# Patient Record
Sex: MALE | Race: WHITE | Employment: OTHER | URBAN - METROPOLITAN AREA
[De-identification: names, ages, dates, MRNs, and addresses within clinical notes are randomized per-mention and may not be internally consistent; named-entity substitution may affect disease eponyms.]

---

## 2015-05-12 LAB — HEMOCCULT STL QL: NEGATIVE

## 2017-08-11 ENCOUNTER — IP HISTORICAL/CONVERTED ENCOUNTER (OUTPATIENT)
Dept: OTHER | Age: 52
End: 2017-08-11

## 2019-09-25 LAB — CREATININE, EXTERNAL: 1.6

## 2019-10-07 LAB — HBA1C MFR BLD HPLC: 9.1 %

## 2019-10-08 LAB — PSA, EXTERNAL: 0.8

## 2020-10-06 RX ORDER — CETIRIZINE HCL 10 MG
TABLET ORAL
Qty: 90 TAB | Refills: 0 | Status: SHIPPED | OUTPATIENT
Start: 2020-10-06 | End: 2021-01-11

## 2020-10-21 VITALS
DIASTOLIC BLOOD PRESSURE: 67 MMHG | WEIGHT: 121 LBS | BODY MASS INDEX: 17.32 KG/M2 | SYSTOLIC BLOOD PRESSURE: 110 MMHG | TEMPERATURE: 97.9 F | HEART RATE: 71 BPM | RESPIRATION RATE: 16 BRPM | OXYGEN SATURATION: 97 % | HEIGHT: 70 IN

## 2020-10-21 PROBLEM — F41.9 CHRONIC ANXIETY: Status: ACTIVE | Noted: 2020-10-21

## 2020-10-21 PROBLEM — E11.42 DIABETIC PERIPHERAL NEUROPATHY (HCC): Status: ACTIVE | Noted: 2020-10-21

## 2020-10-21 PROBLEM — D63.8 ANEMIA OF CHRONIC DISEASE: Status: ACTIVE | Noted: 2020-10-21

## 2020-10-21 PROBLEM — G47.00 INSOMNIA: Status: ACTIVE | Noted: 2020-10-21

## 2020-10-21 PROBLEM — E10.9 TYPE 1 DIABETES MELLITUS (HCC): Status: ACTIVE | Noted: 2020-10-21

## 2020-10-21 PROBLEM — K31.84 GASTROPARESIS: Status: ACTIVE | Noted: 2020-10-21

## 2020-10-21 PROBLEM — I10 BENIGN HYPERTENSION: Status: ACTIVE | Noted: 2020-10-21

## 2020-10-21 RX ORDER — DOCUSATE SODIUM 100 MG/1
100 CAPSULE, LIQUID FILLED ORAL 2 TIMES DAILY
COMMUNITY
End: 2020-12-15

## 2020-10-21 RX ORDER — BLOOD SUGAR DIAGNOSTIC
STRIP MISCELLANEOUS SEE ADMIN INSTRUCTIONS
Status: ON HOLD | COMMUNITY
End: 2021-12-29

## 2020-10-21 RX ORDER — PANCRELIPASE 36000; 180000; 114000 [USP'U]/1; [USP'U]/1; [USP'U]/1
CAPSULE, DELAYED RELEASE PELLETS ORAL
COMMUNITY
End: 2020-12-15

## 2020-10-21 RX ORDER — INSULIN ADMIN. SUPPLIES
INSULIN PEN (EA) SUBCUTANEOUS
COMMUNITY
End: 2020-10-22

## 2020-10-21 RX ORDER — INSULIN DETEMIR 100 [IU]/ML
INJECTION, SOLUTION SUBCUTANEOUS
COMMUNITY
End: 2020-12-15

## 2020-10-21 RX ORDER — INSULIN ASPART 100 [IU]/ML
INJECTION, SOLUTION INTRAVENOUS; SUBCUTANEOUS
COMMUNITY
End: 2021-01-26

## 2020-10-22 RX ORDER — INSULIN ADMIN. SUPPLIES
INSULIN PEN (EA) SUBCUTANEOUS
Qty: 100 CARTRIDGE | Refills: 4 | Status: SHIPPED | OUTPATIENT
Start: 2020-10-22 | End: 2020-12-15

## 2020-11-12 LAB — HBA1C MFR BLD HPLC: 8.2 %

## 2020-12-14 ENCOUNTER — TELEPHONE (OUTPATIENT)
Dept: PHARMACY | Age: 55
End: 2020-12-14

## 2020-12-14 NOTE — TELEPHONE ENCOUNTER
Aníbal Dangelo MD, from below, would patient benefit from lipid panel (and LFTs) and then assessment for statin therapy?  - No noted lipid panel in this EMR or available CareEverywhere labwork    Appears new patient appt with you 12/15/20 - if time permits? Please let me know if I can further assist, or if you would like me to try to reach patient prior to (or after) appt to discuss any further. Thank you,  Patricia Brothers, PharmD, [facility]  Direct: [phone]  Department, toll free: [phone], option 7      ==============================================================  CLINICAL PHARMACY: STATIN REVIEW    SUBJECTIVE:   Identified as DM care gap for [employer]: statin therapy. OBJECTIVE:  Allergies no known allergies    No results found for: CHOL, CHOLPOCT, CHOLX, CHLST, CHOLV, HDL, HDLPOC, HDLP, LDL, LDLCPOC, LDLC, DLDLP, VLDLC, VLDL, TGLX, TRIGL, TRIGP, TGLPOCT, CHHD, CHHDX    Per CareEverywhere, 11/27/19: AST/ALT 16/10    WHITE OR     BP Readings from Last 1 Encounters:   07/13/20 110/67       Social History     Tobacco Use    Smoking status: Never Smoker    Smokeless tobacco: Current User    Tobacco comment: 5+/day   Substance Use Topics    Alcohol use: Not Currently       ASSESSMENT:  Hyperlipidemia Goal: no noted/available lipid panel. May likely benefit from statin therapy, given age and PMH including DM, HTN. · Appears PMH also includes alcohol-induced chronic pancreatitis - available LFTs from >1 year ago appears wnl and 11/23/20 \"Abstract\" noted alcohol use \"Not Currently\".     PLAN:  - Consider lipid panel and LFTs (if not available from another source); then assess for statin therapy

## 2020-12-15 ENCOUNTER — VIRTUAL VISIT (OUTPATIENT)
Dept: INTERNAL MEDICINE CLINIC | Age: 55
End: 2020-12-15
Payer: MEDICARE

## 2020-12-15 DIAGNOSIS — E10.65 HYPERGLYCEMIA DUE TO TYPE 1 DIABETES MELLITUS (HCC): Primary | ICD-10-CM

## 2020-12-15 PROCEDURE — 99213 OFFICE O/P EST LOW 20 MIN: CPT | Performed by: INTERNAL MEDICINE

## 2020-12-15 NOTE — PROGRESS NOTES
Patient states he needs to have a follow up. States he lost his liscence due to a diabetic episode on 10/2020 where he blacked out. States he is back on his insulin pump now and doing good. Has to see doctor to get paperwork completed to get his liscence back. Goes to [place] on Friday. Checks BS 6 times a day. On Friday he goes to get sensor put in his arm for his pump with Marce Brady at Endocrinology. He is having neurologist, endocrinologist and family doctor do paperwork. Lincoln Raymond presents today for   Chief Complaint   Patient presents with   [address]     was a patient of ANTHONY Francis and Dr Thomas Player       Is someone accompanying this pt? No  Is the patient using any DME equipment during OV? No    Depression Screening:  3 most recent PHQ Screens 12/15/2020   Little interest or pleasure in doing things Not at all   Feeling down, depressed, irritable, or hopeless Not at all   Total Score PHQ 2 0       Learning Assessment:  No flowsheet data found. Health Maintenance reviewed and discussed and ordered per Provider. Health Maintenance Due   Topic Date Due    Pneumococcal 0-64 years (1 of 1 - PPSV23) 10/12/1973    Foot Exam Q1  10/12/1977    MICROALBUMIN Q1  10/12/1977    Eye Exam Retinal or Dilated  10/12/1977    Lipid Screen  10/12/1977    DTaP/Tdap/Td series (1 - Tdap) 10/12/1988    Shingrix Vaccine Age 50> (1 of 2) 10/12/2017    Medicare Yearly Exam  08/11/2020    Flu Vaccine (1) 09/01/2020   . Coordination of Care:  1. Have you been to the ER, urgent care clinic since your last visit? Hospitalized since your last visit? no    2. Have you seen or consulted any other health care providers outside of the [address] [place] [address] since your last visit? Include any pap smears or colon screening.  no

## 2020-12-15 NOTE — PROGRESS NOTES
I was at my office in [place], [place] while conducting this encounter. The patient is at home. Consent:  Patient and/or HIS/HER healthcare decision maker is aware that this patient-initiated Telehealth encounter is a billable service, with coverage as determined by patient's insurance carrier. Patient is aware that He/She may receive a bill and has provided verbal consent to proceed: Consent has been obtained within past 12 months of the date of this encounter. This virtual visit was conducted via Telephone    1. Hyperglycemia due to type 1 diabetes mellitus ([facility] [place] [address]  The patient reports that while his blood sugars have been running high overall they are doing okay. His last hemoglobin A1c in November was 8.3. He is being followed by an endocrinologist who is managing all of his diabetic care. He also reports he just had labs drawn including a lipid profile. He is going to bring those to my office. Have also explained to him the that he needs to make an appointment for his annual wellness exam so that we can get all of his screening labs done       Chief Complaint   Patient presents with   [facility]     was a patient of ANTHONY Haider and Dr Jerman Darby        HPI   This is a pleasant 70-year-old gentleman with a history of type 1 diabetes who presents to [facility]. He has seen a couple different providers in our locations and has decided to request me to be his primary care doctor. He reports he sees endocrinology for his type 1 diabetes and although his sugars have been running a little bit high he has been working on it. He recently was implanted with an insulin pump as well. He reports he is overall doing well he has no chest pain palpitation shortness of breath or cough really needs help in getting his license back. He lost it due to hypoglycemic episode.   He states he will bring this by my office for me to look at  Current Outpatient Medications on File Prior to Visit   Medication Sig Dispense Refill    glucose blood VI test strips (Contour Next Test Strips) strip by Does Not Apply route See Admin Instructions.  insulin aspart U-100 (NovoLOG Flexpen U-100 Insulin) 100 unit/mL (3 mL) inpn by SubCUTAneous route. Uses Novolog in insulin pump      cetirizine (ZYRTEC) 10 mg tablet TAKE 1 TABLET EVERY DAY BY ORAL ROUTE. 90 Tab 0    gabapentin (NEURONTIN) 800 mg tablet Take  by mouth three (3) times daily. No current facility-administered medications on file prior to visit.          Patient Active Problem List   Diagnosis Code    Anemia of chronic disease D63.8    Benign hypertension I10    Chronic anxiety F41.9    Type 1 diabetes mellitus ([address] E10.9    Diabetic peripheral neuropathy ([address] E11.42    Gastroparesis K31.84    Insomnia G47.00             Total Time Spent on this Encounter: 15 minutes spent

## 2020-12-16 NOTE — TELEPHONE ENCOUNTER
Zayda Salinas MD  to Me      12/14/20 9:19 AM  I've never seen this patient but will be seeing him. I'll make the assessment when I see him for the first time. Thanks       And per 12/16/20 VV note: \"reports he just had labs drawn including a lipid profile. He is going to bring those to my office. \"      Thank you for review; will close encounter at this time.    =========================================================   For Pharmacy Admin Tracking Only    PHSO: PHSO Patient?: Yes  Total # of Interventions Recommended: Count: 1  - Maintenance Safety Lab Monitoring #: 1  Recommended intervention potential cost savings: 0  Total Interventions Accepted: 0  Time Spent (min): 15

## 2021-01-11 RX ORDER — CETIRIZINE HCL 10 MG
TABLET ORAL
Qty: 90 TAB | Refills: 0 | Status: SHIPPED | OUTPATIENT
Start: 2021-01-11 | End: 2021-04-22

## 2021-01-26 ENCOUNTER — OFFICE VISIT (OUTPATIENT)
Dept: INTERNAL MEDICINE CLINIC | Age: 56
End: 2021-01-26
Payer: MEDICARE

## 2021-01-26 VITALS
HEART RATE: 97 BPM | RESPIRATION RATE: 20 BRPM | TEMPERATURE: 98.8 F | BODY MASS INDEX: 19.33 KG/M2 | WEIGHT: 135 LBS | SYSTOLIC BLOOD PRESSURE: 156 MMHG | HEIGHT: 70 IN | OXYGEN SATURATION: 97 % | DIASTOLIC BLOOD PRESSURE: 80 MMHG

## 2021-01-26 DIAGNOSIS — R05.9 COUGH: ICD-10-CM

## 2021-01-26 DIAGNOSIS — F51.01 PRIMARY INSOMNIA: ICD-10-CM

## 2021-01-26 DIAGNOSIS — I10 HYPERTENSION, UNSPECIFIED TYPE: ICD-10-CM

## 2021-01-26 DIAGNOSIS — E10.65 HYPERGLYCEMIA DUE TO TYPE 1 DIABETES MELLITUS (HCC): ICD-10-CM

## 2021-01-26 DIAGNOSIS — Z00.00 ANNUAL PHYSICAL EXAM: Primary | ICD-10-CM

## 2021-01-26 PROCEDURE — 3017F COLORECTAL CA SCREEN DOC REV: CPT | Performed by: INTERNAL MEDICINE

## 2021-01-26 PROCEDURE — G8753 SYS BP > OR = 140: HCPCS | Performed by: INTERNAL MEDICINE

## 2021-01-26 PROCEDURE — G8427 DOCREV CUR MEDS BY ELIG CLIN: HCPCS | Performed by: INTERNAL MEDICINE

## 2021-01-26 PROCEDURE — 3046F HEMOGLOBIN A1C LEVEL >9.0%: CPT | Performed by: INTERNAL MEDICINE

## 2021-01-26 PROCEDURE — G8510 SCR DEP NEG, NO PLAN REQD: HCPCS | Performed by: INTERNAL MEDICINE

## 2021-01-26 PROCEDURE — G8754 DIAS BP LESS 90: HCPCS | Performed by: INTERNAL MEDICINE

## 2021-01-26 PROCEDURE — 99396 PREV VISIT EST AGE 40-64: CPT | Performed by: INTERNAL MEDICINE

## 2021-01-26 PROCEDURE — 99214 OFFICE O/P EST MOD 30 MIN: CPT | Performed by: INTERNAL MEDICINE

## 2021-01-26 PROCEDURE — G8420 CALC BMI NORM PARAMETERS: HCPCS | Performed by: INTERNAL MEDICINE

## 2021-01-26 PROCEDURE — 2022F DILAT RTA XM EVC RTNOPTHY: CPT | Performed by: INTERNAL MEDICINE

## 2021-01-26 RX ORDER — LANCETS
EACH MISCELLANEOUS
Status: ON HOLD | COMMUNITY
Start: 2020-11-09 | End: 2021-12-29

## 2021-01-26 RX ORDER — LOSARTAN POTASSIUM 50 MG/1
50 TABLET ORAL DAILY
Qty: 30 TAB | Refills: 5 | Status: SHIPPED | OUTPATIENT
Start: 2021-01-26 | End: 2022-01-03

## 2021-01-26 RX ORDER — TRAZODONE HYDROCHLORIDE 50 MG/1
50 TABLET ORAL
Qty: 45 TAB | Refills: 0 | Status: SHIPPED | OUTPATIENT
Start: 2021-01-26 | End: 2022-01-03

## 2021-01-26 RX ORDER — CETIRIZINE HCL 10 MG
10 TABLET ORAL DAILY
COMMUNITY
End: 2021-01-26 | Stop reason: SDUPTHER

## 2021-01-26 NOTE — PROGRESS NOTES
1. Annual physical exam  Lipid profile hemoglobin A1c PSA CMP and a microalbumin. With the exception of his chronic neuropathy and weakness in his left leg as well as his very mild strabismus and chronically slurred speech this is a normal exam except noted below  - LIPID PANEL  - HEMOGLOBIN A1C WITH EAG  - MICROALBUMIN, UR, RAND W/ MICROALB/CREAT RATIO  - PSA SCREENING (SCREENING)  - METABOLIC PANEL, COMPREHENSIVE    2. Hypertension, unspecified type  Uncontrolled. We will start Cozaar 50 mg daily. This will provide renal protection as well in this type I diabetic  - losartan (COZAAR) 50 mg tablet; Take 1 Tab by mouth daily. Dispense: 30 Tab; Refill: 5    3. Hyperglycemia due to type 1 diabetes mellitus [facility])  The patient sees endocrinology but his blood sugars been a little bit elevated. He is due for hemoglobin A1c and a urine microalbumin which I have ordered    4. Primary insomnia  Start trazodone 50 mg at night. He may increase to 100 mg nightly  - traZODone (DESYREL) 50 mg tablet; Take 1 Tab by mouth nightly. If no effect with one tab, may take two tabs 30 mins before going to bed. Dispense: 45 Tab; Refill: 0    5. Cough  -He to monitor this. This could be related to his postnasal drip. Should he have any chest pain fevers or chills he will call me back    Chief Complaint   Patient presents with    Physical        HPI   This is a very pleasant 60-year-old gentleman who presents for his annual physical exam.  He reports he is overall been doing well. He notes his blood sugars have been a little bit elevated in the 1 4150 range but otherwise feels good. He denies any dysuria or changes in urinary frequency. He has no headache new vision changes difficulty swallowing chest pain palpitations. He has had a chronic cough for the past week. It is nonproductive. He has no body aches fevers or chills. He has no loss of smell or taste.   He does report he has chronic weakness in his left leg due to his profound neuropathy. He is up-to-date and had a podiatry exam within the past 6 months and follows with Dr. Jyoti Christianson for his vision. I do not have access to any of his other labs that have been done outside of the facility but he reports he has had some done in the past 7 months. He otherwise reports he is doing well and states that he is up-to-date on his colonoscopy which she received just recently. A polyp was found and he knows he is due in 5 years. He also notes he is able to get up and walk around without difficulty although he does use a cane. His weight has been stable and he has no other complaints  Patient Active Problem List   Diagnosis Code    Anemia of chronic disease D63.8    Benign hypertension I10    Chronic anxiety F41.9    Type 1 diabetes mellitus ([facility] [address] E10.9    Diabetic peripheral neuropathy ([facility] [address] E11.42    Gastroparesis K31.84    Insomnia G47.00        Current Outpatient Medications on File Prior to Visit   Medication Sig Dispense Refill    Microlet Lancet misc       insulin pump (PATIENT SUPPLIED) misc by SubCUTAneous route continuous.  cetirizine (ZYRTEC) 10 mg tablet TAKE 1 TABLET EVERY DAY BY ORAL ROUTE.  90 Tab 0    glucose blood VI test strips (Contour Next Test Strips) strip by Does Not Apply route See Admin Instructions.  gabapentin (NEURONTIN) 800 mg tablet Take  by mouth three (3) times daily. No current facility-administered medications on file prior to visit. ROS  - GEN: no weight gain/loss, no fevers or chills + insomnia  - HEENT: no vision changes, no tinnitus, no sore throat  - CV: no cp, palpitations or edema  - RESP: no sob, + cough  - ABD: no n/v/d, no blood in stool  - : no dysuria or changes in freq.   - SKIN: no rashes, ulcers on arms or legs  - Neuro: no resting tremors, + parasthesia in extremities, no headaches  - MS: + weakness in left lower extremity, + gait abnormalities  - Psych: negative for depression or anxiety      Visit Vitals  BP (!) 156/80   Pulse 97   Temp 98.8 °F (37.1 °C)   Resp 20   Ht 5' 10\" (1.778 m)   Wt 135 lb (61.2 kg)   SpO2 97%   BMI 19.37 kg/m²           Physical Exam  Constitutional:       Appearance: Normal appearance. Normal weight. NAD and pleasant  HENT:      Head: Normocephalic. Nose: Nose normal.      Mouth/Throat: pnd noted     Mouth: Mucous membranes are moist. Throat not inflammed poor dentition  Eyes:      Extraocular Movements: slight strabismus is noted. perrl. Conjunctiva/sclera: Conjunctivae normal. Sclera anicteric     Pupils: Pupils are equal, round, and reactive to light. Cardiovascular:      Rate and Rhythm: slightly tachy 2/6 sm lsb      Pulses: Normal pulses. Pulmonary:      Effort: No respiratory distress. Breath sounds: CTAB and No stridor. No rhonchi. Abdominal:      General: There is no distension. NT, ND  Neurological:      Mental Status: patient is alert and oriented times 3.  No resting tremor, abnormal gait favors right side     Cranial Nerves: cranial nerves grossly intact  Muskuloskeletal     Full ROM in extremities     +2 pt reflexes bilaterally  Skin     Dry without lesions on examined areas including chest and arms, warm to the touch       Deferred  Psychiatry     Calm, normal affect, interacting normally

## 2021-01-26 NOTE — PROGRESS NOTES
Ekta Bhatt presents today for   Chief Complaint   Patient presents with    Insomnia     unable to sleep       Is someone accompanying this pt? no  Is the patient using any DME equipment during OV? no    Depression Screening:  3 most recent PHQ Screens 1/26/2021   Little interest or pleasure in doing things Not at all   Feeling down, depressed, irritable, or hopeless Not at all   Total Score PHQ 2 0       Learning Assessment:  Learning Assessment 12/15/2020   PRIMARY LEARNER Patient   BARRIERS PRIMARY LEARNER NONE   PRIMARY LANGUAGE ENGLISH   LEARNER PREFERENCE PRIMARY READING   ANSWERED BY patient   RELATIONSHIP SELF         Health Maintenance reviewed and discussed and ordered per Provider. Health Maintenance Due   Topic Date Due    Pneumococcal 0-64 years (1 of 1 - PPSV23) 10/12/1973    Foot Exam Q1  10/12/1977    MICROALBUMIN Q1  10/12/1977    Eye Exam Retinal or Dilated  10/12/1977    Lipid Screen  10/12/1977    COVID-19 Vaccine (1 of 2) 10/12/1983    DTaP/Tdap/Td series (1 - Tdap) 10/12/1988    Shingrix Vaccine Age 50> (1 of 2) 10/12/2017    Flu Vaccine (1) 09/01/2020    Medicare Yearly Exam  12/21/2020   . Coordination of Care:  1. Have you been to the ER, urgent care clinic since your last visit? Hospitalized since your last visit? no    2. Have you seen or consulted any other health care providers outside of the [address] [place] [address] since your last visit? Include any pap smears or colon screening.  no

## 2021-01-28 ENCOUNTER — APPOINTMENT (OUTPATIENT)
Dept: GENERAL RADIOLOGY | Age: 56
End: 2021-01-28
Attending: EMERGENCY MEDICINE
Payer: MEDICARE

## 2021-01-28 ENCOUNTER — HOSPITAL ENCOUNTER (EMERGENCY)
Age: 56
Discharge: HOME | End: 2021-01-28
Attending: EMERGENCY MEDICINE
Payer: MEDICARE

## 2021-01-28 VITALS
SYSTOLIC BLOOD PRESSURE: 90 MMHG | RESPIRATION RATE: 16 BRPM | BODY MASS INDEX: 18.9 KG/M2 | WEIGHT: 135 LBS | DIASTOLIC BLOOD PRESSURE: 33 MMHG | HEIGHT: 71 IN | TEMPERATURE: 98.6 F | OXYGEN SATURATION: 98 % | HEART RATE: 64 BPM

## 2021-01-28 DIAGNOSIS — M24.411 RECURRENT SHOULDER DISLOCATION, RIGHT: Primary | ICD-10-CM

## 2021-01-28 PROCEDURE — 75810000303 HC CLSD TRMT  FRACTURE/DISLOCATION W/  ANES

## 2021-01-28 PROCEDURE — 99285 EMERGENCY DEPT VISIT HI MDM: CPT

## 2021-01-28 PROCEDURE — 73030 X-RAY EXAM OF SHOULDER: CPT

## 2021-01-28 RX ORDER — PROPOFOL 10 MG/ML
70 INJECTION, EMULSION INTRAVENOUS
Status: DISCONTINUED | OUTPATIENT
Start: 2021-01-28 | End: 2021-01-28 | Stop reason: HOSPADM

## 2021-01-28 NOTE — ED NOTES
Pt ready for discharge, pt is alert and talking, denies any pain. Pt's nurse Kristy Randle called to come pick pt up. Sling applied to right arm post reduction. VSS. MD aware of blood pressure.   Pt states baseline BP is low, denies any s/s r/t low BP at this time

## 2021-01-28 NOTE — ED TRIAGE NOTES
Pt states he fell last night on his right shoulder, states he felt like he broke his arm at that time, but he went to bed.   Pt woke up with pain and deformity to right shoulder, possible dislocation

## 2021-01-28 NOTE — ED NOTES
70mg Propofol admimistered by anesthesia. Med brought over from OR by anesthesia. Anesthesia consent as well as procedural consent given verbally by pt witnessed by this nurse as well as Asher Wilson RN.   Pt unable to sign r/t injury

## 2021-01-28 NOTE — ED PROVIDER NOTES
EMERGENCY DEPARTMENT HISTORY AND PHYSICAL EXAM      Date: 1/28/2021  Patient Name: Jaswinder Carver    History of Presenting Illness     Chief Complaint   Patient presents with    Shoulder Injury       History Provided By: Patient and EMS    HPI: Jaswinder Carver, 50 y.o. male presents to the ED with complaints of a possible dislocated shoulder. Patient states that he got up early in the morning to get a Coke as he claims his blood sugar was low. In the process of retrieving his drink, the patient states that he tripped and fell, landing on his right shoulder. He states that he was able to sleep on the shoulder, but was still in pain when he woke back up, so he decided to call EMS to bring him to the ED. Patient does have a history of dislocating the same shoulder in the past. Denies any headache. There are no other complaints, changes, or physical findings at this time. PCP: Shay Pollack MD    Current Outpatient Medications   Medication Sig Dispense Refill    insulin pump (PATIENT SUPPLIED) misc by SubCUTAneous route continuous.  losartan (COZAAR) 50 mg tablet Take 1 Tab by mouth daily. 30 Tab 5    traZODone (DESYREL) 50 mg tablet Take 1 Tab by mouth nightly. If no effect with one tab, may take two tabs 30 mins before going to bed. 45 Tab 0    cetirizine (ZYRTEC) 10 mg tablet TAKE 1 TABLET EVERY DAY BY ORAL ROUTE.  90 Tab 0    gabapentin (NEURONTIN) 800 mg tablet Take  by mouth three (3) times daily.  Microlet Lancet misc       glucose blood VI test strips (Contour Next Test Strips) strip by Does Not Apply route See Admin Instructions.          Past History     Past Medical History:  Past Medical History:   Diagnosis Date    Diabetes ([address]     Hypercholesterolemia     Hypertension        Past Surgical History:  Past Surgical History:   Procedure Laterality Date    HX ORTHOPAEDIC Left 08/30/2018    hip joint    HX ORTHOPAEDIC Right     broken knee       Family History:  Family History Problem Relation Age of Onset    Cancer Father         prostate    Cancer Maternal Uncle         prostate    Cancer Paternal Uncle         prostate       Social History:  Social History     Tobacco Use    Smoking status: Never Smoker    Smokeless tobacco: Current User    Tobacco comment: 5+/day   Substance Use Topics    Alcohol use: Not Currently    Drug use: Never       Allergies:  No Known Allergies      Review of Systems   Review of Systems   Constitutional: Negative for chills, diaphoresis and fever. HENT: Negative for congestion, ear pain, rhinorrhea, sore throat and trouble swallowing. Eyes: Negative for photophobia, pain, redness and visual disturbance. Respiratory: Negative for cough, chest tightness, shortness of breath and wheezing. Cardiovascular: Negative for chest pain, palpitations and leg swelling. Gastrointestinal: Negative for abdominal pain, blood in stool, diarrhea, nausea and vomiting. Endocrine: Negative for polydipsia, polyphagia and polyuria. Genitourinary: Negative for dysuria, frequency and urgency. Musculoskeletal: Negative for back pain, joint swelling and neck pain. Right shoulder pain    Skin: Negative for color change, pallor, rash and wound. Allergic/Immunologic: Negative for environmental allergies, food allergies and immunocompromised state. Neurological: Negative for seizures, syncope and headaches. Hematological: Negative for adenopathy. Does not bruise/bleed easily. Psychiatric/Behavioral: Negative for behavioral problems and confusion. The patient is not nervous/anxious. Physical Exam   Physical Exam  Vitals signs and nursing note reviewed. Constitutional:       General: He is not in acute distress. Appearance: Normal appearance. He is normal weight. He is not diaphoretic. HENT:      Head: Normocephalic.       Comments: Small abrasion on the right temple     Right Ear: Tympanic membrane, ear canal and external ear normal. Nose: Nose normal.      Mouth/Throat:      Mouth: Mucous membranes are moist.      Pharynx: Oropharynx is clear. Eyes:      Extraocular Movements: Extraocular movements intact. Conjunctiva/sclera: Conjunctivae normal.      Pupils: Pupils are equal, round, and reactive to light. Neck:      Musculoskeletal: Normal range of motion and neck supple. No neck rigidity or muscular tenderness. Cardiovascular:      Rate and Rhythm: Normal rate and regular rhythm. Pulses: Normal pulses. Heart sounds: Normal heart sounds. Pulmonary:      Effort: Pulmonary effort is normal. No respiratory distress. Breath sounds: Normal breath sounds. Chest:      Chest wall: No tenderness. Abdominal:      General: Bowel sounds are normal.      Palpations: Abdomen is soft. There is no mass. Tenderness: There is no abdominal tenderness. Musculoskeletal:         General: No swelling. Right shoulder: He exhibits decreased range of motion, tenderness, deformity and pain. He exhibits no swelling, no spasm and normal pulse. Arms:    Skin:     General: Skin is warm. Coloration: Skin is not pale. Findings: No erythema. Neurological:      Mental Status: He is alert and oriented to person, place, and time. Sensory: No sensory deficit. Motor: No weakness. Psychiatric:         Mood and Affect: Mood normal.         Behavior: Behavior normal.         Thought Content: Thought content normal.         Judgment: Judgment normal.         Diagnostic Study Results     Labs -   No results found for this or any previous visit (from the past 12 hour(s)). Radiologic Studies -   XR SHOULDER RT AP/LAT MIN 2 V   Final Result         1. No evidence of acute fracture or malalignment involving the right shoulder.    2. Chronic appearing midshaft right clavicular deformity      XR SHOULDER RT AP/LAT MIN 2 V    (Results Pending)     CT Results  (Last 48 hours)    None        CXR Results  (Last 48 hours)    None          Medical Decision Making and ED Course   I am the first provider for this patient. I reviewed the vital signs, available nursing notes, past medical history, past surgical history, family history and social history. Vital Signs-Reviewed the patient's vital signs. Patient Vitals for the past 12 hrs:   Temp Pulse Resp BP SpO2   01/28/21 1208  68 14 100/75 100 %   01/28/21 1203  74 14 113/65 100 %   01/28/21 1112 98.6 °F (37 °C) 75 18 90/61 98 %       EKG interpretation: (Preliminary): Performed at ; Read at      Records Reviewed: Nursing Notes    The patient presents with     ED Course:   Initial assessment performed. The patients presenting problems have been discussed, and they are in agreement with the care plan formulated and outlined with them. I have encouraged them to ask questions as they arise throughout their visit. Provider Notes (Medical Decision Making):     History and physical consistent with an isolated dislocation without other trauma. Will check x-ray, and reduce under moderate sedation. Review of radiology read of initial x-ray reveals shoulder misalignment due to chronic clavicle fracture. However, patient reports that the pain was excruciating prior to procedure and is now completely resolved. X-ray does reveal some better alignment and I suspect some level of dislocation despite radiology finding. Okay for discharge  Consultations:       Consultations:     Anesthesia: CRNA, provided sedation. Procedures     1200 pm: Right shoulder reduction; fixed due to counter rotation. Mild sedation was given via Anesthesia. Patient tolerated well. Disposition     Disposition:         DISCHARGE PLAN:  1. Current Discharge Medication List      CONTINUE these medications which have NOT CHANGED    Details   insulin pump (PATIENT SUPPLIED) misc by SubCUTAneous route continuous. losartan (COZAAR) 50 mg tablet Take 1 Tab by mouth daily.   Qty: 30 Tab, Refills: 5    Associated Diagnoses: Hypertension, unspecified type      traZODone (DESYREL) 50 mg tablet Take 1 Tab by mouth nightly. If no effect with one tab, may take two tabs 30 mins before going to bed. Qty: 45 Tab, Refills: 0    Associated Diagnoses: Primary insomnia      cetirizine (ZYRTEC) 10 mg tablet TAKE 1 TABLET EVERY DAY BY ORAL ROUTE. Qty: 90 Tab, Refills: 0      gabapentin (NEURONTIN) 800 mg tablet Take  by mouth three (3) times daily. Microlet Lancet misc       glucose blood VI test strips (Contour Next Test Strips) strip by Does Not Apply route See Admin Instructions. 2.   Follow-up Information    None       3. Return to ED if worse     Diagnosis     Clinical Impression: No diagnosis found. Attestations:    By signing my name below, I, Kiki Ardon, attest that this documentation has been prepared under the direction and in presence of Dr. Roosevelt Gonzalez on 01/28/21. Electronically signed: Kiki Ardon, 01/28/21, 11:22 AM      Please note that this dictation was completed with [employer], the [employer] voice recognition software. Quite often unanticipated grammatical, syntax, homophones, and other interpretive errors are inadvertently transcribed by the computer software. Please disregard these errors. Please excuse any errors that have escaped final proofreading. Thank you.

## 2021-02-01 ENCOUNTER — APPOINTMENT (OUTPATIENT)
Dept: GENERAL RADIOLOGY | Age: 56
End: 2021-02-01
Attending: EMERGENCY MEDICINE
Payer: MEDICARE

## 2021-02-01 ENCOUNTER — HOSPITAL ENCOUNTER (EMERGENCY)
Age: 56
Discharge: HOME | End: 2021-02-01
Attending: EMERGENCY MEDICINE
Payer: MEDICARE

## 2021-02-01 VITALS
WEIGHT: 135 LBS | DIASTOLIC BLOOD PRESSURE: 55 MMHG | SYSTOLIC BLOOD PRESSURE: 118 MMHG | TEMPERATURE: 99.1 F | BODY MASS INDEX: 18.9 KG/M2 | RESPIRATION RATE: 18 BRPM | HEART RATE: 78 BPM | OXYGEN SATURATION: 100 % | HEIGHT: 71 IN

## 2021-02-01 DIAGNOSIS — R53.1 WEAKNESS: ICD-10-CM

## 2021-02-01 DIAGNOSIS — B34.9 VIRAL ILLNESS: ICD-10-CM

## 2021-02-01 DIAGNOSIS — R53.83 FATIGUE, UNSPECIFIED TYPE: Primary | ICD-10-CM

## 2021-02-01 DIAGNOSIS — E86.0 DEHYDRATION: ICD-10-CM

## 2021-02-01 DIAGNOSIS — E13.9 OTHER SPECIFIED DIABETES MELLITUS WITHOUT COMPLICATION, WITH LONG-TERM CURRENT USE OF INSULIN (HCC): ICD-10-CM

## 2021-02-01 DIAGNOSIS — Z79.4 OTHER SPECIFIED DIABETES MELLITUS WITHOUT COMPLICATION, WITH LONG-TERM CURRENT USE OF INSULIN (HCC): ICD-10-CM

## 2021-02-01 LAB
ALBUMIN SERPL-MCNC: 3.7 G/DL (ref 3.5–4.7)
ALBUMIN/GLOB SERPL: 1.1 {RATIO}
ALP SERPL-CCNC: 88 U/L (ref 38–126)
ALT SERPL-CCNC: 16 U/L (ref 3–72)
ANION GAP SERPL CALC-SCNC: 13 MMOL/L
AST SERPL W P-5'-P-CCNC: 21 U/L (ref 17–74)
BASOPHILS # BLD: 0 K/UL (ref 0–0.1)
BASOPHILS NFR BLD: 0 % (ref 0–2)
BILIRUB DIRECT SERPL-MCNC: <0.1 MG/DL (ref 0–0.3)
BILIRUB SERPL-MCNC: 0.2 MG/DL (ref 0.2–1)
BUN SERPL-MCNC: 63 MG/DL (ref 9–21)
BUN/CREAT SERPL: 29
CA-I BLD-MCNC: 7.8 MG/DL (ref 8.5–10.5)
CHLORIDE SERPL-SCNC: 96 MMOL/L (ref 94–111)
CO2 SERPL-SCNC: 19 MMOL/L (ref 21–33)
CREAT SERPL-MCNC: 2.2 MG/DL (ref 0.8–1.5)
EOSINOPHIL # BLD: 0 K/UL (ref 0–0.4)
EOSINOPHIL NFR BLD: 0 % (ref 0–5)
ERYTHROCYTE [DISTWIDTH] IN BLOOD BY AUTOMATED COUNT: 13.3 % (ref 11.6–14.5)
FLUAV AG NPH QL IA: NEGATIVE
FLUBV AG NOSE QL IA: NEGATIVE
GLOBULIN SER CALC-MCNC: 3.4 G/DL
GLUCOSE SERPL-MCNC: 134 MG/DL (ref 70–110)
HCT VFR BLD AUTO: 37.8 % (ref 36–48)
HGB BLD-MCNC: 12.5 G/DL (ref 13–16)
IMM GRANULOCYTES # BLD AUTO: 0 K/UL
IMM GRANULOCYTES NFR BLD AUTO: 0 %
LYMPHOCYTES # BLD: 0.8 K/UL (ref 0.9–3.6)
LYMPHOCYTES NFR BLD: 11 % (ref 21–52)
MCH RBC QN AUTO: 28.7 PG (ref 24–34)
MCHC RBC AUTO-ENTMCNC: 33.1 G/DL (ref 31–37)
MCV RBC AUTO: 86.9 FL (ref 74–97)
MONOCYTES # BLD: 0.8 K/UL (ref 0.05–1.2)
MONOCYTES NFR BLD: 11 % (ref 3–10)
NEUTS SEG # BLD: 5.8 K/UL (ref 1.8–8)
NEUTS SEG NFR BLD: 78 % (ref 40–73)
PLATELET # BLD AUTO: 236 K/UL (ref 135–420)
PMV BLD AUTO: 11.8 FL (ref 9.2–11.8)
POTASSIUM SERPL-SCNC: 3.3 MMOL/L (ref 3.2–5.1)
PROT SERPL-MCNC: 7.1 G/DL (ref 6.1–8.4)
RBC # BLD AUTO: 4.35 M/UL (ref 4.7–5.5)
SARS-COV-2, COV2: NORMAL
SODIUM SERPL-SCNC: 128 MMOL/L (ref 135–145)
WBC # BLD AUTO: 7.4 K/UL (ref 4.6–13.2)

## 2021-02-01 PROCEDURE — 96360 HYDRATION IV INFUSION INIT: CPT

## 2021-02-01 PROCEDURE — 96361 HYDRATE IV INFUSION ADD-ON: CPT

## 2021-02-01 PROCEDURE — U0003 INFECTIOUS AGENT DETECTION BY NUCLEIC ACID (DNA OR RNA); SEVERE ACUTE RESPIRATORY SYNDROME CORONAVIRUS 2 (SARS-COV-2) (CORONAVIRUS DISEASE [COVID-19]), AMPLIFIED PROBE TECHNIQUE, MAKING USE OF HIGH THROUGHPUT TECHNOLOGIES AS DESCRIBED BY CMS-2020-01-R: HCPCS

## 2021-02-01 PROCEDURE — 74011250636 HC RX REV CODE- 250/636: Performed by: EMERGENCY MEDICINE

## 2021-02-01 PROCEDURE — 87804 INFLUENZA ASSAY W/OPTIC: CPT

## 2021-02-01 PROCEDURE — 71045 X-RAY EXAM CHEST 1 VIEW: CPT

## 2021-02-01 PROCEDURE — 36415 COLL VENOUS BLD VENIPUNCTURE: CPT

## 2021-02-01 PROCEDURE — 93005 ELECTROCARDIOGRAM TRACING: CPT

## 2021-02-01 PROCEDURE — 80048 BASIC METABOLIC PNL TOTAL CA: CPT

## 2021-02-01 PROCEDURE — 74011250637 HC RX REV CODE- 250/637: Performed by: EMERGENCY MEDICINE

## 2021-02-01 PROCEDURE — 99284 EMERGENCY DEPT VISIT MOD MDM: CPT

## 2021-02-01 PROCEDURE — 80076 HEPATIC FUNCTION PANEL: CPT

## 2021-02-01 PROCEDURE — 85025 COMPLETE CBC W/AUTO DIFF WBC: CPT

## 2021-02-01 RX ORDER — ACETAMINOPHEN 500 MG
1000 TABLET ORAL
Status: COMPLETED | OUTPATIENT
Start: 2021-02-01 | End: 2021-02-01

## 2021-02-01 RX ADMIN — SODIUM CHLORIDE 1000 ML: 9 INJECTION, SOLUTION INTRAVENOUS at 19:30

## 2021-02-01 RX ADMIN — SODIUM CHLORIDE 1000 ML: 9 INJECTION, SOLUTION INTRAVENOUS at 18:02

## 2021-02-01 RX ADMIN — ACETAMINOPHEN 1000 MG: 500 TABLET ORAL at 19:20

## 2021-02-01 NOTE — DISCHARGE INSTRUCTIONS
Please see your doctor or clinic in 24 to 48 hours. Please return for increasing pain, weakness, fevers, chills or any other concerning symptoms.   Please talk to your doctor about further testing and work-up for your ongoing fatigue

## 2021-02-01 NOTE — ED PROVIDER NOTES
Patient states he just feels fatigue, he has not eaten much because his appetite is not been good over the last 2 to 3 days. Feels like he could use a bag or 2 of fluids. States this is happened to him in the past.  He does not exactly know why. Says he has been following his blood sugars at home and they have been good, he also has been using his insulin pump. Denies chest pain or shortness of breath. States he does not have any fevers or chills, he tends to always be a little cold, this is not unusual for him and it is what he is feeling currently. Denies any recent infection    No change in bowel or bladder control    The history is provided by the patient. Lethargy  This is a new problem. Pertinent negatives include no chest pain, no abdominal pain, no headaches and no shortness of breath. Nothing aggravates the symptoms. He has tried nothing for the symptoms.         Past Medical History:   Diagnosis Date    Diabetes ([address]     Hypercholesterolemia     Hypertension        Past Surgical History:   Procedure Laterality Date    HX ORTHOPAEDIC Left 08/30/2018    hip joint    HX ORTHOPAEDIC Right     broken knee         Family History:   Problem Relation Age of Onset    Cancer Father         prostate    Cancer Maternal Uncle         prostate    Cancer Paternal Uncle         prostate       Social History     Socioeconomic History    Marital status:      Spouse name: Not on file    Number of children: Not on file    Years of education: Not on file    Highest education level: Not on file   Occupational History    Not on file   Social Needs    Financial resource strain: Not on file    Food insecurity     Worry: Not on file     Inability: Not on file    Transportation needs     Medical: Not on file     Non-medical: Not on file   Tobacco Use    Smoking status: Never Smoker    Smokeless tobacco: Current User    Tobacco comment: 5+/day   Substance and Sexual Activity    Alcohol use: Not Currently    Drug use: Never    Sexual activity: Not Currently     Partners: Female   Lifestyle    Physical activity     Days per week: Not on file     Minutes per session: Not on file    Stress: Not on file   Relationships    Social connections     Talks on phone: Not on file     Gets together: Not on file     Attends Sikhism service: Not on file     Active member of club or organization: Not on file     Attends meetings of clubs or organizations: Not on file     Relationship status: Not on file    Intimate partner violence     Fear of current or ex partner: Not on file     Emotionally abused: Not on file     Physically abused: Not on file     Forced sexual activity: Not on file   Other Topics Concern    Not on file   Social History Narrative    Not on file         ALLERGIES: Patient has no known allergies. Review of Systems   Constitutional: Positive for fatigue. Negative for diaphoresis and fever. HENT: Negative for congestion. Eyes: Negative for visual disturbance. Respiratory: Negative for shortness of breath. Cardiovascular: Negative for chest pain and leg swelling. Gastrointestinal: Negative for abdominal pain. Endocrine: Negative for polyuria. Genitourinary: Negative for dysuria. Skin: Negative for rash. Neurological: Negative for weakness and headaches. Psychiatric/Behavioral: Negative for behavioral problems. Vitals:    02/01/21 1740 02/01/21 1904 02/01/21 2017   BP: 130/78 (!) 153/75 (!) 118/55   Pulse: 88 87 78   Resp: 18 18 18   Temp: 98.9 °F (37.2 °C) (!) 101.2 °F (38.4 °C) (!) 101.1 °F (38.4 °C)   SpO2: 100% 100% 100%   Weight: 61.2 kg (135 lb)     Height: 5' 11\" (1.803 m)              Physical Exam  Vitals signs reviewed. Constitutional:       Appearance: He is well-developed. He is ill-appearing. Comments: Patient chronically ill in appearance, thin and cachectic, no acute distress   HENT:      Head: Normocephalic and atraumatic.       Nose: Nose normal.   Eyes:      Conjunctiva/sclera: Conjunctivae normal.   Neck:      Musculoskeletal: Neck supple. Trachea: No tracheal deviation. Cardiovascular:      Rate and Rhythm: Normal rate and regular rhythm. Heart sounds: Normal heart sounds. Pulmonary:      Effort: Pulmonary effort is normal. No respiratory distress. Breath sounds: Normal breath sounds. Abdominal:      General: There is no distension. Palpations: Abdomen is soft. Tenderness: There is no abdominal tenderness. Musculoskeletal: Normal range of motion. Comments: Right arm in sling   Lymphadenopathy:      Cervical: No cervical adenopathy. Skin:     General: Skin is warm and dry. Capillary Refill: Capillary refill takes less than 2 seconds. Findings: No erythema. Neurological:      Mental Status: He is alert and oriented to person, place, and time. Cranial Nerves: No cranial nerve deficit. Comments: Grossly intact   Psychiatric:         Mood and Affect: Mood normal.          MDM  Number of Diagnoses or Management Options  Diagnosis management comments: Patient without specific complaints, well-known to staff they state this is his baseline. Comfortable. He has checked his blood sugar on arrival here, reports it to be in the 150s. Will provide gentle hydration, screening labs. ED Course as of Feb 01 2047   Mon Feb 01, 2021   [id number] Case signed out to oncoming physician pending labs, anticipate discharge to home if no anion gap or significant laboratory abnormalities    [BT]      ED Course User Index  [BT] Joey Souza MD     Assumed care of patient from [place] [id number], patient awaiting labs and hydration. Ordered exam of patient, he does not appear toxic, he tells me he feels fine and wants to go home. However nurse instructs me temperature of 101.2. Patient is not tachycardic. Added chest x-ray, liver function tests, urine analysis and culture.   CBC is already back and patient has no leukocytosis. On review of chest x-ray, no acute findings. Because of the fever, flu and Covid tests were ordered. Flu is negative. Patient's chemistry shows some hyponatremia and also bicarb of 19 and a creatinine of 2.2. On review of old records last creatinine about 4 years ago was 1.6. Suspect patient's hyponatremia and elevated creatinine most likely due to dehydration. Patient given 2 L of normal saline in the ED he states he feels much better and wants to go home. He refused to give urine and refused catheterization. He is aware of risk of missing UTI. I do however suspect patient's symptoms are related to viral illness. Will put him on Covid precautions anterior results are known. 8:39 PM  I have spent 45 minutes of critical care time involved in lab review, consultations with specialist, family decision-making, and documentation. During this entire length of time I was immediately available to the patient. Critical Care: The reason for providing this level of medical care for this critically ill patient was due a critical illness that impaired one or more vital organ systems such that there was a high probability of imminent or life threatening deterioration in the patients condition. This care involved high complexity decision making to assess, manipulate, and support vital system functions, to treat this degreee vital organ system failure and to prevent further life threatening deterioration of the patients condition. Procedures        Diagnosis:   1. Fatigue, unspecified type    2. Weakness    3. Other specified diabetes mellitus without complication, with long-term current use of insulin ([facility] [address]    4. Viral illness    5.  Dehydration          Disposition:     Follow-up Information     Follow up With Specialties Details Why Liana Zhang., MD Internal Medicine Schedule an appointment as soon as possible for a visit in 2 days  [address] [place] [address] [place] [address]  [phone]            Patient's Medications   Start Taking    No medications on file   Continue Taking    CETIRIZINE (ZYRTEC) 10 MG TABLET    TAKE 1 TABLET EVERY DAY BY ORAL ROUTE. GABAPENTIN (NEURONTIN) 800 MG TABLET    Take  by mouth three (3) times daily. GLUCOSE BLOOD VI TEST STRIPS (CONTOUR NEXT TEST STRIPS) STRIP    by Does Not Apply route See Admin Instructions. INSULIN PUMP (PATIENT SUPPLIED) MISC    by SubCUTAneous route continuous. LOSARTAN (COZAAR) 50 MG TABLET    Take 1 Tab by mouth daily. MICROLET LANCET MISC        TRAZODONE (DESYREL) 50 MG TABLET    Take 1 Tab by mouth nightly. If no effect with one tab, may take two tabs 30 mins before going to bed.    These Medications have changed    No medications on file   Stop Taking    No medications on file

## 2021-02-01 NOTE — ED TRIAGE NOTES
Patient recently in the ER for dislocated shoulder. Discharged home and has been weak. States had not been eating well and is on a continuous insulin pump. Blood glucose has been WNL. POC glucose by . Patient denies fever, shortness of breath, nausea, or sweating.

## 2021-02-02 LAB
ATRIAL RATE: 85 BPM
CALCULATED P AXIS, ECG09: 88 DEGREES
CALCULATED R AXIS, ECG10: 78 DEGREES
CALCULATED T AXIS, ECG11: 85 DEGREES
DIAGNOSIS, 93000: NORMAL
P-R INTERVAL, ECG05: 156 MS
Q-T INTERVAL, ECG07: 367 MS
QRS DURATION, ECG06: 82 MS
QTC CALCULATION (BEZET), ECG08: 437 MS
VENTRICULAR RATE, ECG03: 85 BPM

## 2021-02-03 LAB — SARS-COV-2, COV2NT: DETECTED

## 2021-02-06 RX ORDER — AZITHROMYCIN 250 MG/1
TABLET, FILM COATED ORAL
Qty: 6 TAB | Refills: 0 | Status: SHIPPED | OUTPATIENT
Start: 2021-02-06 | End: 2021-02-11

## 2021-02-11 ENCOUNTER — VIRTUAL VISIT (OUTPATIENT)
Dept: INTERNAL MEDICINE CLINIC | Age: 56
End: 2021-02-11
Payer: MEDICARE

## 2021-02-11 DIAGNOSIS — E87.1 HYPONATREMIA: ICD-10-CM

## 2021-02-11 DIAGNOSIS — U07.1 COVID-19: Primary | ICD-10-CM

## 2021-02-11 DIAGNOSIS — R79.89 ELEVATED SERUM CREATININE: ICD-10-CM

## 2021-02-11 DIAGNOSIS — Z09 HOSPITAL DISCHARGE FOLLOW-UP: ICD-10-CM

## 2021-02-11 PROCEDURE — 99443 PR PHYS/QHP TELEPHONE EVALUATION 21-30 MIN: CPT | Performed by: INTERNAL MEDICINE

## 2021-02-11 PROCEDURE — 1111F DSCHRG MED/CURRENT MED MERGE: CPT | Performed by: INTERNAL MEDICINE

## 2021-02-11 NOTE — PROGRESS NOTES
I was at my office in [place], [place] while conducting this encounter. The patient is at home. Consent:  Patient and/or HIS/HER healthcare decision maker is aware that this patient-initiated Telehealth encounter is a billable service, with coverage as determined by patient's insurance carrier. Patient is aware that He/She may receive a bill and has provided verbal consent to proceed: Consent has been obtained within past 12 months of the date of this encounter. This virtual visit was conducted via Telephone    1. COVID-19  He appears to be recovering. I have reviewed the ER note and all of the labs associated with that visit to the emergency room on February 1. He is not short of breath he still has a dry cough but he has no further fever chills or body aches. His blood sugars have been in the low 100s as well which is better than I would expect in a patient with Covid. I told him to report to the ED should he develop any chest pain or worsening shortness of breath    2. Elevated serum creatinine  Creatinine was greater than 2 at his visit on February 1. A little bit concerned about this we will recheck a BMP just to make sure he did not sustain any further insult to his renal function. Also need to consider further work-up as of his renal insufficiency. In further review of his labs I do see an elevated creatinine of 1.6 at 1 point.  - METABOLIC PANEL, BASIC    3. Hospital discharge follow-up  I have reconciled the patient's discharge meds  - [facility] MED LIST       Chief Complaint   Patient presents with   [facility] Follow Up     ER    Results for Luis Saravia (MRN [id number]) as of 2/11/2021 15:22   Ref.  Range 2/1/2021 18:25   Sodium Latest Ref Range: 135 - 145 mmol/L 128 (L)   Potassium Latest Ref Range: 3.2 - 5.1 mmol/L 3.3   Chloride Latest Ref Range: 94 - 111 mmol/L 96   CO2 Latest Ref Range: 21 - 33 mmol/L 19 (L)   Anion gap Latest Units: mmol/L 13 Glucose Latest Ref Range: 70 - 110 mg/dL 134 (H)   BUN Latest Ref Range: 9 - 21 mg/dL 63 (H)   Creatinine Latest Ref Range: 0.8 - 1.50 mg/dL 2.20 (H)   BUN/Creatinine ratio Latest Units:   29   Calcium Latest Ref Range: 8.5 - 10.5 mg/dL 7.8 (L)   GFR est non-AA Latest Units: ml/min/1.73m2 31   GFR est AA Latest Units: ml/min/1.73m2 38   Bilirubin, total Latest Ref Range: 0.2 - 1.0 mg/dL 0.2   Bilirubin, direct Latest Ref Range: 0.0 - 0.3 mg/dL <0.1   Protein, total Latest Ref Range: 6.1 - 8.4 g/dL 7.1   Albumin Latest Ref Range: 3.5 - 4.7 g/dL 3.7   Globulin Latest Units: g/dL 3.4   A-G Ratio Latest Units:   1.1   ALT Latest Ref Range: 3 - 72 U/L 16   AST Latest Ref Range: 17 - 74 U/L 21   Alk. phosphatase Latest Ref Range: 38 - 126 U/L 88       HPI   This is a very pleasant 44-year-old gentleman with a history of insulin-dependent type 1 diabetes hypertension who presented to the emergency room on February 1 because of cough fever and shortness of breath. He was diagnosed with Covid and also found to have acute renal failure and hyponatremia. He was given IV fluids and subsequently discharged. Of note he was also given antibiotics which she just completed today. He reports he is doing a little bit better. He has no new shortness of breath he has no new headache he still some mild body aches along with a dry cough. He denies any chest pain or new back pain. His blood sugars have been running in the low 100s. He has no nausea vomiting or diarrhea and otherwise he feels okay. He has an appointment with his endocrinologist next week. I asked him about this because he noted some of his blood sugars have been in the low 200s which he reports is good for him. I am a little bit concerned about his blood glucose control. When I talked to him about his kidney function he noted that no one had ever told him he had any kidney issues.   Of note given that this patient is relatively new to me I was unaware of any renal problems until I looked further back and saw some elevated creatinine readings. The patient reports he is urinating without difficulty and otherwise feels well  Current Outpatient Medications on File Prior to Visit   Medication Sig Dispense Refill    Microlet Lancet misc       insulin pump (PATIENT SUPPLIED) misc by SubCUTAneous route continuous.  losartan (COZAAR) 50 mg tablet Take 1 Tab by mouth daily. 30 Tab 5    traZODone (DESYREL) 50 mg tablet Take 1 Tab by mouth nightly. If no effect with one tab, may take two tabs 30 mins before going to bed. 45 Tab 0    cetirizine (ZYRTEC) 10 mg tablet TAKE 1 TABLET EVERY DAY BY ORAL ROUTE.  90 Tab 0    glucose blood VI test strips (Contour Next Test Strips) strip by Does Not Apply route See Admin Instructions.  gabapentin (NEURONTIN) 800 mg tablet Take  by mouth three (3) times daily. No current facility-administered medications on file prior to visit.          Patient Active Problem List   Diagnosis Code    Anemia of chronic disease D63.8    Benign hypertension I10    Chronic anxiety F41.9    Type 1 diabetes mellitus ([address] E10.9    Diabetic peripheral neuropathy ([address] E11.42    Gastroparesis K31.84    Insomnia G47.00             Total Time Spent on this Encounter: greater than 21 minutes spent

## 2021-02-11 NOTE — PROGRESS NOTES
Patient went to ER not feeling well, very fatigued. Tested positive for Covid. Roxann Hillman presents today for   Chief Complaint   Patient presents with   [facility] Follow Up     ER       Depression Screening:  3 most recent PHQ Screens 2/11/2021   Little interest or pleasure in doing things Not at all   Feeling down, depressed, irritable, or hopeless Not at all   Total Score PHQ 2 0       Learning Assessment:  Learning Assessment 12/15/2020   PRIMARY LEARNER Patient   BARRIERS PRIMARY LEARNER NONE   PRIMARY LANGUAGE ENGLISH   LEARNER PREFERENCE PRIMARY READING   ANSWERED BY patient   RELATIONSHIP SELF       Health Maintenance reviewed and discussed and ordered per Provider. Health Maintenance Due   Topic Date Due    Pneumococcal 0-64 years (1 of 1 - PPSV23) 10/12/1973    COVID-19 Vaccine (1 of 2) 10/12/1983    DTaP/Tdap/Td series (1 - Tdap) 10/12/1988    Shingrix Vaccine Age 50> (1 of 2) 10/12/2017    Flu Vaccine (1) 09/01/2020    Medicare Yearly Exam  12/21/2020   . Coordination of Care:  1. Have you been to the ER, urgent care clinic since your last visit? Hospitalized since your last visit? no    2. Have you seen or consulted any other health care providers outside of the [address] [place] [address] since your last visit? Include any pap smears or colon screening.  no

## 2021-02-12 NOTE — ED NOTES
2018 2/11/2021   Patient called back  to inform him that he was Covid positive. He states that he also received another call a few days ago from the hospital informing him of the same. He is doing okay no new or worsening symptoms he has been advised to maintain quarantine as per CDC guidelines for the next 4 days complete his quarantine. He was advised to return to emergency room for any new or worsening symptoms.   Juan Antonio Stringer MD

## 2021-02-25 ENCOUNTER — HOSPITAL ENCOUNTER (OUTPATIENT)
Dept: LAB | Age: 56
Discharge: HOME | End: 2021-02-25

## 2021-02-26 LAB
HBA1C MFR BLD HPLC: 8.3 %
LDL-C, EXTERNAL: 74
PSA, EXTERNAL: 1.6

## 2021-03-16 ENCOUNTER — OFFICE VISIT (OUTPATIENT)
Dept: INTERNAL MEDICINE CLINIC | Age: 56
End: 2021-03-16
Payer: MEDICARE

## 2021-03-16 VITALS
OXYGEN SATURATION: 99 % | TEMPERATURE: 97.3 F | HEART RATE: 95 BPM | BODY MASS INDEX: 18.9 KG/M2 | WEIGHT: 135 LBS | HEIGHT: 71 IN | SYSTOLIC BLOOD PRESSURE: 141 MMHG | DIASTOLIC BLOOD PRESSURE: 82 MMHG | RESPIRATION RATE: 18 BRPM

## 2021-03-16 DIAGNOSIS — I10 BENIGN ESSENTIAL HTN: ICD-10-CM

## 2021-03-16 DIAGNOSIS — E87.6 HYPOKALEMIA: ICD-10-CM

## 2021-03-16 DIAGNOSIS — E10.65 TYPE 1 DIABETES MELLITUS WITH HYPERGLYCEMIA (HCC): ICD-10-CM

## 2021-03-16 DIAGNOSIS — R79.89 ELEVATED SERUM CREATININE: Primary | ICD-10-CM

## 2021-03-16 PROCEDURE — G8420 CALC BMI NORM PARAMETERS: HCPCS | Performed by: INTERNAL MEDICINE

## 2021-03-16 PROCEDURE — 3046F HEMOGLOBIN A1C LEVEL >9.0%: CPT | Performed by: INTERNAL MEDICINE

## 2021-03-16 PROCEDURE — 2022F DILAT RTA XM EVC RTNOPTHY: CPT | Performed by: INTERNAL MEDICINE

## 2021-03-16 PROCEDURE — G8754 DIAS BP LESS 90: HCPCS | Performed by: INTERNAL MEDICINE

## 2021-03-16 PROCEDURE — G8510 SCR DEP NEG, NO PLAN REQD: HCPCS | Performed by: INTERNAL MEDICINE

## 2021-03-16 PROCEDURE — G8427 DOCREV CUR MEDS BY ELIG CLIN: HCPCS | Performed by: INTERNAL MEDICINE

## 2021-03-16 PROCEDURE — 3017F COLORECTAL CA SCREEN DOC REV: CPT | Performed by: INTERNAL MEDICINE

## 2021-03-16 PROCEDURE — 99214 OFFICE O/P EST MOD 30 MIN: CPT | Performed by: INTERNAL MEDICINE

## 2021-03-16 PROCEDURE — G8753 SYS BP > OR = 140: HCPCS | Performed by: INTERNAL MEDICINE

## 2021-03-16 NOTE — PROGRESS NOTES
1. Elevated serum creatinine  His creatinine was quite elevated just a month ago. I am a little bit concerned that he is got rather significant chronic kidney disease due to uncontrolled diabetes. We will recheck a CMP  - METABOLIC PANEL, COMPREHENSIVE    2. Benign essential HTN  His initial blood pressure reading was incredibly high although in reviewing his blood pressure readings previously they were all low. Manual was 141/82. We will continue to monitor    3. Type 1 diabetes mellitus with hyperglycemia (HCC)  He is being followed by endocrine    4. Hypokalemia  Recheck a CMP to see where his potassium is    Total time spent with this encounter was greater than 43 minutes more than 50% spent in coordination of care and counseling including 15 minutes filling out paperwork  Results for Inocencia Pineda (MRN [id number]) as of 3/16/2021 12:41   Ref. Range 2/1/2021 18:25 2/1/2021 19:15   WBC Latest Ref Range: 4.6 - 13.2 K/uL 7.4    RBC Latest Ref Range: 4.70 - 5.50 M/uL 4.35 (L)    HGB Latest Ref Range: 13.0 - 16.0 g/dL 12.5 (L)    HCT Latest Ref Range: 36.0 - 48.0 % 37.8    MCV Latest Ref Range: 74.0 - 97.0 FL 86.9    MCH Latest Ref Range: 24.0 - 34.0 PG 28.7    MCHC Latest Ref Range: 31.0 - 37.0 g/dL 33.1    RDW Latest Ref Range: 11.6 - 14.5 % 13.3    PLATELET Latest Ref Range: 135 - 420 K/uL 236    MPV Latest Ref Range: 9.2 - 11.8 FL 11.8    NEUTROPHILS Latest Ref Range: 40 - 73 % 78 (H)    LYMPHOCYTES Latest Ref Range: 21 - 52 % 11 (L)    MONOCYTES Latest Ref Range: 3 - 10 % 11 (H)    EOSINOPHILS Latest Ref Range: 0 - 5 % 0    BASOPHILS Latest Ref Range: 0 - 2 % 0    IMMATURE GRANULOCYTES Latest Units: % 0    ABS. NEUTROPHILS Latest Ref Range: 1.8 - 8.0 K/UL 5.8    ABS. IMM. GRANS. Latest Units: K/UL 0.0    ABS. LYMPHOCYTES Latest Ref Range: 0.9 - 3.6 K/UL 0.8 (L)    ABS. MONOCYTES Latest Ref Range: 0.05 - 1.2 K/UL 0.8    ABS. EOSINOPHILS Latest Ref Range: 0.0 - 0.4 K/UL 0.0    ABS.  BASOPHILS Latest Ref Range: 0.0 - 0.1 K/UL 0.0    Sodium Latest Ref Range: 135 - 145 mmol/L 128 (L)    Potassium Latest Ref Range: 3.2 - 5.1 mmol/L 3.3    Chloride Latest Ref Range: 94 - 111 mmol/L 96    CO2 Latest Ref Range: 21 - 33 mmol/L 19 (L)    Anion gap Latest Units: mmol/L 13    Glucose Latest Ref Range: 70 - 110 mg/dL 134 (H)    BUN Latest Ref Range: 9 - 21 mg/dL 63 (H)    Creatinine Latest Ref Range: 0.8 - 1.50 mg/dL 2.20 (H)    BUN/Creatinine ratio Latest Units:   29    Calcium Latest Ref Range: 8.5 - 10.5 mg/dL 7.8 (L)    GFR est non-AA Latest Units: ml/min/1.73m2 31    GFR est AA Latest Units: ml/min/1.73m2 38    Bilirubin, total Latest Ref Range: 0.2 - 1.0 mg/dL 0.2    Bilirubin, direct Latest Ref Range: 0.0 - 0.3 mg/dL <0.1    Protein, total Latest Ref Range: 6.1 - 8.4 g/dL 7.1    Albumin Latest Ref Range: 3.5 - 4.7 g/dL 3.7    Globulin Latest Units: g/dL 3.4    A-G Ratio Latest Units:   1.1    ALT Latest Ref Range: 3 - 72 U/L 16    AST Latest Ref Range: 17 - 74 U/L 21    Alk. phosphatase Latest Ref Range: 38 - 126 U/L 88    Influenza A Antigen Latest Ref Range: Negative    Negative   Influenza B Antigen Latest Ref Range: Negative    Negative   INFLUENZA A & B AG (RAPID TEST) Unknown  Rpt   NOVEL CORONAVIRUS (COVID-19) Unknown  Rpt (A)   SARS-COV-2 Unknown  Rpt     Chief Complaint   Patient presents with    Form Completion    followup cr    HPI   This is a pleasant 61-year-old gentleman with a history of generalized debility type 1 diabetes and a stroke in the past.  He originally presented to have us fill out paperwork because his 's license was taken for him because he was in a diabetic coma in a coma a couple months ago. He reports he has been doing well and he has been watching his sugars while he has been on his pump. He sees an endocrinologist who is managing this. He does not recall his most recent hemoglobin A1c.   His last labs noted that he was either in acute renal failure or he has significant chronic kidney disease stage IV. He reports he has been doing okay he has not had any headache vision changes chest pain palpitations or shortness of breath. He takes 800 mg of gabapentin 3 times a day for his chronic neuropathy. He reports that he has been driving a car for years after everything that happened to him and no one took his license away and is very upset that they took his license away because he had a diabetic coma. He reports that he has no issues walking and that he can drive completely normally. Patient Active Problem List   Diagnosis Code    Anemia of chronic disease D63.8    Benign hypertension I10    Chronic anxiety F41.9    Type 1 diabetes mellitus ([facility] [address] E10.9    Diabetic peripheral neuropathy ([facility] [address] E11.42    Gastroparesis K31.84    Insomnia G47.00        Current Outpatient Medications on File Prior to Visit   Medication Sig Dispense Refill    Microlet Lancet misc       insulin pump (PATIENT SUPPLIED) misc by SubCUTAneous route continuous.  losartan (COZAAR) 50 mg tablet Take 1 Tab by mouth daily. 30 Tab 5    cetirizine (ZYRTEC) 10 mg tablet TAKE 1 TABLET EVERY DAY BY ORAL ROUTE.  90 Tab 0    glucose blood VI test strips (Contour Next Test Strips) strip by Does Not Apply route See Admin Instructions.  gabapentin (NEURONTIN) 800 mg tablet Take  by mouth three (3) times daily.  traZODone (DESYREL) 50 mg tablet Take 1 Tab by mouth nightly. If no effect with one tab, may take two tabs 30 mins before going to bed. 45 Tab 0     No current facility-administered medications on file prior to visit. ROS  - GEN: no weight gain/loss, no fevers or chills  - HEENT: no vision changes, no tinnitus, no sore throat  - CV: no cp, palpitations or edema  - RESP: no sob, cough  - ABD: no n/v/d, no blood in stool  - : no dysuria or changes in freq.   - SKIN: no rashes, ulcers  - Neuro: no resting tremors, parasthesia in extremities, no headaches  - MS: + weakness in legs and trunk, + gait abnormalities  - Psych: negative for depression or anxiety      Visit Vitals  BP (!) 141/82   Pulse 95   Temp 97.3 °F (36.3 °C)   Resp 18   Ht 5' 11\" (1.803 m)   Wt 135 lb (61.2 kg)   SpO2 99%   BMI 18.83 kg/m²           Physical Exam  Constitutional:       Appearance: slightly dishevelled appearance. under weight. NAD and pleasant  HENT:      Head: temporal wasting noted. Nose: Nose normal.      Mouth/Throat:      Mouth: Mucous membranes are moist. Throat not inflammed  Eyes:      Extraocular Movements: Extraocular movements intact. Conjunctiva/sclera: Conjunctivae normal. Sclera anicteric     Pupils: Pupils are equal, round, and reactive to light. Cardiovascular:      Rate and Rhythm: Normal rate and regular rhythm. Pulses: Normal pulses. Pulmonary:      Effort: No respiratory distress. Breath sounds: CTAB and No stridor. No rhonchi. Abdominal:      General: There is no distension. NT, ND  Neurological:      Mental Status: patient is alert and oriented times 3.  No resting tremor, abnml gait unable to track fingertips with 100% accuracy     Cranial Nerves: cranial nerves grossly intact

## 2021-03-16 NOTE — PROGRESS NOTES
Mark Srinivasan presents today for   Chief Complaint   Patient presents with    Form Completion       Is someone accompanying this pt? no  Is the patient using any DME equipment during OV? no    Depression Screening:  3 most recent PHQ Screens 3/16/2021   Little interest or pleasure in doing things Not at all   Feeling down, depressed, irritable, or hopeless Not at all   Total Score PHQ 2 0       Learning Assessment:  Learning Assessment 12/15/2020   PRIMARY LEARNER Patient   BARRIERS PRIMARY LEARNER NONE   PRIMARY LANGUAGE ENGLISH   LEARNER PREFERENCE PRIMARY READING   ANSWERED BY patient   RELATIONSHIP SELF         Health Maintenance reviewed and discussed and ordered per Provider. Health Maintenance Due   Topic Date Due    Hepatitis C Screening  Never done    Pneumococcal 0-64 years (1 of 1 - PPSV23) 10/12/1971    DTaP/Tdap/Td series (1 - Tdap) Never done    Shingrix Vaccine Age 50> (1 of 2) Never done    Flu Vaccine (1) 09/01/2020    Medicare Yearly Exam  Never done   . Coordination of Care:  1. Have you been to the ER, urgent care clinic since your last visit? Hospitalized since your last visit? no    2. Have you seen or consulted any other health care providers outside of the [address] [place] [address] since your last visit? Include any pap smears or colon screening.  no

## 2021-03-29 ENCOUNTER — HOSPITAL ENCOUNTER (OUTPATIENT)
Dept: LAB | Age: 56
End: 2021-03-29

## 2021-03-29 ENCOUNTER — HOSPITAL ENCOUNTER (OUTPATIENT)
Dept: LAB | Age: 56
Discharge: HOME | End: 2021-03-29

## 2021-03-29 PROCEDURE — 99001 SPECIMEN HANDLING PT-LAB: CPT

## 2021-03-29 PROCEDURE — 36415 COLL VENOUS BLD VENIPUNCTURE: CPT

## 2021-03-30 LAB — CREATININE, EXTERNAL: 1.54

## 2021-04-22 RX ORDER — CETIRIZINE HCL 10 MG
TABLET ORAL
Qty: 90 TAB | Refills: 0 | Status: SHIPPED | OUTPATIENT
Start: 2021-04-22

## 2021-12-24 ENCOUNTER — APPOINTMENT (OUTPATIENT)
Dept: GENERAL RADIOLOGY | Age: 56
DRG: 637 | End: 2021-12-24
Attending: FAMILY MEDICINE
Payer: MEDICARE

## 2021-12-24 ENCOUNTER — HOSPITAL ENCOUNTER (INPATIENT)
Age: 56
LOS: 10 days | Discharge: NURSING, REHAB OR LONG-TERM CARE | DRG: 637 | End: 2022-01-03
Attending: FAMILY MEDICINE | Admitting: INTERNAL MEDICINE
Payer: MEDICARE

## 2021-12-24 DIAGNOSIS — R77.8 ELEVATED TROPONIN: ICD-10-CM

## 2021-12-24 DIAGNOSIS — E11.42 DIABETIC PERIPHERAL NEUROPATHY (HCC): ICD-10-CM

## 2021-12-24 DIAGNOSIS — E10.10 DIABETIC KETOACIDOSIS WITHOUT COMA ASSOCIATED WITH TYPE 1 DIABETES MELLITUS (HCC): Primary | ICD-10-CM

## 2021-12-24 DIAGNOSIS — J18.9 COMMUNITY ACQUIRED PNEUMONIA, UNSPECIFIED LATERALITY: ICD-10-CM

## 2021-12-24 PROBLEM — Z86.39 HISTORY OF DIABETES MELLITUS: Status: ACTIVE | Noted: 2021-12-24

## 2021-12-24 PROBLEM — E86.0 DEHYDRATION: Status: ACTIVE | Noted: 2021-12-24

## 2021-12-24 PROBLEM — E11.10 DKA (DIABETIC KETOACIDOSIS) (HCC): Status: ACTIVE | Noted: 2021-12-24

## 2021-12-24 PROBLEM — N17.9 AKI (ACUTE KIDNEY INJURY) (HCC): Status: ACTIVE | Noted: 2021-12-24

## 2021-12-24 LAB
ALBUMIN SERPL-MCNC: 3.6 G/DL (ref 3.5–4.7)
ALBUMIN/GLOB SERPL: 1.4 {RATIO}
ALP SERPL-CCNC: 111 U/L (ref 38–126)
ALT SERPL-CCNC: 70 U/L (ref 3–72)
ANION GAP SERPL CALC-SCNC: 31 MMOL/L
ANION GAP SERPL CALC-SCNC: 33 MMOL/L
ARTERIAL PATENCY WRIST A: ABNORMAL
AST SERPL W P-5'-P-CCNC: 76 U/L (ref 17–74)
BASE DEFICIT BLDA-SCNC: 21.3 MMOL/L (ref 0–2.5)
BASOPHILS # BLD: 0 K/UL (ref 0–0.1)
BASOPHILS NFR BLD: 0 % (ref 0–2)
BDY SITE: ABNORMAL
BILIRUB SERPL-MCNC: 1.5 MG/DL (ref 0.2–1)
BNP SERPL-MCNC: 455 PG/ML (ref 0–100)
BUN SERPL-MCNC: 76 MG/DL (ref 9–21)
BUN SERPL-MCNC: 76 MG/DL (ref 9–21)
BUN/CREAT SERPL: 18
BUN/CREAT SERPL: 19
CA-I BLD-MCNC: 7.7 MG/DL (ref 8.5–10.5)
CA-I BLD-MCNC: 7.9 MG/DL (ref 8.5–10.5)
CHLORIDE SERPL-SCNC: 90 MMOL/L (ref 94–111)
CHLORIDE SERPL-SCNC: 92 MMOL/L (ref 94–111)
CO2 SERPL-SCNC: 7 MMOL/L (ref 21–33)
CO2 SERPL-SCNC: 8 MMOL/L (ref 21–33)
COHGB MFR BLD: 0.3 % (ref 0–3)
CREAT SERPL-MCNC: 4 MG/DL (ref 0.8–1.5)
CREAT SERPL-MCNC: 4.2 MG/DL (ref 0.8–1.5)
DIFFERENTIAL METHOD BLD: ABNORMAL
EOSINOPHIL # BLD: 0 K/UL (ref 0–0.4)
EOSINOPHIL NFR BLD: 0 % (ref 0–5)
ERYTHROCYTE [DISTWIDTH] IN BLOOD BY AUTOMATED COUNT: 14.5 % (ref 11.6–14.5)
ETHANOL PERCENT, ALCP: <0.004 %
ETHANOL SERPL-MCNC: <4 MG/DL
FIO2 ON VENT: 21 %
GLOBULIN SER CALC-MCNC: 2.6 G/DL
GLUCOSE BLD STRIP.AUTO-MCNC: >600 MG/DL (ref 70–110)
GLUCOSE SERPL-MCNC: 1040 MG/DL (ref 70–110)
GLUCOSE SERPL-MCNC: 1043 MG/DL (ref 70–110)
HCO3 BLDA-SCNC: 6 MMOL/L (ref 23–27)
HCT VFR BLD AUTO: 38.5 % (ref 36–48)
HGB BLD OXIMETRY-MCNC: 11.6 G/DL (ref 12–16)
HGB BLD-MCNC: 10.6 G/DL (ref 13–16)
IMM GRANULOCYTES # BLD AUTO: 0 K/UL
IMM GRANULOCYTES NFR BLD AUTO: 0 %
INR PPP: 1.2 (ref 0.8–1.2)
LACTATE SERPL-SCNC: 4.6 MMOL/L (ref 0.5–2)
LIPASE SERPL-CCNC: 41 U/L (ref 10–57)
LYMPHOCYTES # BLD: 3.2 K/UL (ref 0.9–3.6)
LYMPHOCYTES NFR BLD: 12 % (ref 21–52)
MAGNESIUM SERPL-MCNC: 2.2 MG/DL (ref 1.7–2.8)
MCH RBC QN AUTO: 28.9 PG (ref 24–34)
MCHC RBC AUTO-ENTMCNC: 27.5 G/DL (ref 31–37)
MCV RBC AUTO: 104.9 FL (ref 78–100)
METHGB MFR BLD: 0.4 % (ref 0–3)
MONOCYTES # BLD: 1.1 K/UL (ref 0.05–1.2)
MONOCYTES NFR BLD: 4 % (ref 3–10)
NEUTS BAND NFR BLD MANUAL: 1 % (ref 0–5)
NEUTS SEG # BLD: 22.2 K/UL (ref 1.8–8)
NEUTS SEG NFR BLD: 83 % (ref 40–73)
NRBC # BLD: 0 K/UL (ref 0–0.01)
NRBC BLD-RTO: 0 PER 100 WBC
OXYHGB MFR BLD: 96.5 % (ref 90–100)
PCO2 BLDA: 21 MMHG (ref 35–45)
PERFORMED BY, TECHID: ABNORMAL
PH BLDA: 7.11 [PH] (ref 7.37–7.43)
PLATELET # BLD AUTO: 227 K/UL (ref 135–420)
PLATELET COMMENTS,PCOM: ABNORMAL
PMV BLD AUTO: 12.8 FL (ref 9.2–11.8)
PO2 BLDA: 113 MMHG (ref 84–98)
POTASSIUM SERPL-SCNC: 5.2 MMOL/L (ref 3.2–5.1)
POTASSIUM SERPL-SCNC: 5.8 MMOL/L (ref 3.2–5.1)
PROT SERPL-MCNC: 6.2 G/DL (ref 6.1–8.4)
PROTHROMBIN TIME: 14.8 SEC (ref 11.5–15.2)
RBC # BLD AUTO: 3.67 M/UL (ref 4.35–5.65)
RBC MORPH BLD: ABNORMAL
SAO2 % BLD: 97 % (ref 94–100)
SAO2% DEVICE SAO2% SENSOR NAME: ABNORMAL
SERVICE CMNT-IMP: ABNORMAL
SODIUM SERPL-SCNC: 130 MMOL/L (ref 135–145)
SODIUM SERPL-SCNC: 131 MMOL/L (ref 135–145)
SPECIMEN SITE: ABNORMAL
TROPONIN I SERPL-MCNC: 0.15 NG/ML (ref 0.02–0.05)
TROPONIN I SERPL-MCNC: 0.19 NG/ML (ref 0.02–0.05)
TSH SERPL DL<=0.05 MIU/L-ACNC: 0.4 UIU/ML (ref 0.35–6.2)
WBC # BLD AUTO: 26.5 K/UL (ref 4.6–13.2)

## 2021-12-24 PROCEDURE — 74011000258 HC RX REV CODE- 258: Performed by: NURSE PRACTITIONER

## 2021-12-24 PROCEDURE — 87040 BLOOD CULTURE FOR BACTERIA: CPT

## 2021-12-24 PROCEDURE — 96361 HYDRATE IV INFUSION ADD-ON: CPT

## 2021-12-24 PROCEDURE — 74011636637 HC RX REV CODE- 636/637: Performed by: FAMILY MEDICINE

## 2021-12-24 PROCEDURE — 83690 ASSAY OF LIPASE: CPT

## 2021-12-24 PROCEDURE — 96365 THER/PROPH/DIAG IV INF INIT: CPT

## 2021-12-24 PROCEDURE — 82803 BLOOD GASES ANY COMBINATION: CPT

## 2021-12-24 PROCEDURE — 80053 COMPREHEN METABOLIC PANEL: CPT

## 2021-12-24 PROCEDURE — 83605 ASSAY OF LACTIC ACID: CPT

## 2021-12-24 PROCEDURE — 36415 COLL VENOUS BLD VENIPUNCTURE: CPT

## 2021-12-24 PROCEDURE — 84443 ASSAY THYROID STIM HORMONE: CPT

## 2021-12-24 PROCEDURE — 96375 TX/PRO/DX INJ NEW DRUG ADDON: CPT

## 2021-12-24 PROCEDURE — 84484 ASSAY OF TROPONIN QUANT: CPT

## 2021-12-24 PROCEDURE — 93005 ELECTROCARDIOGRAM TRACING: CPT

## 2021-12-24 PROCEDURE — 85025 COMPLETE CBC W/AUTO DIFF WBC: CPT

## 2021-12-24 PROCEDURE — 74011250636 HC RX REV CODE- 250/636: Performed by: FAMILY MEDICINE

## 2021-12-24 PROCEDURE — 74011250636 HC RX REV CODE- 250/636: Performed by: NURSE PRACTITIONER

## 2021-12-24 PROCEDURE — 85610 PROTHROMBIN TIME: CPT

## 2021-12-24 PROCEDURE — 83880 ASSAY OF NATRIURETIC PEPTIDE: CPT

## 2021-12-24 PROCEDURE — 74011000258 HC RX REV CODE- 258: Performed by: FAMILY MEDICINE

## 2021-12-24 PROCEDURE — 82962 GLUCOSE BLOOD TEST: CPT

## 2021-12-24 PROCEDURE — 99285 EMERGENCY DEPT VISIT HI MDM: CPT

## 2021-12-24 PROCEDURE — 65610000006 HC RM INTENSIVE CARE

## 2021-12-24 PROCEDURE — 80048 BASIC METABOLIC PNL TOTAL CA: CPT

## 2021-12-24 PROCEDURE — 36600 WITHDRAWAL OF ARTERIAL BLOOD: CPT

## 2021-12-24 PROCEDURE — 74011636637 HC RX REV CODE- 636/637: Performed by: NURSE PRACTITIONER

## 2021-12-24 PROCEDURE — 71045 X-RAY EXAM CHEST 1 VIEW: CPT

## 2021-12-24 PROCEDURE — 82077 ASSAY SPEC XCP UR&BREATH IA: CPT

## 2021-12-24 PROCEDURE — 83735 ASSAY OF MAGNESIUM: CPT

## 2021-12-24 RX ORDER — HEPARIN SODIUM 5000 [USP'U]/ML
5000 INJECTION, SOLUTION INTRAVENOUS; SUBCUTANEOUS EVERY 8 HOURS
Status: DISCONTINUED | OUTPATIENT
Start: 2021-12-24 | End: 2021-12-25

## 2021-12-24 RX ORDER — SODIUM CHLORIDE 9 MG/ML
200 INJECTION, SOLUTION INTRAVENOUS CONTINUOUS
Status: DISCONTINUED | OUTPATIENT
Start: 2021-12-24 | End: 2021-12-25

## 2021-12-24 RX ORDER — LORAZEPAM 2 MG/ML
1 INJECTION INTRAMUSCULAR
Status: DISCONTINUED | OUTPATIENT
Start: 2021-12-24 | End: 2021-12-29

## 2021-12-24 RX ORDER — SODIUM CHLORIDE 0.9 % (FLUSH) 0.9 %
5-40 SYRINGE (ML) INJECTION EVERY 8 HOURS
Status: DISCONTINUED | OUTPATIENT
Start: 2021-12-24 | End: 2022-01-03 | Stop reason: HOSPADM

## 2021-12-24 RX ORDER — SODIUM CHLORIDE 0.9 % (FLUSH) 0.9 %
5-10 SYRINGE (ML) INJECTION AS NEEDED
Status: DISCONTINUED | OUTPATIENT
Start: 2021-12-24 | End: 2022-01-03 | Stop reason: HOSPADM

## 2021-12-24 RX ORDER — DEXTROSE 50 % IN WATER (D50W) INTRAVENOUS SYRINGE
25 AS NEEDED
Status: DISCONTINUED | OUTPATIENT
Start: 2021-12-24 | End: 2022-01-03 | Stop reason: HOSPADM

## 2021-12-24 RX ORDER — SODIUM CHLORIDE 0.9 % (FLUSH) 0.9 %
5-40 SYRINGE (ML) INJECTION AS NEEDED
Status: DISCONTINUED | OUTPATIENT
Start: 2021-12-24 | End: 2021-12-24

## 2021-12-24 RX ADMIN — SODIUM CHLORIDE 150 ML/HR: 9 INJECTION, SOLUTION INTRAVENOUS at 23:42

## 2021-12-24 RX ADMIN — INSULIN HUMAN 10 UNITS: 100 INJECTION, SOLUTION PARENTERAL at 23:41

## 2021-12-24 RX ADMIN — SODIUM CHLORIDE 1000 ML: 9 INJECTION, SOLUTION INTRAVENOUS at 19:26

## 2021-12-24 RX ADMIN — SODIUM CHLORIDE 10 UNITS/HR: 9 INJECTION, SOLUTION INTRAVENOUS at 23:43

## 2021-12-24 RX ADMIN — SODIUM CHLORIDE 959 ML: 9 INJECTION, SOLUTION INTRAVENOUS at 21:15

## 2021-12-24 RX ADMIN — SODIUM CHLORIDE 1000 ML: 9 INJECTION, SOLUTION INTRAVENOUS at 20:45

## 2021-12-24 RX ADMIN — INSULIN HUMAN 10 UNITS: 100 INJECTION, SOLUTION PARENTERAL at 22:29

## 2021-12-24 RX ADMIN — PIPERACILLIN AND TAZOBACTAM 3.38 G: 3; .375 INJECTION, POWDER, LYOPHILIZED, FOR SOLUTION INTRAVENOUS at 22:34

## 2021-12-24 NOTE — Clinical Note
Status[de-identified] INPATIENT [101]   Type of Bed: Intensive Care [6]   Inpatient Hospitalization Certified Necessary for the Following Reasons: 3. Patient receiving treatment that can only be provided in an inpatient setting (further clarification in H&P documentation)   Admitting Diagnosis: DKA, type 1 ([facility] [3052628]   Admitting Diagnosis: PNA (pneumonia) [1585800]   Admitting Diagnosis: ROOSEVELT (acute kidney injury) ([facility] [2140851]   Admitting Diagnosis: Dehydration [276.51. ICD-9-CM]   Admitting Diagnosis: History of diabetes mellitus [2488800]   Admitting Physician: Belgica Khan [[id number]]   Attending Physician: Belgica Khan [[id number]]   Estimated Length of Stay: 2 Midnights   Discharge Plan[de-identified] Home with Office Follow-up

## 2021-12-25 ENCOUNTER — APPOINTMENT (OUTPATIENT)
Dept: CT IMAGING | Age: 56
DRG: 637 | End: 2021-12-25
Attending: INTERNAL MEDICINE
Payer: MEDICARE

## 2021-12-25 PROBLEM — R77.8 ELEVATED TROPONIN: Status: ACTIVE | Noted: 2021-12-25

## 2021-12-25 LAB
ALBUMIN SERPL-MCNC: 3.3 G/DL (ref 3.5–4.7)
ALBUMIN/GLOB SERPL: 1.4 {RATIO}
ALP SERPL-CCNC: 92 U/L (ref 38–126)
ALT SERPL-CCNC: 56 U/L (ref 3–72)
ANION GAP SERPL CALC-SCNC: 15 MMOL/L
ANION GAP SERPL CALC-SCNC: 16 MMOL/L
ANION GAP SERPL CALC-SCNC: 16 MMOL/L
ANION GAP SERPL CALC-SCNC: 18 MMOL/L
ANION GAP SERPL CALC-SCNC: 29 MMOL/L
APTT PPP: 105.7 SEC (ref 23–36.4)
APTT PPP: >180 SEC (ref 23–36.4)
ARTERIAL PATENCY WRIST A: ABNORMAL
AST SERPL W P-5'-P-CCNC: 52 U/L (ref 17–74)
BASE DEFICIT BLDA-SCNC: 10.9 MMOL/L (ref 0–2.5)
BASOPHILS # BLD: 0 K/UL (ref 0–0.1)
BASOPHILS # BLD: 0 K/UL (ref 0–0.1)
BASOPHILS NFR BLD: 0 % (ref 0–2)
BASOPHILS NFR BLD: 0 % (ref 0–2)
BDY SITE: ABNORMAL
BILIRUB SERPL-MCNC: 1 MG/DL (ref 0.2–1)
BUN SERPL-MCNC: 77 MG/DL (ref 9–21)
BUN SERPL-MCNC: 78 MG/DL (ref 9–21)
BUN SERPL-MCNC: 81 MG/DL (ref 9–21)
BUN SERPL-MCNC: 82 MG/DL (ref 9–21)
BUN SERPL-MCNC: 84 MG/DL (ref 9–21)
BUN/CREAT SERPL: 17
BUN/CREAT SERPL: 18
CA-I BLD-MCNC: 7.1 MG/DL (ref 8.5–10.5)
CA-I BLD-MCNC: 7.2 MG/DL (ref 8.5–10.5)
CA-I BLD-MCNC: 7.2 MG/DL (ref 8.5–10.5)
CHLORIDE SERPL-SCNC: 103 MMOL/L (ref 94–111)
CHLORIDE SERPL-SCNC: 106 MMOL/L (ref 94–111)
CHLORIDE SERPL-SCNC: 98 MMOL/L (ref 94–111)
CO2 SERPL-SCNC: 13 MMOL/L (ref 21–33)
CO2 SERPL-SCNC: 15 MMOL/L (ref 21–33)
CO2 SERPL-SCNC: 16 MMOL/L (ref 21–33)
CO2 SERPL-SCNC: 18 MMOL/L (ref 21–33)
CO2 SERPL-SCNC: 7 MMOL/L (ref 21–33)
COHGB MFR BLD: 0.3 % (ref 0–3)
CREAT SERPL-MCNC: 4.3 MG/DL (ref 0.8–1.5)
CREAT SERPL-MCNC: 4.4 MG/DL (ref 0.8–1.5)
CREAT SERPL-MCNC: 4.6 MG/DL (ref 0.8–1.5)
CREAT SERPL-MCNC: 4.8 MG/DL (ref 0.8–1.5)
CREAT SERPL-MCNC: 4.8 MG/DL (ref 0.8–1.5)
DIFFERENTIAL METHOD BLD: ABNORMAL
DIFFERENTIAL METHOD BLD: ABNORMAL
EOSINOPHIL # BLD: 0 K/UL (ref 0–0.4)
EOSINOPHIL # BLD: 0 K/UL (ref 0–0.4)
EOSINOPHIL NFR BLD: 0 % (ref 0–5)
EOSINOPHIL NFR BLD: 0 % (ref 0–5)
ERYTHROCYTE [DISTWIDTH] IN BLOOD BY AUTOMATED COUNT: 14.1 % (ref 11.6–14.5)
ERYTHROCYTE [DISTWIDTH] IN BLOOD BY AUTOMATED COUNT: 14.2 % (ref 11.6–14.5)
FIO2 ON VENT: 21 %
GLOBULIN SER CALC-MCNC: 2.3 G/DL
GLUCOSE BLD STRIP.AUTO-MCNC: 125 MG/DL (ref 70–110)
GLUCOSE BLD STRIP.AUTO-MCNC: 133 MG/DL (ref 70–110)
GLUCOSE BLD STRIP.AUTO-MCNC: 262 MG/DL (ref 70–110)
GLUCOSE BLD STRIP.AUTO-MCNC: 283 MG/DL (ref 70–110)
GLUCOSE BLD STRIP.AUTO-MCNC: 288 MG/DL (ref 70–110)
GLUCOSE BLD STRIP.AUTO-MCNC: 300 MG/DL (ref 70–110)
GLUCOSE BLD STRIP.AUTO-MCNC: 359 MG/DL (ref 70–110)
GLUCOSE BLD STRIP.AUTO-MCNC: 400 MG/DL (ref 70–110)
GLUCOSE BLD STRIP.AUTO-MCNC: 453 MG/DL (ref 70–110)
GLUCOSE BLD STRIP.AUTO-MCNC: 496 MG/DL (ref 70–110)
GLUCOSE BLD STRIP.AUTO-MCNC: 510 MG/DL (ref 70–110)
GLUCOSE BLD STRIP.AUTO-MCNC: 557 MG/DL (ref 70–110)
GLUCOSE BLD STRIP.AUTO-MCNC: 91 MG/DL (ref 70–110)
GLUCOSE BLD STRIP.AUTO-MCNC: 98 MG/DL (ref 70–110)
GLUCOSE BLD STRIP.AUTO-MCNC: >600 MG/DL (ref 70–110)
GLUCOSE SERPL-MCNC: 173 MG/DL (ref 70–110)
GLUCOSE SERPL-MCNC: 247 MG/DL (ref 70–110)
GLUCOSE SERPL-MCNC: 474 MG/DL (ref 70–110)
GLUCOSE SERPL-MCNC: 585 MG/DL (ref 70–110)
GLUCOSE SERPL-MCNC: 686 MG/DL (ref 70–110)
HCO3 BLDA-SCNC: 15 MMOL/L (ref 23–27)
HCT VFR BLD AUTO: 31.4 % (ref 36–48)
HCT VFR BLD AUTO: 32.3 % (ref 36–48)
HGB BLD OXIMETRY-MCNC: 10.7 G/DL (ref 12–16)
HGB BLD-MCNC: 9.4 G/DL (ref 13–16)
HGB BLD-MCNC: 9.8 G/DL (ref 13–16)
IMM GRANULOCYTES # BLD AUTO: 0 K/UL
IMM GRANULOCYTES # BLD AUTO: 0 K/UL
IMM GRANULOCYTES NFR BLD AUTO: 0 %
IMM GRANULOCYTES NFR BLD AUTO: 0 %
LACTATE SERPL-SCNC: 1.7 MMOL/L (ref 0.5–2)
LACTATE SERPL-SCNC: 3.1 MMOL/L (ref 0.5–2)
LYMPHOCYTES # BLD: 0.6 K/UL (ref 0.9–3.6)
LYMPHOCYTES # BLD: 1.5 K/UL (ref 0.9–3.6)
LYMPHOCYTES NFR BLD: 2 % (ref 21–52)
LYMPHOCYTES NFR BLD: 6 % (ref 21–52)
MAGNESIUM SERPL-MCNC: 1.9 MG/DL (ref 1.7–2.8)
MCH RBC QN AUTO: 29 PG (ref 24–34)
MCH RBC QN AUTO: 29.8 PG (ref 24–34)
MCHC RBC AUTO-ENTMCNC: 29.9 G/DL (ref 31–37)
MCHC RBC AUTO-ENTMCNC: 30.3 G/DL (ref 31–37)
MCV RBC AUTO: 96.9 FL (ref 78–100)
MCV RBC AUTO: 98.2 FL (ref 78–100)
METAMYELOCYTES NFR BLD MANUAL: 1 %
METHGB MFR BLD: 0 % (ref 0–3)
MONOCYTES # BLD: 1.8 K/UL (ref 0.05–1.2)
MONOCYTES # BLD: 1.9 K/UL (ref 0.05–1.2)
MONOCYTES NFR BLD: 6 % (ref 3–10)
MONOCYTES NFR BLD: 7 % (ref 3–10)
NEUTS BAND NFR BLD MANUAL: 1 % (ref 0–5)
NEUTS BAND NFR BLD MANUAL: 4 % (ref 0–5)
NEUTS SEG # BLD: 22 K/UL (ref 1.8–8)
NEUTS SEG # BLD: 29.4 K/UL (ref 1.8–8)
NEUTS SEG NFR BLD: 85 % (ref 40–73)
NEUTS SEG NFR BLD: 88 % (ref 40–73)
NRBC # BLD: 0 K/UL (ref 0–0.01)
NRBC # BLD: 0 K/UL (ref 0–0.01)
NRBC BLD-RTO: 0 PER 100 WBC
NRBC BLD-RTO: 0 PER 100 WBC
OXYHGB MFR BLD: 91.3 % (ref 90–100)
PCO2 BLDA: 35 MMHG (ref 35–45)
PERFORMED BY, TECHID: ABNORMAL
PERFORMED BY, TECHID: NORMAL
PERFORMED BY, TECHID: NORMAL
PH BLDA: 7.26 [PH] (ref 7.37–7.43)
PLATELET # BLD AUTO: 211 K/UL (ref 135–420)
PLATELET # BLD AUTO: 424 K/UL (ref 135–420)
PMV BLD AUTO: 12.2 FL (ref 9.2–11.8)
PMV BLD AUTO: 12.5 FL (ref 9.2–11.8)
PO2 BLDA: 63 MMHG (ref 84–98)
POTASSIUM SERPL-SCNC: 3.1 MMOL/L (ref 3.2–5.1)
POTASSIUM SERPL-SCNC: 3.3 MMOL/L (ref 3.2–5.1)
POTASSIUM SERPL-SCNC: 3.9 MMOL/L (ref 3.2–5.1)
PROT SERPL-MCNC: 5.6 G/DL (ref 6.1–8.4)
RBC # BLD AUTO: 3.24 M/UL (ref 4.35–5.65)
RBC # BLD AUTO: 3.29 M/UL (ref 4.35–5.65)
RBC MORPH BLD: ABNORMAL
RBC MORPH BLD: ABNORMAL
SAO2 % BLD: 92 % (ref 94–100)
SAO2% DEVICE SAO2% SENSOR NAME: ABNORMAL
SERVICE CMNT-IMP: ABNORMAL
SODIUM SERPL-SCNC: 134 MMOL/L (ref 135–145)
SODIUM SERPL-SCNC: 134 MMOL/L (ref 135–145)
SODIUM SERPL-SCNC: 136 MMOL/L (ref 135–145)
SODIUM SERPL-SCNC: 138 MMOL/L (ref 135–145)
SODIUM SERPL-SCNC: 140 MMOL/L (ref 135–145)
SPECIMEN SITE: ABNORMAL
THERAPEUTIC RANGE,PTTT: ABNORMAL SEC (ref 82–109)
THERAPEUTIC RANGE,PTTT: ABNORMAL SEC (ref 82–109)
TROPONIN I SERPL-MCNC: 0.2 NG/ML (ref 0.02–0.05)
TROPONIN I SERPL-MCNC: 0.38 NG/ML (ref 0.02–0.05)
TROPONIN I SERPL-MCNC: 0.81 NG/ML (ref 0.02–0.05)
WBC # BLD AUTO: 25.6 K/UL (ref 4.6–13.2)
WBC # BLD AUTO: 31.9 K/UL (ref 4.6–13.2)

## 2021-12-25 PROCEDURE — 70450 CT HEAD/BRAIN W/O DYE: CPT

## 2021-12-25 PROCEDURE — 74011250636 HC RX REV CODE- 250/636: Performed by: INTERNAL MEDICINE

## 2021-12-25 PROCEDURE — 85730 THROMBOPLASTIN TIME PARTIAL: CPT

## 2021-12-25 PROCEDURE — 83605 ASSAY OF LACTIC ACID: CPT

## 2021-12-25 PROCEDURE — 82962 GLUCOSE BLOOD TEST: CPT

## 2021-12-25 PROCEDURE — 80048 BASIC METABOLIC PNL TOTAL CA: CPT

## 2021-12-25 PROCEDURE — 80053 COMPREHEN METABOLIC PANEL: CPT

## 2021-12-25 PROCEDURE — 74011250636 HC RX REV CODE- 250/636: Performed by: NURSE PRACTITIONER

## 2021-12-25 PROCEDURE — 74011000258 HC RX REV CODE- 258: Performed by: NURSE PRACTITIONER

## 2021-12-25 PROCEDURE — 36600 WITHDRAWAL OF ARTERIAL BLOOD: CPT

## 2021-12-25 PROCEDURE — 74011000250 HC RX REV CODE- 250: Performed by: INTERNAL MEDICINE

## 2021-12-25 PROCEDURE — 83735 ASSAY OF MAGNESIUM: CPT

## 2021-12-25 PROCEDURE — 65610000006 HC RM INTENSIVE CARE

## 2021-12-25 PROCEDURE — 74011250636 HC RX REV CODE- 250/636: Performed by: FAMILY MEDICINE

## 2021-12-25 PROCEDURE — 85025 COMPLETE CBC W/AUTO DIFF WBC: CPT

## 2021-12-25 PROCEDURE — 74011636637 HC RX REV CODE- 636/637: Performed by: NURSE PRACTITIONER

## 2021-12-25 PROCEDURE — 82803 BLOOD GASES ANY COMBINATION: CPT

## 2021-12-25 PROCEDURE — 83036 HEMOGLOBIN GLYCOSYLATED A1C: CPT

## 2021-12-25 RX ORDER — TRAZODONE HYDROCHLORIDE 50 MG/1
50 TABLET ORAL
Status: DISCONTINUED | OUTPATIENT
Start: 2021-12-25 | End: 2021-12-29

## 2021-12-25 RX ORDER — CETIRIZINE HCL 10 MG
10 TABLET ORAL DAILY
Status: DISCONTINUED | OUTPATIENT
Start: 2021-12-25 | End: 2022-01-03 | Stop reason: HOSPADM

## 2021-12-25 RX ORDER — POTASSIUM CHLORIDE 750 MG/1
40 TABLET, EXTENDED RELEASE ORAL
Status: DISCONTINUED | OUTPATIENT
Start: 2021-12-25 | End: 2021-12-25

## 2021-12-25 RX ORDER — GABAPENTIN 300 MG/1
300 CAPSULE ORAL DAILY
Status: DISCONTINUED | OUTPATIENT
Start: 2021-12-25 | End: 2022-01-03 | Stop reason: HOSPADM

## 2021-12-25 RX ORDER — HEPARIN SODIUM 10000 [USP'U]/100ML
12-17 INJECTION, SOLUTION INTRAVENOUS
Status: DISCONTINUED | OUTPATIENT
Start: 2021-12-25 | End: 2021-12-27

## 2021-12-25 RX ORDER — LOSARTAN POTASSIUM 25 MG/1
50 TABLET ORAL DAILY
Status: DISCONTINUED | OUTPATIENT
Start: 2021-12-25 | End: 2021-12-27

## 2021-12-25 RX ORDER — DEXTROSE, SODIUM CHLORIDE, AND POTASSIUM CHLORIDE 5; .45; .15 G/100ML; G/100ML; G/100ML
125 INJECTION INTRAVENOUS CONTINUOUS
Status: DISCONTINUED | OUTPATIENT
Start: 2021-12-25 | End: 2021-12-26

## 2021-12-25 RX ORDER — POTASSIUM CHLORIDE 750 MG/1
40 TABLET, EXTENDED RELEASE ORAL 2 TIMES DAILY
Status: ACTIVE | OUTPATIENT
Start: 2021-12-25 | End: 2021-12-26

## 2021-12-25 RX ADMIN — SODIUM BICARBONATE: 84 INJECTION, SOLUTION INTRAVENOUS at 10:29

## 2021-12-25 RX ADMIN — SODIUM CHLORIDE, PRESERVATIVE FREE 10 ML: 5 INJECTION INTRAVENOUS at 22:08

## 2021-12-25 RX ADMIN — HEPARIN SODIUM 12 UNITS/KG/HR: 10000 INJECTION, SOLUTION INTRAVENOUS at 10:42

## 2021-12-25 RX ADMIN — SODIUM CHLORIDE, PRESERVATIVE FREE 10 ML: 5 INJECTION INTRAVENOUS at 05:47

## 2021-12-25 RX ADMIN — POTASSIUM CHLORIDE, DEXTROSE MONOHYDRATE AND SODIUM CHLORIDE 125 ML/HR: 150; 5; 450 INJECTION, SOLUTION INTRAVENOUS at 16:42

## 2021-12-25 RX ADMIN — HEPARIN SODIUM 5000 UNITS: 5000 INJECTION INTRAVENOUS; SUBCUTANEOUS at 06:06

## 2021-12-25 RX ADMIN — SODIUM CHLORIDE 16 UNITS/HR: 9 INJECTION, SOLUTION INTRAVENOUS at 08:16

## 2021-12-25 RX ADMIN — SODIUM CHLORIDE 150 ML/HR: 9 INJECTION, SOLUTION INTRAVENOUS at 05:31

## 2021-12-25 RX ADMIN — HEPARIN SODIUM 5000 UNITS: 5000 INJECTION INTRAVENOUS; SUBCUTANEOUS at 00:22

## 2021-12-25 RX ADMIN — LORAZEPAM 1 MG: 2 INJECTION INTRAMUSCULAR; INTRAVENOUS at 02:45

## 2021-12-25 RX ADMIN — SODIUM CHLORIDE, PRESERVATIVE FREE 10 ML: 5 INJECTION INTRAVENOUS at 01:02

## 2021-12-25 NOTE — ED NOTES
TRANSFER - OUT REPORT:    Verbal report given to Kofi Tomlinson on [facility]  being transferred to ICU(unit) for routine progression of care       Report consisted of patients Situation, Background, Assessment and   Recommendations(SBAR). Information from the following report(s) SBAR was reviewed with the receiving nurse. Lines:   Peripheral IV 12/24/21 Posterior;Right Hand (Active)        Opportunity for questions and clarification was provided.       Patient transported with:   Monitor

## 2021-12-25 NOTE — ASSESSMENT & PLAN NOTE
No chest pain is reported  Troponin may be elevated secondary to DKA  I will cycle troponin every 6x3  Cardiology consult  Initial EKG and ED shows tachycardia no other acute findings

## 2021-12-25 NOTE — ROUTINE PROCESS
TRANSFER - IN REPORT:    Verbal report received from ANTHONY Girard RN(name) on [facility]ater  being received from TIFFANY Valerio RN(unit) for routine progression of care      Report consisted of patients Situation, Background, Assessment and   Recommendations(SBAR). Information from the following report(s) SBAR, Kardex, ED Summary, Intake/Output, Recent Results, Med Rec Status and Quality Measures was reviewed with the receiving nurse. Opportunity for questions and clarification was provided. Assessment completed upon patients arrival to unit and care assumed. 2345  Insulin gtt started at 10 units/hr per provider. 0006  FSBG reads high. 0200  FSBG continues to read \"HI\". Insulin gtt increased to 11units/hr. 0045  Lab in to draw blood for scheduled labs. Pt was encouraged to urinate for ordered labs. Pt states he can't at this time. 80  Pt OOB and saying he wanted to Ant Snyppitnifin and he wanted his coffee cup with apple juice in it\". Pt re-oriented and assisted back in to bed. Brief placed back on hips. 1 mg IVP Ativan given for agitation. 0315  Since pt's FSBG has been \"high\" the past two checks, Insulin gtt turned up to 12 units/hr. 0430  Lab in to draw blood for AM labs. FSBG still reads \"Hi\". Insulin gtt increased to 13 units/hr. 0530  FSBG 557. Gtt increased to 14 units/hr. 0630  FSBG  510. Gtt increased to 15 units/hr. 0645   Bedside shift change report given to H. Malachi Kocher, LPN (oncoming nurse) by TIFFANY Valerio RN (offgoing nurse). Report included the following information SBAR, Kardex, ED Summary, Intake/Output, Recent Results, Med Rec Status and Quality Measures. 100 Dylan Galvan provider and advised of pt's 0.38 Troponin level. No new orders.

## 2021-12-25 NOTE — ED PROVIDER NOTES
EMERGENCY DEPARTMENT HISTORY AND PHYSICAL EXAM      Date: 12/24/2021  Patient Name: Jake Quevedo    History of Presenting Illness     Chief Complaint   Patient presents with    High Blood Sugar       History Provided By: Patient and EMS    HPI: Jake Quevedo, 49 y.o. male with a past medical history significant diabetes, hypertension and hyperlipidemia presents to the ED via EMS with cc of high blood sugar. EMS brings patient to ER after law enforcement was called to his home for a wellness check and then on person called EMS due to patient being apparently altered. EMS did a point-of-care glucose and it read high. Upon arriving to the ER, patient is alert to person but not place, time, or situation. ER Accu-Chek reads high. Patient has an insulin pump which she has struggled with on and off. Apparently his wife usually helps him with it but his wife has had to move in with her mother recently due to her mother having some health issues. EMS states that when they arrived to patient's home, [place] and that it was 36. Currently in the ER, patient states that it is 1996 and that his blood sugar has been 1700. He does deny any chest pain or shortness of breath. He denies drinking alcohol. He otherwise is a poor historian and is difficult to understand because his speech is unintelligible. There are no other complaints, changes, or physical findings at this time. PCP: Claudine Rausch MD    No current facility-administered medications on file prior to encounter. Current Outpatient Medications on File Prior to Encounter   Medication Sig Dispense Refill    cetirizine (ZYRTEC) 10 mg tablet TAKE 1 TABLET EVERY DAY BY ORAL ROUTE.  90 Tab 0    Microlet Lancet misc       insulin pump (PATIENT SUPPLIED) misc by SubCUTAneous route continuous.  losartan (COZAAR) 50 mg tablet Take 1 Tab by mouth daily.  30 Tab 5    traZODone (DESYREL) 50 mg tablet Take 1 Tab by mouth nightly. If no effect with one tab, may take two tabs 30 mins before going to bed. 45 Tab 0    glucose blood VI test strips (Contour Next Test Strips) strip by Does Not Apply route See Admin Instructions.  gabapentin (NEURONTIN) 800 mg tablet Take  by mouth three (3) times daily. Past History     Past Medical History:  Past Medical History:   Diagnosis Date    Diabetes ([address]     Hypercholesterolemia     Hypertension        Past Surgical History:  Past Surgical History:   Procedure Laterality Date    HX ORTHOPAEDIC Left 08/30/2018    hip joint    HX ORTHOPAEDIC Right     broken knee       Family History:  Family History   Problem Relation Age of Onset    Cancer Father         prostate    Cancer Maternal Uncle         prostate    Cancer Paternal Uncle         prostate       Social History:  Social History     Tobacco Use    Smoking status: Never Smoker    Smokeless tobacco: Current User    Tobacco comment: 5+/day   Vaping Use    Vaping Use: Never used   Substance Use Topics    Alcohol use: Not Currently    Drug use: Never       Allergies:  No Known Allergies      Review of Systems     Review of Systems   Unable to perform ROS: Acuity of condition       Physical Exam     Physical Exam  Constitutional:       Appearance: He is ill-appearing. Comments: Thin, poor historian, afebrile, alert to person but not place time or situation. HENT:      Head: Normocephalic and atraumatic. Right Ear: Tympanic membrane normal.      Left Ear: Tympanic membrane normal.      Nose: Nose normal.      Mouth/Throat:      Mouth: Mucous membranes are dry. Pharynx: No oropharyngeal exudate. Eyes:      Extraocular Movements: Extraocular movements intact. Conjunctiva/sclera: Conjunctivae normal.      Pupils: Pupils are equal, round, and reactive to light. Cardiovascular:      Rate and Rhythm: Regular rhythm. Tachycardia present. Pulses: Normal pulses.       Heart sounds: Normal heart sounds. Pulmonary:      Effort: Pulmonary effort is normal. No respiratory distress. Breath sounds: No rales. Chest:      Chest wall: No tenderness. Abdominal:      General: Abdomen is flat. Bowel sounds are normal.      Palpations: Abdomen is soft. Tenderness: There is no abdominal tenderness. There is no guarding. Musculoskeletal:         General: No tenderness, deformity or signs of injury. Normal range of motion. Cervical back: Normal range of motion and neck supple. No rigidity or tenderness. Skin:     General: Skin is warm and dry. Capillary Refill: Capillary refill takes less than 2 seconds. Coloration: Skin is pale. Neurological:      Mental Status: He is alert. He is disoriented. Cranial Nerves: No cranial nerve deficit. Motor: No weakness. Psychiatric:         Attention and Perception: He does not perceive auditory or visual hallucinations. Cognition and Memory: Cognition is impaired. Memory is impaired. Comments: Alert to person but not place, time or situation. Thinks that the year is 56. States [place] the [place].          Lab and Diagnostic Study Results     Labs -     Recent Results (from the past 12 hour(s))   GLUCOSE, POC    Collection Time: 12/24/21  7:07 PM   Result Value Ref Range    Glucose (POC) >600 (HH) 70 - 110 mg/dL    Performed by [address] [place] [address], POC    Collection Time: 12/24/21  7:08 PM   Result Value Ref Range    Glucose (POC) >600 (HH) 70 - 110 mg/dL    Performed by [facility]    EKG, 12 LEAD, INITIAL    Collection Time: 12/24/21  7:11 PM   Result Value Ref Range    Ventricular Rate 109 BPM    Atrial Rate 110 BPM    P-R Interval 170 ms    QRS Duration 122 ms    Q-T Interval 364 ms    QTC Calculation (Bezet) 491 ms    Calculated P Axis 82 degrees    Calculated R Axis 87 degrees    Calculated T Axis 68 degrees    Diagnosis       Sinus tachycardia  NELSON, consider biatrial enlargement  Nonspecific intraventricular conduction delay  Anteroseptal infarct, old  Borderline ST depression, anterolateral leads  Baseline wander in lead(s) I,III,aVL,aVF     LACTIC ACID    Collection Time: 12/24/21  7:24 PM   Result Value Ref Range    Lactic acid 4.6 (HH) 0.5 - 2.0 mmol/L   CBC WITH AUTOMATED DIFF    Collection Time: 12/24/21  7:24 PM   Result Value Ref Range    WBC 26.5 (H) 4.6 - 13.2 K/uL    RBC 3.67 (L) 4.35 - 5.65 M/uL    HGB 10.6 (L) 13.0 - 16.0 g/dL    HCT 38.5 36.0 - 48.0 %    .9 (H) 78.0 - 100.0 FL    MCH 28.9 24.0 - 34.0 PG    MCHC 27.5 (L) 31.0 - 37.0 g/dL    RDW 14.5 11.6 - 14.5 %    PLATELET 378 848 - 837 K/uL    MPV 12.8 (H) 9.2 - 11.8 FL    NRBC 0.0 0.0  WBC    ABSOLUTE NRBC 0.00 0.00 - 0.01 K/uL    NEUTROPHILS 83 (H) 40 - 73 %    BAND NEUTROPHILS 1 0 - 5 %    LYMPHOCYTES 12 (L) 21 - 52 %    MONOCYTES 4 3 - 10 %    EOSINOPHILS 0 0 - 5 %    BASOPHILS 0 0 - 2 %    IMMATURE GRANULOCYTES 0 %    ABS. NEUTROPHILS 22.2 (H) 1.8 - 8.0 K/UL    ABS. LYMPHOCYTES 3.2 0.9 - 3.6 K/UL    ABS. MONOCYTES 1.1 0.05 - 1.2 K/UL    ABS. EOSINOPHILS 0.0 0.0 - 0.4 K/UL    ABS. BASOPHILS 0.0 0.0 - 0.1 K/UL    ABS. IMM.  GRANS. 0.0 K/UL    DF Manual      PLATELET COMMENTS VARIATION IN SIZE     RBC COMMENTS Anisocytosis  1+       PROTHROMBIN TIME + INR    Collection Time: 12/24/21  7:24 PM   Result Value Ref Range    Prothrombin time 14.8 11.5 - 15.2 sec    INR 1.2 0.8 - 1.2     METABOLIC PANEL, COMPREHENSIVE    Collection Time: 12/24/21  7:24 PM   Result Value Ref Range    Sodium 130 (L) 135 - 145 mmol/L    Potassium 5.2 (H) 3.2 - 5.1 mmol/L    Chloride 90 (L) 94 - 111 mmol/L    CO2 7 (LL) 21 - 33 mmol/L    Anion gap 33 mmol/L    Glucose 1,040 (HH) 70 - 110 mg/dL    BUN 76 (H) 9 - 21 mg/dL    Creatinine 4.00 (H) 0.8 - 1.50 mg/dL    BUN/Creatinine ratio 19      GFR est AA 19 ml/min/1.73m2    GFR est non-AA 16 ml/min/1.73m2    Calcium 7.9 (L) 8.5 - 10.5 mg/dL    Bilirubin, total 1.5 (H) 0.2 - 1.0 mg/dL    AST (SGOT) 76 (H) 17 - 74 U/L    ALT (SGPT) 70 3 - 72 U/L    Alk.  phosphatase 111 38 - 126 U/L    Protein, total 6.2 6.1 - 8.4 g/dL    Albumin 3.6 3.5 - 4.7 g/dL    Globulin 2.6 g/dL    A-G Ratio 1.4     TROPONIN I    Collection Time: 12/24/21  7:24 PM   Result Value Ref Range    Troponin-I, Qt. 0.15 (H) 0.02 - 0.05 ng/mL   ETHYL ALCOHOL    Collection Time: 12/24/21  7:24 PM   Result Value Ref Range    ALCOHOL(ETHYL),SERUM <4 <4 mg/dL    Ethanol, percent <0.004 <0.004 %   LIPASE    Collection Time: 12/24/21  7:24 PM   Result Value Ref Range    Lipase 41 10 - 57 U/L   MAGNESIUM    Collection Time: 12/24/21  7:24 PM   Result Value Ref Range    Magnesium 2.2 1.7 - 2.8 mg/dL   BNP    Collection Time: 12/24/21  7:24 PM   Result Value Ref Range     (H) 0 - 100 pg/mL   TSH 3RD GENERATION    Collection Time: 12/24/21  7:24 PM   Result Value Ref Range    TSH 0.40 0.35 - 6.20 uIU/mL   BLOOD GAS, ARTERIAL    Collection Time: 12/24/21  7:35 PM   Result Value Ref Range    pH 7.11 (LL) 7.37 - 7.43      PCO2 21 (L) 35.0 - 45.0 mmHg    PO2 113 (H) 84 - 98 mmHg    O2 SAT 97 94 - 100 %    BICARBONATE 6 (L) 23.0 - 27.0 mmol/L    BASE DEFICIT 21.3 (H) 0.0 - 2.5 mmol/L    O2 METHOD Room air      FIO2 21.0 %    Sample source Arterial      SITE Right Brachial      NAVEED'S TEST Not Applicable      Critical value read back RESULTS CALLED TO WILLIAM Carty8 RBV HV     Carboxy-Hgb 0.3 0 - 3 %    Methemoglobin 0.4 0 - 3 %    tHb 11.6 (L) 12.0 - 16.0 g/dL    Oxyhemoglobin 96.5 90 - 100 %   CULTURE, BLOOD    Collection Time: 12/24/21  8:15 PM    Specimen: Blood   Result Value Ref Range    Special Requests: No Special Requests      Culture result: PENDING    TROPONIN I    Collection Time: 12/24/21 10:15 PM   Result Value Ref Range    Troponin-I, Qt. 0.19 (H) 0.02 - 1.28 ng/mL   METABOLIC PANEL, BASIC    Collection Time: 12/24/21 10:15 PM   Result Value Ref Range    Sodium 131 (L) 135 - 145 mmol/L    Potassium 5.8 (H) 3.2 - 5.1 mmol/L    Chloride 92 (L) 94 - 111 mmol/L    CO2 8 (LL) 21 - 33 mmol/L    Anion gap 31 mmol/L    Glucose 1,043 (HH) 70 - 110 mg/dL    BUN 76 (H) 9 - 21 mg/dL    Creatinine 4.20 (H) 0.8 - 1.50 mg/dL    BUN/Creatinine ratio 18      GFR est AA 18 ml/min/1.73m2    GFR est non-AA 15 ml/min/1.73m2    Calcium 7.7 (L) 8.5 - 10.5 mg/dL   GLUCOSE, POC    Collection Time: 12/24/21 10:28 PM   Result Value Ref Range    Glucose (POC) >600 (HH) 70 - 110 mg/dL    Performed by Monique Garcia    GLUCOSE, POC    Collection Time: 12/25/21 12:06 AM   Result Value Ref Range    Glucose (POC) >600 (HH) 70 - 110 mg/dL    Performed by Naomi Fitzpatrick    METABOLIC PANEL, BASIC    Collection Time: 12/25/21 12:40 AM   Result Value Ref Range    Sodium 134 (L) 135 - 145 mmol/L    Potassium 3.9 3.2 - 5.1 mmol/L    Chloride 98 94 - 111 mmol/L    CO2 7 (LL) 21 - 33 mmol/L    Anion gap 29 mmol/L    Glucose 585 (HH) 70 - 110 mg/dL    BUN 77 (H) 9 - 21 mg/dL    Creatinine 4.40 (H) 0.8 - 1.50 mg/dL    BUN/Creatinine ratio 18      GFR est AA 17 ml/min/1.73m2    GFR est non-AA 14 ml/min/1.73m2    Calcium 7.1 (L) 8.5 - 10.5 mg/dL   LACTIC ACID    Collection Time: 12/25/21 12:40 AM   Result Value Ref Range    Lactic acid 3.1 (HH) 0.5 - 2.0 mmol/L   GLUCOSE, POC    Collection Time: 12/25/21  2:04 AM   Result Value Ref Range    Glucose (POC) >600 (HH) 70 - 110 mg/dL    Performed by Naomi Fitzpatrick    CBC WITH AUTOMATED DIFF    Collection Time: 12/25/21  4:15 AM   Result Value Ref Range    WBC 25.6 (H) 4.6 - 13.2 K/uL    RBC 3.24 (L) 4.35 - 5.65 M/uL    HGB 9.4 (L) 13.0 - 16.0 g/dL    HCT 31.4 (L) 36.0 - 48.0 %    MCV 96.9 78.0 - 100.0 FL    MCH 29.0 24.0 - 34.0 PG    MCHC 29.9 (L) 31.0 - 37.0 g/dL    RDW 14.1 11.6 - 14.5 %    PLATELET 247 493 - 598 K/uL    MPV 12.5 (H) 9.2 - 11.8 FL    NRBC 0.0 0.0  WBC    ABSOLUTE NRBC 0.00 0.00 - 0.01 K/uL    NEUTROPHILS PENDING %    LYMPHOCYTES PENDING %    MONOCYTES PENDING %    EOSINOPHILS PENDING %    BASOPHILS PENDING %    IMMATURE GRANULOCYTES PENDING %    ABS. NEUTROPHILS PENDING K/UL    ABS. LYMPHOCYTES PENDING K/UL    ABS. MONOCYTES PENDING K/UL    ABS. EOSINOPHILS PENDING K/UL    ABS. BASOPHILS PENDING K/UL    ABS. IMM. GRANS. PENDING K/UL    DF PENDING    METABOLIC PANEL, COMPREHENSIVE    Collection Time: 12/25/21  4:15 AM   Result Value Ref Range    Sodium 134 (L) 135 - 145 mmol/L    Potassium 3.3 3.2 - 5.1 mmol/L    Chloride 103 94 - 111 mmol/L    CO2 13 (L) 21 - 33 mmol/L    Anion gap 18 mmol/L    Glucose 686 (HH) 70 - 110 mg/dL    BUN 78 (H) 9 - 21 mg/dL    Creatinine 4.30 (H) 0.8 - 1.50 mg/dL    BUN/Creatinine ratio 18      GFR est AA 18 ml/min/1.73m2    GFR est non-AA 14 ml/min/1.73m2    Calcium 7.1 (L) 8.5 - 10.5 mg/dL    Bilirubin, total 1.0 0.2 - 1.0 mg/dL    AST (SGOT) 52 17 - 74 U/L    ALT (SGPT) 56 3 - 72 U/L    Alk.  phosphatase 92 38 - 126 U/L    Protein, total 5.6 (L) 6.1 - 8.4 g/dL    Albumin 3.3 (L) 3.5 - 4.7 g/dL    Globulin 2.3 g/dL    A-G Ratio 1.4     MAGNESIUM    Collection Time: 12/25/21  4:15 AM   Result Value Ref Range    Magnesium 1.9 1.7 - 2.8 mg/dL   TROPONIN I    Collection Time: 12/25/21  4:15 AM   Result Value Ref Range    Troponin-I, Qt. 0.38 (H) 0.02 - 0.05 ng/mL   LACTIC ACID    Collection Time: 12/25/21  4:15 AM   Result Value Ref Range    Lactic acid 1.7 0.5 - 2.0 mmol/L   GLUCOSE, POC    Collection Time: 12/25/21  4:26 AM   Result Value Ref Range    Glucose (POC) >600 (HH) 70 - 110 mg/dL    Performed by Ashlyn Lincoln, POC    Collection Time: 12/25/21  5:34 AM   Result Value Ref Range    Glucose (POC) 557 (HH) 70 - 110 mg/dL    Performed by Ashlyn Lincoln, POC    Collection Time: 12/25/21  6:32 AM   Result Value Ref Range    Glucose (POC) 510 (HH) 70 - 110 mg/dL    Performed by Jony Fossa        Radiologic Studies -   @[email or web address]@  CT Results  (Last 48 hours)    None        CXR Results  (Last 48 hours)               12/24/21 2114  XR CHEST PORT Final result    Impression:  Moderate emphysema with suggestion of mild pulmonary edema. No focal   airspace disease or effusion. Narrative:  EXAM:  AP Portable Chest X-ray 1 view        INDICATION: Altered mental status       COMPARISON: February 1, 2021       _______________       FINDINGS:  Heart and mediastinal contours are within normal limits for portable   radiograph. There is moderate emphysema. Increased reticular interstitial lung   markings are present. There is mild prominence of the central pulmonary   vasculature which may represent mild pulmonary edema. No focal airspace disease. There are no pleural effusions. No acute osseous findings. ________________                       Medical Decision Making   - I am the first provider for this patient. - I reviewed the vital signs, available nursing notes, past medical history, past surgical history, family history and social history. - Initial assessment performed. The patients presenting problems have been discussed, and they are in agreement with the care plan formulated and outlined with them. I have encouraged them to ask questions as they arise throughout their visit. Vital Signs-Reviewed the patient's vital signs.   Patient Vitals for the past 12 hrs:   Temp Pulse Resp BP SpO2   12/25/21 0400  90      12/25/21 0349 97.6 °F (36.4 °C) 91 18 (!) 110/51    12/25/21 0013 97.7 °F (36.5 °C) (!) 108 16 (!) 136/52 97 %   12/25/21 0000  (!) 108      12/24/21 2328  (!) 103 20 (!) 104/54 95 %   12/24/21 2202  (!) 107 20 (!) 135/54 97 %   12/24/21 1906 97.6 °F (36.4 °C) (!) 115 20 (!) 100/46 99 %       Records Reviewed: Nursing Notes, Old Medical Records, Previous electrocardiograms, Ambulance Run Sheet, Previous Radiology Studies and Previous Laboratory Studies    The patient presents with altered mental status with a differential diagnosis of  ETOH intoxication, encephalopathy, hypernatremia, hyponatremia, hypoxia, insulin reaction, ICB, UTI, volume depletion and DKA, substance ingestion      ED Course:     ED Course as of 12/25/21 0650   Fri Dec 24, 2021   234 63-year-old male with a longstanding history of type 1 diabetes and episodic DKA presents the ER via EMS after law enforcement was called to his home for a wellness check and then on person called EMS due to patient being apparently altered. EMS did a point-of-care glucose and it read high. Upon arriving to the ER, patient is alert to person but not place, time, or situation. ER Accu-Chek reads high. [OM]   1915 Patient is afebrile, tachycardic, with oxygen saturation 99% on room air. [OM]   2019 GLUCOSE,FAST - POC(!!): >600 [OM]   2020 pH(!!): 7.11 [OM]   2020 PCO2(!): 21 [OM]   2020 PO2(!): 113 [OM]   2020 BICARBONATE(!): 6 [OM]   2040 TSH: 0.40 [OM]   2040 Troponin-I, Qt.(!): 0.15 [OM]   2040 WBC(!): 26.5 [OM]   2040 RBC(!): 3.67 [OM]   2040 HGB(!): 10.6  Sepsis protocol initiated as patient has tachycardia, AMS, leukocytosis, hyperglycemia. Fluid running. [OM]   2041 Troponin elevated. Will trend. Suspect due to dehydration, ketosis, tachycardia. [OM]   2042 TODAY I, Mal Blocker, D.O., PERSONALLY VISUALIZED THE PATIENT'S EKG TRACING. I AM THE PRIMARY . Rate 109, , , QTc 491. Baseline wander throughout, with interference. Sinus tachycardia with nonspecific intraventricular conduction delay. No acute ST elevation to suggest STEMI, possible old anterior septal infarct. [OM]   2140 Lactic acid(!!): 4.6 [OM]   2140 BNP(!): 455 [OM]   2140 Glucose(!!): 1,040 [OM]   2158 Creatinine(!): 4.00 [OM]   2158 BUN(!): 76 [OM]   2203 TODAY I Mal Blocker, D.O., PERSONALLY VISUALIZED THE PATIENT'S DIAGNOSTIC IMAGING STUDIES. DEFER TO THE RADIOLOGIST'S REPORT FOR THE FINAL DEFINITIVE RADIOLOGY READING. MY INITIAL INDEPENDENT INTERPRETATION IS AS FOLLOWS, BUT NOT LIMITED TO: Preliminary review prior chest x-ray shows pulmonary vascular markings, possible early infiltrates cannot rule out pneumonia.   Left shoulder hardware present. [OM]   2249 Discussed ICU admission with Tl Johnson NP, Hospitalist. [OM]      ED Course User Index  [OM] Sterling Kohler DO       Provider Notes (Medical Decision Making):   7:20 PM  I have spent 55 minutes of critical care time involved in lab review, consultations with specialist, family decision-making, and documentation. During this entire length of time I was immediately available to the patient. Critical Care: The reason for providing this level of medical care for this critically ill patient was due a critical illness that impaired one or more vital organ systems such that there was a high probability of imminent or life threatening deterioration in the patients condition. This care involved high complexity decision making to assess, manipulate, and support vital system functions, to treat this degreee vital organ system failure and to prevent further life threatening deterioration of the patients condition. Arterial Blood Gas (ABG) Analyzer from [email or web address]  on 12/24/2021  ** All calculations should be rechecked by clinician prior to use **    RESULT SUMMARY:       This is an Anion Gap Metabolic Acidosis. Primary Metabolic Acidosis, with: Appropriately Compensated by Respiratory Alkalosis and Additional Metabolic Alkalosis. INPUTS:  pH > 7.11   P?CO? > 21 mm Hg  HCO?- > 6 mEq/L  Sodium > 130 mEq/L  Chloride > 90 mEq/L  Albumin > 3.6 g/dL    MDM       Procedures   Medical Decision Makingedical Decision Making  Performed by: Zeina Stone DO  PROCEDURES:  Procedures       Disposition   Disposition: Admitted to ICU Medical ICU the case was discussed with the admitting hospitalist Tl Johnson NP. Admitted    DISCHARGE PLAN:  1. Current Discharge Medication List      CONTINUE these medications which have NOT CHANGED    Details   cetirizine (ZYRTEC) 10 mg tablet TAKE 1 TABLET EVERY DAY BY ORAL ROUTE.    Qty: 90 Tab, Refills: 0      Microlet Lancet misc       insulin pump (PATIENT SUPPLIED) misc by SubCUTAneous route continuous. losartan (COZAAR) 50 mg tablet Take 1 Tab by mouth daily. Qty: 30 Tab, Refills: 5    Associated Diagnoses: Hypertension, unspecified type      traZODone (DESYREL) 50 mg tablet Take 1 Tab by mouth nightly. If no effect with one tab, may take two tabs 30 mins before going to bed. Qty: 45 Tab, Refills: 0    Associated Diagnoses: Primary insomnia      glucose blood VI test strips (Contour Next Test Strips) strip by Does Not Apply route See Admin Instructions. gabapentin (NEURONTIN) 800 mg tablet Take  by mouth three (3) times daily. 2.   Follow-up Information    None       3. Return to ED if worse   4. Current Discharge Medication List            Diagnosis     Clinical Impression:   1. Diabetic ketoacidosis without coma associated with type 1 diabetes mellitus (HCC)    2. Elevated troponin    3. Community acquired pneumonia, unspecified laterality        Attestations:    Natalya Das, DO    Please note that this dictation was completed with [employer], the computer voice recognition software. Quite often unanticipated grammatical, syntax, homophones, and other interpretive errors are inadvertently transcribed by the computer software. Please disregard these errors. Please excuse any errors that have escaped final proofreading. Thank you.

## 2021-12-25 NOTE — ED NOTES
Speech remains slurred, fluids administered per MAR. Pt unable to provide urine specimen, pt verbally uncooperative on being disturbed for procedure but does not physically resist blood draw or replacing of monitor leads.

## 2021-12-25 NOTE — ED TRIAGE NOTES
Pt arrived via ems for AMS, blood sugar high on meter on scene. On arrival pt alert, oriented x 2, cooperative.

## 2021-12-25 NOTE — PROGRESS NOTES
Reason for Admission: Chart reviewed and noted patient presented with elevated blood sugar. Serum blood test showed over thousand and patient had an increased anion gap and was in DKA. An insulin drip and IVF's was ordered in the ER. Patient was placed in the ICU on an insulin drip and BNP will be ordered every 4 hours. IVF's ordered for patient because he is dehydrated. Patient has not been managing his insulin pump well as his wife is out of town. His wife normally helps him with his insulin pump. Dx: DKA, Dehydration, [place]    PMH: DM, Hypercholesterolemia, HTN                       RUR Score: 15%                 PCP: First and Last name:   Jose Jones MD     Name of Practice:    Are you a current patient: Yes/No:    Approximate date of last visit:    Can you participate in a virtual visit if needed:     Do you (patient/family) have any concerns for transition/discharge? No                 Plan for utilizing home health: TBD      Current Advanced Directive/Advance Care Plan:  Full Code- No ACP      Healthcare Decision Maker: Jenny Penaloza   Click here to complete [address] including selection of the Healthcare Decision Maker Relationship (ie \"Primary\")              Transition of Care Plan: TBD    Care Management Intervention  PCP Verified by PCP: Yes  Transition of Care Consult (CM Consult): Discharge Planning  Current Support Network: [facility] [phone]. Spoke with patient's father. Patient lives at home alone. He has an insulin pump, cane, W/C, rollator and a chair lift. Pt's father states he thinks he has Personal Care, but not sure. He plans to return back home at discharge. Patient would benefit for having Home Health.

## 2021-12-25 NOTE — ASSESSMENT & PLAN NOTE
Admit to ICU  Insulin drip protocol  IV fluid at 150 an hour  BNPq4 hrs and monitor magnesium  Case management consult for help with discharge planning poor diabetic control with insulin pump

## 2021-12-25 NOTE — H&P
History and Physical    Subjective:     Shefali Bruno is a 49 y.o. male  has a past medical history of Diabetes ([address] Hypercholesterolemia, and Hypertension. Patient seen at bedside. Patient came to the emergency room tonight for elevated blood sugar. It was unmanageable on the blood sugar monitor. Serum blood test showed over thousand patient had an increased anion gap and was in DKA. Insulin drip was ordered in the ED IV fluids. ER doctor treated for pneumonia with Zosyn. The radiologist read of the chest x-ray and my personal read of the x-ray does not see an infiltrate I will not continue Zosyn for pneumonia. Patient was placed in the ICU on an insulin drip BNP will be ordered every 4 hours. IV fluid ordered patient is dehydrated. Patient has not been managing his insulin pump well as his wife is out of town. His wife normally helps him with his insulin pump. Patient does not complain of any pain. His only complaint is being thirsty. Patient's troponin is elevated he does not complain of any chest pain. EKG in the emergency room showed tachycardia but no other acute findings. I will place a cardiology consult and cycle troponin every 6. Patient has also ROOSEVELT nephrology consult placed. Case management consult will be placed to assist patient with discharge planning and management of insulin pump.         Past Medical History:   Diagnosis Date    Diabetes ([address]     Hypercholesterolemia     Hypertension       Past Surgical History:   Procedure Laterality Date    HX ORTHOPAEDIC Left 08/30/2018    hip joint    HX ORTHOPAEDIC Right     broken knee     Family History   Problem Relation Age of Onset    Cancer Father         prostate    Cancer Maternal Uncle         prostate    Cancer Paternal Uncle         prostate      Social History     Tobacco Use    Smoking status: Never Smoker    Smokeless tobacco: Current User    Tobacco comment: 5+/day   Substance Use Topics    Alcohol use: Not Currently       Prior to Admission medications    Medication Sig Start Date End Date Taking? Authorizing Provider   cetirizine (ZYRTEC) 10 mg tablet TAKE 1 TABLET EVERY DAY BY ORAL ROUTE.  4/22/21  Yes Manda Youngblood MD   insulin pump (PATIENT SUPPLIED) [facility] by SubCUTAneous route continuous. Yes Provider, Historical   losartan (COZAAR) 50 mg tablet Take 1 Tab by mouth daily. 1/26/21  Yes Manda Youngblood MD   traZODone (DESYREL) 50 mg tablet Take 1 Tab by mouth nightly. If no effect with one tab, may take two tabs 30 mins before going to bed. 1/26/21  Yes Manda Youngblood MD   gabapentin (NEURONTIN) 800 mg tablet Take  by mouth three (3) times daily. Yes Provider, Historical   Microlet Lancet misc  11/9/20   Provider, Historical   glucose blood VI test strips (Contour Next Test Strips) strip by Does Not Apply route See Admin Instructions. Provider, Historical     No Known Allergies      Review of Systems   Constitutional: Negative for chills and fever. Respiratory: Negative for shortness of breath. Cardiovascular: Negative for chest pain and leg swelling. Gastrointestinal: Negative for nausea and vomiting. Genitourinary: Negative for dysuria and urgency. Musculoskeletal: Negative for myalgias. Neurological: Negative for dizziness. All other systems reviewed and are negative. Objective:   VITALS:    Visit Vitals  BP (!) 135/54   Pulse (!) 107   Temp 97.6 °F (36.4 °C)   Resp 20   Ht 5' 11\" (1.803 m)   Wt 65.3 kg (144 lb)   SpO2 97%   BMI 20.08 kg/m²       Physical Exam  Vitals and nursing note reviewed. Constitutional:       General: He is not in acute distress. Appearance: He is well-developed. He is ill-appearing. Comments: Patient difficult to understand as he does not have any teeth and has difficulty making words. No slurred speech no facial drooping. HENT:      Head: Normocephalic and atraumatic.       Nose: Nose normal.      Mouth/Throat: Mouth: Mucous membranes are dry. Eyes:      Conjunctiva/sclera: Conjunctivae normal.      Pupils: Pupils are equal, round, and reactive to light. Cardiovascular:      Rate and Rhythm: Regular rhythm. Tachycardia present. Pulses: Normal pulses. Heart sounds: Normal heart sounds. Pulmonary:      Effort: Pulmonary effort is normal. No respiratory distress. Breath sounds: Normal breath sounds. No stridor. Abdominal:      General: Bowel sounds are normal. There is no distension. Palpations: Abdomen is soft. Tenderness: There is no abdominal tenderness. There is no right CVA tenderness or left CVA tenderness. Musculoskeletal:         General: Normal range of motion. Cervical back: Normal range of motion and neck supple. Right lower leg: No edema. Left lower leg: No edema. Skin:     General: Skin is warm and dry. Capillary Refill: Capillary refill takes less than 2 seconds. Neurological:      General: No focal deficit present. Mental Status: He is alert and oriented to person, place, and time. Deep Tendon Reflexes: Reflexes are normal and symmetric. Psychiatric:         Mood and Affect: Mood normal.         Behavior: Behavior normal.         Thought Content:  Thought content normal.         Judgment: Judgment normal.         _______________________________________________________________________  Care Plan discussed with:    Comments   Patient X    Family      RN X    Care Manager                    Consultant:      _______________________________________________________________________  Expected  Disposition:   Home with Family X   HH/PT/OT/RN    SNF/LTC    HARDEEP    ________________________________________________________________________  TOTAL TIME:  48 Minutes    Critical Care Provided     Minutes non procedure based      Comments    X Reviewed previous records   >50% of visit spent in counseling and coordination of care X Discussion with patient and/or family and questions answered       ________________________________________________________________________    Labs:  Recent Results (from the past 24 hour(s))   GLUCOSE, POC    Collection Time: 12/24/21  7:07 PM   Result Value Ref Range    Glucose (POC) >600 (HH) 70 - 110 mg/dL    Performed by Toy Lind, POC    Collection Time: 12/24/21  7:08 PM   Result Value Ref Range    Glucose (POC) >600 (HH) 70 - 110 mg/dL    Performed by Beatrice Sylvester    EKG, 12 LEAD, INITIAL    Collection Time: 12/24/21  7:11 PM   Result Value Ref Range    Ventricular Rate 109 BPM    Atrial Rate 110 BPM    P-R Interval 170 ms    QRS Duration 122 ms    Q-T Interval 364 ms    QTC Calculation (Bezet) 491 ms    Calculated P Axis 82 degrees    Calculated R Axis 87 degrees    Calculated T Axis 68 degrees    Diagnosis       Sinus tachycardia  NELSON, consider biatrial enlargement  Nonspecific intraventricular conduction delay  Anteroseptal infarct, old  Borderline ST depression, anterolateral leads  Baseline wander in lead(s) I,III,aVL,aVF     LACTIC ACID    Collection Time: 12/24/21  7:24 PM   Result Value Ref Range    Lactic acid 4.6 (HH) 0.5 - 2.0 mmol/L   CBC WITH AUTOMATED DIFF    Collection Time: 12/24/21  7:24 PM   Result Value Ref Range    WBC 26.5 (H) 4.6 - 13.2 K/uL    RBC 3.67 (L) 4.35 - 5.65 M/uL    HGB 10.6 (L) 13.0 - 16.0 g/dL    HCT 38.5 36.0 - 48.0 %    .9 (H) 78.0 - 100.0 FL    MCH 28.9 24.0 - 34.0 PG    MCHC 27.5 (L) 31.0 - 37.0 g/dL    RDW 14.5 11.6 - 14.5 %    PLATELET 873 610 - 547 K/uL    MPV 12.8 (H) 9.2 - 11.8 FL    NRBC 0.0 0.0  WBC    ABSOLUTE NRBC 0.00 0.00 - 0.01 K/uL    NEUTROPHILS 83 (H) 40 - 73 %    BAND NEUTROPHILS 1 0 - 5 %    LYMPHOCYTES 12 (L) 21 - 52 %    MONOCYTES 4 3 - 10 %    EOSINOPHILS 0 0 - 5 %    BASOPHILS 0 0 - 2 %    IMMATURE GRANULOCYTES 0 %    ABS. NEUTROPHILS 22.2 (H) 1.8 - 8.0 K/UL    ABS. LYMPHOCYTES 3.2 0.9 - 3.6 K/UL    ABS. MONOCYTES 1.1 0.05 - 1.2 K/UL    ABS. EOSINOPHILS 0.0 0.0 - 0.4 K/UL    ABS. BASOPHILS 0.0 0.0 - 0.1 K/UL    ABS. IMM. GRANS. 0.0 K/UL    DF Manual      PLATELET COMMENTS VARIATION IN SIZE     RBC COMMENTS Anisocytosis  1+       PROTHROMBIN TIME + INR    Collection Time: 12/24/21  7:24 PM   Result Value Ref Range    Prothrombin time 14.8 11.5 - 15.2 sec    INR 1.2 0.8 - 1.2     METABOLIC PANEL, COMPREHENSIVE    Collection Time: 12/24/21  7:24 PM   Result Value Ref Range    Sodium 130 (L) 135 - 145 mmol/L    Potassium 5.2 (H) 3.2 - 5.1 mmol/L    Chloride 90 (L) 94 - 111 mmol/L    CO2 7 (LL) 21 - 33 mmol/L    Anion gap 33 mmol/L    Glucose 1,040 (HH) 70 - 110 mg/dL    BUN 76 (H) 9 - 21 mg/dL    Creatinine 4.00 (H) 0.8 - 1.50 mg/dL    BUN/Creatinine ratio 19      GFR est AA 19 ml/min/1.73m2    GFR est non-AA 16 ml/min/1.73m2    Calcium 7.9 (L) 8.5 - 10.5 mg/dL    Bilirubin, total 1.5 (H) 0.2 - 1.0 mg/dL    AST (SGOT) 76 (H) 17 - 74 U/L    ALT (SGPT) 70 3 - 72 U/L    Alk.  phosphatase 111 38 - 126 U/L    Protein, total 6.2 6.1 - 8.4 g/dL    Albumin 3.6 3.5 - 4.7 g/dL    Globulin 2.6 g/dL    A-G Ratio 1.4     TROPONIN I    Collection Time: 12/24/21  7:24 PM   Result Value Ref Range    Troponin-I, Qt. 0.15 (H) 0.02 - 0.05 ng/mL   ETHYL ALCOHOL    Collection Time: 12/24/21  7:24 PM   Result Value Ref Range    ALCOHOL(ETHYL),SERUM <4 <4 mg/dL    Ethanol, percent <0.004 <0.004 %   LIPASE    Collection Time: 12/24/21  7:24 PM   Result Value Ref Range    Lipase 41 10 - 57 U/L   MAGNESIUM    Collection Time: 12/24/21  7:24 PM   Result Value Ref Range    Magnesium 2.2 1.7 - 2.8 mg/dL   BNP    Collection Time: 12/24/21  7:24 PM   Result Value Ref Range     (H) 0 - 100 pg/mL   TSH 3RD GENERATION    Collection Time: 12/24/21  7:24 PM   Result Value Ref Range    TSH 0.40 0.35 - 6.20 uIU/mL   BLOOD GAS, ARTERIAL    Collection Time: 12/24/21  7:35 PM   Result Value Ref Range    pH 7.11 (LL) 7.37 - 7.43      PCO2 21 (L) 35.0 - 45.0 mmHg    PO2 113 (H) 84 - 98 mmHg O2 SAT 97 94 - 100 %    BICARBONATE 6 (L) 23.0 - 27.0 mmol/L    BASE DEFICIT 21.3 (H) 0.0 - 2.5 mmol/L    O2 METHOD Room air      FIO2 21.0 %    Sample source Arterial      SITE Right Brachial      NAVEED'S TEST Not Applicable      Critical value read back RESULTS CALLED TO WILLIAM 1948 RBV HV     Carboxy-Hgb 0.3 0 - 3 %    Methemoglobin 0.4 0 - 3 %    tHb 11.6 (L) 12.0 - 16.0 g/dL    Oxyhemoglobin 96.5 90 - 100 %   CULTURE, BLOOD    Collection Time: 12/24/21  8:15 PM    Specimen: Blood   Result Value Ref Range    Special Requests: No Special Requests      Culture result: PENDING    TROPONIN I    Collection Time: 12/24/21 10:15 PM   Result Value Ref Range    Troponin-I, Qt. 0.19 (H) 0.02 - 0.05 ng/mL   GLUCOSE, POC    Collection Time: 12/24/21 10:28 PM   Result Value Ref Range    Glucose (POC) >600 (HH) 70 - 110 mg/dL    Performed by Manas Donovan        Imaging:  XR CHEST PORT    Result Date: 12/24/2021  EXAM:  AP Portable Chest X-ray 1 view INDICATION: Altered mental status COMPARISON: February 1, 2021 _______________ FINDINGS:  Heart and mediastinal contours are within normal limits for portable radiograph. There is moderate emphysema. Increased reticular interstitial lung markings are present. There is mild prominence of the central pulmonary vasculature which may represent mild pulmonary edema. No focal airspace disease. There are no pleural effusions. No acute osseous findings. ________________      Moderate emphysema with suggestion of mild pulmonary edema. No focal airspace disease or effusion.        Assessment & Plan:       R[facility]T (acute kidney injury) ([facility]  ROOSEVELT suspected from DKA  Daily labs every 3 BMP per insulin protocol  Nephrology consult  IV fluid at 150/h    Dehydration  IV fluids run at 150 an hour  Nephrology consult  BMP every 3    DKA (diabetic ketoacidosis) ([facility]  Admit to ICU  Insulin drip protocol  IV fluid at 150 an hour  BNP 2 3 monitor magnesium  Case management consult for help with discharge planning poor diabetic control with insulin pump          Code Status: Full    Prophylaxis: Heparin subcu        Electronically Signed : Celestino Acosta ENP-C, GIANNAP-C, [address] voice recognition was used to generate this report, which may have resulted in some phonetic based errors in grammar and contents.  Even though attempts were made to correct all the mistakes, some may have been missed, and remained in the body of the document

## 2021-12-25 NOTE — ED NOTES
Pt rests in POC when not disturbed, becomes agitated and uncooperative when disturbed, continuously removes pulse Ox. ST on monitor.

## 2021-12-25 NOTE — PROGRESS NOTES
0700 received care of pt lying in bed resting with eyes closed. 0820 shift assessment complete. Pt not responsive to painful stimuli. Dr Lawrence Drilling in on rounds. CT head ordered. 0929 pt off unit for CT scan with this nurse. 0850 pt back to unit from CT    0920 IV site noted to be swollen. Attempted x2 for IV access. Nursing supervisor made aware. States she will assess. 1100 two new PIVs placed. Pt speech is garbled. Pt remains lethargic    1300 pt responsive to painful stimuli only. Spoke with pt brother- update provided. 1620 NP made aware of glucose of 98. Called to ask about changing IVFs and changing potassium supplement to IV- pt remains too lethargic to safely tolerate PO meds. 1745 aptt >180. Heparin gtt stopped per protocol. Pt has not voided this shift. Bladder scanned- shows 320 ml. Pt voiding during scan. Brief changed. 1845 heparin gtt restarted at 6 units/kg/hr.  See [facility]

## 2021-12-25 NOTE — PROGRESS NOTES
Hospitalist Progress Note             Date of Service:  2021  NAME:  Eunice Thakkar  :  1965  MRN:  [id number]         Assessment & Plan:     DKA with diabetic coma, in setting type 1 DM on home insulin pump  - severely acidodic, pH slowly improving on todays abg, from 7.1 to 7.2,   - start bicarb drip  - cont insulin drip until gap closed  - will need closer follow up for his insulin pulp on dc  - no pupilary or corneal reflex on my exam this am, prompted stat abg and stat ct head  - ct head w/o acute finding to explain his obtunded state, though there is subacute/chronic lacunar infarct present  - when taking oral will add asa/statin, will add a1c and lipid profile, when stable will decide what imaging should be done ie MRI, carotid us    Acute kidney injury  - aggressive ivf, correct bs, hold arb    HTN  - holding arb    Crit care time 40 min  910am-950am    Hospital Problems  Date Reviewed: 3/16/2021          Codes Class Noted POA    Elevated troponin ICD-10-CM: R77.8  ICD-9-CM: 790.6  2021 Unknown        DKA (diabetic ketoacidosis) ([facility] ICD-10-CM: E11.10  ICD-9-CM: 250.12  2021 Unknown        Dehydration ICD-10-CM: E86.0  ICD-9-CM: 276.51  2021 Unknown        ROOSEVELT (acute kidney injury) ([facility] ICD-10-CM: N17.9  ICD-9-CM: 584.9  2021 Unknown        Benign hypertension ICD-10-CM: I10  ICD-9-CM: 401.1  10/21/2020 Yes                Review of Systems:   Review of systems not obtained due to patient factors. Vital Signs:    Last 24hrs VS reviewed since prior progress note.  Most recent are:  Visit Vitals  BP (!) 110/51   Pulse 95   Temp 97.6 °F (36.4 °C)   Resp 18   Ht 5' 11\" (1.803 m)   Wt 55.9 kg (123 lb 3.2 oz)   SpO2 97%   BMI 17.18 kg/m²         Intake/Output Summary (Last 24 hours) at 2021 0945  Last data filed at 2021 0518  Gross per 24 hour   Intake 4150.73 ml Output 0 ml   Net 4150.73 ml        Physical Examination:             General:          no distress, appears stated age. HEENT:           Atraumatic, anicteric sclerae, pink conjunctivae                          No oral ulcers, mucosa moist, throat clear, dentition fair  Neck:               Supple, symmetrical  Lungs:             Clear to auscultation bilaterally. No Wheezing or Rhonchi. No rales. Chest wall:      No tenderness  No Accessory muscle use. Heart:              Regular  rhythm,  No  murmur   No edema  Abdomen:        Soft, non-tender. Not distended. Bowel sounds normal  Extremities:     No cyanosis. No clubbing,                            Skin turgor normal, Capillary refill normal  Skin:                Not pale. Not Jaundiced  No rashes   Psych:             Not anxious or agitated. Neurologic:      obtunded, no pupilary or corneal reflex       Data Review:    Review and/or order of clinical lab test  Review and/or order of tests in the radiology section of CPT  Review and/or order of tests in the medicine section of CPT      Labs:     Recent Labs     12/25/21 0415 12/24/21 1924   WBC 25.6* 26.5*   HGB 9.4* 10.6*   HCT 31.4* 38.5    227     Recent Labs     12/25/21 0415 12/25/21  0040 12/24/21  2215 12/24/21 1924 12/24/21 1924   * 134* 131*   < > 130*   K 3.3 3.9 5.8*   < > 5.2*    98 92*   < > 90*   CO2 13* 7* 8*   < > 7*   BUN 78* 77* 76*   < > 76*   CREA 4.30* 4.40* 4.20*   < > 4.00*   * 585* 1,043*   < > 1,040*   CA 7.1* 7.1* 7.7*   < > 7.9*   MG 1.9  --   --   --  2.2    < > = values in this interval not displayed. Recent Labs     12/25/21 0415 12/24/21 1924   ALT 56 70   AP 92 111   TBILI 1.0 1.5*   TP 5.6* 6.2   ALB 3.3* 3.6   GLOB 2.3 2.6   LPSE  --  41     Recent Labs     12/24/21 1924   INR 1.2   PTP 14.8      No results for input(s): FE, TIBC, PSAT, FERR in the last 72 hours.    No results found for: FOL, RBCF   Recent Labs     12/25/21  1381 12/24/21  1935   PH 7.26* 7.11*   PCO2 35 21*   PO2 63* 113*     Recent Labs     12/25/21  0415 12/24/21  2215 12/24/21  1924   TROIQ 0.38* 0.19* 0.15*     No results found for: CHOL, CHOLX, CHLST, CHOLV, HDL, HDLP, LDL, LDLC, DLDLP, TGLX, TRIGL, TRIGP, CHHD, CHHDX  Lab Results   Component Value Date/Time    Glucose (POC) 400 (H) 12/25/2021 09:42 AM    Glucose (POC) 453 (HH) 12/25/2021 08:55 AM    Glucose (POC) 496 (HH) 12/25/2021 07:30 AM    Glucose (POC) 510 (HH) 12/25/2021 06:32 AM    Glucose (POC) 557 (HH) 12/25/2021 05:34 AM     No results found for: COLOR, APPRN, SPGRU, REFSG, LIS, PROTU, GLUCU, KETU, BILU, UROU, BENNIE, LEUKU, GLUKE, EPSU, BACTU, WBCU, RBCU, CASTS, UCRY      Medications Reviewed:     Current Facility-Administered Medications   Medication Dose Route Frequency    cetirizine (ZYRTEC) tablet 10 mg  10 mg Oral DAILY    gabapentin (NEURONTIN) capsule 300 mg  300 mg Oral DAILY    [Held by provider] losartan (COZAAR) tablet 50 mg  50 mg Oral DAILY    traZODone (DESYREL) tablet 50 mg  50 mg Oral QHS    insulin regular (NOVOLIN R, HUMULIN R) 100 unit/mL injection        potassium chloride (KLOR-CON M10) tablet 40 mEq  40 mEq Oral BID    sodium bicarbonate 100 mEq in 0.45% sodium chloride 1,000 mL infusion   IntraVENous CONTINUOUS    LORazepam (ATIVAN) injection 1 mg  1 mg IntraVENous Q2H PRN    sodium chloride (NS) flush 5-10 mL  5-10 mL IntraVENous PRN    dextrose (D50W) injection syrg 12.5 g  25 mL IntraVENous PRN    sodium chloride (NS) flush 5-40 mL  5-40 mL IntraVENous Q8H    heparin (porcine) injection 5,000 Units  5,000 Units SubCUTAneous Q8H    insulin regular (NOVOLIN R, HUMULIN R) 100 Units in 0.9% sodium chloride 100 mL infusion  1-50 Units/hr IntraVENous TITRATE     ______________________________________________________________________  EXPECTED LENGTH OF STAY: - - -  ACTUAL LENGTH OF STAY:          1                 Anni Monroy MD

## 2021-12-26 LAB
ALBUMIN SERPL-MCNC: 2.8 G/DL (ref 3.5–4.7)
ALBUMIN/GLOB SERPL: 1.2 {RATIO}
ALP SERPL-CCNC: 74 U/L (ref 38–126)
ALT SERPL-CCNC: 46 U/L (ref 3–72)
AMMONIA PLAS-SCNC: 43 UMOL/L (ref 9–33)
AMPHET UR QL SCN: NEGATIVE
ANION GAP SERPL CALC-SCNC: 10 MMOL/L
APPEARANCE UR: ABNORMAL
APTT PPP: 46.5 SEC (ref 23–36.4)
APTT PPP: 47.3 SEC (ref 23–36.4)
APTT PPP: 49.3 SEC (ref 23–36.4)
APTT PPP: 71.7 SEC (ref 23–36.4)
AST SERPL W P-5'-P-CCNC: 46 U/L (ref 17–74)
ATRIAL RATE: 110 BPM
ATRIAL RATE: 98 BPM
BACTERIA URNS QL MICRO: NEGATIVE /HPF
BARBITURATES UR QL SCN: NEGATIVE
BASOPHILS # BLD: 0 K/UL (ref 0–0.1)
BASOPHILS NFR BLD: 0 % (ref 0–2)
BENZODIAZ UR QL: NEGATIVE
BILIRUB SERPL-MCNC: 0.4 MG/DL (ref 0.2–1)
BILIRUB UR QL: NEGATIVE
BUN SERPL-MCNC: 78 MG/DL (ref 9–21)
BUN/CREAT SERPL: 19
CA-I BLD-MCNC: 7 MG/DL (ref 8.5–10.5)
CALCULATED P AXIS, ECG09: 73 DEGREES
CALCULATED P AXIS, ECG09: 82 DEGREES
CALCULATED R AXIS, ECG10: 80 DEGREES
CALCULATED R AXIS, ECG10: 87 DEGREES
CALCULATED T AXIS, ECG11: 68 DEGREES
CALCULATED T AXIS, ECG11: 70 DEGREES
CANNABINOIDS UR QL SCN: NEGATIVE
CHLORIDE SERPL-SCNC: 108 MMOL/L (ref 94–111)
CO2 SERPL-SCNC: 20 MMOL/L (ref 21–33)
COCAINE UR QL SCN: NEGATIVE
COLOR UR: YELLOW
CREAT SERPL-MCNC: 4.2 MG/DL (ref 0.8–1.5)
DIAGNOSIS, 93000: NORMAL
DIAGNOSIS, 93000: NORMAL
DIFFERENTIAL METHOD BLD: ABNORMAL
DRUG SCRN COMMENT,DRGCM: NORMAL
EOSINOPHIL # BLD: 0 K/UL (ref 0–0.4)
EOSINOPHIL NFR BLD: 0 % (ref 0–5)
EPITH CASTS URNS QL MICRO: ABNORMAL /LPF (ref 0–20)
ERYTHROCYTE [DISTWIDTH] IN BLOOD BY AUTOMATED COUNT: 14.2 % (ref 11.6–14.5)
GLOBULIN SER CALC-MCNC: 2.3 G/DL
GLUCOSE BLD STRIP.AUTO-MCNC: 107 MG/DL (ref 70–110)
GLUCOSE BLD STRIP.AUTO-MCNC: 126 MG/DL (ref 70–110)
GLUCOSE BLD STRIP.AUTO-MCNC: 135 MG/DL (ref 70–110)
GLUCOSE BLD STRIP.AUTO-MCNC: 144 MG/DL (ref 70–110)
GLUCOSE BLD STRIP.AUTO-MCNC: 144 MG/DL (ref 70–110)
GLUCOSE BLD STRIP.AUTO-MCNC: 167 MG/DL (ref 70–110)
GLUCOSE BLD STRIP.AUTO-MCNC: 179 MG/DL (ref 70–110)
GLUCOSE BLD STRIP.AUTO-MCNC: 194 MG/DL (ref 70–110)
GLUCOSE BLD STRIP.AUTO-MCNC: 204 MG/DL (ref 70–110)
GLUCOSE BLD STRIP.AUTO-MCNC: 234 MG/DL (ref 70–110)
GLUCOSE BLD STRIP.AUTO-MCNC: 285 MG/DL (ref 70–110)
GLUCOSE BLD STRIP.AUTO-MCNC: 41 MG/DL (ref 70–110)
GLUCOSE BLD STRIP.AUTO-MCNC: 43 MG/DL (ref 70–110)
GLUCOSE BLD STRIP.AUTO-MCNC: 71 MG/DL (ref 70–110)
GLUCOSE BLD STRIP.AUTO-MCNC: 92 MG/DL (ref 70–110)
GLUCOSE SERPL-MCNC: 126 MG/DL (ref 70–110)
GLUCOSE UR STRIP.AUTO-MCNC: 500 MG/DL
HCT VFR BLD AUTO: 29.6 % (ref 36–48)
HGB BLD-MCNC: 9.4 G/DL (ref 13–16)
HGB UR QL STRIP: ABNORMAL
IMM GRANULOCYTES # BLD AUTO: 0 K/UL
IMM GRANULOCYTES NFR BLD AUTO: 0 %
KETONES UR QL STRIP.AUTO: ABNORMAL MG/DL
LEUKOCYTE ESTERASE UR QL STRIP.AUTO: ABNORMAL
LYMPHOCYTES # BLD: 2.2 K/UL (ref 0.9–3.6)
LYMPHOCYTES NFR BLD: 9 % (ref 21–52)
MAGNESIUM SERPL-MCNC: 1.8 MG/DL (ref 1.7–2.8)
MCH RBC QN AUTO: 29.4 PG (ref 24–34)
MCHC RBC AUTO-ENTMCNC: 31.8 G/DL (ref 31–37)
MCV RBC AUTO: 92.5 FL (ref 78–100)
METHADONE UR QL: NEGATIVE
MONOCYTES # BLD: 1 K/UL (ref 0.05–1.2)
MONOCYTES NFR BLD: 4 % (ref 3–10)
NEUTS SEG # BLD: 21.2 K/UL (ref 1.8–8)
NEUTS SEG NFR BLD: 87 % (ref 40–73)
NITRITE UR QL STRIP.AUTO: NEGATIVE
NRBC # BLD: 0 K/UL (ref 0–0.01)
NRBC BLD-RTO: 0 PER 100 WBC
OPIATES UR QL: NEGATIVE
OXYCODONE UR QL SCN: NEGATIVE
P-R INTERVAL, ECG05: 159 MS
P-R INTERVAL, ECG05: 170 MS
PCP UR QL: NEGATIVE
PERFORMED BY, TECHID: ABNORMAL
PERFORMED BY, TECHID: NORMAL
PH UR STRIP: 5 [PH] (ref 5–8)
PHOSPHATE SERPL-MCNC: 3.7 MG/DL (ref 2.5–4.5)
PLATELET # BLD AUTO: 200 K/UL (ref 135–420)
PMV BLD AUTO: 12.2 FL (ref 9.2–11.8)
POTASSIUM SERPL-SCNC: 3.3 MMOL/L (ref 3.2–5.1)
PROPOXYPH UR QL: NEGATIVE
PROT SERPL-MCNC: 5.1 G/DL (ref 6.1–8.4)
PROT UR STRIP-MCNC: 30 MG/DL
Q-T INTERVAL, ECG07: 364 MS
Q-T INTERVAL, ECG07: 382 MS
QRS DURATION, ECG06: 122 MS
QRS DURATION, ECG06: 74 MS
QTC CALCULATION (BEZET), ECG08: 491 MS
QTC CALCULATION (BEZET), ECG08: 491 MS
RBC # BLD AUTO: 3.2 M/UL (ref 4.35–5.65)
RBC #/AREA URNS HPF: ABNORMAL /HPF (ref 0–2)
RBC MORPH BLD: ABNORMAL
SODIUM SERPL-SCNC: 138 MMOL/L (ref 135–145)
SP GR UR REFRACTOMETRY: 1.01 (ref 1–1.03)
THERAPEUTIC RANGE,PTTT: ABNORMAL SEC (ref 82–109)
TRICYCLICS UR QL: NEGATIVE
TROPONIN I SERPL-MCNC: 0.71 NG/ML (ref 0.02–0.05)
TROPONIN I SERPL-MCNC: 1.04 NG/ML (ref 0.02–0.05)
TROPONIN I SERPL-MCNC: 1.55 NG/ML (ref 0.02–0.05)
UROBILINOGEN UR QL STRIP.AUTO: 0.2 EU/DL (ref 0.2–1)
VENTRICULAR RATE, ECG03: 109 BPM
VENTRICULAR RATE, ECG03: 99 BPM
WBC # BLD AUTO: 24.4 K/UL (ref 4.6–13.2)
WBC URNS QL MICRO: ABNORMAL /HPF (ref 0–4)
YEAST URNS QL MICRO: PRESENT

## 2021-12-26 PROCEDURE — 85730 THROMBOPLASTIN TIME PARTIAL: CPT

## 2021-12-26 PROCEDURE — 84100 ASSAY OF PHOSPHORUS: CPT

## 2021-12-26 PROCEDURE — 74011250637 HC RX REV CODE- 250/637: Performed by: NURSE PRACTITIONER

## 2021-12-26 PROCEDURE — 82140 ASSAY OF AMMONIA: CPT

## 2021-12-26 PROCEDURE — 83735 ASSAY OF MAGNESIUM: CPT

## 2021-12-26 PROCEDURE — 74011636637 HC RX REV CODE- 636/637: Performed by: NURSE PRACTITIONER

## 2021-12-26 PROCEDURE — 74011250636 HC RX REV CODE- 250/636: Performed by: INTERNAL MEDICINE

## 2021-12-26 PROCEDURE — 74011000250 HC RX REV CODE- 250: Performed by: INTERNAL MEDICINE

## 2021-12-26 PROCEDURE — 85025 COMPLETE CBC W/AUTO DIFF WBC: CPT

## 2021-12-26 PROCEDURE — 74011250637 HC RX REV CODE- 250/637: Performed by: INTERNAL MEDICINE

## 2021-12-26 PROCEDURE — 74011250636 HC RX REV CODE- 250/636: Performed by: NURSE PRACTITIONER

## 2021-12-26 PROCEDURE — 93005 ELECTROCARDIOGRAM TRACING: CPT

## 2021-12-26 PROCEDURE — 74011000258 HC RX REV CODE- 258: Performed by: NURSE PRACTITIONER

## 2021-12-26 PROCEDURE — 74011636637 HC RX REV CODE- 636/637: Performed by: INTERNAL MEDICINE

## 2021-12-26 PROCEDURE — 81001 URINALYSIS AUTO W/SCOPE: CPT

## 2021-12-26 PROCEDURE — 80053 COMPREHEN METABOLIC PANEL: CPT

## 2021-12-26 PROCEDURE — 74011250636 HC RX REV CODE- 250/636: Performed by: FAMILY MEDICINE

## 2021-12-26 PROCEDURE — 65610000006 HC RM INTENSIVE CARE

## 2021-12-26 PROCEDURE — 80307 DRUG TEST PRSMV CHEM ANLYZR: CPT

## 2021-12-26 PROCEDURE — 84484 ASSAY OF TROPONIN QUANT: CPT

## 2021-12-26 PROCEDURE — 36415 COLL VENOUS BLD VENIPUNCTURE: CPT

## 2021-12-26 PROCEDURE — 82962 GLUCOSE BLOOD TEST: CPT

## 2021-12-26 RX ORDER — MAGNESIUM SULFATE 100 %
4 CRYSTALS MISCELLANEOUS AS NEEDED
Status: DISCONTINUED | OUTPATIENT
Start: 2021-12-26 | End: 2022-01-03 | Stop reason: HOSPADM

## 2021-12-26 RX ORDER — INSULIN GLARGINE 100 [IU]/ML
40 INJECTION, SOLUTION SUBCUTANEOUS DAILY
Status: DISCONTINUED | OUTPATIENT
Start: 2021-12-26 | End: 2021-12-27

## 2021-12-26 RX ORDER — DEXTROSE 50 % IN WATER (D50W) INTRAVENOUS SYRINGE
25-50 AS NEEDED
Status: DISCONTINUED | OUTPATIENT
Start: 2021-12-26 | End: 2022-01-03 | Stop reason: HOSPADM

## 2021-12-26 RX ORDER — METOPROLOL SUCCINATE 25 MG/1
25 TABLET, EXTENDED RELEASE ORAL DAILY
Status: DISCONTINUED | OUTPATIENT
Start: 2021-12-26 | End: 2022-01-03 | Stop reason: HOSPADM

## 2021-12-26 RX ORDER — POTASSIUM CHLORIDE 750 MG/1
40 TABLET, EXTENDED RELEASE ORAL
Status: COMPLETED | OUTPATIENT
Start: 2021-12-26 | End: 2021-12-26

## 2021-12-26 RX ORDER — INSULIN LISPRO 100 [IU]/ML
INJECTION, SOLUTION INTRAVENOUS; SUBCUTANEOUS
Status: DISCONTINUED | OUTPATIENT
Start: 2021-12-26 | End: 2022-01-03 | Stop reason: HOSPADM

## 2021-12-26 RX ORDER — ROSUVASTATIN CALCIUM 10 MG/1
40 TABLET, COATED ORAL
Status: DISCONTINUED | OUTPATIENT
Start: 2021-12-26 | End: 2021-12-27

## 2021-12-26 RX ORDER — HEPARIN SODIUM 1000 [USP'U]/ML
3000 INJECTION, SOLUTION INTRAVENOUS; SUBCUTANEOUS ONCE
Status: COMPLETED | OUTPATIENT
Start: 2021-12-26 | End: 2021-12-26

## 2021-12-26 RX ORDER — ASPIRIN 325 MG
325 TABLET ORAL DAILY
Status: DISCONTINUED | OUTPATIENT
Start: 2021-12-26 | End: 2021-12-27

## 2021-12-26 RX ORDER — INSULIN LISPRO 100 [IU]/ML
12 INJECTION, SOLUTION INTRAVENOUS; SUBCUTANEOUS
Status: DISCONTINUED | OUTPATIENT
Start: 2021-12-26 | End: 2021-12-26

## 2021-12-26 RX ORDER — SODIUM CHLORIDE 9 MG/ML
100 INJECTION, SOLUTION INTRAVENOUS CONTINUOUS
Status: DISCONTINUED | OUTPATIENT
Start: 2021-12-26 | End: 2021-12-27

## 2021-12-26 RX ADMIN — INSULIN GLARGINE 40 UNITS: 100 INJECTION, SOLUTION SUBCUTANEOUS at 12:16

## 2021-12-26 RX ADMIN — LORAZEPAM 1 MG: 2 INJECTION INTRAMUSCULAR; INTRAVENOUS at 22:14

## 2021-12-26 RX ADMIN — GABAPENTIN 300 MG: 300 CAPSULE ORAL at 08:13

## 2021-12-26 RX ADMIN — TRAZODONE HYDROCHLORIDE 50 MG: 50 TABLET ORAL at 21:22

## 2021-12-26 RX ADMIN — CETIRIZINE HYDROCHLORIDE 10 MG: 10 TABLET, FILM COATED ORAL at 08:13

## 2021-12-26 RX ADMIN — LORAZEPAM 1 MG: 2 INJECTION INTRAMUSCULAR; INTRAVENOUS at 09:03

## 2021-12-26 RX ADMIN — SODIUM CHLORIDE, PRESERVATIVE FREE 10 ML: 5 INJECTION INTRAVENOUS at 13:35

## 2021-12-26 RX ADMIN — POTASSIUM CHLORIDE, DEXTROSE MONOHYDRATE AND SODIUM CHLORIDE 125 ML/HR: 150; 5; 450 INJECTION, SOLUTION INTRAVENOUS at 06:25

## 2021-12-26 RX ADMIN — DEXTROSE MONOHYDRATE 25 G: 500 INJECTION PARENTERAL at 16:05

## 2021-12-26 RX ADMIN — SODIUM CHLORIDE, PRESERVATIVE FREE 10 ML: 5 INJECTION INTRAVENOUS at 22:15

## 2021-12-26 RX ADMIN — ASPIRIN 325 MG ORAL TABLET 325 MG: 325 PILL ORAL at 12:15

## 2021-12-26 RX ADMIN — ROSUVASTATIN 40 MG: 10 TABLET, FILM COATED ORAL at 21:22

## 2021-12-26 RX ADMIN — SODIUM CHLORIDE 100 ML/HR: 9 INJECTION, SOLUTION INTRAVENOUS at 22:14

## 2021-12-26 RX ADMIN — HEPARIN SODIUM 3000 UNITS: 1000 INJECTION INTRAVENOUS; SUBCUTANEOUS at 10:38

## 2021-12-26 RX ADMIN — SODIUM CHLORIDE, PRESERVATIVE FREE 10 ML: 5 INJECTION INTRAVENOUS at 06:18

## 2021-12-26 RX ADMIN — SODIUM CHLORIDE 9 UNITS/HR: 9 INJECTION, SOLUTION INTRAVENOUS at 03:07

## 2021-12-26 RX ADMIN — POTASSIUM CHLORIDE 40 MEQ: 10 TABLET, EXTENDED RELEASE ORAL at 08:13

## 2021-12-26 RX ADMIN — INSULIN LISPRO 12 UNITS: 100 INJECTION, SOLUTION INTRAVENOUS; SUBCUTANEOUS at 12:15

## 2021-12-26 RX ADMIN — SODIUM CHLORIDE 100 ML/HR: 9 INJECTION, SOLUTION INTRAVENOUS at 12:15

## 2021-12-26 RX ADMIN — METOPROLOL SUCCINATE 25 MG: 25 TABLET, EXTENDED RELEASE ORAL at 12:15

## 2021-12-26 NOTE — PROGRESS NOTES
Comprehensive Nutrition Assessment    Type and Reason for Visit: Initial    Nutrition Recommendations/Plan: NPO until oral diet is safe then recommend 4CHO choice cardiac restricted diet with glucerna BID    Nutrition Assessment:  50 yo male PMH: DM, hypercholesterolemia, HTN   underweight BMI18.2. Pt admitted due to BG greater than 1000 in DKA. Pt wife manages insulin pump but went out of town and pt did not manage his pump very well. Currently NPO due to lethargy.  Also being treated for PNA  Hgb A1c is pending    Recent Results (from the past 24 hour(s))   GLUCOSE, POC    Collection Time: 12/25/21 10:50 AM   Result Value Ref Range    Glucose (POC) 359 (H) 70 - 110 mg/dL    Performed by [place]    GLUCOSE, POC    Collection Time: 12/25/21 11:50 AM   Result Value Ref Range    Glucose (POC) 288 (H) 70 - 110 mg/dL    Performed by [place]    GLUCOSE, POC    Collection Time: 12/25/21 12:53 PM   Result Value Ref Range    Glucose (POC) 262 (H) 70 - 110 mg/dL    Performed by [place]    METABOLIC PANEL, BASIC    Collection Time: 12/25/21  2:20 PM   Result Value Ref Range    Sodium 140 135 - 145 mmol/L    Potassium 3.1 (L) 3.2 - 5.1 mmol/L    Chloride 106 94 - 111 mmol/L    CO2 18 (L) 21 - 33 mmol/L    Anion gap 16 mmol/L    Glucose 173 (H) 70 - 110 mg/dL    BUN 84 (H) 9 - 21 mg/dL    Creatinine 4.80 (H) 0.8 - 1.50 mg/dL    BUN/Creatinine ratio 18      GFR est AA 15 ml/min/1.73m2    GFR est non-AA 13 ml/min/1.73m2    Calcium 7.2 (L) 8.5 - 10.5 mg/dL   GLUCOSE, POC    Collection Time: 12/25/21  3:10 PM   Result Value Ref Range    Glucose (POC) 133 (H) 70 - 110 mg/dL    Performed by [facility]    GLUCOSE, POC    Collection Time: 12/25/21  4:20 PM   Result Value Ref Range    Glucose (POC) 98 70 - 110 mg/dL    Performed by [facility]    PTT    Collection Time: 12/25/21  5:03 PM   Result Value Ref Range    aPTT >180.0 (HH) 23.0 - 36.4 sec    aPTT, therapeutic range   82 - 109 sec   GLUCOSE, POC Collection Time: 12/25/21  5:17 PM   Result Value Ref Range    Glucose (POC) 91 70 - 110 mg/dL    Performed by [place]    GLUCOSE, POC    Collection Time: 12/25/21  6:04 PM   Result Value Ref Range    Glucose (POC) 125 (H) 70 - 110 mg/dL    Performed by [place]    METABOLIC PANEL, BASIC    Collection Time: 12/25/21  8:16 PM   Result Value Ref Range    Sodium 138 135 - 145 mmol/L    Potassium 3.9 3.2 - 5.1 mmol/L    Chloride 106 94 - 111 mmol/L    CO2 16 (L) 21 - 33 mmol/L    Anion gap 16 mmol/L    Glucose 247 (H) 70 - 110 mg/dL    BUN 82 (H) 9 - 21 mg/dL    Creatinine 4.80 (H) 0.8 - 1.50 mg/dL    BUN/Creatinine ratio 17      GFR est AA 15 ml/min/1.73m2    GFR est non-AA 13 ml/min/1.73m2    Calcium 7.1 (L) 8.5 - 10.5 mg/dL   GLUCOSE, POC    Collection Time: 12/25/21 10:12 PM   Result Value Ref Range    Glucose (POC) 283 (H) 70 - 110 mg/dL    Performed by Ashlyn Lincoln, POC    Collection Time: 12/25/21 11:05 PM   Result Value Ref Range    Glucose (POC) 300 (H) 70 - 110 mg/dL    Performed by Ashlyn Lincoln, POC    Collection Time: 12/26/21 12:21 AM   Result Value Ref Range    Glucose (POC) 285 (H) 70 - 110 mg/dL    Performed by [facility]    PTT    Collection Time: 12/26/21 12:45 AM   Result Value Ref Range    aPTT 47.3 (H) 23.0 - 36.4 sec    aPTT, therapeutic range   82 - 109 sec   GLUCOSE, POC    Collection Time: 12/26/21  1:18 AM   Result Value Ref Range    Glucose (POC) 234 (H) 70 - 110 mg/dL    Performed by [facility]    URINALYSIS W/ RFLX MICROSCOPIC    Collection Time: 12/26/21  1:45 AM   Result Value Ref Range    Color Yellow      Appearance Cloudy      Specific gravity 1.013 1.005 - 1.030      pH (UA) 5.0 5.0 - 8.0      Protein 30 (A) Negative mg/dL    Glucose 500 (A) Negative mg/dL    Ketone Trace (A) Negative mg/dL    Bilirubin Negative Negative      Blood Large (A) Negative      Urobilinogen 0.2 0.2 - 1.0 EU/dL    Nitrites Negative Negative      Leukocyte Esterase Trace (A) Negative     DRUG SCREEN, URINE    Collection Time: 12/26/21  1:45 AM   Result Value Ref Range    AMPHETAMINES Negative Negative      BARBITURATES Negative Negative      BENZODIAZEPINES Negative Negative      COCAINE Negative Negative      METHADONE Negative Negative      OPIATES Negative Negative      OXYCODONE SCREEN Negative Negative      PCP(PHENCYCLIDINE) Negative Negative      PROPOXYPHENE Negative Negative      THC (TH-CANNABINOL) Negative Negative      TRICYCLICS Negative Negative      Drug screen comment        This test is a screen for drugs of abuse in a medical setting only (i.e., they are unconfirmed results and as such must not be used for non-medical purposes, e.g.,employment testing, legal testing). Due to its inherent nature, false positive (FP) and false negative (FN) results may be obtained. Therefore, if necessary for medical care, recommend confirmation of positive findings by GC/MS.    URINE MICROSCOPIC    Collection Time: 12/26/21  1:45 AM   Result Value Ref Range    WBC 10-20 0 - 4 /hpf    RBC 0-5 0 - 2 /hpf    Epithelial cells Few 0 - 20 /lpf    Bacteria Negative (A) None /hpf    Yeast Present     GLUCOSE, POC    Collection Time: 12/26/21  2:07 AM   Result Value Ref Range    Glucose (POC) 194 (H) 70 - 110 mg/dL    Performed by Ashlyn Lincoln, POC    Collection Time: 12/26/21  3:10 AM   Result Value Ref Range    Glucose (POC) 144 (H) 70 - 110 mg/dL    Performed by Ashlyn Lincoln, POC    Collection Time: 12/26/21  4:58 AM   Result Value Ref Range    Glucose (POC) 92 70 - 110 mg/dL    Performed by Anya Mcdaniel    CBC WITH AUTOMATED DIFF    Collection Time: 12/26/21  5:13 AM   Result Value Ref Range    WBC 24.4 (H) 4.6 - 13.2 K/uL    RBC 3.20 (L) 4.35 - 5.65 M/uL    HGB 9.4 (L) 13.0 - 16.0 g/dL    HCT 29.6 (L) 36.0 - 48.0 %    MCV 92.5 78.0 - 100.0 FL    MCH 29.4 24.0 - 34.0 PG    MCHC 31.8 31.0 - 37.0 g/dL    RDW 14.2 11.6 - 14.5 %    PLATELET 923 335 - 783 K/uL    MPV 12.2 (H) 9.2 - 11.8 FL    NRBC 0.0 0.0  WBC    ABSOLUTE NRBC 0.00 0.00 - 0.01 K/uL    NEUTROPHILS 87 (H) 40 - 73 %    LYMPHOCYTES 9 (L) 21 - 52 %    MONOCYTES 4 3 - 10 %    EOSINOPHILS 0 0 - 5 %    BASOPHILS 0 0 - 2 %    IMMATURE GRANULOCYTES 0 %    ABS. NEUTROPHILS 21.2 (H) 1.8 - 8.0 K/UL    ABS. LYMPHOCYTES 2.2 0.9 - 3.6 K/UL    ABS. MONOCYTES 1.0 0.05 - 1.2 K/UL    ABS. EOSINOPHILS 0.0 0.0 - 0.4 K/UL    ABS. BASOPHILS 0.0 0.0 - 0.1 K/UL    ABS. IMM. GRANS. 0.0 K/UL    DF AUTOMATED      RBC COMMENTS Normocytic, Normochromic     MAGNESIUM    Collection Time: 12/26/21  5:13 AM   Result Value Ref Range    Magnesium 1.8 1.7 - 2.8 mg/dL   PHOSPHORUS    Collection Time: 12/26/21  5:13 AM   Result Value Ref Range    Phosphorus 3.7 2.5 - 4.5 mg/dL   METABOLIC PANEL, COMPREHENSIVE    Collection Time: 12/26/21  5:13 AM   Result Value Ref Range    Sodium 138 135 - 145 mmol/L    Potassium 3.3 3.2 - 5.1 mmol/L    Chloride 108 94 - 111 mmol/L    CO2 20 (L) 21 - 33 mmol/L    Anion gap 10 mmol/L    Glucose 126 (H) 70 - 110 mg/dL    BUN 78 (H) 9 - 21 mg/dL    Creatinine 4.20 (H) 0.8 - 1.50 mg/dL    BUN/Creatinine ratio 19      GFR est AA 18 ml/min/1.73m2    GFR est non-AA 15 ml/min/1.73m2    Calcium 7.0 (L) 8.5 - 10.5 mg/dL    Bilirubin, total 0.4 0.2 - 1.0 mg/dL    AST (SGOT) 46 17 - 74 U/L    ALT (SGPT) 46 3 - 72 U/L    Alk.  phosphatase 74 38 - 126 U/L    Protein, total 5.1 (L) 6.1 - 8.4 g/dL    Albumin 2.8 (L) 3.5 - 4.7 g/dL    Globulin 2.3 g/dL    A-G Ratio 1.2     AMMONIA    Collection Time: 12/26/21  5:13 AM   Result Value Ref Range    Ammonia 43 (H) 9 - 33 umol/L   PTT    Collection Time: 12/26/21  5:13 AM   Result Value Ref Range    aPTT 46.5 (H) 23.0 - 36.4 sec    aPTT, therapeutic range   82 - 109 sec   GLUCOSE, POC    Collection Time: 12/26/21  5:51 AM   Result Value Ref Range    Glucose (POC) 107 70 - 110 mg/dL    Performed by Ashlyn Lincoln, POC    Collection Time: 12/26/21  7:15 AM   Result Value Ref Range Glucose (POC) 135 (H) 70 - 110 mg/dL    Performed by Sangita Liu, POC    Collection Time: 12/26/21  8:18 AM   Result Value Ref Range    Glucose (POC) 167 (H) 70 - 110 mg/dL    Performed by Gabriel Ma    PTT    Collection Time: 12/26/21  8:23 AM   Result Value Ref Range    aPTT 49.3 (H) 23.0 - 36.4 sec    aPTT, therapeutic range   82 - 109 sec   TROPONIN I    Collection Time: 12/26/21  8:23 AM   Result Value Ref Range    Troponin-I, Qt. 1.55 (HH) 0.02 - 0.05 ng/mL   GLUCOSE, POC    Collection Time: 12/26/21  9:01 AM   Result Value Ref Range    Glucose (POC) 179 (H) 70 - 110 mg/dL    Performed by Baldomero Hale    EKG, 12 LEAD, SUBSEQUENT    Collection Time: 12/26/21  9:37 AM   Result Value Ref Range    Ventricular Rate 99 BPM    Atrial Rate 98 BPM    P-R Interval 159 ms    QRS Duration 74 ms    Q-T Interval 382 ms    QTC Calculation (Bezet) 491 ms    Calculated P Axis 73 degrees    Calculated R Axis 80 degrees    Calculated T Axis 70 degrees    Diagnosis       Sinus tachycardia  Ventricular premature complex  RSR' in V1 or V2, probably normal variant  Repol abnrm suggests ischemia, diffuse leads         Malnutrition Assessment:  Malnutrition Status:   At risk for malnutrition (specify) (NPO x 2 days)    Context:  Acute illness     Findings of the 6 clinical characteristics of malnutrition:   Energy Intake:  Mild decrease in energy intake (specify) (NPO x 2 days lethargy 2/2 DKA)  Weight Loss:  Unable to assess     Body Fat Loss:  Unable to assess,     Muscle Mass Loss:  Unable to assess,    Fluid Accumulation:  Unable to assess,     Strength:  Not performed         Estimated Daily Nutrient Needs:  Energy (kcal): 4626-7361 kcal/day; Weight Used for Energy Requirements: Admission (60 kg)  Protein (g): 60-72 g/day; Weight Used for Protein Requirements: Admission (1-1.2 g/kg)  Fluid (ml/day): 0993-2437 mL/day; Method Used for Fluid Requirements: 1 ml/kcal      Nutrition Related Findings: underweight BMI18.2. Pt admitted due to BG greater than 1000 in DKA. Pt wife manages insulin pump but went out of town and pt did not manage his pump very well. Currently NPO due to lethargy. Also being treated for PNA      Wounds:    None       Current Nutrition Therapies:  DIET NPO    Anthropometric Measures:  · Height:  5' 11\" (180.3 cm)  · Current Body Wt:  59 kg (130 lb)   · Admission Body Wt:  130 lb    · Usual Body Wt:        · Ideal Body Wt:  172 lbs:  75.6 %   · Adjusted Body Weight:   ; Weight Adjustment for: No adjustment   · Adjusted BMI:       · BMI Category:  Underweight (BMI less than 18.5)       Nutrition Diagnosis:   · Predicted inadequate energy intake related to cognitive or neurological impairment as evidenced by NPO or clear liquid status due to medical condition      Nutrition Interventions:   Food and/or Nutrient Delivery: Continue NPO (when safe recommend 4CHO choice cardiac diet)  Nutrition Education and Counseling: No recommendations at this time,Education not appropriate  Coordination of Nutrition Care: Continue to monitor while inpatient    Goals:  BG , Hgb A1c < 7, Pt to start oral diet with in 3 days or TF will be considered, BM every 1-3 days       Nutrition Monitoring and Evaluation:   Behavioral-Environmental Outcomes:    Food/Nutrient Intake Outcomes: Food and nutrient intake,Diet advancement/tolerance  Physical Signs/Symptoms Outcomes: Biochemical data,Meal time behavior,Weight     F/u: 12/28/2021    Discharge Planning:     Too soon to determine     Electronically signed by Mark Pleitez on 12/26/2021 at 10:32 AM    Contact: ROSIE [phone]

## 2021-12-26 NOTE — ROUTINE PROCESS
Bedside shift change report given to TIFFANY Valerio RN (oncoming nurse) by Patricia Easton LPN (offgoing nurse). Report included the following information SBAR, Intake/Output, Recent Results, Med Rec Status and Quality Measures. 1945   Shift assessment completed. Pt is awake and talking. Speech is garbled but some words are comprehendible. Pt denies pain or needs. 1955  Lab in to draw blood for scheduled BMP. Pt tolerated well. 2015  Called provider to clarify Insulin gtt and Bicarb orders. BMP drawn at 5. New order to wait to see results of BMP before possibly re-starting Insulin gtt to further close the Anion gap. Hold placed on Bicarb gtt at this time. 2115  Spoke with provider and discussed pt's Chemistry results. New order to re-start Insulin gtt starting at 5 units/hr for a blood glucose of 247.     2215  FSBG 283. Insulin gtt increased to 6 units/hr. 2305  FSBG 300. Insulin gtt increased to 8 units/hr. 0020  FSBG 285. Insulin gtt increased to 9 units/hr. 0045  APTT drawn by . Pt tolerated well. 0120  FSBG 234. Gtt left at 9 units/hr. Pt awake and talking cohenently. Pt states he needs \"to get outta here\". 0207  FSBG 194. Gtt remains at 9 units/hr. Aptt   results :47.3. Dressing increased to 8 units/hr. 0311  FSBG 144. Insulin gtt decreased to 5 units/hr. 0455  FSBG 92. Insulin gtt delayed for one hour. Will review [facility].       0645  Bedside shift change report given AlmaMarquita Gonzalez (oncoming nurse) by TIFFANY Valerio RN (offgoing nurse). Report included the following information SBAR, Intake/Output, Recent Results, Med Rec Status and Quality Measures.

## 2021-12-26 NOTE — PROGRESS NOTES
Problem: Pressure Injury - Risk of  Goal: *Prevention of pressure injury  Description: Document Giuliano Scale and appropriate interventions in the flowsheet.   Outcome: Progressing Towards Goal  Note: Pressure Injury Interventions:  Sensory Interventions: Discuss PT/OT consult with provider    Moisture Interventions: Offer toileting Q_hr    Activity Interventions: PT/OT evaluation    Mobility Interventions: Assess need for specialty bed,PT/OT evaluation    Nutrition Interventions: Discuss nutritional consult with provider    Friction and Shear Interventions: Apply protective barrier, creams and emollients,Minimize layers,HOB 30 degrees or less                Problem: Patient Education: Go to Patient Education Activity  Goal: Patient/Family Education  Outcome: Progressing Towards Goal     Problem: Patient Education: Go to Patient Education Activity  Goal: Patient/Family Education  Outcome: Progressing Towards Goal     Problem: DKA: Day 1  Goal: Off Pathway (Use only if patient is Off Pathway)  Outcome: Progressing Towards Goal  Goal: Activity/Safety  Outcome: Progressing Towards Goal  Goal: Consults, if ordered  Outcome: Progressing Towards Goal  Goal: Diagnostic Tests/Procedures, if Ordered  Outcome: Progressing Towards Goal  Goal: Nutrition/Diet  Outcome: Progressing Towards Goal  Goal: Discharge Planning  Outcome: Progressing Towards Goal  Goal: Medications  Outcome: Progressing Towards Goal  Goal: Respiratory  Outcome: Progressing Towards Goal  Goal: Treatments/Interventions/Procedures  Outcome: Progressing Towards Goal  Goal: Psychosocial  Outcome: Progressing Towards Goal  Goal: *Hemodynamically stable  Outcome: Progressing Towards Goal  Goal: *Blood glucose falling 50 to 100 mg/dl/hr  Outcome: Progressing Towards Goal  Goal: *Potassium normalizing  Outcome: Progressing Towards Goal     Problem: DKA: Day 2  Goal: Off Pathway (Use only if patient is Off Pathway)  Outcome: Progressing Towards Goal  Goal: Activity/Safety  Outcome: Progressing Towards Goal  Goal: Consults, if ordered  Outcome: Progressing Towards Goal  Goal: Diagnostic Test/Procedures  Outcome: Progressing Towards Goal  Goal: Nutrition/Diet  Outcome: Progressing Towards Goal  Goal: Discharge Planning  Outcome: Progressing Towards Goal  Goal: Medications  Outcome: Progressing Towards Goal  Goal: Respiratory  Outcome: Progressing Towards Goal  Goal: Treatments/Interventions/Procedures  Outcome: Progressing Towards Goal  Goal: Psychosocial  Outcome: Progressing Towards Goal  Goal: *Acidosis resolved  Outcome: Progressing Towards Goal  Goal: *Tolerating diet  Outcome: Progressing Towards Goal  Goal: *Demonstrates progressive activity  Outcome: Progressing Towards Goal  Goal: *Blood glucose 80 to 180 mg/dl  Outcome: Progressing Towards Goal     Problem: DKA: Day 3  Goal: Off Pathway (Use only if patient is Off Pathway)  Outcome: Progressing Towards Goal  Goal: Activity/Safety  Outcome: Progressing Towards Goal  Goal: Diagnostic Test/Procedures  Outcome: Progressing Towards Goal  Goal: Nutrition/Diet  Outcome: Progressing Towards Goal  Goal: Discharge Planning  Outcome: Progressing Towards Goal  Goal: Medications  Outcome: Progressing Towards Goal  Goal: Treatments/Interventions/Procedures  Outcome: Progressing Towards Goal  Goal: Psychosocial  Outcome: Progressing Towards Goal     Problem: DKA: Discharge Outcomes  Goal: *Ambulates and performs ADL's  Outcome: Progressing Towards Goal  Goal: *Describes follow-up/return visits to physicians, diabetes treatment coordinator and other resources  Outcome: Progressing Towards Goal  Goal: *Blood glucose at patient's target range  Outcome: Progressing Towards Goal  Goal: *Acidosis resolved  Outcome: Progressing Towards Goal  Goal: *Tolerating diet  Outcome: Progressing Towards Goal  Goal: *Verbalizes understanding and describes prescribed diet  Outcome: Progressing Towards Goal  Goal: *Describes blood glucose goals, monitoring, sick day rules, hypo/hyperglycemia  Outcome: Progressing Towards Goal  Goal: *Describes available resources and support systems  Outcome: Progressing Towards Goal  Goal: *Verbalizes name, dosage, time, side effects, and number of days to continue medications  Outcome: Progressing Towards Goal  Goal: *Demonstrates ability to self-administer insulin  Outcome: Progressing Towards Goal     Problem: Falls - Risk of  Goal: *Absence of Falls  Description: Document Henriquez Blades Fall Risk and appropriate interventions in the flowsheet.   Outcome: Progressing Towards Goal     Problem: Patient Education: Go to Patient Education Activity  Goal: Patient/Family Education  Outcome: Progressing Towards Goal     Problem: Patient Education: Go to Patient Education Activity  Goal: Patient/Family Education  Outcome: Progressing Towards Goal     Problem: Pneumonia: Day 1  Goal: Off Pathway (Use only if patient is Off Pathway)  Outcome: Progressing Towards Goal  Goal: Activity/Safety  Outcome: Progressing Towards Goal  Goal: Consults, if ordered  Outcome: Progressing Towards Goal  Goal: Diagnostic Test/Procedures  Outcome: Progressing Towards Goal  Goal: Nutrition/Diet  Outcome: Progressing Towards Goal  Goal: Medications  Outcome: Progressing Towards Goal  Goal: Respiratory  Outcome: Progressing Towards Goal  Goal: Treatments/Interventions/Procedures  Outcome: Progressing Towards Goal  Goal: Psychosocial  Outcome: Progressing Towards Goal  Goal: *Oxygen saturation within defined limits  Outcome: Progressing Towards Goal  Goal: *Influenza vaccine administered (October-March)  Outcome: Progressing Towards Goal  Goal: *Pneumoccocal vaccine administered  Outcome: Progressing Towards Goal  Goal: *Hemodynamically stable  Outcome: Progressing Towards Goal  Goal: *Demonstrates progressive activity  Outcome: Progressing Towards Goal  Goal: *Tolerating diet  Outcome: Progressing Towards Goal     Problem: Pneumonia: Day 2  Goal: Off Pathway (Use only if patient is Off Pathway)  Outcome: Progressing Towards Goal  Goal: Activity/Safety  Outcome: Progressing Towards Goal  Goal: Consults, if ordered  Outcome: Progressing Towards Goal  Goal: Diagnostic Test/Procedures  Outcome: Progressing Towards Goal  Goal: Nutrition/Diet  Outcome: Progressing Towards Goal  Goal: Discharge Planning  Outcome: Progressing Towards Goal  Goal: Medications  Outcome: Progressing Towards Goal  Goal: Respiratory  Outcome: Progressing Towards Goal  Goal: Treatments/Interventions/Procedures  Outcome: Progressing Towards Goal  Goal: Psychosocial  Outcome: Progressing Towards Goal  Goal: *Oxygen saturation within defined limits  Outcome: Progressing Towards Goal  Goal: *Hemodynamically stable  Outcome: Progressing Towards Goal  Goal: *Demonstrates progressive activity  Outcome: Progressing Towards Goal  Goal: *Tolerating diet  Outcome: Progressing Towards Goal  Goal: *Optimal pain control at patient's stated goal  Outcome: Progressing Towards Goal     Problem: Pneumonia: Day 3  Goal: Off Pathway (Use only if patient is Off Pathway)  Outcome: Progressing Towards Goal  Goal: Activity/Safety  Outcome: Progressing Towards Goal  Goal: Consults, if ordered  Outcome: Progressing Towards Goal  Goal: Diagnostic Test/Procedures  Outcome: Progressing Towards Goal  Goal: Nutrition/Diet  Outcome: Progressing Towards Goal  Goal: Discharge Planning  Outcome: Progressing Towards Goal  Goal: Medications  Outcome: Progressing Towards Goal  Goal: Respiratory  Outcome: Progressing Towards Goal  Goal: Treatments/Interventions/Procedures  Outcome: Progressing Towards Goal  Goal: Psychosocial  Outcome: Progressing Towards Goal  Goal: *Oxygen saturation within defined limits  Outcome: Progressing Towards Goal  Goal: *Hemodynamically stable  Outcome: Progressing Towards Goal  Goal: *Demonstrates progressive activity  Outcome: Progressing Towards Goal  Goal: *Tolerating diet  Outcome: Progressing Towards Goal  Goal: *Describes available resources and support systems  Outcome: Progressing Towards Goal  Goal: *Optimal pain control at patient's stated goal  Outcome: Progressing Towards Goal     Problem: Pneumonia: Day 4  Goal: Off Pathway (Use only if patient is Off Pathway)  Outcome: Progressing Towards Goal  Goal: Activity/Safety  Outcome: Progressing Towards Goal  Goal: Nutrition/Diet  Outcome: Progressing Towards Goal  Goal: Discharge Planning  Outcome: Progressing Towards Goal  Goal: Medications  Outcome: Progressing Towards Goal  Goal: Respiratory  Outcome: Progressing Towards Goal  Goal: Treatments/Interventions/Procedures  Outcome: Progressing Towards Goal  Goal: Psychosocial  Outcome: Progressing Towards Goal     Problem: Pneumonia: Discharge Outcomes  Goal: *Demonstrates progressive activity  Outcome: Progressing Towards Goal  Goal: *Describes follow-up/return visits to physicians  Outcome: Progressing Towards Goal  Goal: *Tolerating diet  Outcome: Progressing Towards Goal  Goal: *Verbalizes name, dosage, time, side effects, and number of days to continue medications  Outcome: Progressing Towards Goal  Goal: *Influenza immunization  Outcome: Progressing Towards Goal  Goal: *Pneumococcal immunization  Outcome: Progressing Towards Goal  Goal: *Respiratory status at baseline  Outcome: Progressing Towards Goal  Goal: *Vital signs within defined limits  Outcome: Progressing Towards Goal  Goal: *Describes available resources and support systems  Outcome: Progressing Towards Goal  Goal: *Optimal pain control at patient's stated goal  Outcome: Progressing Towards Goal

## 2021-12-26 NOTE — PROGRESS NOTES
Hospitalist Progress Note             Date of Service:  2021  NAME:  Justin Barlow  :  1965  MRN:  [id number]    Assessment & Plan:      DKA with diabetic coma, in setting type 1 DM on home insulin pump  - severely acidodic poa, now improving  - dc bicarb drip  - convert to basal bolus, his pump at bedside is not functioning, will not be able to address here, he will need to be discharged on insulin, can follow with his endocrine after that  - will need closer follow up for his insulin pulp on dc  - mental status greatly improved, still either not fully back to his baseline, or just very poorly educated about his dm regiment  - ct head w/o acute finding to explain his obtunded state, though there is subacute/chronic lacunar infarct present- asa/statin  - add a1c and lipid profile,     nstemi  - cont hep drip, add bb, add asa  - cardiology consult, ? Need for cath? Watch renal function     Acute kidney injury  - aggressive ivf, correct bs, hold arb  - cont to trend, renal consult     HTN  - holding arb        Hospital Problems  Date Reviewed: 3/16/2021          Codes Class Noted POA    Elevated troponin ICD-10-CM: R77.8  ICD-9-CM: 790.6  2021 Unknown        DKA (diabetic ketoacidosis) ([facility] [address] ICD-10-CM: E11.10  ICD-9-CM: 250.12  2021 Unknown        Dehydration ICD-10-CM: E86.0  ICD-9-CM: 276.51  2021 Unknown        ROOSEVELT (acute kidney injury) ([facility] ICD-10-CM: N17.9  ICD-9-CM: 584.9  2021 Unknown        Benign hypertension ICD-10-CM: I10  ICD-9-CM: 401.1  10/21/2020 Yes                Review of Systems:   A comprehensive review of systems was negative except for that written in the HPI. Vital Signs:    Last 24hrs VS reviewed since prior progress note.  Most recent are:  Visit Vitals  BP (!) 105/56   Pulse 80   Temp 97.9 °F (36.6 °C)   Resp 16   Ht 5' 11\" (1.803 m)   Wt 59 kg (130 lb 1.6 oz)   SpO2 93%   BMI 18.15 kg/m²         Intake/Output Summary (Last 24 hours) at 12/26/2021 1134  Last data filed at 12/26/2021 3093  Gross per 24 hour   Intake 2881.1 ml   Output 775 ml   Net 2106.1 ml        Physical Examination:             General:          Alert, cooperative, no distress, appears stated age. HEENT:           Atraumatic, anicteric sclerae, pink conjunctivae                          No oral ulcers, mucosa moist, throat clear, dentition fair  Neck:               Supple, symmetrical  Lungs:             Clear to auscultation bilaterally. No Wheezing or Rhonchi. No rales. Chest wall:      No tenderness  No Accessory muscle use. Heart:              Regular  rhythm,  No  murmur   No edema  Abdomen:        Soft, non-tender. Not distended. Bowel sounds normal  Extremities:     No cyanosis. No clubbing,                            Skin turgor normal, Capillary refill normal  Skin:                Not pale. Not Jaundiced  No rashes   Psych:             Not anxious or agitated.   Neurologic:      Alert, moves all extremities, answers questions appropriately and responds to commands - slow mental processing       Data Review:    Review and/or order of clinical lab test  Review and/or order of tests in the radiology section of CPT  Review and/or order of tests in the medicine section of CPT      Labs:     Recent Labs     12/26/21  0513 12/25/21  0902   WBC 24.4* 31.9*   HGB 9.4* 9.8*   HCT 29.6* 32.3*    424*     Recent Labs     12/26/21  0513 12/25/21 2016 12/25/21  1420 12/25/21  0902 12/25/21  0415 12/24/21  2215 12/24/21  1924    138 140   < > 134*   < > 130*   K 3.3 3.9 3.1*   < > 3.3   < > 5.2*    106 106   < > 103   < > 90*   CO2 20* 16* 18*   < > 13*   < > 7*   BUN 78* 82* 84*   < > 78*   < > 76*   CREA 4.20* 4.80* 4.80*   < > 4.30*   < > 4.00*   * 247* 173*   < > 686*   < > 1,040*   CA 7.0* 7.1* 7.2*   < > 7.1*   < > 7.9*   MG 1.8  --   --   --  1.9  --  2.2   PHOS 3.7  --   --   --   --   --   --     < > = values in this interval not displayed. Recent Labs     12/26/21  0513 12/25/21 0415 12/24/21 1924   ALT 46 56 70   AP 74 92 111   TBILI 0.4 1.0 1.5*   TP 5.1* 5.6* 6.2   ALB 2.8* 3.3* 3.6   GLOB 2.3 2.3 2.6   LPSE  --   --  41     Recent Labs     12/26/21  0823 12/26/21  0513 12/26/21 0045 12/25/21  0902 12/24/21 1924   INR  --   --   --   --  1.2   PTP  --   --   --   --  14.8   APTT 49.3* 46.5* 47.3*   < >  --     < > = values in this interval not displayed. No results for input(s): FE, TIBC, PSAT, FERR in the last 72 hours.    No results found for: FOL, RBCF   Recent Labs     12/25/21 0830 12/24/21 1935   PH 7.26* 7.11*   PCO2 35 21*   PO2 63* 113*     Recent Labs     12/26/21 0823 12/25/21 0902 12/25/21 0415   TROIQ 1.55* 0.81* 0.38*     No results found for: CHOL, CHOLX, CHLST, CHOLV, HDL, HDLP, LDL, LDLC, DLDLP, TGLX, TRIGL, TRIGP, CHHD, CHHDX  Lab Results   Component Value Date/Time    Glucose (POC) 204 (H) 12/26/2021 10:47 AM    Glucose (POC) 179 (H) 12/26/2021 09:01 AM    Glucose (POC) 167 (H) 12/26/2021 08:18 AM    Glucose (POC) 135 (H) 12/26/2021 07:15 AM    Glucose (POC) 107 12/26/2021 05:51 AM     Lab Results   Component Value Date/Time    Color Yellow 12/26/2021 01:45 AM    Appearance Cloudy 12/26/2021 01:45 AM    Specific gravity 1.013 12/26/2021 01:45 AM    pH (UA) 5.0 12/26/2021 01:45 AM    Protein 30 (A) 12/26/2021 01:45 AM    Glucose 500 (A) 12/26/2021 01:45 AM    Ketone Trace (A) 12/26/2021 01:45 AM    Bilirubin Negative 12/26/2021 01:45 AM    Urobilinogen 0.2 12/26/2021 01:45 AM    Nitrites Negative 12/26/2021 01:45 AM    Leukocyte Esterase Trace (A) 12/26/2021 01:45 AM    Epithelial cells Few 12/26/2021 01:45 AM    Bacteria Negative (A) 12/26/2021 01:45 AM    WBC 10-20 12/26/2021 01:45 AM    RBC 0-5 12/26/2021 01:45 AM         Medications Reviewed:     Current Facility-Administered Medications   Medication Dose Route Frequency    insulin glargine (LANTUS) injection 40 Units  40 Units SubCUTAneous DAILY    insulin lispro (HUMALOG) injection 12 Units  12 Units SubCUTAneous TIDAC    insulin lispro (HUMALOG) injection   SubCUTAneous AC&HS    glucose chewable tablet 16 g  4 Tablet Oral PRN    glucagon (GLUCAGEN) injection 1 mg  1 mg IntraMUSCular PRN    dextrose (D50W) injection syrg 12.5-25 g  25-50 mL IntraVENous PRN    0.9% sodium chloride infusion  100 mL/hr IntraVENous CONTINUOUS    aspirin tablet 325 mg  325 mg Oral DAILY    metoprolol succinate (TOPROL-XL) XL tablet 25 mg  25 mg Oral DAILY    cetirizine (ZYRTEC) tablet 10 mg  10 mg Oral DAILY    gabapentin (NEURONTIN) capsule 300 mg  300 mg Oral DAILY    [Held by provider] losartan (COZAAR) tablet 50 mg  50 mg Oral DAILY    traZODone (DESYREL) tablet 50 mg  50 mg Oral QHS    heparin 25,000 units in D5W 250 ml infusion  12-17 Units/kg/hr IntraVENous TITRATE    LORazepam (ATIVAN) injection 1 mg  1 mg IntraVENous Q2H PRN    sodium chloride (NS) flush 5-10 mL  5-10 mL IntraVENous PRN    dextrose (D50W) injection syrg 12.5 g  25 mL IntraVENous PRN    sodium chloride (NS) flush 5-40 mL  5-40 mL IntraVENous Q8H     ______________________________________________________________________  EXPECTED LENGTH OF STAY: - - -  ACTUAL LENGTH OF STAY:          2                 Loretta Gallardo MD

## 2021-12-26 NOTE — PROGRESS NOTES
0700  Assumed care of pt at this time, resting in bed with eyes closed, VSS.  0745  In to assist pt to void in urinal, pt very confused, agitated, argumentative, and irritable. Assessment complete, VSS, call bell in reach. 1100  Resting in bed with no distress noted, VSS, call bell in reach. 1600  FSBG low at 43, recheck on opposite hand 41, d50 given per protocol, hospitalist made aware of hypoglycemia and new orders received. 1800  Resting in bed with no distress noted, no complaints, VSS, call bell in reach.

## 2021-12-27 ENCOUNTER — APPOINTMENT (OUTPATIENT)
Dept: NON INVASIVE DIAGNOSTICS | Age: 56
DRG: 637 | End: 2021-12-27
Attending: NURSE PRACTITIONER
Payer: MEDICARE

## 2021-12-27 LAB
ALBUMIN SERPL-MCNC: 3 G/DL (ref 3.5–4.7)
ANION GAP SERPL CALC-SCNC: 14 MMOL/L
APTT PPP: 160.4 SEC (ref 23–36.4)
APTT PPP: 30.1 SEC (ref 23–36.4)
APTT PPP: 34.6 SEC (ref 23–36.4)
APTT PPP: 52.7 SEC (ref 23–36.4)
ATRIAL RATE: 72 BPM
BASOPHILS # BLD: 0 K/UL (ref 0–0.1)
BASOPHILS NFR BLD: 0 % (ref 0–2)
BUN SERPL-MCNC: 57 MG/DL (ref 9–21)
BUN/CREAT SERPL: 19
CA-I BLD-MCNC: 7.9 MG/DL (ref 8.5–10.5)
CALCULATED P AXIS, ECG09: 38 DEGREES
CALCULATED R AXIS, ECG10: 74 DEGREES
CALCULATED T AXIS, ECG11: 79 DEGREES
CHLORIDE SERPL-SCNC: 113 MMOL/L (ref 94–111)
CHOLEST SERPL-MCNC: 95 MG/DL
CO2 SERPL-SCNC: 21 MMOL/L (ref 21–33)
CREAT SERPL-MCNC: 3 MG/DL (ref 0.8–1.5)
DIAGNOSIS, 93000: NORMAL
DIFFERENTIAL METHOD BLD: ABNORMAL
ECHO AO ROOT DIAM: 3 CM
ECHO AO ROOT INDEX: 1.73 CM/M2
ECHO AV AREA PEAK VELOCITY: 2.8 CM2
ECHO AV AREA VTI: 3.1 CM2
ECHO AV AREA/BSA PEAK VELOCITY: 1.6 CM2/M2
ECHO AV AREA/BSA VTI: 1.8 CM2/M2
ECHO AV MEAN GRADIENT: 2 MMHG
ECHO AV MEAN VELOCITY: 0.6 M/S
ECHO AV PEAK GRADIENT: 3 MMHG
ECHO AV PEAK VELOCITY: 0.9 M/S
ECHO AV VELOCITY RATIO: 0.78
ECHO AV VTI: 21 CM
ECHO LA DIAMETER INDEX: 1.5 CM/M2
ECHO LA DIAMETER: 2.6 CM
ECHO LA TO AORTIC ROOT RATIO: 0.87
ECHO LV E' LATERAL VELOCITY: 11 CM/S
ECHO LV E' SEPTAL VELOCITY: 10 CM/S
ECHO LV EDV A2C: 42 ML
ECHO LV EDV A4C: 50 ML
ECHO LV EDV BP: 47 ML (ref 67–155)
ECHO LV EDV INDEX A4C: 29 ML/M2
ECHO LV EDV INDEX BP: 27 ML/M2
ECHO LV EDV NDEX A2C: 24 ML/M2
ECHO LV EJECTION FRACTION A2C: 65 %
ECHO LV EJECTION FRACTION A4C: 49 %
ECHO LV EJECTION FRACTION BIPLANE: 58 % (ref 55–100)
ECHO LV ESV A2C: 15 ML
ECHO LV ESV A4C: 25 ML
ECHO LV ESV INDEX A2C: 9 ML/M2
ECHO LV ESV INDEX A4C: 14 ML/M2
ECHO LV FRACTIONAL SHORTENING: 36 % (ref 28–44)
ECHO LV INTERNAL DIMENSION DIASTOLE INDEX: 2.25 CM/M2
ECHO LV INTERNAL DIMENSION DIASTOLIC: 3.9 CM (ref 4.2–5.9)
ECHO LV INTERNAL DIMENSION SYSTOLIC INDEX: 1.45 CM/M2
ECHO LV INTERNAL DIMENSION SYSTOLIC: 2.5 CM
ECHO LV IVSD: 1 CM (ref 0.6–1)
ECHO LV MASS 2D: 122.1 G (ref 88–224)
ECHO LV MASS INDEX 2D: 70.6 G/M2 (ref 49–115)
ECHO LV POSTERIOR WALL DIASTOLIC: 1 CM (ref 0.6–1)
ECHO LV RELATIVE WALL THICKNESS RATIO: 0.51
ECHO LVOT AREA: 3.5 CM2
ECHO LVOT AV VTI INDEX: 0.9
ECHO LVOT DIAM: 2.1 CM
ECHO LVOT MEAN GRADIENT: 1 MMHG
ECHO LVOT PEAK GRADIENT: 2 MMHG
ECHO LVOT PEAK VELOCITY: 0.7 M/S
ECHO LVOT STROKE VOLUME INDEX: 37.8 ML/M2
ECHO LVOT SV: 65.4 ML
ECHO LVOT VTI: 18.9 CM
ECHO MV A VELOCITY: 0.49 M/S
ECHO MV AREA VTI: 3.2 CM2
ECHO MV E DECELERATION TIME (DT): 190 MS
ECHO MV E VELOCITY: 0.76 M/S
ECHO MV E/A RATIO: 1.55
ECHO MV E/E' LATERAL: 6.91
ECHO MV E/E' RATIO (AVERAGED): 7.25
ECHO MV E/E' SEPTAL: 7.6
ECHO MV LVOT VTI INDEX: 1.07
ECHO MV MAX VELOCITY: 0.8 M/S
ECHO MV MEAN GRADIENT: 1 MMHG
ECHO MV MEAN VELOCITY: 0.4 M/S
ECHO MV PEAK GRADIENT: 3 MMHG
ECHO MV VTI: 20.3 CM
ECHO PV MAX VELOCITY: 0.8 M/S
ECHO PV PEAK GRADIENT: 2 MMHG
ECHO RA AREA 4C: 10.6 CM2
ECHO RV TAPSE: 2 CM (ref 1.5–2)
EOSINOPHIL # BLD: 0 K/UL (ref 0–0.4)
EOSINOPHIL NFR BLD: 0 % (ref 0–5)
ERYTHROCYTE [DISTWIDTH] IN BLOOD BY AUTOMATED COUNT: 14.4 % (ref 11.6–14.5)
GLUCOSE BLD STRIP.AUTO-MCNC: 111 MG/DL (ref 70–110)
GLUCOSE BLD STRIP.AUTO-MCNC: 114 MG/DL (ref 70–110)
GLUCOSE BLD STRIP.AUTO-MCNC: 85 MG/DL (ref 70–110)
GLUCOSE BLD STRIP.AUTO-MCNC: 86 MG/DL (ref 70–110)
GLUCOSE BLD STRIP.AUTO-MCNC: 89 MG/DL (ref 70–110)
GLUCOSE SERPL-MCNC: 100 MG/DL (ref 70–110)
HCT VFR BLD AUTO: 32.9 % (ref 36–48)
HDLC SERPL-MCNC: 47 MG/DL (ref 40–60)
HDLC SERPL: 2 {RATIO} (ref 0–5)
HGB BLD-MCNC: 10.3 G/DL (ref 13–16)
IMM GRANULOCYTES # BLD AUTO: 0.1 K/UL (ref 0–0.04)
IMM GRANULOCYTES NFR BLD AUTO: 1 % (ref 0–0.5)
LDLC SERPL CALC-MCNC: 38.2 MG/DL (ref 0–100)
LIPID PROFILE,FLP: NORMAL
LYMPHOCYTES # BLD: 1.4 K/UL (ref 0.9–3.6)
LYMPHOCYTES NFR BLD: 10 % (ref 21–52)
MCH RBC QN AUTO: 29.3 PG (ref 24–34)
MCHC RBC AUTO-ENTMCNC: 31.3 G/DL (ref 31–37)
MCV RBC AUTO: 93.5 FL (ref 78–100)
MONOCYTES # BLD: 0.8 K/UL (ref 0.05–1.2)
MONOCYTES NFR BLD: 6 % (ref 3–10)
NEUTS SEG # BLD: 11.4 K/UL (ref 1.8–8)
NEUTS SEG NFR BLD: 83 % (ref 40–73)
NRBC # BLD: 0 K/UL (ref 0–0.01)
NRBC BLD-RTO: 0 PER 100 WBC
P-R INTERVAL, ECG05: 137 MS
PERFORMED BY, TECHID: ABNORMAL
PERFORMED BY, TECHID: ABNORMAL
PERFORMED BY, TECHID: NORMAL
PHOSPHATE SERPL-MCNC: 3.3 MG/DL (ref 2.5–4.5)
PLATELET # BLD AUTO: 177 K/UL (ref 135–420)
PMV BLD AUTO: 12.1 FL (ref 9.2–11.8)
POTASSIUM SERPL-SCNC: 2.9 MMOL/L (ref 3.2–5.1)
Q-T INTERVAL, ECG07: 426 MS
QRS DURATION, ECG06: 89 MS
QTC CALCULATION (BEZET), ECG08: 463 MS
RBC # BLD AUTO: 3.52 M/UL (ref 4.35–5.65)
SODIUM SERPL-SCNC: 148 MMOL/L (ref 135–145)
THERAPEUTIC RANGE,PTTT: ABNORMAL SEC (ref 82–109)
THERAPEUTIC RANGE,PTTT: ABNORMAL SEC (ref 82–109)
THERAPEUTIC RANGE,PTTT: NORMAL SEC (ref 82–109)
THERAPEUTIC RANGE,PTTT: NORMAL SEC (ref 82–109)
TRIGL SERPL-MCNC: 49 MG/DL (ref ?–150)
TROPONIN I SERPL-MCNC: 0.75 NG/ML (ref 0.02–0.05)
VENTRICULAR RATE, ECG03: 71 BPM
VLDLC SERPL CALC-MCNC: 9.8 MG/DL
WBC # BLD AUTO: 13.6 K/UL (ref 4.6–13.2)

## 2021-12-27 PROCEDURE — 93306 TTE W/DOPPLER COMPLETE: CPT

## 2021-12-27 PROCEDURE — 74011250637 HC RX REV CODE- 250/637: Performed by: NURSE PRACTITIONER

## 2021-12-27 PROCEDURE — 74011250637 HC RX REV CODE- 250/637: Performed by: INTERNAL MEDICINE

## 2021-12-27 PROCEDURE — 74011250636 HC RX REV CODE- 250/636: Performed by: INTERNAL MEDICINE

## 2021-12-27 PROCEDURE — 85025 COMPLETE CBC W/AUTO DIFF WBC: CPT

## 2021-12-27 PROCEDURE — 85730 THROMBOPLASTIN TIME PARTIAL: CPT

## 2021-12-27 PROCEDURE — 74011250636 HC RX REV CODE- 250/636: Performed by: NURSE PRACTITIONER

## 2021-12-27 PROCEDURE — 93005 ELECTROCARDIOGRAM TRACING: CPT

## 2021-12-27 PROCEDURE — 84484 ASSAY OF TROPONIN QUANT: CPT

## 2021-12-27 PROCEDURE — 80069 RENAL FUNCTION PANEL: CPT

## 2021-12-27 PROCEDURE — 36415 COLL VENOUS BLD VENIPUNCTURE: CPT

## 2021-12-27 PROCEDURE — 82962 GLUCOSE BLOOD TEST: CPT

## 2021-12-27 PROCEDURE — 80061 LIPID PANEL: CPT

## 2021-12-27 PROCEDURE — 65610000006 HC RM INTENSIVE CARE

## 2021-12-27 RX ORDER — HEPARIN SODIUM 1000 [USP'U]/ML
3000 INJECTION, SOLUTION INTRAVENOUS; SUBCUTANEOUS ONCE
Status: COMPLETED | OUTPATIENT
Start: 2021-12-27 | End: 2021-12-27

## 2021-12-27 RX ORDER — ROSUVASTATIN CALCIUM 10 MG/1
10 TABLET, COATED ORAL
Status: DISCONTINUED | OUTPATIENT
Start: 2021-12-27 | End: 2022-01-03 | Stop reason: HOSPADM

## 2021-12-27 RX ORDER — POTASSIUM CHLORIDE 750 MG/1
40 TABLET, EXTENDED RELEASE ORAL ONCE
Status: COMPLETED | OUTPATIENT
Start: 2021-12-27 | End: 2021-12-27

## 2021-12-27 RX ORDER — POTASSIUM CHLORIDE 750 MG/1
40 TABLET, EXTENDED RELEASE ORAL
Status: COMPLETED | OUTPATIENT
Start: 2021-12-27 | End: 2021-12-27

## 2021-12-27 RX ORDER — POTASSIUM CHLORIDE AND SODIUM CHLORIDE 450; 150 MG/100ML; MG/100ML
INJECTION, SOLUTION INTRAVENOUS CONTINUOUS
Status: DISCONTINUED | OUTPATIENT
Start: 2021-12-27 | End: 2021-12-29

## 2021-12-27 RX ORDER — ASPIRIN 81 MG/1
81 TABLET ORAL DAILY
Status: DISCONTINUED | OUTPATIENT
Start: 2021-12-27 | End: 2022-01-03 | Stop reason: HOSPADM

## 2021-12-27 RX ORDER — INSULIN GLARGINE 100 [IU]/ML
40 INJECTION, SOLUTION SUBCUTANEOUS
Status: DISCONTINUED | OUTPATIENT
Start: 2021-12-27 | End: 2021-12-29

## 2021-12-27 RX ADMIN — CETIRIZINE HYDROCHLORIDE 10 MG: 10 TABLET, FILM COATED ORAL at 08:22

## 2021-12-27 RX ADMIN — POTASSIUM CHLORIDE 40 MEQ: 10 TABLET, EXTENDED RELEASE ORAL at 14:17

## 2021-12-27 RX ADMIN — SODIUM CHLORIDE, PRESERVATIVE FREE 10 ML: 5 INJECTION INTRAVENOUS at 06:23

## 2021-12-27 RX ADMIN — TRAZODONE HYDROCHLORIDE 50 MG: 50 TABLET ORAL at 22:25

## 2021-12-27 RX ADMIN — ASPIRIN 325 MG ORAL TABLET 325 MG: 325 PILL ORAL at 08:22

## 2021-12-27 RX ADMIN — POTASSIUM CHLORIDE 40 MEQ: 10 TABLET, EXTENDED RELEASE ORAL at 17:00

## 2021-12-27 RX ADMIN — POTASSIUM CHLORIDE AND SODIUM CHLORIDE: 450; 150 INJECTION, SOLUTION INTRAVENOUS at 14:16

## 2021-12-27 RX ADMIN — SODIUM CHLORIDE, PRESERVATIVE FREE 10 ML: 5 INJECTION INTRAVENOUS at 14:10

## 2021-12-27 RX ADMIN — METOPROLOL SUCCINATE 25 MG: 25 TABLET, EXTENDED RELEASE ORAL at 08:22

## 2021-12-27 RX ADMIN — POTASSIUM CHLORIDE AND SODIUM CHLORIDE 100 ML/HR: 450; 150 INJECTION, SOLUTION INTRAVENOUS at 23:31

## 2021-12-27 RX ADMIN — SODIUM CHLORIDE 100 ML/HR: 9 INJECTION, SOLUTION INTRAVENOUS at 09:07

## 2021-12-27 RX ADMIN — HEPARIN SODIUM 3000 UNITS: 1000 INJECTION INTRAVENOUS; SUBCUTANEOUS at 01:00

## 2021-12-27 RX ADMIN — SODIUM CHLORIDE, PRESERVATIVE FREE 10 ML: 5 INJECTION INTRAVENOUS at 22:13

## 2021-12-27 RX ADMIN — ROSUVASTATIN 10 MG: 10 TABLET, FILM COATED ORAL at 22:25

## 2021-12-27 RX ADMIN — HEPARIN SODIUM 10 UNITS/KG/HR: 10000 INJECTION, SOLUTION INTRAVENOUS at 08:12

## 2021-12-27 RX ADMIN — GABAPENTIN 300 MG: 300 CAPSULE ORAL at 08:22

## 2021-12-27 NOTE — CONSULTS
CARDIOLOGY CONSULTATION      REASON FOR CONSULT: NSTEMI Type 2    REQUESTING PROVIDER: Dr. Walker Luis:  Diabetic coma    HISTORY OF PRESENT ILLNESS:  Valerie Morales is a 49y.o. year-old male with a past medical history of Type 1 diabetes mellitus, hyperlipidemia, and hypertension who presented to the ED in DKA, ROOSEVELT. Troponin elevated on presentation and peaked initially at 1.55, 1.04, 0.71. EKG showing normal sinus rhythm with lateral ST depression to precordial/lateral leads. Patient was obtunded and therefore could not obtain accurate ROS on presentation. He was initiated on a heparin gtt, asa, and statin as well as insulin gtt, etc. For DKA. This morning his BS are controlled on insulin gtt. He is somnolent but arousable and denies chest pain or dyspnea. Repeat EKG this morning continues to show ST depression in V4-V6. Remains oliguric despite IVF with renal consult pending for ROOSEVELT. Records from hospital admission course thus far reviewed. Telemetry reviewed. No events overnight. Remains in NSR . INPATIENT MEDICATIONS:  Home medications reviewed.     Current Facility-Administered Medications:     aspirin delayed-release tablet 81 mg, 81 mg, Oral, DAILY, Jakub Yap MD    rosuvastatin (CRESTOR) tablet 10 mg, 10 mg, Oral, QHS, Leida Chatman MD    insulin glargine (LANTUS) injection 40 Units, 40 Units, SubCUTAneous, QHS, Alex GRECO MD    insulin lispro (HUMALOG) injection, , SubCUTAneous, AC&HS, Leida Chatman MD    glucose chewable tablet 16 g, 4 Tablet, Oral, PRN, Leida Chatman MD    glucagon (GLUCAGEN) injection 1 mg, 1 mg, IntraMUSCular, PRN, Leida Chatman MD    dextrose (D50W) injection syrg 12.5-25 g, 25-50 mL, IntraVENous, PRN, Leida Chatman MD, 25 g at 12/26/21 1605    0.9% sodium chloride infusion, 100 mL/hr, IntraVENous, CONTINUOUS, Leida Chatman MD, Last Rate: 100 mL/hr at 12/27/21 0907, 100 mL/hr at 12/27/21 0907   metoprolol succinate (TOPROL-XL) XL tablet 25 mg, 25 mg, Oral, DAILY, Vicky Sapp MD, 25 mg at 12/27/21 9843    cetirizine (ZYRTEC) tablet 10 mg, 10 mg, Oral, DAILY, Deuce Gonzalez A, NP, 10 mg at 12/27/21 9979    gabapentin (NEURONTIN) capsule 300 mg, 300 mg, Oral, DAILY, Deuce Gonzalez A, NP, 300 mg at 12/27/21 8909    [Held by provider] losartan (COZAAR) tablet 50 mg, 50 mg, Oral, DAILY, Charolet Skiff, NP    traZODone (DESYREL) tablet 50 mg, 50 mg, Oral, QHS, Paula Lozano NP, 50 mg at 12/26/21 2122    heparin 25,000 units in D5W 250 ml infusion, 12-17 Units/kg/hr, IntraVENous, TITRATE, Vicky Sapp MD, Stopped at 12/27/21 1049    LORazepam (ATIVAN) injection 1 mg, 1 mg, IntraVENous, Q2H PRN, Neelam Lewis DO, 1 mg at 12/26/21 2214    sodium chloride (NS) flush 5-10 mL, 5-10 mL, IntraVENous, PRN, Neelam Lewis,     dextrose (D50W) injection syrg 12.5 g, 25 mL, IntraVENous, PRN, Neelam Lewis,     sodium chloride (NS) flush 5-40 mL, 5-40 mL, IntraVENous, Q8H, Paula Lozano NP, 10 mL at 12/27/21 3610     ALLERGIES:  Allergies reviewed with the patient,No Known Allergies . FAMILY HISTORY:  Family history reviewed. SOCIAL HISTORY:  Notable for no  tobacco use, no heavy alcohol or illicit drug use. REVIEW OF SYSTEMS:  Complete review of systems performed, pertinents noted above, all other systems are negative. PHYSICAL EXAMINATION:  Vital sign assessment reveal a blood pressure of  158/75  and pulse rate of 66. Cardiovascular exam has a heart with a regular rate and rhythm, normal S1 and S2. No murmur present. There are no rubs or gallops. Faint peripheral pulses to lower extremities though warm to touch. No jugular venous distension. No carotid bruits are present. Respiratory exam reveals clear lung fields, no rales or rhonchi. Gastrointestinal exam has soft, nontender abdomen with normal bowel sounds.   Lymphatic exam reveals no edema and no varicosities. No notable skin changes. Neurologic exam is nonfocal.  Musculoskeletal exam is notable for a normal gait. Visit Vitals  BP (!) 158/75   Pulse 66   Temp 97.3 °F (36.3 °C)   Resp 20   Ht 5' 11\" (1.803 m)   Wt 56.8 kg (125 lb 3.2 oz)   SpO2 96%   BMI 17.46 kg/m²         Recent labs results and imaging reviewed. Discussed case with Dr. Christian Regalado and our impression and recommendations are as follows:  1. NSTEMI:  Likely Type 2 secondary to DKA and ROOSEVELT, however, given risk factors for CAD and abnormal EKG we will pursue a nuclear stress test in the morning. Will discontinue heparin gtt as well as has been 48 hours and will transition asa from 325mg to 81mg daily. Continue telemetry monitoring and high intensity statin. 2. Hypertension:  Remains somewhat elevated. Continue metoprolol for now. Cozaar held due to ROOSEVELT. Will adjust further pending trends and ischemic evaluation. Thank you for involving us in the care of this patient. Please do not hesitate to call me or Dr. Christian Regalado if additional questions arise.     Agnes Mendoza NP  12/27/2021

## 2021-12-27 NOTE — CONSULTS
Renal Consultation Note    NAME:  Mitchell Mcallister   :   1965   MRN:   [id number]     ATTENDING: Thomas Pedro MD  PCP:  Robin Kurtz MD    Date/Time:  2021 3:51 PM      Subjective:   REQUESTING PHYSICIAN:  REASON FOR CONSULT:        Pt is a 49 yr old while make with h/o DM-1,HTN,HL ,CKD IIIb, who presented with markedly elevated BS of > 1000. Initial Cr was 4.0 which increadseed to 4.8 and improved to 4.2 yesterday. Renal consult called for eval and rx. Pt seen in ICU. Currently having Echo done. off insulin gtt now and sugars are in 110 range. Bl cr around 2.0 based on labs early Dec 2021. Was on Cozaar which was held on admission  Denies any nsaid intake  On NS at 100ml/hr  Also on heparin gtt for NSTEMI. Pt was severely acidotic on admission with CO2 of 7 and was on bicarb gtt which was dced yesterday. Past Medical History:   Diagnosis Date    Diabetes ([address]     Hypercholesterolemia     Hypertension       Past Surgical History:   Procedure Laterality Date    HX ORTHOPAEDIC Left 2018    hip joint    HX ORTHOPAEDIC Right     broken knee     Social History     Tobacco Use    Smoking status: Never Smoker    Smokeless tobacco: Current User    Tobacco comment: 5+/day   Substance Use Topics    Alcohol use: Not Currently      Family History   Problem Relation Age of Onset    Cancer Father         prostate    Cancer Maternal Uncle         prostate    Cancer Paternal Uncle         prostate       No Known Allergies   Prior to Admission medications    Medication Sig Start Date End Date Taking? Authorizing Provider   cetirizine (ZYRTEC) 10 mg tablet TAKE 1 TABLET EVERY DAY BY ORAL ROUTE.  21   Robin Kurtz MD   Microlet Lancet misc  20   Provider, Historical   insulin pump (PATIENT SUPPLIED) misc by SubCUTAneous route continuous. Provider, Historical   losartan (COZAAR) 50 mg tablet Take 1 Tab by mouth daily.  21   Keturah Pollack Madhav Saucedo MD   traZODone (DESYREL) 50 mg tablet Take 1 Tab by mouth nightly. If no effect with one tab, may take two tabs 30 mins before going to bed. 21   Viola Peraza MD   glucose blood VI test strips (Contour Next Test Strips) strip by Does Not Apply route See Admin Instructions. Provider, Historical   gabapentin (NEURONTIN) 800 mg tablet Take  by mouth three (3) times daily. Provider, Historical       REVIEW OF SYSTEMS:       Constitutional: Negative for chills and fever. HENT: Negative. Eyes: Negative. Respiratory: Negative. Cardiovascular: Negative. Gastrointestinal: Negative for abdominal pain and nausea. Skin: Negative. Neurological: Negative. Objective:   VITALS:    Visit Vitals  BP (!) 158/75   Pulse (!) 57   Temp 97.7 °F (36.5 °C)   Resp 16   Ht 5' 11\" (1.803 m)   Wt 56.7 kg (125 lb)   SpO2 100%   BMI 17.43 kg/m²     Temp (24hrs), Av.8 °F (36.6 °C), Min:97.3 °F (36.3 °C), Max:98.6 °F (37 °C)      PHYSICAL EXAM:     General: NAD, alert, cooperative  Eyes: sclera anicteric  Oral Cavity: No thrush or ulcers  Neck: no JVD  Chest: Fair bilateral air entry. No Wheezing or Rhonchi. No rales.   Heart: normal sounds  Abdomen: soft and non tender   : no arnold  Lower Extremities: no edema  Skin: no rash  Neuro: intact, no focals  Psychiatric: non-depressed          LAB DATA REVIEWED:    Recent Results (from the past 24 hour(s))   GLUCOSE, POC    Collection Time: 21  4:02 PM   Result Value Ref Range    Glucose (POC) 43 (LL) 70 - 110 mg/dL    Performed by Senaminah Swains[place]    GLUCOSE, POC    Collection Time: 21  4:04 PM   Result Value Ref Range    Glucose (POC) 41 (LL) 70 - 110 mg/dL    Performed by Bernadine Canas    GLUCOSE, POC    Collection Time: 21  4:33 PM   Result Value Ref Range    Glucose (POC) 144 (H) 70 - 110 mg/dL    Performed by Bernadine Canas    PTT    Collection Time: 21  4:50 PM   Result Value Ref Range    aPTT 71.7 (H) 23.0 - 36.4 sec aPTT, therapeutic range   82 - 109 sec   GLUCOSE, POC    Collection Time: 12/26/21  5:33 PM   Result Value Ref Range    Glucose (POC) 126 (H) 70 - 110 mg/dL    Performed by Sky Santos    GLUCOSE, POC    Collection Time: 12/26/21  9:06 PM   Result Value Ref Range    Glucose (POC) 71 70 - 110 mg/dL    Performed by Anya Mcdaniel    TROPONIN I    Collection Time: 12/26/21  9:16 PM   Result Value Ref Range    Troponin-I, Qt. 0.71 (H) 0.02 - 0.05 ng/mL   PTT    Collection Time: 12/26/21 11:34 PM   Result Value Ref Range    aPTT 52.7 (H) 23.0 - 36.4 sec    aPTT, therapeutic range   82 - 109 sec   PTT    Collection Time: 12/27/21  3:46 AM   Result Value Ref Range    aPTT 160.4 (HH) 23.0 - 36.4 sec    aPTT, therapeutic range   82 - 109 sec   CBC WITH AUTOMATED DIFF    Collection Time: 12/27/21  3:46 AM   Result Value Ref Range    WBC 13.6 (H) 4.6 - 13.2 K/uL    RBC 3.52 (L) 4.35 - 5.65 M/uL    HGB 10.3 (L) 13.0 - 16.0 g/dL    HCT 32.9 (L) 36.0 - 48.0 %    MCV 93.5 78.0 - 100.0 FL    MCH 29.3 24.0 - 34.0 PG    MCHC 31.3 31.0 - 37.0 g/dL    RDW 14.4 11.6 - 14.5 %    PLATELET 541 552 - 257 K/uL    MPV 12.1 (H) 9.2 - 11.8 FL    NRBC 0.0 0.0  WBC    ABSOLUTE NRBC 0.00 0.00 - 0.01 K/uL    NEUTROPHILS 83 (H) 40 - 73 %    LYMPHOCYTES 10 (L) 21 - 52 %    MONOCYTES 6 3 - 10 %    EOSINOPHILS 0 0 - 5 %    BASOPHILS 0 0 - 2 %    IMMATURE GRANULOCYTES 1 (H) 0 - 0.5 %    ABS. NEUTROPHILS 11.4 (H) 1.8 - 8.0 K/UL    ABS. LYMPHOCYTES 1.4 0.9 - 3.6 K/UL    ABS. MONOCYTES 0.8 0.05 - 1.2 K/UL    ABS. EOSINOPHILS 0.0 0.0 - 0.4 K/UL    ABS. BASOPHILS 0.0 0.0 - 0.1 K/UL    ABS. IMM.  GRANS. 0.1 (H) 0.00 - 0.04 K/UL    DF AUTOMATED     LIPID PANEL    Collection Time: 12/27/21  3:46 AM   Result Value Ref Range    LIPID PROFILE        Cholesterol, total 95 <200 mg/dL    Triglyceride 49 <150 mg/dL    HDL Cholesterol 47 40 - 60 mg/dL    LDL, calculated 38.2 0 - 100 mg/dL    VLDL, calculated 9.8 mg/dL    CHOL/HDL Ratio 2.0 0 - 5.0     TROPONIN I    Collection Time: 12/27/21  3:46 AM   Result Value Ref Range    Troponin-I, Qt. 0.75 (H) 0.02 - 0.05 ng/mL   RENAL FUNCTION PANEL    Collection Time: 12/27/21  3:46 AM   Result Value Ref Range    Sodium 148 (H) 135 - 145 mmol/L    Potassium 2.9 (LL) 3.2 - 5.1 mmol/L    Chloride 113 (H) 94 - 111 mmol/L    CO2 21 21 - 33 mmol/L    Anion gap 14 mmol/L    Glucose 100 70 - 110 mg/dL    BUN 57 (H) 9 - 21 mg/dL    Creatinine 3.00 (H) 0.8 - 1.50 mg/dL    BUN/Creatinine ratio 19      GFR est AA 27 ml/min/1.73m2    GFR est non-AA 22 ml/min/1.73m2    Calcium 7.9 (L) 8.5 - 10.5 mg/dL    Phosphorus 3.3 2.5 - 4.5 mg/dL    Albumin 3.0 (L) 3.5 - 4.7 g/dL   GLUCOSE, POC    Collection Time: 12/27/21  5:36 AM   Result Value Ref Range    Glucose (POC) 85 70 - 110 mg/dL    Performed by Ashlyn Lincoln, POC    Collection Time: 12/27/21  8:03 AM   Result Value Ref Range    Glucose (POC) 86 70 - 110 mg/dL    Performed by Roger Dolan    EKG, 12 LEAD, SUBSEQUENT    Collection Time: 12/27/21  9:20 AM   Result Value Ref Range    Ventricular Rate 71 BPM    Atrial Rate 72 BPM    P-R Interval 137 ms    QRS Duration 89 ms    Q-T Interval 426 ms    QTC Calculation (Bezet) 463 ms    Calculated P Axis 38 degrees    Calculated R Axis 74 degrees    Calculated T Axis 79 degrees    Diagnosis       Sinus rhythm  Probable left atrial enlargement  ST depr, consider ischemia, anterolateral lds     GLUCOSE, POC    Collection Time: 12/27/21 11:28 AM   Result Value Ref Range    Glucose (POC) 111 (H) 70 - 110 mg/dL    Performed by Roger Dolan    PTT    Collection Time: 12/27/21 12:00 PM   Result Value Ref Range    aPTT 34.6 23.0 - 36.4 sec    aPTT, therapeutic range   82 - 109 sec   ECHO ADULT COMPLETE    Collection Time: 12/27/21 12:28 PM   Result Value Ref Range    LV EDV A2C 42 mL    LV EDV A4C 50 mL    LV EDV BP 47 (A) 67 - 155 mL    LV ESV A2C 15 mL    LV ESV A4C 25 mL    IVSd 1.0 0.6 - 1.0 cm    LVIDd 3.9 (A) 4.2 - 5.9 cm    LVIDs 2.5 cm    LVOT Diameter 2.1 cm    LVOT Mean Gradient 1 mmHg    LVOT VTI 18.9 cm    LVOT Peak Velocity 0.7 m/s    LVOT Peak Gradient 2 mmHg    LVPWd 1.0 0.6 - 1.0 cm    LV E' Lateral Velocity 11 cm/s    LV E' Septal Velocity 10 cm/s    LV Ejection Fraction A2C 65 %    LV Ejection Fraction A4C 49 %    EF BP 58 55 - 100 %    LVOT Area 3.5 cm2    LVOT SV 65.4 ml    RA Area 4C 10.6 cm2    AV Mean Gradient 2 mmHg    AV VTI 21.0 cm    AV Mean Velocity 0.6 m/s    AV Peak Velocity 0.9 m/s    AV Peak Gradient 3 mmHg    AV Area by VTI 3.1 cm2    AV Area by Peak Velocity 2.8 cm2    Aortic Root 3.0 cm    LA Diameter 2.6 cm    MV E Wave Deceleration Time 190.0 ms    MV A Velocity 0.49 m/s    MV E Velocity 0.76 m/s    MV Mean Gradient 1 mmHg    MV VTI 20.3 cm    MV Mean Velocity 0.4 m/s    MV Max Velocity 0.8 m/s    MV Peak Gradient 3 mmHg    MV Area by VTI 3.2 cm2    PV Max Velocity 0.8 m/s    PV Peak Gradient 2 mmHg    TAPSE 2.0 1.5 - 2.0 cm    Fractional Shortening 2D 36 28 - 44 %    LV EDV Index BP 27 mL/m2    LV ESV Index A4C 14 mL/m2    LV EDV Index A4C 29 mL/m2    LV ESV Index A2C 9 mL/m2    LV EDV Index A2C 24 mL/m2    LVIDd Index 2.25 cm/m2    LVIDs Index 1.45 cm/m2    LV RWT Ratio 0.51     LV Mass 2D 122.1 88 - 224 g    LV Mass 2D Index 70.6 49 - 115 g/m2    MV E/A 1.55     E/E' Ratio (Averaged) 7.25     E/E' Lateral 6.91     E/E' Septal 7.60     LVOT Stroke Volume Index 37.8 mL/m2    LA Size Index 1.50 cm/m2    LA/AO Root Ratio 0.87     Ao Root Index 1.73 cm/m2    AV Velocity Ratio 0.78     LVOT:AV VTI Index 0.90     SUMAN/BSA VTI 1.8 cm2/m2    SUMAN/BSA Peak Velocity 1.6 cm2/m2    MV:LVOT VTI Index 1.07        Recommendations/Plan:     1. ROOSEVELT on CKD III  -Cr was 4.0 on admission  Which peaked at 4.8  -ROOSEVELT shravan prerenal 2/2 volume depletion from severe hyperglycemia with cozaar on board  -agree with holding co=zaar  Cr hs imporved from 4.8-->4.2-->3.0 tolday. Bl Cr about 2.0 based on labs dec 2,2021.  -urine is bland.  Will quantify proteinuria  -on NS which I will change to 1.2 NS with 20meq KCl 2//2 hypernatremia and severe hypoklemia  -f/u renal fxn in am    2. Severe hypokalemia  -K was 5.8 on admission . 2/2 insulopenia and volume contraction  -K is 2.9 today which is likely 2/2 insulin drip and decreased oral inake  -will give Kcl 40meq now and in 3 hrs and add Kcl to ivf  Check Mag level in am    3. Severe metabolic acidosis  -anion gap +  -2/2 DKA and nickie  -CO2 was 7 on admisssion. Was started on bicarb gtt which was dceed yesterday  -CO2 22 today. Not on oral bicarb    4. AMS  -likley 2/2 hyperglcemia  -CT head negative  -mentation imporving    5. DKA  -pt is DM-1 on insulin pump. Not sure of complaince  -HbA1C pening  -BS > 1000 on admsission. Was on insulin ghtt which was dced yesterday  -BS are better now    6. HTN  -BP controlled with metoprolol.   -Continue to hold cozaar till cr stabalizes. 7. Hypernatremia  -Na is 148  -likely 2/2 ivf and decreased oral intake  -will change ivf to hypotonic ivf  -encouraged increased oral fluid intake    8. NSTEMI  -Troponin leak noted  -no EKG  -on heparin gtt.  -cardiology consulted      Thank you for the consult      _______________________________________________________________________    ___________________________________________________________________________________________________________________________  Signed: Ezequiel Turner MD

## 2021-12-27 NOTE — PROGRESS NOTES
Received report via walking rounds ,pt asleep but arousal to verbal stimuli, does follow commands stated he just want to sleep.  Only took in apple juice  Did not eat any breakfast.

## 2021-12-27 NOTE — ROUTINE PROCESS
Bedside shift change report given to TIFFANY Valerio RN (oncoming nurse) by REI Frances (offgoing nurse). Report included the following information SBAR, Kardex, Intake/Output, Recent Results, Med Rec Status and Quality Measures. 2100 Lab in to draw blood for scheduled Troponin. 2330  Lab in to draw blood for aPTT.     0025  APTT result is 52.7. Per order, 3000 ml bolus administered and gtt increased by 2 units for a total of 13 units/kg//hr.     0200  Pt lying in bed with eyes closed. Clear, light yellow urine to suction from Primo fit. 0330  Lab in to draw blood for AM labs. 8040  Bedside shift change report given to MIRZA Ramirez RN (oncoming nurse) by TIFFANY Valerio RN (offgoing nurse). Report included the following information SBAR, Kardex, Intake/Output, Accordion, Med Rec Status and Quality Measures.

## 2021-12-27 NOTE — PROGRESS NOTES
Problem: Pressure Injury - Risk of  Goal: *Prevention of pressure injury  Description: Document Giuliano Scale and appropriate interventions in the flowsheet.   Outcome: Progressing Towards Goal  Note: Pressure Injury Interventions:  Sensory Interventions: Assess changes in LOC,Avoid rigorous massage over bony prominences,Check visual cues for pain,Keep linens dry and wrinkle-free    Moisture Interventions: Check for incontinence Q2 hours and as needed    Activity Interventions: PT/OT evaluation    Mobility Interventions: PT/OT evaluation    Nutrition Interventions: Document food/fluid/supplement intake,Offer support with meals,snacks and hydration    Friction and Shear Interventions: Apply protective barrier, creams and emollients,Minimize layers,HOB 30 degrees or less                Problem: Patient Education: Go to Patient Education Activity  Goal: Patient/Family Education  Outcome: Progressing Towards Goal     Problem: Patient Education: Go to Patient Education Activity  Goal: Patient/Family Education  Outcome: Progressing Towards Goal     Problem: DKA: Day 1  Goal: Off Pathway (Use only if patient is Off Pathway)  Outcome: Progressing Towards Goal  Goal: Activity/Safety  Outcome: Progressing Towards Goal  Goal: Consults, if ordered  Outcome: Progressing Towards Goal  Goal: Diagnostic Tests/Procedures, if Ordered  Outcome: Progressing Towards Goal  Goal: Nutrition/Diet  Outcome: Progressing Towards Goal  Goal: Discharge Planning  Outcome: Progressing Towards Goal  Goal: Medications  Outcome: Progressing Towards Goal  Goal: Respiratory  Outcome: Progressing Towards Goal  Goal: Treatments/Interventions/Procedures  Outcome: Progressing Towards Goal  Goal: Psychosocial  Outcome: Progressing Towards Goal  Goal: *Hemodynamically stable  Outcome: Progressing Towards Goal  Goal: *Blood glucose falling 50 to 100 mg/dl/hr  Outcome: Progressing Towards Goal  Goal: *Potassium normalizing  Outcome: Progressing Towards Goal Problem: DKA: Day 2  Goal: Off Pathway (Use only if patient is Off Pathway)  Outcome: Progressing Towards Goal  Goal: Activity/Safety  Outcome: Progressing Towards Goal  Goal: Consults, if ordered  Outcome: Progressing Towards Goal  Goal: Diagnostic Test/Procedures  Outcome: Progressing Towards Goal  Goal: Nutrition/Diet  Outcome: Progressing Towards Goal  Goal: Discharge Planning  Outcome: Progressing Towards Goal  Goal: Medications  Outcome: Progressing Towards Goal  Goal: Respiratory  Outcome: Progressing Towards Goal  Goal: Treatments/Interventions/Procedures  Outcome: Progressing Towards Goal  Goal: Psychosocial  Outcome: Progressing Towards Goal  Goal: *Acidosis resolved  Outcome: Progressing Towards Goal  Goal: *Tolerating diet  Outcome: Progressing Towards Goal  Goal: *Demonstrates progressive activity  Outcome: Progressing Towards Goal  Goal: *Blood glucose 80 to 180 mg/dl  Outcome: Progressing Towards Goal     Problem: DKA: Day 3  Goal: Off Pathway (Use only if patient is Off Pathway)  Outcome: Progressing Towards Goal  Goal: Activity/Safety  Outcome: Progressing Towards Goal  Goal: Diagnostic Test/Procedures  Outcome: Progressing Towards Goal  Goal: Nutrition/Diet  Outcome: Progressing Towards Goal  Goal: Discharge Planning  Outcome: Progressing Towards Goal  Goal: Medications  Outcome: Progressing Towards Goal  Goal: Treatments/Interventions/Procedures  Outcome: Progressing Towards Goal  Goal: Psychosocial  Outcome: Progressing Towards Goal     Problem: DKA: Discharge Outcomes  Goal: *Ambulates and performs ADL's  Outcome: Progressing Towards Goal  Goal: *Describes follow-up/return visits to physicians, diabetes treatment coordinator and other resources  Outcome: Progressing Towards Goal  Goal: *Blood glucose at patient's target range  Outcome: Progressing Towards Goal  Goal: *Acidosis resolved  Outcome: Progressing Towards Goal  Goal: *Tolerating diet  Outcome: Progressing Towards Goal  Goal: *Verbalizes understanding and describes prescribed diet  Outcome: Progressing Towards Goal  Goal: *Describes blood glucose goals, monitoring, sick day rules, hypo/hyperglycemia  Outcome: Progressing Towards Goal  Goal: *Describes available resources and support systems  Outcome: Progressing Towards Goal  Goal: *Verbalizes name, dosage, time, side effects, and number of days to continue medications  Outcome: Progressing Towards Goal  Goal: *Demonstrates ability to self-administer insulin  Outcome: Progressing Towards Goal     Problem: Falls - Risk of  Goal: *Absence of Falls  Description: Document Graciela Marking Fall Risk and appropriate interventions in the flowsheet.   Outcome: Progressing Towards Goal  Note: Fall Risk Interventions:  Mobility Interventions: Communicate number of staff needed for ambulation/transfer,Patient to call before getting OOB    Mentation Interventions: Adequate sleep, hydration, pain control,Bed/chair exit alarm,Door open when patient unattended,More frequent rounding,Reorient patient,Room close to nurse's station,Toileting rounds,Update white board    Medication Interventions: Bed/chair exit alarm,Patient to call before getting OOB,Teach patient to arise slowly    Elimination Interventions: Bed/chair exit alarm,Call light in reach,Patient to call for help with toileting needs,Toileting schedule/hourly rounds,Urinal in reach    History of Falls Interventions: Door open when patient unattended,Room close to nurse's station         Problem: Patient Education: Go to Patient Education Activity  Goal: Patient/Family Education  Outcome: Progressing Towards Goal     Problem: Patient Education: Go to Patient Education Activity  Goal: Patient/Family Education  Outcome: Progressing Towards Goal     Problem: Pneumonia: Day 1  Goal: Off Pathway (Use only if patient is Off Pathway)  Outcome: Progressing Towards Goal  Goal: Activity/Safety  Outcome: Progressing Towards Goal  Goal: Consults, if ordered  Outcome: Progressing Towards Goal  Goal: Diagnostic Test/Procedures  Outcome: Progressing Towards Goal  Goal: Nutrition/Diet  Outcome: Progressing Towards Goal  Goal: Medications  Outcome: Progressing Towards Goal  Goal: Respiratory  Outcome: Progressing Towards Goal  Goal: Treatments/Interventions/Procedures  Outcome: Progressing Towards Goal  Goal: Psychosocial  Outcome: Progressing Towards Goal  Goal: *Oxygen saturation within defined limits  Outcome: Progressing Towards Goal  Goal: *Influenza vaccine administered (October-March)  Outcome: Progressing Towards Goal  Goal: *Pneumoccocal vaccine administered  Outcome: Progressing Towards Goal  Goal: *Hemodynamically stable  Outcome: Progressing Towards Goal  Goal: *Demonstrates progressive activity  Outcome: Progressing Towards Goal  Goal: *Tolerating diet  Outcome: Progressing Towards Goal     Problem: Pneumonia: Day 2  Goal: Off Pathway (Use only if patient is Off Pathway)  Outcome: Progressing Towards Goal  Goal: Activity/Safety  Outcome: Progressing Towards Goal  Goal: Consults, if ordered  Outcome: Progressing Towards Goal  Goal: Diagnostic Test/Procedures  Outcome: Progressing Towards Goal  Goal: Nutrition/Diet  Outcome: Progressing Towards Goal  Goal: Discharge Planning  Outcome: Progressing Towards Goal  Goal: Medications  Outcome: Progressing Towards Goal  Goal: Respiratory  Outcome: Progressing Towards Goal  Goal: Treatments/Interventions/Procedures  Outcome: Progressing Towards Goal  Goal: Psychosocial  Outcome: Progressing Towards Goal  Goal: *Oxygen saturation within defined limits  Outcome: Progressing Towards Goal  Goal: *Hemodynamically stable  Outcome: Progressing Towards Goal  Goal: *Demonstrates progressive activity  Outcome: Progressing Towards Goal  Goal: *Tolerating diet  Outcome: Progressing Towards Goal  Goal: *Optimal pain control at patient's stated goal  Outcome: Progressing Towards Goal     Problem: Pneumonia: Day 3  Goal: Off Pathway (Use only if patient is Off Pathway)  Outcome: Progressing Towards Goal  Goal: Activity/Safety  Outcome: Progressing Towards Goal  Goal: Consults, if ordered  Outcome: Progressing Towards Goal  Goal: Diagnostic Test/Procedures  Outcome: Progressing Towards Goal  Goal: Nutrition/Diet  Outcome: Progressing Towards Goal  Goal: Discharge Planning  Outcome: Progressing Towards Goal  Goal: Medications  Outcome: Progressing Towards Goal  Goal: Respiratory  Outcome: Progressing Towards Goal  Goal: Treatments/Interventions/Procedures  Outcome: Progressing Towards Goal  Goal: Psychosocial  Outcome: Progressing Towards Goal  Goal: *Oxygen saturation within defined limits  Outcome: Progressing Towards Goal  Goal: *Hemodynamically stable  Outcome: Progressing Towards Goal  Goal: *Demonstrates progressive activity  Outcome: Progressing Towards Goal  Goal: *Tolerating diet  Outcome: Progressing Towards Goal  Goal: *Describes available resources and support systems  Outcome: Progressing Towards Goal  Goal: *Optimal pain control at patient's stated goal  Outcome: Progressing Towards Goal     Problem: Pneumonia: Day 4  Goal: Off Pathway (Use only if patient is Off Pathway)  Outcome: Progressing Towards Goal  Goal: Activity/Safety  Outcome: Progressing Towards Goal  Goal: Nutrition/Diet  Outcome: Progressing Towards Goal  Goal: Discharge Planning  Outcome: Progressing Towards Goal  Goal: Medications  Outcome: Progressing Towards Goal  Goal: Respiratory  Outcome: Progressing Towards Goal  Goal: Treatments/Interventions/Procedures  Outcome: Progressing Towards Goal  Goal: Psychosocial  Outcome: Progressing Towards Goal     Problem: Pneumonia: Discharge Outcomes  Goal: *Demonstrates progressive activity  Outcome: Progressing Towards Goal  Goal: *Describes follow-up/return visits to physicians  Outcome: Progressing Towards Goal  Goal: *Tolerating diet  Outcome: Progressing Towards Goal  Goal: *Verbalizes name, dosage, time, side effects, and number of days to continue medications  Outcome: Progressing Towards Goal  Goal: *Influenza immunization  Outcome: Progressing Towards Goal  Goal: *Pneumococcal immunization  Outcome: Progressing Towards Goal  Goal: *Respiratory status at baseline  Outcome: Progressing Towards Goal  Goal: *Vital signs within defined limits  Outcome: Progressing Towards Goal  Goal: *Describes available resources and support systems  Outcome: Progressing Towards Goal  Goal: *Optimal pain control at patient's stated goal  Outcome: Progressing Towards Goal

## 2021-12-27 NOTE — PROGRESS NOTES
Hospitalist Progress Note     INTERNAL MEDICINE PROGRESS NOTE  Patient: Yaya Davis   YOB: 1965   MRN: [id number]      Hospital course / Assessment    Principle Problems:  DKA with diabetic coma, in setting type 1 DM on home insulin pump  - severely acidodic poa, admitted to ICU, treated with IV insulin, Bicarb drip   - improved , converted to basal bolus, his pump at bedside is not functioning, will not be able to address here, he will need to be discharged on insulin, can follow with his endocrine after that  - will need closer follow up for his insulin pulp on dc  - mental status greatly improved, still either not fully back to his baseline, or just very poorly educated about his DM regimen, A1c Pending   - ct head w/o acute finding to explain his obtunded state, though there is subacute/chronic lacunar infarct present- cont' with ASA, Statin   NSTEMI  - Started on Heparin drip, ASA, BB, HI Statin   - cardiology consult, NST in [place]   Acute kidney injury  - treated with IVF support, avoid nephrotoxin, hold ARB  - Nephrology consult   Leukocytosis   - likely reactive due to DKA, resolved   Hypertension  - holding ARB, start on metoprolol , cont' monitor   Chronic Anemia   - Stable , cont' monitor      Code Status: Full code   DVT prophylaxis: pharmacologic and mechanical  Today Recommendation and Plan:   Cont' with heparin drip, Antiplatelet, BB  and HI statin   Cardiology and nephrology following / plans   Cont' monitor BS     Disposition    Disposition: TBD    Subjective / ROS:   Patient still sleepy, NO CP       Medical Decision Making   Chart, Images and Lab data reviewed, necessary medical Orders placed   Discussed with nursing staff     Vitals:    12/27/21 0405 12/27/21 0614 12/27/21 0800 12/27/21 0809   BP: 102/62   (!) 158/75   Pulse: 66  (!) 102 66   Resp: 15   20   Temp: 98 °F (36.7 °C)   97.3 °F (36.3 °C)   SpO2: 96%      Weight:  56.8 kg (125 lb 3.2 oz)     Height: Temp (24hrs), Av.9 °F (36.6 °C), Min:97.3 °F (36.3 °C), Max:98.6 °F (37 °C)      Intake/Output Summary (Last 24 hours) at 2021 0919  Last data filed at 2021 7443  Gross per 24 hour   Intake 2059.7 ml   Output 2675 ml   Net -615.3 ml       Physical Exam:   General Appearance:   Appears in no acute distress.,  HEENT:   Moist oral mucous membranes, conjunctiva clear,   Neck:   Supple  Lungs: No wheezes. , No rales. , Normal respiratory effort,   Heart:   Regular rate and rhythm  Abdomen:   Soft , Non-distended and Non-tender,   Extremities:   no edema of legs  Neuro:   alert, oriented, moves all extremities well    Current medications:     Current Facility-Administered Medications   Medication Dose Route Frequency    aspirin delayed-release tablet 81 mg  81 mg Oral DAILY    rosuvastatin (CRESTOR) tablet 10 mg  10 mg Oral QHS    insulin glargine (LANTUS) injection 40 Units  40 Units SubCUTAneous DAILY    insulin lispro (HUMALOG) injection   SubCUTAneous AC&HS    glucose chewable tablet 16 g  4 Tablet Oral PRN    glucagon (GLUCAGEN) injection 1 mg  1 mg IntraMUSCular PRN    dextrose (D50W) injection syrg 12.5-25 g  25-50 mL IntraVENous PRN    0.9% sodium chloride infusion  100 mL/hr IntraVENous CONTINUOUS    metoprolol succinate (TOPROL-XL) XL tablet 25 mg  25 mg Oral DAILY    cetirizine (ZYRTEC) tablet 10 mg  10 mg Oral DAILY    gabapentin (NEURONTIN) capsule 300 mg  300 mg Oral DAILY    [Held by provider] losartan (COZAAR) tablet 50 mg  50 mg Oral DAILY    traZODone (DESYREL) tablet 50 mg  50 mg Oral QHS    heparin 25,000 units in D5W 250 ml infusion  12-17 Units/kg/hr IntraVENous TITRATE    LORazepam (ATIVAN) injection 1 mg  1 mg IntraVENous Q2H PRN    sodium chloride (NS) flush 5-10 mL  5-10 mL IntraVENous PRN    dextrose (D50W) injection syrg 12.5 g  25 mL IntraVENous PRN    sodium chloride (NS) flush 5-40 mL  5-40 mL IntraVENous Q8H          Laboratory and Radiology Data : No results found for: FERR  WBC   Date Value Ref Range Status   12/27/2021 13.6 (H) 4.6 - 13.2 K/uL Final     Comment:     CHANGE NOTED     No results found for: EMBER  No results found for: UEO    Microbiology    No results found for: GMS    Recent Results (from the past 24 hour(s))   EKG, 12 LEAD, SUBSEQUENT    Collection Time: 12/26/21  9:37 AM   Result Value Ref Range    Ventricular Rate 99 BPM    Atrial Rate 98 BPM    P-R Interval 159 ms    QRS Duration 74 ms    Q-T Interval 382 ms    QTC Calculation (Bezet) 491 ms    Calculated P Axis 73 degrees    Calculated R Axis 80 degrees    Calculated T Axis 70 degrees    Diagnosis       Sinus tachycardia  Ventricular premature complex  RSR' in V1 or V2, probably normal variant  Repol abnrm suggests ischemia, diffuse leads    Confirmed by NIDA Wang ([id number]) on 12/26/2021 11:28:21 AM     GLUCOSE, POC    Collection Time: 12/26/21 10:47 AM   Result Value Ref Range    Glucose (POC) 204 (H) 70 - 110 mg/dL    Performed by Tamiko Farmer    TROPONIN I    Collection Time: 12/26/21  3:15 PM   Result Value Ref Range    Troponin-I, Qt. 1.04 (H) 0.02 - 0.05 ng/mL   GLUCOSE, POC    Collection Time: 12/26/21  4:02 PM   Result Value Ref Range    Glucose (POC) 43 (LL) 70 - 110 mg/dL    Performed by Courtney Torres Dr, POC    Collection Time: 12/26/21  4:04 PM   Result Value Ref Range    Glucose (POC) 41 (LL) 70 - 110 mg/dL    Performed by Tamiko Farmer    GLUCOSE, POC    Collection Time: 12/26/21  4:33 PM   Result Value Ref Range    Glucose (POC) 144 (H) 70 - 110 mg/dL    Performed by Tamiko Farmer    PTT    Collection Time: 12/26/21  4:50 PM   Result Value Ref Range    aPTT 71.7 (H) 23.0 - 36.4 sec    aPTT, therapeutic range   82 - 109 sec   GLUCOSE, POC    Collection Time: 12/26/21  5:33 PM   Result Value Ref Range    Glucose (POC) 126 (H) 70 - 110 mg/dL    Performed by Courtney Torres Dr, POC    Collection Time: 12/26/21  9:06 PM   Result Value Ref Range    Glucose (POC) 71 70 - 110 mg/dL    Performed by Candelario Gutierrez    TROPONIN I    Collection Time: 12/26/21  9:16 PM   Result Value Ref Range    Troponin-I, Qt. 0.71 (H) 0.02 - 0.05 ng/mL   PTT    Collection Time: 12/26/21 11:34 PM   Result Value Ref Range    aPTT 52.7 (H) 23.0 - 36.4 sec    aPTT, therapeutic range   82 - 109 sec   PTT    Collection Time: 12/27/21  3:46 AM   Result Value Ref Range    aPTT 160.4 (HH) 23.0 - 36.4 sec    aPTT, therapeutic range   82 - 109 sec   CBC WITH AUTOMATED DIFF    Collection Time: 12/27/21  3:46 AM   Result Value Ref Range    WBC 13.6 (H) 4.6 - 13.2 K/uL    RBC 3.52 (L) 4.35 - 5.65 M/uL    HGB 10.3 (L) 13.0 - 16.0 g/dL    HCT 32.9 (L) 36.0 - 48.0 %    MCV 93.5 78.0 - 100.0 FL    MCH 29.3 24.0 - 34.0 PG    MCHC 31.3 31.0 - 37.0 g/dL    RDW 14.4 11.6 - 14.5 %    PLATELET 812 814 - 209 K/uL    MPV 12.1 (H) 9.2 - 11.8 FL    NRBC 0.0 0.0  WBC    ABSOLUTE NRBC 0.00 0.00 - 0.01 K/uL    NEUTROPHILS 83 (H) 40 - 73 %    LYMPHOCYTES 10 (L) 21 - 52 %    MONOCYTES 6 3 - 10 %    EOSINOPHILS 0 0 - 5 %    BASOPHILS 0 0 - 2 %    IMMATURE GRANULOCYTES 1 (H) 0 - 0.5 %    ABS. NEUTROPHILS 11.4 (H) 1.8 - 8.0 K/UL    ABS. LYMPHOCYTES 1.4 0.9 - 3.6 K/UL    ABS. MONOCYTES 0.8 0.05 - 1.2 K/UL    ABS. EOSINOPHILS 0.0 0.0 - 0.4 K/UL    ABS. BASOPHILS 0.0 0.0 - 0.1 K/UL    ABS. IMM.  GRANS. 0.1 (H) 0.00 - 0.04 K/UL    DF AUTOMATED     TROPONIN I    Collection Time: 12/27/21  3:46 AM   Result Value Ref Range    Troponin-I, Qt. 0.75 (H) 0.02 - 0.05 ng/mL   GLUCOSE, POC    Collection Time: 12/27/21  5:36 AM   Result Value Ref Range    Glucose (POC) 85 70 - 110 mg/dL    Performed by Ashlyn Lincoln, POC    Collection Time: 12/27/21  8:03 AM   Result Value Ref Range    Glucose (POC) 86 70 - 110 mg/dL    Performed by Linden Quezada        XR Results:  Results from [place] encounter on 12/24/21    XR CHEST PORT    Narrative  EXAM:  AP Portable Chest X-ray 1 view    INDICATION: Altered mental status    COMPARISON: February 1, 2021    _______________    FINDINGS:  Heart and mediastinal contours are within normal limits for portable  radiograph. There is moderate emphysema. Increased reticular interstitial lung  markings are present. There is mild prominence of the central pulmonary  vasculature which may represent mild pulmonary edema. No focal airspace disease. There are no pleural effusions. No acute osseous findings. ________________    Impression  Moderate emphysema with suggestion of mild pulmonary edema. No focal  airspace disease or effusion. CT Results:  Results from Hospital Encounter encounter on 12/24/21    CT HEAD WO CONT    Narrative  EXAM: CT of the head    INDICATION: Unresponsive. COMPARISON: Head CT 12/10/2018. TECHNIQUE: Axial CT imaging of the head was performed without nonionic  intravenous contrast. Multiplanar reformats were generated. One or more dose reduction techniques were used on this CT: automated exposure  control, adjustment of the mAs and/or kVp according to patient size, and  iterative reconstruction techniques. The specific techniques used on this CT  exam have been documented in the patient's electronic medical record.    _______________    FINDINGS:    BRAIN: No evidence of intracranial hemorrhage. Lobular area of low attenuation  involving the posterior limb right internal capsule and thalamus (up to  approximately 2.0 x 0.8 cm), not present at prior CT. Small areas of chronic encephalomalacia within the left frontal and temporal  lobes, present previously. CALVARIA: No fracture or suspicious bone lesion. OTHER: None.    _______________    Impression  1. Right-sided deep cerebral lacunar infarct, not present at 2018 comparison  CT. Finding is age indeterminate, though most likely subacute or chronic. 2. No clearly acute findings. MRI Results:  No results found for this or any previous visit.       Nuclear Medicine Results:  No results found for this or any previous visit. US Results:  No results found for this or any previous visit. IR Results:  No results found for this or any previous visit. VAS/US Results:  No results found for this or any previous visit. Thaddeus Galeas M.D.   Hospitalist

## 2021-12-28 ENCOUNTER — APPOINTMENT (OUTPATIENT)
Dept: NON INVASIVE DIAGNOSTICS | Age: 56
DRG: 637 | End: 2021-12-28
Attending: NURSE PRACTITIONER
Payer: MEDICARE

## 2021-12-28 ENCOUNTER — APPOINTMENT (OUTPATIENT)
Dept: NUCLEAR MEDICINE | Age: 56
DRG: 637 | End: 2021-12-28
Attending: NURSE PRACTITIONER
Payer: MEDICARE

## 2021-12-28 LAB
ALBUMIN SERPL-MCNC: 3.1 G/DL (ref 3.5–4.7)
ANION GAP SERPL CALC-SCNC: 9 MMOL/L
APTT PPP: 30 SEC (ref 23–36.4)
BUN SERPL-MCNC: 36 MG/DL (ref 9–21)
BUN/CREAT SERPL: 20
CA-I BLD-MCNC: 8.1 MG/DL (ref 8.5–10.5)
CHLORIDE SERPL-SCNC: 113 MMOL/L (ref 94–111)
CO2 SERPL-SCNC: 23 MMOL/L (ref 21–33)
CREAT SERPL-MCNC: 1.8 MG/DL (ref 0.8–1.5)
GLUCOSE BLD STRIP.AUTO-MCNC: 145 MG/DL (ref 70–110)
GLUCOSE BLD STRIP.AUTO-MCNC: 167 MG/DL (ref 70–110)
GLUCOSE BLD STRIP.AUTO-MCNC: 196 MG/DL (ref 70–110)
GLUCOSE BLD STRIP.AUTO-MCNC: 90 MG/DL (ref 70–110)
GLUCOSE SERPL-MCNC: 95 MG/DL (ref 70–110)
MAGNESIUM SERPL-MCNC: 1.6 MG/DL (ref 1.7–2.8)
NUC STRESS EJECTION FRACTION: 70 %
PERFORMED BY, TECHID: ABNORMAL
PERFORMED BY, TECHID: NORMAL
PHOSPHATE SERPL-MCNC: 1.8 MG/DL (ref 2.5–4.5)
POTASSIUM SERPL-SCNC: 3.8 MMOL/L (ref 3.2–5.1)
SODIUM SERPL-SCNC: 145 MMOL/L (ref 135–145)
STRESS BASELINE HR: 65 BPM
STRESS BASELINE ST DEPRESSION: 0 MM
STRESS ESTIMATED WORKLOAD: 1 METS
STRESS EXERCISE DUR MIN: NORMAL
STRESS PEAK DIAS BP: 87 MMHG
STRESS PEAK SYS BP: 167 MMHG
STRESS PERCENT HR ACHIEVED: 51 %
STRESS POST PEAK HR: 85 BPM
STRESS RATE PRESSURE PRODUCT: NORMAL BPM*MMHG
STRESS TARGET HR: 166 BPM
THERAPEUTIC RANGE,PTTT: NORMAL SEC (ref 82–109)

## 2021-12-28 PROCEDURE — 74011250636 HC RX REV CODE- 250/636: Performed by: INTERNAL MEDICINE

## 2021-12-28 PROCEDURE — 74011250637 HC RX REV CODE- 250/637: Performed by: NURSE PRACTITIONER

## 2021-12-28 PROCEDURE — 93017 CV STRESS TEST TRACING ONLY: CPT

## 2021-12-28 PROCEDURE — 36415 COLL VENOUS BLD VENIPUNCTURE: CPT

## 2021-12-28 PROCEDURE — 74011000250 HC RX REV CODE- 250: Performed by: INTERNAL MEDICINE

## 2021-12-28 PROCEDURE — 74011250636 HC RX REV CODE- 250/636: Performed by: FAMILY MEDICINE

## 2021-12-28 PROCEDURE — 85730 THROMBOPLASTIN TIME PARTIAL: CPT

## 2021-12-28 PROCEDURE — A9500 TC99M SESTAMIBI: HCPCS

## 2021-12-28 PROCEDURE — 83735 ASSAY OF MAGNESIUM: CPT

## 2021-12-28 PROCEDURE — 74011250636 HC RX REV CODE- 250/636: Performed by: NURSE PRACTITIONER

## 2021-12-28 PROCEDURE — 82962 GLUCOSE BLOOD TEST: CPT

## 2021-12-28 PROCEDURE — 74011250637 HC RX REV CODE- 250/637: Performed by: INTERNAL MEDICINE

## 2021-12-28 PROCEDURE — 80069 RENAL FUNCTION PANEL: CPT

## 2021-12-28 PROCEDURE — 74011636637 HC RX REV CODE- 636/637: Performed by: INTERNAL MEDICINE

## 2021-12-28 PROCEDURE — 65610000006 HC RM INTENSIVE CARE

## 2021-12-28 RX ORDER — TETRAKIS(2-METHOXYISOBUTYLISOCYANIDE)COPPER(I) TETRAFLUOROBORATE 1 MG/ML
9.5 INJECTION, POWDER, LYOPHILIZED, FOR SOLUTION INTRAVENOUS
Status: COMPLETED | OUTPATIENT
Start: 2021-12-28 | End: 2021-12-28

## 2021-12-28 RX ORDER — TETRAKIS(2-METHOXYISOBUTYLISOCYANIDE)COPPER(I) TETRAFLUOROBORATE 1 MG/ML
34 INJECTION, POWDER, LYOPHILIZED, FOR SOLUTION INTRAVENOUS
Status: COMPLETED | OUTPATIENT
Start: 2021-12-28 | End: 2021-12-28

## 2021-12-28 RX ORDER — MAGNESIUM SULFATE HEPTAHYDRATE 40 MG/ML
2 INJECTION, SOLUTION INTRAVENOUS ONCE
Status: COMPLETED | OUTPATIENT
Start: 2021-12-28 | End: 2021-12-28

## 2021-12-28 RX ADMIN — POTASSIUM CHLORIDE AND SODIUM CHLORIDE: 450; 150 INJECTION, SOLUTION INTRAVENOUS at 12:55

## 2021-12-28 RX ADMIN — GABAPENTIN 300 MG: 300 CAPSULE ORAL at 11:15

## 2021-12-28 RX ADMIN — ROSUVASTATIN 10 MG: 10 TABLET, FILM COATED ORAL at 22:02

## 2021-12-28 RX ADMIN — CETIRIZINE HYDROCHLORIDE 10 MG: 10 TABLET, FILM COATED ORAL at 12:57

## 2021-12-28 RX ADMIN — TETRAKIS(2-METHOXYISOBUTYLISOCYANIDE)COPPER(I) TETRAFLUOROBORATE 34 MILLICURIE: 1 INJECTION, POWDER, LYOPHILIZED, FOR SOLUTION INTRAVENOUS at 11:00

## 2021-12-28 RX ADMIN — POTASSIUM PHOSPHATE, MONOBASIC AND POTASSIUM PHOSPHATE, DIBASIC: 224; 236 INJECTION, SOLUTION, CONCENTRATE INTRAVENOUS at 17:00

## 2021-12-28 RX ADMIN — TRAZODONE HYDROCHLORIDE 50 MG: 50 TABLET ORAL at 22:01

## 2021-12-28 RX ADMIN — SODIUM CHLORIDE, PRESERVATIVE FREE 10 ML: 5 INJECTION INTRAVENOUS at 06:38

## 2021-12-28 RX ADMIN — REGADENOSON 0.4 MG: 0.08 INJECTION, SOLUTION INTRAVENOUS at 09:04

## 2021-12-28 RX ADMIN — INSULIN LISPRO 2 UNITS: 100 INJECTION, SOLUTION INTRAVENOUS; SUBCUTANEOUS at 22:01

## 2021-12-28 RX ADMIN — LORAZEPAM 1 MG: 2 INJECTION INTRAMUSCULAR; INTRAVENOUS at 02:20

## 2021-12-28 RX ADMIN — MAGNESIUM SULFATE HEPTAHYDRATE 2 G: 40 INJECTION, SOLUTION INTRAVENOUS at 16:00

## 2021-12-28 RX ADMIN — TETRAKIS(2-METHOXYISOBUTYLISOCYANIDE)COPPER(I) TETRAFLUOROBORATE 9.5 MILLICURIE: 1 INJECTION, POWDER, LYOPHILIZED, FOR SOLUTION INTRAVENOUS at 07:10

## 2021-12-28 RX ADMIN — METOPROLOL SUCCINATE 25 MG: 25 TABLET, EXTENDED RELEASE ORAL at 11:15

## 2021-12-28 RX ADMIN — SODIUM CHLORIDE, PRESERVATIVE FREE 10 ML: 5 INJECTION INTRAVENOUS at 21:52

## 2021-12-28 NOTE — PROGRESS NOTES
@1763 Received care of pt from off going nurse. @2006 PM Assessment completed. A&OX2 with confusion. Resp even and non-labored. Lungs clear, SATS 97% on RA. Skin warm and dry. Abrasions noted to BLE. Primo fit intact, draining clear yellow colored urine. @0514 Pt father calling to check on pt. Pt father asking that MD call him in am.  This nurse will inform am nurse. @0002 Pt is NPO at present. CBWR, bed in lowest position. @0145 Pt restless at present and yelling out.     @0220 Pt remains restless. Pt pulling at IV sites. Ativan 1 mg IV given. @0700 Verbal shift change report given to DARIA Steiner RN (oncoming nurse) by LINA Dotson RN (offgoing nurse). Report included the following information Kardex, Intake/Output, MAR and Recent Results.

## 2021-12-28 NOTE — PROGRESS NOTES
Renal Progress Note    Patient: Reinaldo Corey MRN: [id number]  SSN: [id number]    YOB: 1965  Age: 49 y.o. Sex: male      Admit Date: 12/24/2021    LOS: 4 days     Subjective:   Patient seen at bedside. drowsy, no acute distress. arousable but not interactive  Improved creat to 1.8 with ivf. Stable electrolytes but phos and Mg are low      Current Facility-Administered Medications   Medication Dose Route Frequency    caffeine citrate (CAFCIT) 60 mg in dextrose 5% 25 mL IVPB  60 mg IntraVENous ONCE    aspirin delayed-release tablet 81 mg  81 mg Oral DAILY    rosuvastatin (CRESTOR) tablet 10 mg  10 mg Oral QHS    insulin glargine (LANTUS) injection 40 Units  40 Units SubCUTAneous QHS    0.45% sodium chloride with KCl 20 mEq/L infusion   IntraVENous CONTINUOUS    insulin lispro (HUMALOG) injection   SubCUTAneous AC&HS    glucose chewable tablet 16 g  4 Tablet Oral PRN    glucagon (GLUCAGEN) injection 1 mg  1 mg IntraMUSCular PRN    dextrose (D50W) injection syrg 12.5-25 g  25-50 mL IntraVENous PRN    metoprolol succinate (TOPROL-XL) XL tablet 25 mg  25 mg Oral DAILY    cetirizine (ZYRTEC) tablet 10 mg  10 mg Oral DAILY    gabapentin (NEURONTIN) capsule 300 mg  300 mg Oral DAILY    traZODone (DESYREL) tablet 50 mg  50 mg Oral QHS    LORazepam (ATIVAN) injection 1 mg  1 mg IntraVENous Q2H PRN    sodium chloride (NS) flush 5-10 mL  5-10 mL IntraVENous PRN    dextrose (D50W) injection syrg 12.5 g  25 mL IntraVENous PRN    sodium chloride (NS) flush 5-40 mL  5-40 mL IntraVENous Q8H        Vitals:    12/28/21 0003 12/28/21 0414 12/28/21 0521 12/28/21 0717   BP: (!) 160/88 (!) 151/78  126/65   Pulse: 65 69  60   Resp: 13 17  (!) 31   Temp: 98.5 °F (36.9 °C) 98.7 °F (37.1 °C)  98.7 °F (37.1 °C)   SpO2: 98% 95%     Weight:   56.7 kg (125 lb 1.6 oz)    Height:         Objective:   General: Drowsy and lethargic but arousable, no acute distress.   HEENT: EOMI, no Icterus, no Pallor,  mucosa moist.  Neck: Neck is supple, No JVD  Lungs: breathsounds normal, no respiratory distress on inspection, no rhonchi, no rales,   CVS: heart sounds normal, regular rate and rhythm, no murmurs, no rubs. GI: soft, nontender, normal BS. Extremeties: no cyanosis, no edema,   Neuro: Drowsy with altered mental status   skin: normal skin turgor, no skin rashes. Intake and Output:  Current Shift: No intake/output data recorded. Last three shifts: 12/26 1901 - 12/28 0700  In: 4206 [P.O.:780; I.V.:3426]  Out: 2525 [Urine:2525]      Lab/Data Review:  Recent Labs     12/27/21  0346 12/26/21  0513   WBC 13.6* 24.4*   HGB 10.3* 9.4*   HCT 32.9* 29.6*    200     Recent Labs     12/28/21  0400 12/27/21  0346 12/26/21  0513    148* 138   K 3.8 2.9* 3.3   * 113* 108   CO2 23 21 20*   GLU 95 100 126*   BUN 36* 57* 78*   CREA 1.80* 3.00* 4.20*   CA 8.1* 7.9* 7.0*   MG 1.6*  --  1.8   PHOS 1.8* 3.3 3.7   ALB 3.1* 3.0* 2.8*   TBILI  --   --  0.4   ALT  --   --  46     No results for input(s): PH, PCO2, PO2, HCO3, FIO2 in the last 72 hours.   Recent Results (from the past 24 hour(s))   GLUCOSE, POC    Collection Time: 12/27/21  4:56 PM   Result Value Ref Range    Glucose (POC) 114 (H) 70 - 110 mg/dL    Performed by Nadine Garcia    GLUCOSE, POC    Collection Time: 12/27/21  9:54 PM   Result Value Ref Range    Glucose (POC) 89 70 - 110 mg/dL    Performed by Mindi Agosto    PTT    Collection Time: 12/27/21 10:15 PM   Result Value Ref Range    aPTT 30.1 23.0 - 36.4 sec    aPTT, therapeutic range   82 - 109 sec   RENAL FUNCTION PANEL    Collection Time: 12/28/21  4:00 AM   Result Value Ref Range    Sodium 145 135 - 145 mmol/L    Potassium 3.8 3.2 - 5.1 mmol/L    Chloride 113 (H) 94 - 111 mmol/L    CO2 23 21 - 33 mmol/L    Anion gap 9 mmol/L    Glucose 95 70 - 110 mg/dL    BUN 36 (H) 9 - 21 mg/dL    Creatinine 1.80 (H) 0.8 - 1.50 mg/dL    BUN/Creatinine ratio 20      GFR est AA 48 ml/min/1.73m2    GFR est non-AA 40 ml/min/1.73m2    Calcium 8.1 (L) 8.5 - 10.5 mg/dL    Phosphorus 1.8 (L) 2.5 - 4.5 mg/dL    Albumin 3.1 (L) 3.5 - 4.7 g/dL   MAGNESIUM    Collection Time: 12/28/21  4:00 AM   Result Value Ref Range    Magnesium 1.6 (L) 1.7 - 2.8 mg/dL   GLUCOSE, POC    Collection Time: 12/28/21  7:14 AM   Result Value Ref Range    Glucose (POC) 90 70 - 110 mg/dL    Performed by [address] [place] [address] STRESS TEST    Collection Time: 12/28/21 11:12 AM   Result Value Ref Range    Stress Target  bpm    Exercise Duration Time 00:04:00     Stress Systolic  mmHg    Stress Diastolic BP 87 mmHg    Stress Peak HR 85 BPM    Baseline HR 65 BPM    Stress Estimated Workload 1.0 METS    Stress Rate Pressure Product 14,195 BPM*mmHg    Stress Percent HR Achieved 51 %    Baseline ST Depression 0 mm    Nuc Stress EF 70 %   GLUCOSE, POC    Collection Time: 12/28/21 11:15 AM   Result Value Ref Range    Glucose (POC) 145 (H) 70 - 110 mg/dL    Performed by [address] [place] [address], POC    Collection Time: 12/28/21  3:15 PM   Result Value Ref Range    Glucose (POC) 167 (H) 70 - 110 mg/dL    Performed by John Saxena and Plan:     1. ROOSEVELT on CKD III  -Cr was 4.0 on admission  Which peaked at 4.8  -ROOSEVELT likley prerenal 2/2 volume depletion from severe hyperglycemia with cozaar on board  holding co=zaar  Cr hs imporved from 4.8-->4.2-->3.0-1.8 today. Bl Cr about 2.0 based on labs dec 2,2021.  -urine is bland. Will quantify proteinuria  -continue 1.2 NS with 20meq KCl ,  hypernatremia and severe hypoklemia improved  -f/u renal fxn in am     2. Severe hypokalemia  -K was 5.8 on admission . 2/2 insulopenia and volume contraction  -K was 2.9 yesterday but improved to 3.8 today  Continue kcl in ivf  Hypomag: will supplement IV Mg and monitor Mg    3. Hypophosphatemia: We will give IV potassium phosphate supplementation and repeat phosphorus tomorrow     3.  Severe metabolic acidosis  -anion gap +  -2/2 DKA and nickie  -CO2 was 7 on admisssion. Was started on bicarb gtt which was dceed yesterday  -CO2 23 today. Not on oral bicarb     4. AMS  -likley 2/2 hyperglcemia,   -CT head negative  -mentation imporving     5. DKA  -pt is DM-1 on insulin pump. Not sure of complaince, improved DKA with IV insulin drip  -HbA1C pening  -Improved glycemic control now, continue to monitor Accu-Cheks    6. HTN  -BP controlled with metoprolol.   -Continue to hold cozaar till cr stabalizes.      7.  NSTEMI  -Troponin leak noted  -no EKG  -on heparin gtt.  -cardiology following  Signed By: Karina Butterfield MD     December 28, 2021

## 2021-12-28 NOTE — PROGRESS NOTES
CARDIOLOGY PROGRESS NOTE - NP    Patient seen and examined. This is a patient who is followed for elevated troponin in the setting of ROOSEVELT and DKA. He is slightly more responsive this morning as is able to consent for nuclear stress test and denies chest pain or dyspnea. Insulin gtt has been discontinued. No other complaints reported. Telemetry reviewed, there were no events noted in the past 24 hours. Remains in NSR. Pertinent review of systems items noted above, all other systems are negative. Current medications reviewed. Physical Examination  Vital signs are stable. Blood pressure 126/65, Pulse 60  No apparent distress. Heart is regular, rate and rhythm. Normal S1, S2, no murmurs are appreciated. Lungs are clear bilaterally with poor inspiratory effort. Abdomen is soft, nontender, normal bowel sounds. Extremities have no edema. Labs reviewed:  Renal function improving. Case discussed with Dr. Enio Mendoza and our impression and recommendations are as follows:  1. NSTEMI:  Likely Type 2 secondary to DKA and ROOSEVELT, however, given risk factors for CAD and abnormal EKG nuclear stress test was completed this morning. No ischemic EKG changes during Lexiscan portion and nuclear images are pending. Continue asa 81mg, beta blocker, and high intensity statin. 2. Hypertension: At goal this morning. Will continue to monitor. 3. Hypokalemia:  Presented with hyperkalemia and hypokalemic given insulin gtt. This has been repleted by renal.  Continue telemetry monitoring. Should stress test be negative for ischemia the patient is considered stable for discharge from a cardiac standpoint. Follow-up appt made at [facility] office on 1/11/22 at 12:30pm with Dr. Enio Mendoza. Please do not hesitate to call me or Dr. Enio Mendoza if additional questions arise.

## 2021-12-28 NOTE — PROGRESS NOTES
Hospitalist Progress Note     INTERNAL MEDICINE PROGRESS NOTE  Patient: Fabiana Eagle   YOB: 1965   MRN: [id number]      Hospital course / Assessment    Principle Problems:  DKA with diabetic coma, in setting type 1 DM on home insulin pump  - severely acidodic poa, admitted to ICU, treated with IV insulin, Bicarb drip   - improved , converted to basal bolus, his pump at bedside is not functioning, will not be able to address here, he will need to be discharged on insulin, can follow with his endocrine after that  - will need closer follow up for his insulin pump on dc  - mental status greatly improved, still either not fully back to his baseline, or just very poorly educated about his DM regimen, A1c Pending   - CT head w/o acute finding to explain his obtunded state, though there is subacute/chronic lacunar infarct present- cont' with ASA, Statin   NSTEMI  - Started on Heparin drip initially , ASA, BB, HI Statin   - cardiology consult, NST today   Acute kidney injury  - treated with IVF support, avoid nephrotoxin, hold ARB  - Nephrology consulted, Improving Cr 3.0 -> 1.8  Leukocytosis   - likely reactive due to DKA, resolved   Hypertension  - holding ARB, start on metoprolol , cont' monitor   Chronic Anemia   - Stable , cont' monitor      Code Status: Full code   DVT prophylaxis: pharmacologic and mechanical    Today Recommendation and Plan:   Cont' with  Antiplatelet, BB  and HI statin   Cardiology and nephrology following / plans   Cont' monitor BS and BP  NST today       Disposition    Disposition: TBD    Subjective / ROS:   Patient denies CP or SOB        Medical Decision Making   Chart, Images and Lab data reviewed, necessary medical Orders placed   Discussed with nursing staff     Vitals:    12/28/21 0003 12/28/21 0414 12/28/21 0521 12/28/21 0717   BP: (!) 160/88 (!) 151/78  126/65   Pulse: 65 69  60   Resp: 13 17  (!) 31   Temp: 98.5 °F (36.9 °C) 98.7 °F (37.1 °C)  98.7 °F (37.1 °C)   SpO2: 98% 95%     Weight:   56.7 kg (125 lb 1.6 oz)    Height:         Temp (24hrs), Av.2 °F (36.8 °C), Min:97.6 °F (36.4 °C), Max:98.7 °F (37.1 °C)      Intake/Output Summary (Last 24 hours) at 2021 0912  Last data filed at 2021 4858  Gross per 24 hour   Intake 2146.31 ml   Output 1200 ml   Net 946.31 ml       Physical Exam:   General Appearance:   Appears in no acute distress.,  HEENT:   Moist oral mucous membranes, conjunctiva clear,   Neck:   Supple  Lungs: No wheezes. , No rales. , Normal respiratory effort,   Heart:   Regular rate and rhythm  Abdomen:   Soft , Non-distended and Non-tender,   Extremities:   no edema of legs  Neuro:   alert, oriented, moves all extremities well      Current medications:     Current Facility-Administered Medications   Medication Dose Route Frequency    caffeine citrate (CAFCIT) 60 mg in dextrose 5% 25 mL IVPB  60 mg IntraVENous ONCE    aspirin delayed-release tablet 81 mg  81 mg Oral DAILY    rosuvastatin (CRESTOR) tablet 10 mg  10 mg Oral QHS    insulin glargine (LANTUS) injection 40 Units  40 Units SubCUTAneous QHS    0.45% sodium chloride with KCl 20 mEq/L infusion   IntraVENous CONTINUOUS    insulin lispro (HUMALOG) injection   SubCUTAneous AC&HS    glucose chewable tablet 16 g  4 Tablet Oral PRN    glucagon (GLUCAGEN) injection 1 mg  1 mg IntraMUSCular PRN    dextrose (D50W) injection syrg 12.5-25 g  25-50 mL IntraVENous PRN    metoprolol succinate (TOPROL-XL) XL tablet 25 mg  25 mg Oral DAILY    cetirizine (ZYRTEC) tablet 10 mg  10 mg Oral DAILY    gabapentin (NEURONTIN) capsule 300 mg  300 mg Oral DAILY    traZODone (DESYREL) tablet 50 mg  50 mg Oral QHS    LORazepam (ATIVAN) injection 1 mg  1 mg IntraVENous Q2H PRN    sodium chloride (NS) flush 5-10 mL  5-10 mL IntraVENous PRN    dextrose (D50W) injection syrg 12.5 g  25 mL IntraVENous PRN    sodium chloride (NS) flush 5-40 mL  5-40 mL IntraVENous Q8H          Laboratory and Radiology Data :      No results found for: FERR  WBC   Date Value Ref Range Status   12/27/2021 13.6 (H) 4.6 - 13.2 K/uL Final     Comment:     CHANGE NOTED     No results found for: EMBER  No results found for: UEO    Microbiology    No results found for: GMS    Recent Results (from the past 24 hour(s))   EKG, 12 LEAD, SUBSEQUENT    Collection Time: 12/27/21  9:20 AM   Result Value Ref Range    Ventricular Rate 71 BPM    Atrial Rate 72 BPM    P-R Interval 137 ms    QRS Duration 89 ms    Q-T Interval 426 ms    QTC Calculation (Bezet) 463 ms    Calculated P Axis 38 degrees    Calculated R Axis 74 degrees    Calculated T Axis 79 degrees    Diagnosis       Sinus rhythm  Probable left atrial enlargement  ST depr, consider ischemia, anterolateral lds    Confirmed by CHARLIE CHAHAL ([id number]) on 12/27/2021 5:04:33 PM     GLUCOSE, POC    Collection Time: 12/27/21 11:28 AM   Result Value Ref Range    Glucose (POC) 111 (H) 70 - 110 mg/dL    Performed by Aylin Lauren    PTT    Collection Time: 12/27/21 12:00 PM   Result Value Ref Range    aPTT 34.6 23.0 - 36.4 sec    aPTT, therapeutic range   82 - 109 sec   ECHO ADULT COMPLETE    Collection Time: 12/27/21 12:28 PM   Result Value Ref Range    LV EDV A2C 42 mL    LV EDV A4C 50 mL    LV EDV BP 47 (A) 67 - 155 mL    LV ESV A2C 15 mL    LV ESV A4C 25 mL    IVSd 1.0 0.6 - 1.0 cm    LVIDd 3.9 (A) 4.2 - 5.9 cm    LVIDs 2.5 cm    LVOT Diameter 2.1 cm    LVOT Mean Gradient 1 mmHg    LVOT VTI 18.9 cm    LVOT Peak Velocity 0.7 m/s    LVOT Peak Gradient 2 mmHg    LVPWd 1.0 0.6 - 1.0 cm    LV E' Lateral Velocity 11 cm/s    LV E' Septal Velocity 10 cm/s    LV Ejection Fraction A2C 65 %    LV Ejection Fraction A4C 49 %    EF BP 58 55 - 100 %    LVOT Area 3.5 cm2    LVOT SV 65.4 ml    RA Area 4C 10.6 cm2    AV Mean Gradient 2 mmHg    AV VTI 21.0 cm    AV Mean Velocity 0.6 m/s    AV Peak Velocity 0.9 m/s    AV Peak Gradient 3 mmHg    AV Area by VTI 3.1 cm2    AV Area by Peak Velocity 2.8 cm2 Aortic Root 3.0 cm    LA Diameter 2.6 cm    MV E Wave Deceleration Time 190.0 ms    MV A Velocity 0.49 m/s    MV E Velocity 0.76 m/s    MV Mean Gradient 1 mmHg    MV VTI 20.3 cm    MV Mean Velocity 0.4 m/s    MV Max Velocity 0.8 m/s    MV Peak Gradient 3 mmHg    MV Area by VTI 3.2 cm2    PV Max Velocity 0.8 m/s    PV Peak Gradient 2 mmHg    TAPSE 2.0 1.5 - 2.0 cm    Fractional Shortening 2D 36 28 - 44 %    LV EDV Index BP 27 mL/m2    LV ESV Index A4C 14 mL/m2    LV EDV Index A4C 29 mL/m2    LV ESV Index A2C 9 mL/m2    LV EDV Index A2C 24 mL/m2    LVIDd Index 2.25 cm/m2    LVIDs Index 1.45 cm/m2    LV RWT Ratio 0.51     LV Mass 2D 122.1 88 - 224 g    LV Mass 2D Index 70.6 49 - 115 g/m2    MV E/A 1.55     E/E' Ratio (Averaged) 7.25     E/E' Lateral 6.91     E/E' Septal 7.60     LVOT Stroke Volume Index 37.8 mL/m2    LA Size Index 1.50 cm/m2    LA/AO Root Ratio 0.87     Ao Root Index 1.73 cm/m2    AV Velocity Ratio 0.78     LVOT:AV VTI Index 0.90     SUMAN/BSA VTI 1.8 cm2/m2    SUMAN/BSA Peak Velocity 1.6 cm2/m2    MV:LVOT VTI Index 1.07    GLUCOSE, POC    Collection Time: 12/27/21  4:56 PM   Result Value Ref Range    Glucose (POC) 114 (H) 70 - 110 mg/dL    Performed by Panfilo Chu    GLUCOSE, POC    Collection Time: 12/27/21  9:54 PM   Result Value Ref Range    Glucose (POC) 89 70 - 110 mg/dL    Performed by Finn Porter    PTT    Collection Time: 12/27/21 10:15 PM   Result Value Ref Range    aPTT 30.1 23.0 - 36.4 sec    aPTT, therapeutic range   82 - 109 sec   RENAL FUNCTION PANEL    Collection Time: 12/28/21  4:00 AM   Result Value Ref Range    Sodium 145 135 - 145 mmol/L    Potassium 3.8 3.2 - 5.1 mmol/L    Chloride 113 (H) 94 - 111 mmol/L    CO2 23 21 - 33 mmol/L    Anion gap 9 mmol/L    Glucose 95 70 - 110 mg/dL    BUN 36 (H) 9 - 21 mg/dL    Creatinine 1.80 (H) 0.8 - 1.50 mg/dL    BUN/Creatinine ratio 20      GFR est AA 48 ml/min/1.73m2    GFR est non-AA 40 ml/min/1.73m2    Calcium 8.1 (L) 8.5 - 10.5 mg/dL Phosphorus 1.8 (L) 2.5 - 4.5 mg/dL    Albumin 3.1 (L) 3.5 - 4.7 g/dL   MAGNESIUM    Collection Time: 12/28/21  4:00 AM   Result Value Ref Range    Magnesium 1.6 (L) 1.7 - 2.8 mg/dL   GLUCOSE, POC    Collection Time: 12/28/21  7:14 AM   Result Value Ref Range    Glucose (POC) 90 70 - 110 mg/dL    Performed by Amy Saenz STRESS TEST    Collection Time: 12/28/21  7:16 AM   Result Value Ref Range    Stress Target  bpm       XR Results:  Results from Hospital Encounter encounter on 12/24/21    XR CHEST PORT    Narrative  EXAM:  AP Portable Chest X-ray 1 view    INDICATION: Altered mental status    COMPARISON: February 1, 2021    _______________    FINDINGS:  Heart and mediastinal contours are within normal limits for portable  radiograph. There is moderate emphysema. Increased reticular interstitial lung  markings are present. There is mild prominence of the central pulmonary  vasculature which may represent mild pulmonary edema. No focal airspace disease. There are no pleural effusions. No acute osseous findings. ________________    Impression  Moderate emphysema with suggestion of mild pulmonary edema. No focal  airspace disease or effusion. CT Results:  Results from Hospital Encounter encounter on 12/24/21    CT HEAD WO CONT    Narrative  EXAM: CT of the head    INDICATION: Unresponsive. COMPARISON: Head CT 12/10/2018. TECHNIQUE: Axial CT imaging of the head was performed without nonionic  intravenous contrast. Multiplanar reformats were generated. One or more dose reduction techniques were used on this CT: automated exposure  control, adjustment of the mAs and/or kVp according to patient size, and  iterative reconstruction techniques. The specific techniques used on this CT  exam have been documented in the patient's electronic medical record.    _______________    FINDINGS:    BRAIN: No evidence of intracranial hemorrhage.  Lobular area of low attenuation  involving the posterior limb right internal capsule and thalamus (up to  approximately 2.0 x 0.8 cm), not present at prior CT. Small areas of chronic encephalomalacia within the left frontal and temporal  lobes, present previously. CALVARIA: No fracture or suspicious bone lesion. OTHER: None.    _______________    Impression  1. Right-sided deep cerebral lacunar infarct, not present at 2018 comparison  CT. Finding is age indeterminate, though most likely subacute or chronic. 2. No clearly acute findings. MRI Results:  No results found for this or any previous visit. Nuclear Medicine Results:  No results found for this or any previous visit. US Results:  No results found for this or any previous visit. IR Results:  No results found for this or any previous visit. VAS/US Results:  No results found for this or any previous visit. Misha Guzman M.D.   Hospitalist

## 2021-12-28 NOTE — PROGRESS NOTES
Comprehensive Nutrition Assessment    Type and Reason for Visit: Initial    Nutrition Recommendations/Plan: 4CHO choice cardiac restricted diet with glucerna BID    Nutrition Assessment:  50 yo male PMH: DM, hypercholesterolemia, HTN   underweight BMI18.2. Pt admitted due to BG greater than 1000 in DKA. Pt wife manages insulin pump but went out of town and pt did not manage his pump very well. Currently NPO due to lethargy. Also being treated for PNA  Hgb A1c is pending    12/28/2021. AMS has improved pt starting oral diet today diabetic cardiac start glucerna BID due to underweight BMI 17.45 and Hgb A1c is still pending.      Recent Results (from the past 24 hour(s))   GLUCOSE, POC    Collection Time: 12/27/21  4:56 PM   Result Value Ref Range    Glucose (POC) 114 (H) 70 - 110 mg/dL    Performed by Carin Whiting    GLUCOSE, POC    Collection Time: 12/27/21  9:54 PM   Result Value Ref Range    Glucose (POC) 89 70 - 110 mg/dL    Performed by Evinan Sera    PTT    Collection Time: 12/27/21 10:15 PM   Result Value Ref Range    aPTT 30.1 23.0 - 36.4 sec    aPTT, therapeutic range   82 - 109 sec   RENAL FUNCTION PANEL    Collection Time: 12/28/21  4:00 AM   Result Value Ref Range    Sodium 145 135 - 145 mmol/L    Potassium 3.8 3.2 - 5.1 mmol/L    Chloride 113 (H) 94 - 111 mmol/L    CO2 23 21 - 33 mmol/L    Anion gap 9 mmol/L    Glucose 95 70 - 110 mg/dL    BUN 36 (H) 9 - 21 mg/dL    Creatinine 1.80 (H) 0.8 - 1.50 mg/dL    BUN/Creatinine ratio 20      GFR est AA 48 ml/min/1.73m2    GFR est non-AA 40 ml/min/1.73m2    Calcium 8.1 (L) 8.5 - 10.5 mg/dL    Phosphorus 1.8 (L) 2.5 - 4.5 mg/dL    Albumin 3.1 (L) 3.5 - 4.7 g/dL   MAGNESIUM    Collection Time: 12/28/21  4:00 AM   Result Value Ref Range    Magnesium 1.6 (L) 1.7 - 2.8 mg/dL   GLUCOSE, POC    Collection Time: 12/28/21  7:14 AM   Result Value Ref Range    Glucose (POC) 90 70 - 110 mg/dL    Performed by [employer] TEST    Collection Time: 12/28/21 11:12 AM   Result Value Ref Range    Stress Target  bpm    Exercise Duration Time 00:04:00     Stress Systolic  mmHg    Stress Diastolic BP 87 mmHg    Stress Peak HR 85 BPM    Baseline HR 65 BPM    Stress Estimated Workload 1.0 METS    Stress Rate Pressure Product 14,195 BPM*mmHg    Stress Percent HR Achieved 51 %    Baseline ST Depression 0 mm    Nuc Stress EF 70 %   GLUCOSE, POC    Collection Time: 12/28/21 11:15 AM   Result Value Ref Range    Glucose (POC) 145 (H) 70 - 110 mg/dL    Performed by Clint Muhammad        Malnutrition Assessment:  Malnutrition Status: At risk for malnutrition (specify) (NPO x 3 days)    Context:  Acute illness     Findings of the 6 clinical characteristics of malnutrition:   Energy Intake:  Mild decrease in energy intake (specify) (NPO x 3 days)  Weight Loss:  No significant weight loss     Body Fat Loss:  No significant body fat loss,     Muscle Mass Loss:  No significant muscle mass loss,    Fluid Accumulation:  No significant fluid accumulation,     Strength:  Normal  strength         Estimated Daily Nutrient Needs:  Energy (kcal): 7308-4287 kcal/day; Weight Used for Energy Requirements: Admission (60 kg)  Protein (g): 60-72 g/day; Weight Used for Protein Requirements: Admission (1-1.2 g/kg)  Fluid (ml/day): 3852-5079 mL/day; Method Used for Fluid Requirements: 1 ml/kcal      Nutrition Related Findings:  underweight BMI18.2. Pt admitted due to BG greater than 1000 in DKA. Pt wife manages insulin pump but went out of town and pt did not manage his pump very well. Currently NPO due to lethargy. Also being treated for PNA      Wounds:    None       Current Nutrition Therapies:  ADULT DIET Dysphagia - Soft & Bite Sized; 3 carb choices (45 gm/meal);  Low Fat/Low Chol/High Fiber/REINIER    Anthropometric Measures:  · Height:  5' 11\" (180.3 cm)  · Current Body Wt:  59 kg (130 lb)   · Admission Body Wt:  130 lb    · Usual Body Wt:        · Ideal Body Wt: 172 lbs:  75.6 %   · Adjusted Body Weight:   ; Weight Adjustment for: No adjustment   · Adjusted BMI:       · BMI Category:  Underweight (BMI less than 18.5)       Nutrition Diagnosis:   · Predicted inadequate energy intake related to cognitive or neurological impairment as evidenced by NPO or clear liquid status due to medical condition      Nutrition Interventions:   Food and/or Nutrient Delivery: Continue NPO (when safe recommend 4CHO choice cardiac diet)  Nutrition Education and Counseling: No recommendations at this time,Education not appropriate  Coordination of Nutrition Care: Continue to monitor while inpatient    Goals:  BG , Hgb A1c < 7, Pt to start oral diet with in 3 days or TF will be considered, BM every 1-3 days       Nutrition Monitoring and Evaluation:   Behavioral-Environmental Outcomes:    Food/Nutrient Intake Outcomes: Food and nutrient intake,Diet advancement/tolerance  Physical Signs/Symptoms Outcomes: Biochemical data,Meal time behavior,Weight     F/u: 1/2/2021    Discharge Planning:     Too soon to determine     Electronically signed by Kirill Parker on 12/28/2021 at 10:32 AM    Contact: ROSIE [phone]

## 2021-12-28 NOTE — PROGRESS NOTES
P.t. screen completed, pt still recovering medically but not expected to need assistance with mobility at this time after discussion with nursing.    Trenton Mcnair, PT, DPT Home

## 2021-12-28 NOTE — PROGRESS NOTES
Problem: Pressure Injury - Risk of  Goal: *Prevention of pressure injury  Description: Document Giuliano Scale and appropriate interventions in the flowsheet. Outcome: Progressing Towards Goal  Note: Pressure Injury Interventions:  Sensory Interventions: Assess changes in LOC    Moisture Interventions: Minimize layers,Check for incontinence Q2 hours and as needed    Activity Interventions: PT/OT evaluation    Mobility Interventions: PT/OT evaluation    Nutrition Interventions: Document food/fluid/supplement intake    Friction and Shear Interventions: Apply protective barrier, creams and emollients,Minimize layers,HOB 30 degrees or less                Problem: DKA: Day 1  Goal: Consults, if ordered  Outcome: Progressing Towards Goal  Goal: Diagnostic Tests/Procedures, if Ordered  Outcome: Progressing Towards Goal  Goal: Respiratory  Outcome: Progressing Towards Goal  Goal: *Hemodynamically stable  Outcome: Progressing Towards Goal  Goal: *Blood glucose falling 50 to 100 mg/dl/hr  Outcome: Progressing Towards Goal     Problem: Falls - Risk of  Goal: *Absence of Falls  Description: Document Beverly Fall Risk and appropriate interventions in the flowsheet.   Outcome: Progressing Towards Goal  Note: Fall Risk Interventions:  Mobility Interventions: Bed/chair exit alarm,Patient to call before getting OOB    Mentation Interventions: Adequate sleep, hydration, pain control,Bed/chair exit alarm,Door open when patient unattended,More frequent rounding,Reorient patient,Room close to nurse's station,Toileting rounds,Update white board    Medication Interventions: Bed/chair exit alarm    Elimination Interventions: Call light in reach,Bed/chair exit alarm    History of Falls Interventions: Door open when patient unattended,Room close to nurse's station         Problem: Pneumonia: Day 1  Goal: Consults, if ordered  Outcome: Progressing Towards Goal  Goal: Medications  Outcome: Progressing Towards Goal  Goal: Respiratory  Outcome: Progressing Towards Goal  Goal: Treatments/Interventions/Procedures  Outcome: Progressing Towards Goal     Problem: Activity Intolerance  Goal: *Oxygen saturation during activity within specified parameters  Outcome: Progressing Towards Goal     Problem: General Medical Care Plan  Goal: *Vital signs within specified parameters  Outcome: Progressing Towards Goal  Goal: *Labs within defined limits  Outcome: Progressing Towards Goal  Goal: *Absence of infection signs and symptoms  Outcome: Progressing Towards Goal  Goal: *Optimal pain control at patient's stated goal  Outcome: Progressing Towards Goal  Goal: *Skin integrity maintained  Outcome: Progressing Towards Goal     Problem: DKA: Day 1  Goal: Off Pathway (Use only if patient is Off Pathway)  Outcome: Not Progressing Towards Goal  Goal: Activity/Safety  Outcome: Not Progressing Towards Goal  Goal: Nutrition/Diet  Outcome: Not Progressing Towards Goal  Goal: Medications  Outcome: Not Progressing Towards Goal  Goal: Psychosocial  Outcome: Not Progressing Towards Goal     Problem: Pneumonia: Day 1  Goal: Activity/Safety  Outcome: Not Progressing Towards Goal  Goal: Diagnostic Test/Procedures  Outcome: Not Progressing Towards Goal  Goal: Nutrition/Diet  Outcome: Not Progressing Towards Goal  Goal: Psychosocial  Outcome: Not Progressing Towards Goal     Problem:  Activity Intolerance  Goal: *Able to remain out of bed as prescribed  Outcome: Not Progressing Towards Goal     Problem: Nutrition Deficit  Goal: *Optimize nutritional status  Outcome: Not Progressing Towards Goal     Problem: General Medical Care Plan  Goal: *Fluid volume balance  Outcome: Not Progressing Towards Goal  Goal: *Optimize nutritional status  Outcome: Not Progressing Towards Goal  Goal: *Progressive mobility and function (eg: ADL's)  Outcome: Not Progressing Towards Goal

## 2021-12-28 NOTE — PROGRESS NOTES
0700-Received care of pt. Pt resting in bed with eyes closed. No distress noted. Vitals stable. Bed in lowest position, wheels locked, call bell within reach. 0810-Pt down to [facility] for stress test.    1100-Pt back to unit at this time. Pt cleaned of incontinent stool and linens changed. 1520-Pt refusing to eat at this time stating \"I dont want anything. \" Will attempt to get pt to eat when dinner tray arrives. 1700-Attempted to get pt to eat dinner tray. Pt stating \"Im really not hungry right now, ill eat later\"    1830-I's&O's completed.

## 2021-12-29 PROBLEM — E43 SEVERE PROTEIN-CALORIE MALNUTRITION (HCC): Status: ACTIVE | Noted: 2021-12-29

## 2021-12-29 LAB
ALBUMIN SERPL-MCNC: 3.2 G/DL (ref 3.5–4.7)
ANION GAP SERPL CALC-SCNC: 11 MMOL/L
BUN SERPL-MCNC: 28 MG/DL (ref 9–21)
BUN/CREAT SERPL: 22
CA-I BLD-MCNC: 8.4 MG/DL (ref 8.5–10.5)
CHLORIDE SERPL-SCNC: 111 MMOL/L (ref 94–111)
CO2 SERPL-SCNC: 21 MMOL/L (ref 21–33)
CREAT SERPL-MCNC: 1.3 MG/DL (ref 0.8–1.5)
GLUCOSE BLD STRIP.AUTO-MCNC: 207 MG/DL (ref 70–110)
GLUCOSE BLD STRIP.AUTO-MCNC: 218 MG/DL (ref 70–110)
GLUCOSE BLD STRIP.AUTO-MCNC: 84 MG/DL (ref 70–110)
GLUCOSE BLD STRIP.AUTO-MCNC: 92 MG/DL (ref 70–110)
GLUCOSE SERPL-MCNC: 185 MG/DL (ref 70–110)
PERFORMED BY, TECHID: ABNORMAL
PERFORMED BY, TECHID: ABNORMAL
PERFORMED BY, TECHID: NORMAL
PERFORMED BY, TECHID: NORMAL
PHOSPHATE SERPL-MCNC: 2.7 MG/DL (ref 2.5–4.5)
POTASSIUM SERPL-SCNC: 4.5 MMOL/L (ref 3.2–5.1)
SODIUM SERPL-SCNC: 143 MMOL/L (ref 135–145)

## 2021-12-29 PROCEDURE — 74011250637 HC RX REV CODE- 250/637: Performed by: INTERNAL MEDICINE

## 2021-12-29 PROCEDURE — 65610000006 HC RM INTENSIVE CARE

## 2021-12-29 PROCEDURE — 74011250636 HC RX REV CODE- 250/636: Performed by: INTERNAL MEDICINE

## 2021-12-29 PROCEDURE — 74011636637 HC RX REV CODE- 636/637: Performed by: INTERNAL MEDICINE

## 2021-12-29 PROCEDURE — 80069 RENAL FUNCTION PANEL: CPT

## 2021-12-29 PROCEDURE — 74011250637 HC RX REV CODE- 250/637: Performed by: NURSE PRACTITIONER

## 2021-12-29 PROCEDURE — 36415 COLL VENOUS BLD VENIPUNCTURE: CPT

## 2021-12-29 PROCEDURE — 82962 GLUCOSE BLOOD TEST: CPT

## 2021-12-29 RX ORDER — LOSARTAN POTASSIUM 25 MG/1
25 TABLET ORAL DAILY
Status: DISCONTINUED | OUTPATIENT
Start: 2021-12-30 | End: 2022-01-03 | Stop reason: HOSPADM

## 2021-12-29 RX ORDER — INSULIN GLARGINE 100 [IU]/ML
20 INJECTION, SOLUTION SUBCUTANEOUS DAILY
Status: DISCONTINUED | OUTPATIENT
Start: 2021-12-29 | End: 2022-01-03 | Stop reason: HOSPADM

## 2021-12-29 RX ORDER — INSULIN LISPRO 100 [IU]/ML
75 INJECTION, SOLUTION INTRAVENOUS; SUBCUTANEOUS SEE ADMIN INSTRUCTIONS
COMMUNITY
End: 2022-01-03

## 2021-12-29 RX ADMIN — POTASSIUM CHLORIDE AND SODIUM CHLORIDE 100 ML/HR: 450; 150 INJECTION, SOLUTION INTRAVENOUS at 04:07

## 2021-12-29 RX ADMIN — SODIUM CHLORIDE, PRESERVATIVE FREE 10 ML: 5 INJECTION INTRAVENOUS at 21:43

## 2021-12-29 RX ADMIN — GABAPENTIN 300 MG: 300 CAPSULE ORAL at 08:44

## 2021-12-29 RX ADMIN — INSULIN LISPRO 4 UNITS: 100 INJECTION, SOLUTION INTRAVENOUS; SUBCUTANEOUS at 08:44

## 2021-12-29 RX ADMIN — INSULIN GLARGINE 20 UNITS: 100 INJECTION, SOLUTION SUBCUTANEOUS at 08:58

## 2021-12-29 RX ADMIN — ROSUVASTATIN 10 MG: 10 TABLET, FILM COATED ORAL at 21:43

## 2021-12-29 RX ADMIN — ASPIRIN 81 MG: 81 TABLET, COATED ORAL at 08:43

## 2021-12-29 RX ADMIN — CETIRIZINE HYDROCHLORIDE 10 MG: 10 TABLET, FILM COATED ORAL at 08:43

## 2021-12-29 RX ADMIN — INSULIN LISPRO 4 UNITS: 100 INJECTION, SOLUTION INTRAVENOUS; SUBCUTANEOUS at 11:13

## 2021-12-29 RX ADMIN — SODIUM CHLORIDE, PRESERVATIVE FREE 10 ML: 5 INJECTION INTRAVENOUS at 14:16

## 2021-12-29 RX ADMIN — SODIUM CHLORIDE, PRESERVATIVE FREE 10 ML: 5 INJECTION INTRAVENOUS at 05:52

## 2021-12-29 RX ADMIN — METOPROLOL SUCCINATE 25 MG: 25 TABLET, EXTENDED RELEASE ORAL at 08:43

## 2021-12-29 NOTE — PROGRESS NOTES
Problem: Pressure Injury - Risk of  Goal: *Prevention of pressure injury  Description: Document Giuliano Scale and appropriate interventions in the flowsheet. Outcome: Progressing Towards Goal  Note: Pressure Injury Interventions:  Sensory Interventions: Assess changes in LOC,Keep linens dry and wrinkle-free,Minimize linen layers,Maintain/enhance activity level    Moisture Interventions: Absorbent underpads,Apply protective barrier, creams and emollients,Check for incontinence Q2 hours and as needed,Internal/External urinary devices,Minimize layers    Activity Interventions: Increase time out of bed,PT/OT evaluation    Mobility Interventions: HOB 30 degrees or less    Nutrition Interventions: Document food/fluid/supplement intake,Discuss nutritional consult with provider,Offer support with meals,snacks and hydration    Friction and Shear Interventions: Apply protective barrier, creams and emollients,HOB 30 degrees or less,Minimize layers                Problem: Patient Education: Go to Patient Education Activity  Goal: Patient/Family Education  Outcome: Progressing Towards Goal     Problem: Falls - Risk of  Goal: *Absence of Falls  Description: Document Beverly Fall Risk and appropriate interventions in the flowsheet.   Outcome: Progressing Towards Goal  Note: Fall Risk Interventions:  Mobility Interventions: Bed/chair exit alarm,Patient to call before getting OOB    Mentation Interventions: Adequate sleep, hydration, pain control,Bed/chair exit alarm    Medication Interventions: Bed/chair exit alarm,Patient to call before getting OOB,Teach patient to arise slowly    Elimination Interventions: Bed/chair exit alarm,Call light in reach,Patient to call for help with toileting needs    History of Falls Interventions: Door open when patient unattended,Room close to nurse's station         Problem: Patient Education: Go to Patient Education Activity  Goal: Patient/Family Education  Outcome: Progressing Towards Goal Problem:  Activity Intolerance  Goal: *Oxygen saturation during activity within specified parameters  Outcome: Progressing Towards Goal     Problem: Patient Education: Go to Patient Education Activity  Goal: Patient/Family Education  Outcome: Progressing Towards Goal

## 2021-12-29 NOTE — PROGRESS NOTES
Attempt to evaluate pt two times this afternoon both times he was sleeping and would barely speak with therapist. Will return to attempt eval tomorrow.   García Huston, PT, DPT

## 2021-12-29 NOTE — PROGRESS NOTES
@3592 Received care of pt from off going nurse. @7088 Pt incontinent of stool. Pt cleaned. New primofit applied and connected to suction. New gown applied and linens changed. PM Assessment completed. A&OX1 only. Resp even and non-labored. Skin warm and dry. @2201 . Pt has poor appetite and pt refusing to eat. NP notified. PM scheduled dose of lantus held. @8973 Verbal shift change report given to tonya Pierre (oncoming nurse) by LINA Dotson RN (offgoing nurse). Report included the following information Kardex, Intake/Output, MAR and Recent Results.

## 2021-12-29 NOTE — PROGRESS NOTES
Renal Progress Note    Patient: Earl Blanchard MRN: [id number]  SSN: [id number]    YOB: 1965  Age: 49 y.o. Sex: male      Admit Date: 12/24/2021    LOS: 5 days     Subjective:   Patient seen at bedside. Lying comfortably in bed  In no acute distress. Claims he is eating better   Blood pressure is elevated today   improved creat to 1.3 with ivf. Stable electrolytes      Current Facility-Administered Medications   Medication Dose Route Frequency    insulin glargine (LANTUS) injection 20 Units  20 Units SubCUTAneous DAILY    aspirin delayed-release tablet 81 mg  81 mg Oral DAILY    rosuvastatin (CRESTOR) tablet 10 mg  10 mg Oral QHS    insulin lispro (HUMALOG) injection   SubCUTAneous AC&HS    glucose chewable tablet 16 g  4 Tablet Oral PRN    glucagon (GLUCAGEN) injection 1 mg  1 mg IntraMUSCular PRN    dextrose (D50W) injection syrg 12.5-25 g  25-50 mL IntraVENous PRN    metoprolol succinate (TOPROL-XL) XL tablet 25 mg  25 mg Oral DAILY    cetirizine (ZYRTEC) tablet 10 mg  10 mg Oral DAILY    gabapentin (NEURONTIN) capsule 300 mg  300 mg Oral DAILY    traZODone (DESYREL) tablet 50 mg  50 mg Oral QHS    LORazepam (ATIVAN) injection 1 mg  1 mg IntraVENous Q2H PRN    sodium chloride (NS) flush 5-10 mL  5-10 mL IntraVENous PRN    dextrose (D50W) injection syrg 12.5 g  25 mL IntraVENous PRN    sodium chloride (NS) flush 5-40 mL  5-40 mL IntraVENous Q8H        Vitals:    12/29/21 0405 12/29/21 0534 12/29/21 0800 12/29/21 0843   BP: (!) 157/95   136/68   Pulse: 97  79 79   Resp: 19   15   Temp: 99 °F (37.2 °C)   98.8 °F (37.1 °C)   SpO2: 98%   97%   Weight:  55.7 kg (122 lb 11.2 oz)     Height:         Objective:   General: Drowsy and lethargic but arousable, no acute distress.   HEENT: EOMI, no Icterus, no Pallor,  mucosa moist.  Neck: Neck is supple, No JVD  Lungs: breathsounds normal, no respiratory distress on inspection, no rhonchi, no rales,   CVS: heart sounds normal, regular rate and rhythm, no murmurs, no rubs. GI: soft, nontender, normal BS. Extremeties: no cyanosis, no edema,   Neuro: Drowsy with altered mental status   skin: normal skin turgor, no skin rashes. Intake and Output:  Current Shift: No intake/output data recorded. Last three shifts: 12/27 1901 - 12/29 0700  In: 4094.1 [P.O.:1140; I.V.:2954.1]  Out: 1300 [Urine:1300]      Lab/Data Review:  Recent Labs     12/27/21  0346   WBC 13.6*   HGB 10.3*   HCT 32.9*        Recent Labs     12/29/21  0357 12/28/21  0400 12/27/21  0346    145 148*   K 4.5 3.8 2.9*    113* 113*   CO2 21 23 21   * 95 100   BUN 28* 36* 57*   CREA 1.30 1.80* 3.00*   CA 8.4* 8.1* 7.9*   MG  --  1.6*  --    PHOS 2.7 1.8* 3.3   ALB 3.2* 3.1* 3.0*     No results for input(s): PH, PCO2, PO2, HCO3, FIO2 in the last 72 hours.   Recent Results (from the past 24 hour(s))   NUCLEAR CARDIAC STRESS TEST    Collection Time: 12/28/21 11:12 AM   Result Value Ref Range    Stress Target  bpm    Exercise Duration Time 00:04:00     Stress Systolic  mmHg    Stress Diastolic BP 87 mmHg    Stress Peak HR 85 BPM    Baseline HR 65 BPM    Stress Estimated Workload 1.0 METS    Stress Rate Pressure Product 14,195 BPM*mmHg    Stress Percent HR Achieved 51 %    Baseline ST Depression 0 mm    Nuc Stress EF 70 %   GLUCOSE, POC    Collection Time: 12/28/21 11:15 AM   Result Value Ref Range    Glucose (POC) 145 (H) 70 - 110 mg/dL    Performed by [employer] [address] [place] [address], POC    Collection Time: 12/28/21  3:15 PM   Result Value Ref Range    Glucose (POC) 167 (H) 70 - 110 mg/dL    Performed by [employer] [address], POC    Collection Time: 12/28/21  9:39 PM   Result Value Ref Range    Glucose (POC) 196 (H) 70 - 110 mg/dL    Performed by Janelle Carbajal    RENAL FUNCTION PANEL    Collection Time: 12/29/21  3:57 AM   Result Value Ref Range    Sodium 143 135 - 145 mmol/L    Potassium 4.5 3.2 - 5.1 mmol/L    Chloride 111 94 - 111 mmol/L CO2 21 21 - 33 mmol/L    Anion gap 11 mmol/L    Glucose 185 (H) 70 - 110 mg/dL    BUN 28 (H) 9 - 21 mg/dL    Creatinine 1.30 0.8 - 1.50 mg/dL    BUN/Creatinine ratio 22      GFR est AA >60 ml/min/1.73m2    GFR est non-AA 58 ml/min/1.73m2    Calcium 8.4 (L) 8.5 - 10.5 mg/dL    Phosphorus 2.7 2.5 - 4.5 mg/dL    Albumin 3.2 (L) 3.5 - 4.7 g/dL   GLUCOSE, POC    Collection Time: 12/29/21  7:51 AM   Result Value Ref Range    Glucose (POC) 218 (H) 70 - 110 mg/dL    Performed by Viola Prakash and Plan:     1. NICKIE on CKD III  -Cr was 4.0 on admission  Which peaked at 4.8  -NICKIE likely prerenal 2/2 volume depletion from severe hyperglycemia with cozaar on board  -Cr has imporved from 4.8-->4.2-->3.0-->1.8-->1.3 today. Baseline creatinine is unknown. Was 2.0 in December 2021  -urine is bland. Will quantify proteinuria  -on 1/2 NS with 20meq KCl. I will discontinue IV fluids because of elevated blood pressure  -We will restart low-dose Cozaar  -f/u renal fxn in am     2. Severe hypokalemia  -K was 5.8 on admission . 2/2 insulopenia and volume contraction  -K was 2.9 but improved to 4.5 today with aggressive replacement and potassium and IV fluids   -Hypomagnesemia corrected yesterday   -I will discontinue IV fluids with KCl     3. Hypophosphatemia:   -Phos is normal at 2.7 today after replacement yesterday    3. Severe metabolic acidosis  -anion gap +  -2/2 DKA and nickie  -CO2 was 7 on admisssion. Was started on bicarb gtt which was dceed yesterday  -CO2 21 today. Not on oral bicarb     4. AMS  -likley 2/2 hyperglcemia,   -CT head negative  -mentation imporving     5. DKA  -pt is DM-1 on insulin pump. Not sure of complaince, improved DKA with IV insulin drip  -HbA1C pending  -Improved glycemic control now, continue to monitor Accu-Cheks    6. HTN  -BP labile but currently okay  -I will discontinue IV fluids  -We will restart low-dose Cozaar     7. NSTEMI  -Troponin leak noted.   No chest pain  -no EKG  -off heparin gtt.  -cardiology following      Signed By: Jhonny Schneider MD     December 29, 2021

## 2021-12-29 NOTE — ROUTINE PROCESS
0700 Bedside shift change report given to ELIO Massey RN (oncoming nurse) by LINA Dotson RN (offgoing nurse). Report included the following information SBAR, Intake/Output, MAR, Accordion, Recent Results and Med Rec Status. Pt resting in bed with eyes closed. Pt shows no signs of distress or pain at this time. CBWR, 2 side rails up and bed locked in lowest position. 0900 AM medications administered with no difficulty. 1100 Pt resting in bed with eyes closed. Attempted to walk with pt. Pt refused. Pt shows no signs of distress and has no c/o pain at this time. CBWR, 2 side rails up and bed locked in lowest position. 2205 [address] with pt approximately 20 feet. Pt very unsteady on feet. Pt back in bed watching TV. Pt shows no signs of distress and has no c/o pain or needs at this time. CBWR, 2 side rails up and bed locked in lowest position. 1900 Bedside shift change report given to TIFFANY Valerio RN (oncoming nurse) by ELIO Massey RN (offgoing nurse). Report included the following information SBAR, Intake/Output, MAR, Accordion, Recent Results and Med Rec Status.

## 2021-12-29 NOTE — PROGRESS NOTES
Hospitalist Progress Note             Date of Service:  2021  NAME:  Zahraa Lopez  :  1965  MRN:  [id number]    Hospital course / Assessment    Confusion  - unclear if this is a continuation of his previously documented altered mental status from DKA w/ coma, or something new  - he is awake and alert, able to speak clearly in full sentances, but is not sure where he is or what year it is  - we have alerady checked a ct head earlier in admission, will check lfts and ammonia in am  - hopefull will improve by then, but if not better by Friday, will check MRI    DKA with diabetic coma, in setting type 1 DM on home insulin pump- coma now resolved  - severely acidodic poa, admitted to ICU, treated with IV insulin, Bicarb drip   - improved , converted to basal bolus, his pump at bedside is not functioning, will not be able to address here, he will need to be discharged on insulin, can follow with his endocrine after that  - will need closer follow up for his insulin pump on dc  - CT head w/o acute finding to explain his obtunded state, though there is subacute/chronic lacunar infarct present- cont' with ASA, Statin   NSTEMI  - sp Heparin drip, cont ASA, BB, HI Statin   - NST wnl  Acute kidney injury  - treated with IVF support, avoid nephrotoxin, hold ARB  - Nephrology consulted, Improving Cr   Leukocytosis   - likely reactive due to DKA, resolved   Hypertension  - holding ARB, start on metoprolol , cont' monitor   Chronic Anemia   - Stable , cont' monitor      Code Status: Full code   DVT prophylaxis: pharmacologic and mechanical     Today Recommendation and Plan:   Cont' with  Antiplatelet, BB  and HI statin   Cardiology and nephrology following / plans   Cont' monitor BS and BP  Confusion precludes discharge, pt will not/can not get up to work with PT        Hospital Problems  Date Reviewed: 3/16/2021 Codes Class Noted POA    Severe protein-calorie malnutrition ([facility] [address] ICD-10-CM: E43  ICD-9-CM: 285  12/29/2021 Unknown        Elevated troponin ICD-10-CM: R77.8  ICD-9-CM: 790.6  12/25/2021 Unknown        DKA (diabetic ketoacidosis) ([facility] [address] ICD-10-CM: E11.10  ICD-9-CM: 250.12  12/24/2021 Unknown        Dehydration ICD-10-CM: E86.0  ICD-9-CM: 276.51  12/24/2021 Unknown        [place] (acute kidney injury) [facility]) ICD-10-CM: N17.9  ICD-9-CM: 584.9  12/24/2021 Unknown        Benign hypertension ICD-10-CM: I10  ICD-9-CM: 401.1  10/21/2020 Yes                Review of Systems:   A comprehensive review of systems was negative except for that written in the HPI. Vital Signs:    Last 24hrs VS reviewed since prior progress note. Most recent are:  Visit Vitals  /68 (BP 1 Location: Right upper arm, BP Patient Position: At rest)   Pulse 66   Temp 98.8 °F (37.1 °C)   Resp 15   Ht 5' 11\" (1.803 m)   Wt 55.7 kg (122 lb 11.2 oz)   SpO2 97%   BMI 17.11 kg/m²         Intake/Output Summary (Last 24 hours) at 12/29/2021 1541  Last data filed at 12/29/2021 0534  Gross per 24 hour   Intake 2801.12 ml   Output 800 ml   Net 2001.12 ml        Physical Examination:             General:          Alert, cooperative, no distress, appears stated age. HEENT:           Atraumatic, anicteric sclerae, pink conjunctivae                          No oral ulcers, mucosa moist, throat clear, dentition fair  Neck:               Supple, symmetrical  Lungs:             Clear to auscultation bilaterally. No Wheezing or Rhonchi. No rales. Chest wall:      No tenderness  No Accessory muscle use. Heart:              Regular  rhythm,  No  murmur   No edema  Abdomen:        Soft, non-tender. Not distended. Bowel sounds normal  Extremities:     No cyanosis. No clubbing,                            Skin turgor normal, Capillary refill normal  Skin:                Not pale.   Not Jaundiced  No rashes   Psych:             Not anxious or agitated. Neurologic:      Alert, moves all extremities, oriented x 1 only,        Data Review:    Review and/or order of clinical lab test  Review and/or order of tests in the radiology section of CPT  Review and/or order of tests in the medicine section of CPT      Labs:     Recent Labs     12/27/21  0346   WBC 13.6*   HGB 10.3*   HCT 32.9*        Recent Labs     12/29/21  0357 12/28/21  0400 12/27/21  0346    145 148*   K 4.5 3.8 2.9*    113* 113*   CO2 21 23 21   BUN 28* 36* 57*   CREA 1.30 1.80* 3.00*   * 95 100   CA 8.4* 8.1* 7.9*   MG  --  1.6*  --    PHOS 2.7 1.8* 3.3     Recent Labs     12/29/21  0357 12/28/21  0400 12/27/21  0346   ALB 3.2* 3.1* 3.0*     Recent Labs     12/28/21  0410 12/27/21  2215 12/27/21  1200   APTT 30.0 30.1 34.6      No results for input(s): FE, TIBC, PSAT, FERR in the last 72 hours. No results found for: FOL, RBCF   No results for input(s): PH, PCO2, PO2 in the last 72 hours.   Recent Labs     12/27/21  0346 12/26/21  2116   TROIQ 0.75* 0.71*     Lab Results   Component Value Date/Time    Cholesterol, total 95 12/27/2021 03:46 AM    HDL Cholesterol 47 12/27/2021 03:46 AM    LDL, calculated 38.2 12/27/2021 03:46 AM    Triglyceride 49 12/27/2021 03:46 AM    CHOL/HDL Ratio 2.0 12/27/2021 03:46 AM     Lab Results   Component Value Date/Time    Glucose (POC) 207 (H) 12/29/2021 11:10 AM    Glucose (POC) 218 (H) 12/29/2021 07:51 AM    Glucose (POC) 196 (H) 12/28/2021 09:39 PM    Glucose (POC) 167 (H) 12/28/2021 03:15 PM    Glucose (POC) 145 (H) 12/28/2021 11:15 AM     Lab Results   Component Value Date/Time    Color Yellow 12/26/2021 01:45 AM    Appearance Cloudy 12/26/2021 01:45 AM    Specific gravity 1.013 12/26/2021 01:45 AM    pH (UA) 5.0 12/26/2021 01:45 AM    Protein 30 (A) 12/26/2021 01:45 AM    Glucose 500 (A) 12/26/2021 01:45 AM    Ketone Trace (A) 12/26/2021 01:45 AM    Bilirubin Negative 12/26/2021 01:45 AM    Urobilinogen 0.2 12/26/2021 01:45 AM Nitrites Negative 12/26/2021 01:45 AM    Leukocyte Esterase Trace (A) 12/26/2021 01:45 AM    Epithelial cells Few 12/26/2021 01:45 AM    Bacteria Negative (A) 12/26/2021 01:45 AM    WBC 10-20 12/26/2021 01:45 AM    RBC 0-5 12/26/2021 01:45 AM         Medications Reviewed:     Current Facility-Administered Medications   Medication Dose Route Frequency    insulin glargine (LANTUS) injection 20 Units  20 Units SubCUTAneous DAILY    [START ON 12/30/2021] losartan (COZAAR) tablet 25 mg  25 mg Oral DAILY    aspirin delayed-release tablet 81 mg  81 mg Oral DAILY    rosuvastatin (CRESTOR) tablet 10 mg  10 mg Oral QHS    insulin lispro (HUMALOG) injection   SubCUTAneous AC&HS    glucose chewable tablet 16 g  4 Tablet Oral PRN    glucagon (GLUCAGEN) injection 1 mg  1 mg IntraMUSCular PRN    dextrose (D50W) injection syrg 12.5-25 g  25-50 mL IntraVENous PRN    metoprolol succinate (TOPROL-XL) XL tablet 25 mg  25 mg Oral DAILY    cetirizine (ZYRTEC) tablet 10 mg  10 mg Oral DAILY    gabapentin (NEURONTIN) capsule 300 mg  300 mg Oral DAILY    sodium chloride (NS) flush 5-10 mL  5-10 mL IntraVENous PRN    dextrose (D50W) injection syrg 12.5 g  25 mL IntraVENous PRN    sodium chloride (NS) flush 5-40 mL  5-40 mL IntraVENous Q8H     ______________________________________________________________________  EXPECTED LENGTH OF STAY: 3d 19h  ACTUAL LENGTH OF STAY:          5                 Crystal Guerrero MD

## 2021-12-29 NOTE — PROGRESS NOTES
Nurse called and informed me that patient's blood sugars running low in the 80s and 90s she is can hold the Lantus for tonight. Patient not eating since admission on Christmas Eve except for tiny sips and little bite. She will hold Lantus for tonight and continue sliding scale evaluation and regular insulin when needed. Nutrition consult placed for possible tube feeding.     Lucretia Rodriguez ENP-C, FNP-C

## 2021-12-29 NOTE — PROGRESS NOTES
Admission Medication Reconciliation:    Information obtained from:  Bouchra Olivarez at [facility]     Comments/Recommendations: Reviewed PTA medications and patient's allergies. Per pharmacy, the only two medications the patient regularly gets are:  Gabapentin 800mg po four times daily  Humalog Insulin 75 units total daily dose via insulin pump    Other medications listed are active but have not been filled since 2021        Allergies:  Patient has no known allergies. Significant PMH/Disease States:   Past Medical History:   Diagnosis Date    Diabetes ([address]     Hypercholesterolemia     Hypertension      Chief Complaint for this Admission:    Chief Complaint   Patient presents with    High Blood Sugar     Prior to Admission Medications:   Prior to Admission Medications   Prescriptions Last Dose Informant Patient Reported? Taking? cetirizine (ZYRTEC) 10 mg tablet Unknown at Unknown time  No No   Sig: TAKE 1 TABLET EVERY DAY BY ORAL ROUTE.    gabapentin (NEURONTIN) 800 mg tablet   Yes No   Sig: Take 800 mg by mouth four (4) times daily. insulin lispro (HumaLOG U-100 Insulin) 100 unit/mL injection   Yes Yes   Si Units by SubCUTAneous route See Admin Instructions. Total daily dose of 75 units via pump daily   losartan (COZAAR) 50 mg tablet Unknown at Unknown time  No No   Sig: Take 1 Tab by mouth daily. Patient not taking: Reported on 2021   traZODone (DESYREL) 50 mg tablet Unknown at Unknown time  No No   Sig: Take 1 Tab by mouth nightly. If no effect with one tab, may take two tabs 30 mins before going to bed.    Patient not taking: Reported on 2021      Facility-Administered Medications: None       SEBASTIAN Kenny

## 2021-12-29 NOTE — PROGRESS NOTES
Attempt to evaluate pt, he states he wants to sleep and does not want to get up right now.   Elvis Perez, PT, DPT

## 2021-12-29 NOTE — PROGRESS NOTES
CARDIOLOGY PROGRESS NOTE - NP    Patient seen and examined. This is a patient who is followed for elevated troponin in the setting of ROOSEVELT and DKA. He is very sleepy this morning and minimally participated in his examination. Insulin gttremains off. No other complaints reported. Telemetry reviewed, there were no events noted in the past 24 hours. Remains in NSR. Pertinent review of systems items noted above, all other systems are negative. Current medications reviewed. Physical Examination  Vital signs are stable. Blood pressure 136/68, Pulse 79  No apparent distress. Heart is regular, rate and rhythm. Normal S1, S2, no murmurs are appreciated. Lungs are clear bilaterally with poor inspiratory effort. Abdomen is soft, nontender, normal bowel sounds. Extremities have no edema. Labs reviewed:  Renal function improving. Case discussed with Dr. Satish Santos and our impression and recommendations are as follows:  1. NSTEMI:  Likely Type 2 secondary to DKA and ROOSEVELT. NST was normal. Continue asa 81mg, beta blocker, and high intensity statin. 2. Hypertension: At goal this morning. Will continue to monitor. 3. Hypokalemia:  Presented with hyperkalemia and hypokalemic given insulin gtt. This has been repleted by renal.  Continue telemetry monitoring. Patient is considered stable for discharge from a cardiac standpoint. Follow-up appt made at [facility] office on 1/11/22 at 12:30pm with Dr. Satish Santos. Please do not hesitate to call me or Dr. Satish Santos if additional questions arise.

## 2021-12-29 NOTE — PROGRESS NOTES
Comprehensive Nutrition Assessment    Type and Reason for Visit: reassessment/consult    Nutrition Recommendations/Plan: 4CHO choice cardiac restricted diet with glucerna BID    IF TF is started recommend glucerna 1.5 start 30 mL/hr increase 10 mL/hr every 8 hrs to a goal rate of 50 mL/hr with 150 mL water flush every 4 hrs to provide 1800 kcal, 100g protein, and 1812 mL water with flushes included. Nutrition Assessment:  48 yo male PMH: DM, hypercholesterolemia, HTN   underweight BMI18.2. Pt admitted due to BG greater than 1000 in DKA. Pt wife manages insulin pump but went out of town and pt did not manage his pump very well. Currently NPO due to lethargy. Also being treated for PNA  Hgb A1c is pending    12/28/2021. AMS has improved pt starting oral diet today diabetic cardiac start glucerna BID due to underweight BMI 17.45 and Hgb A1c is still pending. 12/29/2021 pt has started oral diet yesterday but is refusing to eat or will only eat bites or sips of soup. MD has placed nutrition consult for possible need of TF recommendations as pt is now hospital day 5 with inadequate nutrition due to 3 days of being NPO due to AMS with DKA. IF TF is started recommend glucerna 1.5 start 30 mL/hr increase 10 mL/hr every 8 hrs to a goal rate of 50 mL/hr with 150 mL water flush every 4 hrs to provide 1800 kcal, 100g protein, and 1812 mL water with flushes included.        Recent Results (from the past 24 hour(s))   GLUCOSE, POC    Collection Time: 12/28/21  3:15 PM   Result Value Ref Range    Glucose (POC) 167 (H) 70 - 110 mg/dL    Performed by [address] [place] [address], POC    Collection Time: 12/28/21  9:39 PM   Result Value Ref Range    Glucose (POC) 196 (H) 70 - 110 mg/dL    Performed by [facility]    RENAL FUNCTION PANEL    Collection Time: 12/29/21  3:57 AM   Result Value Ref Range    Sodium 143 135 - 145 mmol/L    Potassium 4.5 3.2 - 5.1 mmol/L    Chloride 111 94 - 111 mmol/L    CO2 21 21 - 33 mmol/L Anion gap 11 mmol/L    Glucose 185 (H) 70 - 110 mg/dL    BUN 28 (H) 9 - 21 mg/dL    Creatinine 1.30 0.8 - 1.50 mg/dL    BUN/Creatinine ratio 22      GFR est AA >60 ml/min/1.73m2    GFR est non-AA 58 ml/min/1.73m2    Calcium 8.4 (L) 8.5 - 10.5 mg/dL    Phosphorus 2.7 2.5 - 4.5 mg/dL    Albumin 3.2 (L) 3.5 - 4.7 g/dL   GLUCOSE, POC    Collection Time: 12/29/21  7:51 AM   Result Value Ref Range    Glucose (POC) 218 (H) 70 - 110 mg/dL    Performed by Lottie Tolliver, POC    Collection Time: 12/29/21 11:10 AM   Result Value Ref Range    Glucose (POC) 207 (H) 70 - 110 mg/dL    Performed by Kala Champagne        Malnutrition Assessment:  Malnutrition Status:  Severe malnutrition    Context:  Acute illness     Findings of the 6 clinical characteristics of malnutrition:   Energy Intake:  7 - 50% or less of est energy requirements for 5 or more days  Weight Loss:  Unable to assess (AMS)     Body Fat Loss:  7 - Moderate body fat loss, Triceps,Orbital,Fat overlying ribs,Buccal region   Muscle Mass Loss:  7 - Moderate muscle mass loss, Temples (temporalis),Thigh (quadriceps),Scapula (trapezius),Hand (interosseous),Clavicles (pectoralis & deltoids),Calf  Fluid Accumulation:  No significant fluid accumulation,     Strength:  Not performed         Estimated Daily Nutrient Needs:  Energy (kcal): 1277-7826 kcal/day; Weight Used for Energy Requirements: Admission (60 kg)  Protein (g): 60-72 g/day; Weight Used for Protein Requirements: Admission (1-1.2 g/kg)  Fluid (ml/day): 2962-3553 mL/day; Method Used for Fluid Requirements: 1 ml/kcal      Nutrition Related Findings:  underweight BMI18.2. Pt admitted due to BG greater than 1000 in DKA. Pt wife manages insulin pump but went out of town and pt did not manage his pump very well. Currently NPO due to lethargy. Also being treated for PNA      Wounds:    None       Current Nutrition Therapies:  ADULT DIET Dysphagia - Soft & Bite Sized; 3 carb choices (45 gm/meal);  Low Fat/Low Chol/High Fiber/REINIER  ADULT ORAL NUTRITION SUPPLEMENT Breakfast, Dinner; Diabetic Supplement    Anthropometric Measures:  · Height:  5' 11\" (180.3 cm)  · Current Body Wt:  59 kg (130 lb)   · Admission Body Wt:  130 lb    · Usual Body Wt:        · Ideal Body Wt:  172 lbs:  75.6 %   · Adjusted Body Weight:   ; Weight Adjustment for: No adjustment   · Adjusted BMI:       · BMI Category:  Underweight (BMI less than 18.5)       Nutrition Diagnosis:   · Predicted inadequate energy intake related to cognitive or neurological impairment as evidenced by NPO or clear liquid status due to medical condition      Nutrition Interventions:   Food and/or Nutrient Delivery: Continue NPO (when safe recommend 4CHO choice cardiac diet)  Nutrition Education and Counseling: No recommendations at this time,Education not appropriate  Coordination of Nutrition Care: Continue to monitor while inpatient    Goals:  BG , Hgb A1c < 7, Pt to start oral diet with in 3 days or TF will be considered, BM every 1-3 days       Nutrition Monitoring and Evaluation:   Behavioral-Environmental Outcomes:    Food/Nutrient Intake Outcomes: Food and nutrient intake,Diet advancement/tolerance  Physical Signs/Symptoms Outcomes: Biochemical data,Meal time behavior,Weight     F/u: 1/2/2021    Discharge Planning:     Too soon to determine     Electronically signed by Jonatan Avila on 12/29/2021 at 10:32 AM    Contact: ROSIE [phone]

## 2021-12-30 LAB
ALBUMIN SERPL-MCNC: 3.1 G/DL (ref 3.5–4.7)
ALBUMIN SERPL-MCNC: 3.6 G/DL (ref 3.5–4.7)
ALBUMIN/GLOB SERPL: 1.1 {RATIO}
ALP SERPL-CCNC: 92 U/L (ref 38–126)
ALT SERPL-CCNC: 32 U/L (ref 3–72)
AMMONIA PLAS-SCNC: 16 UMOL/L (ref 9–33)
ANION GAP SERPL CALC-SCNC: 13 MMOL/L
ANION GAP SERPL CALC-SCNC: 19 MMOL/L
APTT PPP: 22.4 SEC (ref 23–36.4)
AST SERPL W P-5'-P-CCNC: 32 U/L (ref 17–74)
BACTERIA SPEC CULT: NORMAL
BASOPHILS # BLD: 0.1 K/UL (ref 0–0.1)
BASOPHILS NFR BLD: 1 % (ref 0–2)
BILIRUB SERPL-MCNC: 0.6 MG/DL (ref 0.2–1)
BUN SERPL-MCNC: 27 MG/DL (ref 9–21)
BUN SERPL-MCNC: 28 MG/DL (ref 9–21)
BUN/CREAT SERPL: 20
BUN/CREAT SERPL: 21
CA-I BLD-MCNC: 8.8 MG/DL (ref 8.5–10.5)
CA-I BLD-MCNC: 9.3 MG/DL (ref 8.5–10.5)
CHLORIDE SERPL-SCNC: 109 MMOL/L (ref 94–111)
CHLORIDE SERPL-SCNC: 110 MMOL/L (ref 94–111)
CO2 SERPL-SCNC: 16 MMOL/L (ref 21–33)
CO2 SERPL-SCNC: 19 MMOL/L (ref 21–33)
CREAT SERPL-MCNC: 1.3 MG/DL (ref 0.8–1.5)
CREAT SERPL-MCNC: 1.4 MG/DL (ref 0.8–1.5)
DIFFERENTIAL METHOD BLD: ABNORMAL
EOSINOPHIL # BLD: 0.2 K/UL (ref 0–0.4)
EOSINOPHIL NFR BLD: 2 % (ref 0–5)
ERYTHROCYTE [DISTWIDTH] IN BLOOD BY AUTOMATED COUNT: 13.9 % (ref 11.6–14.5)
GLOBULIN SER CALC-MCNC: 3.2 G/DL
GLUCOSE BLD STRIP.AUTO-MCNC: 113 MG/DL (ref 70–110)
GLUCOSE BLD STRIP.AUTO-MCNC: 185 MG/DL (ref 70–110)
GLUCOSE BLD STRIP.AUTO-MCNC: 68 MG/DL (ref 70–110)
GLUCOSE BLD STRIP.AUTO-MCNC: 89 MG/DL (ref 70–110)
GLUCOSE SERPL-MCNC: 113 MG/DL (ref 70–110)
GLUCOSE SERPL-MCNC: 151 MG/DL (ref 70–110)
HCT VFR BLD AUTO: 41.5 % (ref 36–48)
HGB BLD-MCNC: 13.1 G/DL (ref 13–16)
IMM GRANULOCYTES # BLD AUTO: 0.1 K/UL (ref 0–0.04)
IMM GRANULOCYTES NFR BLD AUTO: 1 % (ref 0–0.5)
LYMPHOCYTES # BLD: 2.2 K/UL (ref 0.9–3.6)
LYMPHOCYTES NFR BLD: 21 % (ref 21–52)
MCH RBC QN AUTO: 29.3 PG (ref 24–34)
MCHC RBC AUTO-ENTMCNC: 31.6 G/DL (ref 31–37)
MCV RBC AUTO: 92.8 FL (ref 78–100)
MONOCYTES # BLD: 0.7 K/UL (ref 0.05–1.2)
MONOCYTES NFR BLD: 6 % (ref 3–10)
NEUTS SEG # BLD: 7.1 K/UL (ref 1.8–8)
NEUTS SEG NFR BLD: 69 % (ref 40–73)
NRBC # BLD: 0 K/UL (ref 0–0.01)
NRBC BLD-RTO: 0 PER 100 WBC
PERFORMED BY, TECHID: ABNORMAL
PERFORMED BY, TECHID: NORMAL
PHOSPHATE SERPL-MCNC: 2.4 MG/DL (ref 2.5–4.5)
PLATELET # BLD AUTO: 152 K/UL (ref 135–420)
PMV BLD AUTO: 12.3 FL (ref 9.2–11.8)
POTASSIUM SERPL-SCNC: 4.2 MMOL/L (ref 3.2–5.1)
POTASSIUM SERPL-SCNC: 4.6 MMOL/L (ref 3.2–5.1)
PROT SERPL-MCNC: 6.8 G/DL (ref 6.1–8.4)
RBC # BLD AUTO: 4.47 M/UL (ref 4.35–5.65)
SODIUM SERPL-SCNC: 142 MMOL/L (ref 135–145)
SODIUM SERPL-SCNC: 144 MMOL/L (ref 135–145)
SPECIAL REQUESTS,SREQ: NORMAL
THERAPEUTIC RANGE,PTTT: ABNORMAL SEC (ref 82–109)
WBC # BLD AUTO: 10.2 K/UL (ref 4.6–13.2)

## 2021-12-30 PROCEDURE — 74011250637 HC RX REV CODE- 250/637: Performed by: INTERNAL MEDICINE

## 2021-12-30 PROCEDURE — 74011636637 HC RX REV CODE- 636/637: Performed by: INTERNAL MEDICINE

## 2021-12-30 PROCEDURE — 85730 THROMBOPLASTIN TIME PARTIAL: CPT

## 2021-12-30 PROCEDURE — 85025 COMPLETE CBC W/AUTO DIFF WBC: CPT

## 2021-12-30 PROCEDURE — 82962 GLUCOSE BLOOD TEST: CPT

## 2021-12-30 PROCEDURE — 82140 ASSAY OF AMMONIA: CPT

## 2021-12-30 PROCEDURE — 80069 RENAL FUNCTION PANEL: CPT

## 2021-12-30 PROCEDURE — 97161 PT EVAL LOW COMPLEX 20 MIN: CPT

## 2021-12-30 PROCEDURE — 80053 COMPREHEN METABOLIC PANEL: CPT

## 2021-12-30 PROCEDURE — 65270000029 HC RM PRIVATE

## 2021-12-30 PROCEDURE — 74011250637 HC RX REV CODE- 250/637: Performed by: NURSE PRACTITIONER

## 2021-12-30 RX ADMIN — GABAPENTIN 300 MG: 300 CAPSULE ORAL at 08:11

## 2021-12-30 RX ADMIN — ROSUVASTATIN 10 MG: 10 TABLET, FILM COATED ORAL at 21:18

## 2021-12-30 RX ADMIN — METOPROLOL SUCCINATE 25 MG: 25 TABLET, EXTENDED RELEASE ORAL at 08:10

## 2021-12-30 RX ADMIN — CETIRIZINE HYDROCHLORIDE 10 MG: 10 TABLET, FILM COATED ORAL at 08:11

## 2021-12-30 RX ADMIN — ASPIRIN 81 MG: 81 TABLET, COATED ORAL at 08:10

## 2021-12-30 RX ADMIN — LOSARTAN POTASSIUM 25 MG: 25 TABLET, FILM COATED ORAL at 08:11

## 2021-12-30 RX ADMIN — INSULIN GLARGINE 20 UNITS: 100 INJECTION, SOLUTION SUBCUTANEOUS at 08:09

## 2021-12-30 RX ADMIN — INSULIN LISPRO 2 UNITS: 100 INJECTION, SOLUTION INTRAVENOUS; SUBCUTANEOUS at 21:18

## 2021-12-30 RX ADMIN — SODIUM CHLORIDE, PRESERVATIVE FREE 10 ML: 5 INJECTION INTRAVENOUS at 21:18

## 2021-12-30 NOTE — PROGRESS NOTES
Received care of patient from ICU to room 244. Patient is awake and alert. VSS. Patient positioned for comfort. Bed alarm on.  CB in reach

## 2021-12-30 NOTE — PROGRESS NOTES
Problem: Mobility Impaired (Adult and Pediatric)  Goal: *Acute Goals and Plan of Care (Insert Text)  Description: Physical Therapy Goals  Initiated 12/30/2021 and to be accomplished within 7 day(s)  1. Patient will move from supine to sit and sit to supine , scoot up and down, and roll side to side in bed with modified independence. 2.  Patient will transfer from bed to chair and chair to bed with modified independence using the least restrictive device. 3.  Patient will perform sit to stand with modified independence. 4.  Patient will ambulate with modified independence for 150 feet with the least restrictive device. 5.  Patient will ascend/descend 4 stairs with 2 handrail(s) with supervision/set-up. PLOF: Community ambulator who used a cane and who was (I) with ADLs. Outcome: Progressing Towards Goal   PHYSICAL THERAPY EVALUATION    Patient: Adam Yepez (3 y.o. male)  Date: 12/30/2021  Primary Diagnosis: DKA (diabetic ketoacidosis) ([facility] [E11.10]  DKA, type 1 ([facility] [E10.10]  PNA (pneumonia) [J18.9]  ROOSEVELT (acute kidney injury) ([facility] [N17.9]  Dehydration [E86.0]  History of diabetes mellitus [Z86.39]        Precautions:   Fall    ASSESSMENT :  Based on the objective data described below, the patient presents with DKA and confusion. He is slow with mobility and states he feels cold. He ambulates well with HHA and does not have any complaints. Upon completion he is left in a chair at the bedside. Patient would benefit from further P.T. to improve strength, gait, and stair navigation. They would likely benefit from [facility] once medically stable. Patient will benefit from skilled intervention to address the above impairments.   Patient's rehabilitation potential is considered to be Good  Factors which may influence rehabilitation potential include:   []         None noted  [x]         Mental ability/status  []         Medical condition  []         Home/family situation and support systems  [] Safety awareness  []         Pain tolerance/management  []         Other:      PLAN :  Recommendations and Planned Interventions:   [x]           Bed Mobility Training             []    Neuromuscular Re-Education  [x]           Transfer Training                   []    Orthotic/Prosthetic Training  [x]           Gait Training                          []    Modalities  [x]           Therapeutic Exercises           []    Edema Management/Control  [x]           Therapeutic Activities            []    Family Training/Education  [x]           Patient Education  []           Other (comment):    Frequency/Duration: Patient will be followed by physical therapy 1-2 times per day/4-7 days per week to address goals. Discharge Recommendations: Home Health and Home Physical Therapy  Further Equipment Recommendations for Discharge: N/A     SUBJECTIVE:   Patient stated I feel cold.     OBJECTIVE DATA SUMMARY:     Past Medical History:   Diagnosis Date    Diabetes ([facility] [address]     Hypercholesterolemia     Hypertension      Past Surgical History:   Procedure Laterality Date    HX ORTHOPAEDIC Left 08/30/2018    hip joint    HX ORTHOPAEDIC Right     broken knee     Barriers to Learning/Limitations: yes;  altered mental status (i.e.Sedation, Confusion)  Compensate with: Visual Cues, Verbal Cues, and Tactile Cues  Home Situation:  Home Situation  Home Environment: Private residence  # Steps to Enter: 2  Rails to Enter: Yes  Hand Rails : Bilateral  One/Two Story Residence: One story  Living Alone: Yes  Support Systems: Parent(s)  Patient Expects to be Discharged to[de-identified] House  Current DME Used/Available at Home: Cane, straight  Critical Behavior:  Neurologic State: Alert;Confused  Orientation Level: Oriented to person  Cognition: Follows commands     Psychosocial  Patient Behaviors: Calm; Cooperative     Strength:    Strength: Generally decreased, functional  RUE: 4/5  LUE: 4/5  RLE: 4/5  LLE: 4/5  Tone & Sensation:   Sensation: Impaired (states it feels like he is walking on clouds)  Range Of Motion:   AROM: Within functional limits    Posture:  Posture (WDL): Within defined limits     Functional Mobility:  Bed Mobility:  Rolling: Modified independent  Supine to Sit: Contact guard assistance     Scooting: Modified independent  Transfers:  Sit to Stand: Contact guard assistance  Stand to Sit: Contact guard assistance  Balance:   Sitting: Intact  Standing: Intact  Ambulation/Gait Training:  Distance (ft): 70 Feet (ft)  Assistive Device: Other (comment) (HHA from therapist)  Ambulation - Level of Assistance: Contact guard assistance     Gait Description (WDL): Exceptions to WDL    Pain:  Pain level pre-treatment: 0/10   Pain level post-treatment: 0/10   Pain Location: N/A  Pain Intervention(s) : Medication (see MAR); Rest, Ice, Repositioning  Response to intervention: Nurse notified, See doc flow    Activity Tolerance:   Good  Please refer to the flowsheet for vital signs taken during this treatment. After treatment:   [x]         Patient left in no apparent distress sitting up in chair  []         Patient left in no apparent distress in bed  [x]         Call bell left within reach  []         Nursing notified  []         Caregiver present  []         Bed alarm activated  []         SCDs applied    COMMUNICATION/EDUCATION:   [x]         Role of Physical Therapy in the acute care setting. [x]         Fall prevention education was provided and the patient/caregiver indicated understanding. [x]         Patient/family have participated as able in goal setting and plan of care. [x]         Patient/family agree to work toward stated goals and plan of care. []         Patient understands intent and goals of therapy, but is neutral about his/her participation. []         Patient is unable to participate in goal setting/plan of care: ongoing with therapy staff.  []         Other:     Thank you for this referral.  Rand Fraction, PT, DPT   Time Calculation: 21 mins

## 2021-12-30 NOTE — PROGRESS NOTES
Renal Progress Note    Patient: Olga Rodríguez MRN: [id number]  SSN: [id number]    YOB: 1965  Age: 49 y.o. Sex: male      Admit Date: 12/24/2021    LOS: 6 days     Subjective:   Patient seen at bedside. Lying comfortably in bed  In no acute distress. Claims he is eating better   Blood pressure is elevated today   improved creat to 1.3 with ivf. Stable electrolytes      Current Facility-Administered Medications   Medication Dose Route Frequency    insulin glargine (LANTUS) injection 20 Units  20 Units SubCUTAneous DAILY    losartan (COZAAR) tablet 25 mg  25 mg Oral DAILY    aspirin delayed-release tablet 81 mg  81 mg Oral DAILY    rosuvastatin (CRESTOR) tablet 10 mg  10 mg Oral QHS    insulin lispro (HUMALOG) injection   SubCUTAneous AC&HS    glucose chewable tablet 16 g  4 Tablet Oral PRN    glucagon (GLUCAGEN) injection 1 mg  1 mg IntraMUSCular PRN    dextrose (D50W) injection syrg 12.5-25 g  25-50 mL IntraVENous PRN    metoprolol succinate (TOPROL-XL) XL tablet 25 mg  25 mg Oral DAILY    cetirizine (ZYRTEC) tablet 10 mg  10 mg Oral DAILY    gabapentin (NEURONTIN) capsule 300 mg  300 mg Oral DAILY    sodium chloride (NS) flush 5-10 mL  5-10 mL IntraVENous PRN    dextrose (D50W) injection syrg 12.5 g  25 mL IntraVENous PRN    sodium chloride (NS) flush 5-40 mL  5-40 mL IntraVENous Q8H        Vitals:    12/30/21 0800 12/30/21 0810 12/30/21 0938 12/30/21 1242   BP: 120/76 120/76  124/62   Pulse: 74 74  65   Resp: 12   15   Temp: 97.7 °F (36.5 °C)      SpO2:   98% 98%   Weight:       Height:         Objective:   General: Drowsy and lethargic but arousable, no acute distress. HEENT: EOMI, no Icterus, no Pallor,  mucosa moist.  Neck: Neck is supple, No JVD  Lungs: breathsounds normal, no respiratory distress on inspection, no rhonchi, no rales,   CVS: heart sounds normal, regular rate and rhythm, no murmurs, no rubs. GI: soft, nontender, normal BS.   Extremeties: no cyanosis, no edema,   Neuro: Drowsy with altered mental status   skin: normal skin turgor, no skin rashes. Intake and Output:  Current Shift: No intake/output data recorded. Last three shifts: 12/28 1901 - 12/30 0700  In: 1914 [P.O.:1120; I.V.:794]  Out: 1250 [Urine:1250]      Lab/Data Review:  Recent Labs     12/30/21  1000   WBC 10.2   HGB 13.1   HCT 41.5        Recent Labs     12/30/21  1000 12/30/21  0400 12/29/21  0357 12/28/21  0400 12/28/21  0400    142 143   < > 145   K 4.6 4.2 4.5   < > 3.8    110 111   < > 113*   CO2 16* 19* 21   < > 23   * 113* 185*   < > 95   BUN 28* 27* 28*   < > 36*   CREA 1.40 1.30 1.30   < > 1.80*   CA 9.3 8.8 8.4*   < > 8.1*   MG  --   --   --   --  1.6*   PHOS  --  2.4* 2.7  --  1.8*   ALB 3.6 3.1* 3.2*   < > 3.1*   TBILI 0.6  --   --   --   --    ALT 32  --   --   --   --     < > = values in this interval not displayed. No results for input(s): PH, PCO2, PO2, HCO3, FIO2 in the last 72 hours.   Recent Results (from the past 24 hour(s))   GLUCOSE, POC    Collection Time: 12/29/21  4:59 PM   Result Value Ref Range    Glucose (POC) 84 70 - 110 mg/dL    Performed by [facility], POC    Collection Time: 12/29/21  9:41 PM   Result Value Ref Range    Glucose (POC) 92 70 - 110 mg/dL    Performed by [facility]    RENAL FUNCTION PANEL    Collection Time: 12/30/21  4:00 AM   Result Value Ref Range    Sodium 142 135 - 145 mmol/L    Potassium 4.2 3.2 - 5.1 mmol/L    Chloride 110 94 - 111 mmol/L    CO2 19 (L) 21 - 33 mmol/L    Anion gap 13 mmol/L    Glucose 113 (H) 70 - 110 mg/dL    BUN 27 (H) 9 - 21 mg/dL    Creatinine 1.30 0.8 - 1.50 mg/dL    BUN/Creatinine ratio 21      GFR est AA >60 ml/min/1.73m2    GFR est non-AA 58 ml/min/1.73m2    Calcium 8.8 8.5 - 10.5 mg/dL    Phosphorus 2.4 (L) 2.5 - 4.5 mg/dL    Albumin 3.1 (L) 3.5 - 4.7 g/dL   GLUCOSE, POC    Collection Time: 12/30/21  7:40 AM   Result Value Ref Range    Glucose (POC) 89 70 - 110 mg/dL Performed by Steve Keenan    PTT    Collection Time: 12/30/21 10:00 AM   Result Value Ref Range    aPTT 22.4 (L) 23.0 - 36.4 sec    aPTT, therapeutic range   82 - 109 sec   AMMONIA    Collection Time: 12/30/21 10:00 AM   Result Value Ref Range    Ammonia 16 9 - 33 umol/L   METABOLIC PANEL, COMPREHENSIVE    Collection Time: 12/30/21 10:00 AM   Result Value Ref Range    Sodium 144 135 - 145 mmol/L    Potassium 4.6 3.2 - 5.1 mmol/L    Chloride 109 94 - 111 mmol/L    CO2 16 (L) 21 - 33 mmol/L    Anion gap 19 mmol/L    Glucose 151 (H) 70 - 110 mg/dL    BUN 28 (H) 9 - 21 mg/dL    Creatinine 1.40 0.8 - 1.50 mg/dL    BUN/Creatinine ratio 20      GFR est AA >60 ml/min/1.73m2    GFR est non-AA 53 ml/min/1.73m2    Calcium 9.3 8.5 - 10.5 mg/dL    Bilirubin, total 0.6 0.2 - 1.0 mg/dL    AST (SGOT) 32 17 - 74 U/L    ALT (SGPT) 32 3 - 72 U/L    Alk. phosphatase 92 38 - 126 U/L    Protein, total 6.8 6.1 - 8.4 g/dL    Albumin 3.6 3.5 - 4.7 g/dL    Globulin 3.2 g/dL    A-G Ratio 1.1     CBC WITH AUTOMATED DIFF    Collection Time: 12/30/21 10:00 AM   Result Value Ref Range    WBC 10.2 4.6 - 13.2 K/uL    RBC 4.47 4.35 - 5.65 M/uL    HGB 13.1 13.0 - 16.0 g/dL    HCT 41.5 36.0 - 48.0 %    MCV 92.8 78.0 - 100.0 FL    MCH 29.3 24.0 - 34.0 PG    MCHC 31.6 31.0 - 37.0 g/dL    RDW 13.9 11.6 - 14.5 %    PLATELET 061 154 - 622 K/uL    MPV 12.3 (H) 9.2 - 11.8 FL    NRBC 0.0 0.0  WBC    ABSOLUTE NRBC 0.00 0.00 - 0.01 K/uL    NEUTROPHILS 69 40 - 73 %    LYMPHOCYTES 21 21 - 52 %    MONOCYTES 6 3 - 10 %    EOSINOPHILS 2 0 - 5 %    BASOPHILS 1 0 - 2 %    IMMATURE GRANULOCYTES 1 (H) 0 - 0.5 %    ABS. NEUTROPHILS 7.1 1.8 - 8.0 K/UL    ABS. LYMPHOCYTES 2.2 0.9 - 3.6 K/UL    ABS. MONOCYTES 0.7 0.05 - 1.2 K/UL    ABS. EOSINOPHILS 0.2 0.0 - 0.4 K/UL    ABS. BASOPHILS 0.1 0.0 - 0.1 K/UL    ABS. IMM.  GRANS. 0.1 (H) 0.00 - 0.04 K/UL    DF AUTOMATED     GLUCOSE, POC    Collection Time: 12/30/21 11:39 AM   Result Value Ref Range    Glucose (POC) 113 (H) 70 - 110 mg/dL    Performed by Divya Sick and Plan:     1. ROOSEVELT on CKD III  -Cr was 4.0 on admission  Which peaked at 4.8  -ROOSEVELT likely prerenal 2/2 volume depletion from severe hyperglycemia with cozaar on board  -Cr has imporved from 4.8-->4.2-->3.0-->1.8-->1.3 today. Baseline creatinine is unknown. Was 2.0 in December 2021  -urine is bland. Will quantify proteinuria  -on 1/2 NS with 20meq KCl. I will discontinue IV fluids because of elevated blood pressure  -We will restart low-dose Cozaar  -f/u renal fxn in am     2. Severe hypokalemia  -K was 5.8 on admission . 2/2 insulopenia and volume contraction  -K was 2.9 but improved to 4.5 today with aggressive replacement and potassium and IV fluids   -Hypomagnesemia corrected yesterday   -I will discontinue IV fluids with KCl     3. Hypophosphatemia:   -Phos is normal at 2.7 today after replacement yesterday    3. Severe metabolic acidosis  -anion gap +  -2/2 DKA and roosevelt  -CO2 was 7 on admisssion. Was started on bicarb gtt which was dceed yesterday  -CO2 21 today. Not on oral bicarb     4. AMS  -likley 2/2 hyperglcemia,   -CT head negative  -mentation imporving     5. DKA  -pt is DM-1 on insulin pump. Not sure of complaince, improved DKA with IV insulin drip  -HbA1C pending  -Improved glycemic control now, continue to monitor Accu-Cheks    6. HTN  -BP labile but currently okay  -I will discontinue IV fluids  -We will restart low-dose Cozaar     7. NSTEMI  -Troponin leak noted.   No chest pain  -no EKG  -off heparin gtt.  -cardiology following      Signed By: John Valencia MD     December 30, 2021

## 2021-12-30 NOTE — PROGRESS NOTES
Spoke with pt's dad Jensen Devlin [phone] about discharge planning. I told him his son is not safe to go home alone. He states his son was fine before the coma. He states since the coma his son has been confused. He has requested for Dr Samson Escobar to call him. Made Dr Samson Escobar aware. Pt's dad has spoken to Mami at [facility]. Mami states they have had pt before in the past. She states she will take a look at him on Monday 1/3/22. CM to follow.

## 2021-12-30 NOTE — PROGRESS NOTES
Hospitalist Progress Note             Date of Service:  2021  NAME:  Florecita Kaba  :  1965  MRN:  [id number]    Hospital course / Assessment    Confusion  - unclear if this is a continuation of his previously documented altered mental status from DKA w/ coma, or something new  - he is awake and alert, able to speak clearly in full sentances, but is not sure where he is or what year it is  - we have alerady checked a ct head earlier in admission,   - ammonia wnl  - confusion improving, but not yet sharp enough to return home alone and take care of brittle diabetes  - I have asked for case mgmt to assist in finding safe dispo for him, I believe his father may be involved in his care  - transfer out of icu while we await placement       DKA with diabetic coma, in setting type 1 DM on home insulin pump- coma now resolved  - severely acidodic poa, admitted to ICU, treated with IV insulin, sp Bicarb drip   - improved , converted to basal bolus, his pump at bedside is not functioning, will not be able to address here, he will need to be discharged on insulin, can follow with his endocrine after that  - will need closer follow up for his insulin pump on dc  - CT head w/o acute finding to explain his obtunded state, though there is subacute/chronic lacunar infarct present- cont' with ASA, Statin   NSTEMI  - sp Heparin drip, cont ASA, BB, HI Statin   - NST wnl  Acute kidney injury  - treated with IVF support, avoid nephrotoxin, hold ARB  - Nephrology consulted, Improving Cr   Leukocytosis   - likely reactive due to DKA, resolved   Hypertension  - holding ARB, start on metoprolol , cont' monitor   Chronic Anemia   - Stable , cont' monitor      Code Status: Full code   DVT prophylaxis: pharmacologic and mechanical     Today Recommendation and Plan:   Cont' with  Antiplatelet, BB  and HI statin   Cardiology and nephrology following / plans   Cont' monitor BS and BP  Did well with PT, does not need snf for this indication          Hospital Problems  Date Reviewed: 3/16/2021          Codes Class Noted POA    Severe protein-calorie malnutrition ([facility] ICD-10-CM: E43  ICD-9-CM: 262  12/29/2021 Unknown        Elevated troponin ICD-10-CM: R77.8  ICD-9-CM: 790.6  12/25/2021 Unknown        DKA (diabetic ketoacidosis) ([facility] ICD-10-CM: E11.10  ICD-9-CM: 250.12  12/24/2021 Unknown        Dehydration ICD-10-CM: E86.0  ICD-9-CM: 276.51  12/24/2021 Unknown        ROOSE[place]T (acute kidney injury) ([facility] ICD-10-CM: N17.9  ICD-9-CM: 584.9  12/24/2021 Unknown        Benign hypertension ICD-10-CM: I10  ICD-9-CM: 401.1  10/21/2020 Yes                Review of Systems:   A comprehensive review of systems was negative except for that written in the HPI. Vital Signs:    Last 24hrs VS reviewed since prior progress note. Most recent are:  Visit Vitals  /76   Pulse 74   Temp 97.7 °F (36.5 °C)   Resp 12   Ht 5' 11\" (1.803 m)   Wt 55.7 kg (122 lb 11.2 oz)   SpO2 94%   BMI 17.11 kg/m²         Intake/Output Summary (Last 24 hours) at 12/30/2021 1207  Last data filed at 12/30/2021 7240  Gross per 24 hour   Intake 400 ml   Output 650 ml   Net -250 ml        Physical Examination:             General:          Alert, cooperative, no distress, appears stated age. HEENT:           Atraumatic, anicteric sclerae, pink conjunctivae                          No oral ulcers, mucosa moist, throat clear, dentition fair  Neck:               Supple, symmetrical  Lungs:             Clear to auscultation bilaterally. No Wheezing or Rhonchi. No rales. Chest wall:      No tenderness  No Accessory muscle use. Heart:              Regular  rhythm,  No  murmur   No edema  Abdomen:        Soft, non-tender. Not distended. Bowel sounds normal  Extremities:     No cyanosis.   No clubbing,                            Skin turgor normal, Capillary refill normal  Skin: Not pale. Not Jaundiced  No rashes   Psych:             Not anxious or agitated. Neurologic:      Alert, moves all extremities, answers questions appropriately and responds to commands a&ox2       Data Review:    Review and/or order of clinical lab test  Review and/or order of tests in the radiology section of CPT  Review and/or order of tests in the medicine section of CPT      Labs:     Recent Labs     12/30/21  1000   WBC 10.2   HGB 13.1   HCT 41.5   PLT PENDING     Recent Labs     12/30/21  0400 12/29/21  0357 12/28/21  0400    143 145   K 4.2 4.5 3.8    111 113*   CO2 19* 21 23   BUN 27* 28* 36*   CREA 1.30 1.30 1.80*   * 185* 95   CA 8.8 8.4* 8.1*   MG  --   --  1.6*   PHOS 2.4* 2.7 1.8*     Recent Labs     12/30/21  0400 12/29/21  0357 12/28/21  0400   ALB 3.1* 3.2* 3.1*     Recent Labs     12/30/21  1000 12/28/21  0410 12/27/21  2215   APTT 22.4* 30.0 30.1      No results for input(s): FE, TIBC, PSAT, FERR in the last 72 hours. No results found for: FOL, RBCF   No results for input(s): PH, PCO2, PO2 in the last 72 hours. No results for input(s): CPK, CKNDX, TROIQ in the last 72 hours.     No lab exists for component: CPKMB  Lab Results   Component Value Date/Time    Cholesterol, total 95 12/27/2021 03:46 AM    HDL Cholesterol 47 12/27/2021 03:46 AM    LDL, calculated 38.2 12/27/2021 03:46 AM    Triglyceride 49 12/27/2021 03:46 AM    CHOL/HDL Ratio 2.0 12/27/2021 03:46 AM     Lab Results   Component Value Date/Time    Glucose (POC) 113 (H) 12/30/2021 11:39 AM    Glucose (POC) 89 12/30/2021 07:40 AM    Glucose (POC) 92 12/29/2021 09:41 PM    Glucose (POC) 84 12/29/2021 04:59 PM    Glucose (POC) 207 (H) 12/29/2021 11:10 AM     Lab Results   Component Value Date/Time    Color Yellow 12/26/2021 01:45 AM    Appearance Cloudy 12/26/2021 01:45 AM    Specific gravity 1.013 12/26/2021 01:45 AM    pH (UA) 5.0 12/26/2021 01:45 AM    Protein 30 (A) 12/26/2021 01:45 AM    Glucose 500 (A) 12/26/2021 01:45 AM    Ketone Trace (A) 12/26/2021 01:45 AM    Bilirubin Negative 12/26/2021 01:45 AM    Urobilinogen 0.2 12/26/2021 01:45 AM    Nitrites Negative 12/26/2021 01:45 AM    Leukocyte Esterase Trace (A) 12/26/2021 01:45 AM    Epithelial cells Few 12/26/2021 01:45 AM    Bacteria Negative (A) 12/26/2021 01:45 AM    WBC 10-20 12/26/2021 01:45 AM    RBC 0-5 12/26/2021 01:45 AM         Medications Reviewed:     Current Facility-Administered Medications   Medication Dose Route Frequency    insulin glargine (LANTUS) injection 20 Units  20 Units SubCUTAneous DAILY    losartan (COZAAR) tablet 25 mg  25 mg Oral DAILY    aspirin delayed-release tablet 81 mg  81 mg Oral DAILY    rosuvastatin (CRESTOR) tablet 10 mg  10 mg Oral QHS    insulin lispro (HUMALOG) injection   SubCUTAneous AC&HS    glucose chewable tablet 16 g  4 Tablet Oral PRN    glucagon (GLUCAGEN) injection 1 mg  1 mg IntraMUSCular PRN    dextrose (D50W) injection syrg 12.5-25 g  25-50 mL IntraVENous PRN    metoprolol succinate (TOPROL-XL) XL tablet 25 mg  25 mg Oral DAILY    cetirizine (ZYRTEC) tablet 10 mg  10 mg Oral DAILY    gabapentin (NEURONTIN) capsule 300 mg  300 mg Oral DAILY    sodium chloride (NS) flush 5-10 mL  5-10 mL IntraVENous PRN    dextrose (D50W) injection syrg 12.5 g  25 mL IntraVENous PRN    sodium chloride (NS) flush 5-40 mL  5-40 mL IntraVENous Q8H     ______________________________________________________________________  EXPECTED LENGTH OF STAY: 3d 19h  ACTUAL LENGTH OF STAY:          6                 Lindsey Taveras MD

## 2021-12-30 NOTE — PROGRESS NOTES
Received report via walking rounds assisted pt  To sit on side of bed to eat his breakfast ,no problem noted. @0900  Assisted oob to bsc to have bm.  @1000  Assisted back to bed.         a

## 2021-12-30 NOTE — ROUTINE PROCESS
Bedside shift change report given to TIFFANY Valerio RN (oncoming nurse) by ELIO Zee RN (offgoing nurse). Report included the following information SBAR, Kardex, Intake/Output, Recent Results, Med Rec Status and Quality Measures. 2145  Pt spoke with his father on the telephone. 2200 medication administered with apple sauce. 2300 Pt lying in bed with eyes closed. Respirations equal and unlabored. 2400 VSS. CBWR.     0100  Pt awake and talkative. Pt wants to go home to rehab today. Discussed pt's need for addidtional time to rehab here with PT.     0300 Pt lying in bed with eyes closed. No distress noted. CBWR.        0645  Bedside shift change report given to MIRZA Ramirez RN (oncoming nurse) by TIFFANY Valerio RN (offgoing nurse). Report included the following information SBAR, Kardex, Intake/Output, Recent Results, Med Rec Status and Quality Measures.

## 2021-12-30 NOTE — PROGRESS NOTES
Problem: Pressure Injury - Risk of  Goal: *Prevention of pressure injury  Description: Document Giuliano Scale and appropriate interventions in the flowsheet. Outcome: Progressing Towards Goal  Note: Pressure Injury Interventions:  Sensory Interventions: Assess changes in LOC    Moisture Interventions: Absorbent underpads    Activity Interventions: Increase time out of bed    Mobility Interventions: HOB 30 degrees or less    Nutrition Interventions: Document food/fluid/supplement intake    Friction and Shear Interventions: Apply protective barrier, creams and emollients                Problem: Patient Education: Go to Patient Education Activity  Goal: Patient/Family Education  Outcome: Progressing Towards Goal     Problem: DKA: Day 1  Goal: Discharge Planning  Outcome: Progressing Towards Goal     Problem: Falls - Risk of  Goal: *Absence of Falls  Description: Document Beverly Fall Risk and appropriate interventions in the flowsheet. Outcome: Progressing Towards Goal  Note: Fall Risk Interventions:  Mobility Interventions: Bed/chair exit alarm,Patient to call before getting OOB    Mentation Interventions: Adequate sleep, hydration, pain control,Bed/chair exit alarm    Medication Interventions: Bed/chair exit alarm    Elimination Interventions: Bed/chair exit alarm    History of Falls Interventions: Door open when patient unattended,Room close to nurse's station         Problem: Patient Education: Go to Patient Education Activity  Goal: Patient/Family Education  Outcome: Progressing Towards Goal     Problem:  Activity Intolerance  Goal: *Oxygen saturation during activity within specified parameters  Outcome: Progressing Towards Goal  Goal: *Able to remain out of bed as prescribed  Outcome: Progressing Towards Goal     Problem: Patient Education: Go to Patient Education Activity  Goal: Patient/Family Education  Outcome: Progressing Towards Goal     Problem: Nutrition Deficit  Goal: *Optimize nutritional status  Outcome: Progressing Towards Goal     Problem: General Medical Care Plan  Goal: *Vital signs within specified parameters  Outcome: Progressing Towards Goal  Goal: *Labs within defined limits  Outcome: Progressing Towards Goal  Goal: *Absence of infection signs and symptoms  Outcome: Progressing Towards Goal  Goal: *Optimal pain control at patient's stated goal  Outcome: Progressing Towards Goal  Goal: *Skin integrity maintained  Outcome: Progressing Towards Goal  Goal: *Fluid volume balance  Outcome: Progressing Towards Goal  Goal: *Optimize nutritional status  Outcome: Progressing Towards Goal  Goal: *Anxiety reduced or absent  Outcome: Progressing Towards Goal  Goal: *Progressive mobility and function (eg: ADL's)  Outcome: Progressing Towards Goal     Problem: Patient Education: Go to Patient Education Activity  Goal: Patient/Family Education  Outcome: Progressing Towards Goal

## 2021-12-30 NOTE — PROGRESS NOTES
1609- apple juice provided to treat blood sugar of 68    1640- up to urinate in urinal with assistance of this nurse

## 2021-12-31 LAB
BACTERIA SPEC CULT: NORMAL
COVID-19 RAPID TEST, COVR: NOT DETECTED
GLUCOSE BLD STRIP.AUTO-MCNC: 160 MG/DL (ref 70–110)
GLUCOSE BLD STRIP.AUTO-MCNC: 314 MG/DL (ref 70–110)
GLUCOSE BLD STRIP.AUTO-MCNC: 73 MG/DL (ref 70–110)
GLUCOSE BLD STRIP.AUTO-MCNC: 91 MG/DL (ref 70–110)
PERFORMED BY, TECHID: ABNORMAL
PERFORMED BY, TECHID: ABNORMAL
PERFORMED BY, TECHID: NORMAL
PERFORMED BY, TECHID: NORMAL
SARS-COV-2, COV2: NORMAL
SPECIAL REQUESTS,SREQ: NORMAL
SPECIMEN SOURCE: NORMAL

## 2021-12-31 PROCEDURE — 65270000029 HC RM PRIVATE

## 2021-12-31 PROCEDURE — 74011250637 HC RX REV CODE- 250/637: Performed by: NURSE PRACTITIONER

## 2021-12-31 PROCEDURE — 74011250637 HC RX REV CODE- 250/637: Performed by: INTERNAL MEDICINE

## 2021-12-31 PROCEDURE — 74011636637 HC RX REV CODE- 636/637: Performed by: INTERNAL MEDICINE

## 2021-12-31 PROCEDURE — 87635 SARS-COV-2 COVID-19 AMP PRB: CPT

## 2021-12-31 PROCEDURE — U0003 INFECTIOUS AGENT DETECTION BY NUCLEIC ACID (DNA OR RNA); SEVERE ACUTE RESPIRATORY SYNDROME CORONAVIRUS 2 (SARS-COV-2) (CORONAVIRUS DISEASE [COVID-19]), AMPLIFIED PROBE TECHNIQUE, MAKING USE OF HIGH THROUGHPUT TECHNOLOGIES AS DESCRIBED BY CMS-2020-01-R: HCPCS

## 2021-12-31 PROCEDURE — 82962 GLUCOSE BLOOD TEST: CPT

## 2021-12-31 RX ORDER — GABAPENTIN 300 MG/1
300 CAPSULE ORAL 3 TIMES DAILY
Qty: 90 CAPSULE | Refills: 0 | Status: SHIPPED
Start: 2021-12-31 | End: 2022-01-03 | Stop reason: SDUPTHER

## 2021-12-31 RX ORDER — METOPROLOL SUCCINATE 25 MG/1
25 TABLET, EXTENDED RELEASE ORAL DAILY
Qty: 30 TABLET | Refills: 0 | Status: SHIPPED | OUTPATIENT
Start: 2022-01-01 | End: 2022-01-03 | Stop reason: SDUPTHER

## 2021-12-31 RX ORDER — LOSARTAN POTASSIUM 25 MG/1
25 TABLET ORAL DAILY
Qty: 30 TABLET | Refills: 0 | Status: SHIPPED | OUTPATIENT
Start: 2022-01-01 | End: 2022-01-03

## 2021-12-31 RX ORDER — ASPIRIN 81 MG/1
81 TABLET ORAL DAILY
Qty: 30 TABLET | Refills: 0 | Status: SHIPPED | OUTPATIENT
Start: 2022-01-01 | End: 2022-01-03 | Stop reason: SDUPTHER

## 2021-12-31 RX ORDER — ROSUVASTATIN CALCIUM 10 MG/1
10 TABLET, COATED ORAL
Qty: 30 TABLET | Refills: 0 | Status: SHIPPED | OUTPATIENT
Start: 2021-12-31 | End: 2022-01-03 | Stop reason: SDUPTHER

## 2021-12-31 RX ORDER — INSULIN GLARGINE 100 [IU]/ML
20 INJECTION, SOLUTION SUBCUTANEOUS DAILY
Qty: 5 ADJUSTABLE DOSE PRE-FILLED PEN SYRINGE | Refills: 0 | Status: SHIPPED | OUTPATIENT
Start: 2021-12-31 | End: 2022-01-16

## 2021-12-31 RX ADMIN — METOPROLOL SUCCINATE 25 MG: 25 TABLET, EXTENDED RELEASE ORAL at 08:26

## 2021-12-31 RX ADMIN — ROSUVASTATIN 10 MG: 10 TABLET, FILM COATED ORAL at 21:16

## 2021-12-31 RX ADMIN — INSULIN LISPRO 8 UNITS: 100 INJECTION, SOLUTION INTRAVENOUS; SUBCUTANEOUS at 21:17

## 2021-12-31 RX ADMIN — LOSARTAN POTASSIUM 25 MG: 25 TABLET, FILM COATED ORAL at 08:26

## 2021-12-31 RX ADMIN — INSULIN GLARGINE 20 UNITS: 100 INJECTION, SOLUTION SUBCUTANEOUS at 08:26

## 2021-12-31 RX ADMIN — CETIRIZINE HYDROCHLORIDE 10 MG: 10 TABLET, FILM COATED ORAL at 08:26

## 2021-12-31 RX ADMIN — SODIUM CHLORIDE, PRESERVATIVE FREE 10 ML: 5 INJECTION INTRAVENOUS at 21:16

## 2021-12-31 RX ADMIN — GABAPENTIN 300 MG: 300 CAPSULE ORAL at 08:26

## 2021-12-31 RX ADMIN — ASPIRIN 81 MG: 81 TABLET, COATED ORAL at 08:26

## 2021-12-31 RX ADMIN — SODIUM CHLORIDE, PRESERVATIVE FREE 10 ML: 5 INJECTION INTRAVENOUS at 05:51

## 2021-12-31 NOTE — PROGRESS NOTES
05/21/19 1900   Group 1   Start Time 1800   Stop Time 1900   Length (min) 60 Min   Group Type Inpatient   Group Name group psychotherapy   Focus of Group coping with anger   Attendance Present   Participation Active   Patient Response Attentive;Interested in topic   Group Notes Pt was engaged, cooperative and able to identify triggers and coping skills for anger.      Problem: Pressure Injury - Risk of  Goal: *Prevention of pressure injury  Description: Document Giuliano Scale and appropriate interventions in the flowsheet. Outcome: Progressing Towards Goal  Note: Pressure Injury Interventions:  Sensory Interventions: Assess changes in LOC,Check visual cues for pain,Minimize linen layers    Moisture Interventions: Absorbent underpads,Minimize layers    Activity Interventions: Increase time out of bed    Mobility Interventions: Float heels    Nutrition Interventions: Document food/fluid/supplement intake,Offer support with meals,snacks and hydration    Friction and Shear Interventions: Minimize layers                Problem: Patient Education: Go to Patient Education Activity  Goal: Patient/Family Education  Outcome: Progressing Towards Goal     Problem: DKA: Day 1  Goal: Discharge Planning  Outcome: Progressing Towards Goal     Problem: Falls - Risk of  Goal: *Absence of Falls  Description: Document Beverly Fall Risk and appropriate interventions in the flowsheet. Outcome: Progressing Towards Goal  Note: Fall Risk Interventions:  Mobility Interventions: Bed/chair exit alarm,Utilize walker, cane, or other assistive device    Mentation Interventions: Adequate sleep, hydration, pain control    Medication Interventions: Bed/chair exit alarm,Teach patient to arise slowly    Elimination Interventions: Bed/chair exit alarm,Call light in reach,Toileting schedule/hourly rounds,Urinal in reach    History of Falls Interventions: Bed/chair exit alarm,Room close to nurse's station         Problem: Patient Education: Go to Patient Education Activity  Goal: Patient/Family Education  Outcome: Progressing Towards Goal     Problem:  Activity Intolerance  Goal: *Oxygen saturation during activity within specified parameters  Outcome: Progressing Towards Goal  Goal: *Able to remain out of bed as prescribed  Outcome: Progressing Towards Goal     Problem: Patient Education: Go to Patient Education Activity  Goal: Patient/Family Education  Outcome: Progressing Towards Goal     Problem: Nutrition Deficit  Goal: *Optimize nutritional status  Outcome: Progressing Towards Goal     Problem: General Medical Care Plan  Goal: *Vital signs within specified parameters  Outcome: Progressing Towards Goal  Goal: *Labs within defined limits  Outcome: Progressing Towards Goal  Goal: *Absence of infection signs and symptoms  Outcome: Progressing Towards Goal  Goal: *Optimal pain control at patient's stated goal  Outcome: Progressing Towards Goal  Goal: *Skin integrity maintained  Outcome: Progressing Towards Goal  Goal: *Fluid volume balance  Outcome: Progressing Towards Goal  Goal: *Optimize nutritional status  Outcome: Progressing Towards Goal  Goal: *Anxiety reduced or absent  Outcome: Progressing Towards Goal  Goal: *Progressive mobility and function (eg: ADL's)  Outcome: Progressing Towards Goal     Problem: Patient Education: Go to Patient Education Activity  Goal: Patient/Family Education  Outcome: Progressing Towards Goal     Problem: Patient Education: Go to Patient Education Activity  Goal: Patient/Family Education  Outcome: Progressing Towards Goal

## 2021-12-31 NOTE — PROGRESS NOTES
1900 - Shift change report received from Vincent Holder,[address]  1910 - Vital signs assessed. /74. Patient assisted to bathroom with walker. 1930 - Perineal care provided for incontinence of loose stool on way to bathroom. New incontinence underwear provided. Patient back in bed. 2118 - Scheduled medication administered. 2 units SSI administered for POC glucose 185.   2300 - Patient assisted with walker to bathroom.    2354 - vital signs assessed and WDL  0300 - patient provided with sandwich and coffee

## 2021-12-31 NOTE — PROGRESS NOTES
6707- bedside shift report completed and care of the patient assumed    0830- AM medications administerred    0339- spoke with Mike Zuniga, patient;s father, via phone regarding discharge, father states that the patient cannot go home at this time.  Will notify provider    1200- states he will eat lunch later    1600- up to bathroom to void using walker and standby assistance

## 2021-12-31 NOTE — DISCHARGE SUMMARY
Discharge Summary       PATIENT ID: Zahraa Lopez  MRN: [id number]   YOB: 1965    DATE OF ADMISSION: 12/24/2021  7:14 PM    DATE OF DISCHARGE: 12/31/21    PRIMARY CARE PROVIDER: Sd Kirkpatrick MD     ATTENDING PHYSICIAN: Brittney Hummel MD  DISCHARGING PROVIDER: Brittney Hummel MD        CONSULTATIONS: IP CONSULT TO NEPHROLOGY  IP CONSULT TO NEPHROLOGY  IP CONSULT TO CARDIOLOGY  IP CONSULT TO CARDIOLOGY    PROCEDURES/SURGERIES: * No surgery found *    ADMITTING [address] [place] [address] COURSE:   Zahraa Lopez is a 49 y.o. male  has a past medical history of Diabetes ([facility] [address] Hypercholesterolemia, and Hypertension. Patient seen at bedside. Patient came to the emergency room tonight for elevated blood sugar. It was unmanageable on the blood sugar monitor. Serum blood test showed over thousand patient had an increased anion gap and was in DKA. Insulin drip was ordered in the ED IV fluids. ER doctor treated for pneumonia with Zosyn. The radiologist read of the chest x-ray and my personal read of the x-ray does not see an infiltrate I will not continue Zosyn for pneumonia. Patient was placed in the ICU on an insulin drip BNP will be ordered every 4 hours. IV fluid ordered patient is dehydrated. Patient has not been managing his insulin pump well as his wife is out of town. His wife normally helps him with his insulin pump. Patient does not complain of any pain. His only complaint is being thirsty. Patient's troponin is elevated he does not complain of any chest pain. EKG in the emergency room showed tachycardia but no other acute findings. I will place a cardiology consult and cycle troponin every 6. Patient has also ROOSEVELT nephrology consult placed. Case management consult will be placed to assist patient with discharge planning and management of insulin pump.         DISCHARGE DIAGNOSES / PLAN:      Hospital course / Assessment    Confusion  - unclear if this is a continuation of his previously documented altered mental status from DKA w/ coma, or something new  - he is awake and alert, able to speak clearly in full sentances, but is not sure where he is or what year it is  - we have rajesh checked a ct head earlier in admission,   - ammonia wnl  - confusion improving, but not yet sharp enough to return home alone and take care of brittle diabetes  - I have asked for case mgmt to assist in finding safe dispo for him, I believe his father may be involved in his care  - transfer out of icu while we await placement        DKA with diabetic coma, in setting type 1 DM on home insulin pump- coma now resolved  - severely acidodic poa, admitted to ICU, treated with IV insulin, sp Bicarb drip   - improved , converted to basal bolus, his pump at bedside is not functioning, will not be able to address here, he will need to be discharged on insulin, can follow with his endocrine after that  - will need closer follow up for his insulin pump on dc  - CT head w/o acute finding to explain his obtunded state, though there is subacute/chronic lacunar infarct present- cont' with ASA, Statin   NSTEMI  - sp Heparin drip, cont ASA, BB, HI Statin   - NST wnl  Acute kidney injury  - treated with IVF support, avoid nephrotoxin, hold ARB  - Nephrology consulted, Improving Cr   Leukocytosis   - likely reactive due to DKA, resolved   Hypertension  - holding ARB, start on metoprolol , cont' monitor   Chronic Anemia   - Stable , cont' monitor      Code Status: Full code   DVT prophylaxis: pharmacologic and mechanical     Today Recommendation and Plan:   Cont' with  Antiplatelet, BB  and HI statin   Cardiology and nephrology following / plans   Cont' monitor BS and BP  Did well with PT, does not need snf for this indication         Pt is stable for dc, but I am not yet convinced he can handle his own insulin injections and dosing.   Would ask that he have 24 hours supervision for the next few days and have asked that he stay with father until they are confident he can handle his lantus. Total dc time 35 mins  If not possible to have 24 supervision, he should stay here. Get in with your endo to fix your insulin pump    FOLLOW UP APPOINTMENTS:    Follow-up Information     Follow up With Specialties Details Why Horace Robles MD Internal Medicine   [address] [address]  [phone]               DIET: diabetic      DISCHARGE MEDICATIONS:  Current Discharge Medication List      START taking these medications    Details   gabapentin (NEURONTIN) 300 mg capsule Take 1 Capsule by mouth three (3) times daily. Max Daily Amount: 900 mg. Qty: 90 Capsule, Refills: 0  Start date: 12/31/2021    Associated Diagnoses: Diabetic peripheral neuropathy (HCC)      rosuvastatin (CRESTOR) 10 mg tablet Take 1 Tablet by mouth nightly. Qty: 30 Tablet, Refills: 0  Start date: 12/31/2021      metoprolol succinate (TOPROL-XL) 25 mg XL tablet Take 1 Tablet by mouth daily. Qty: 30 Tablet, Refills: 0  Start date: 1/1/2022      aspirin delayed-release 81 mg tablet Take 1 Tablet by mouth daily. Qty: 30 Tablet, Refills: 0  Start date: 1/1/2022      insulin glargine (Lantus Solostar U-100 Insulin) 100 unit/mL (3 mL) inpn 20 Units by SubCUTAneous route daily. Give 1 month supply, to include any needed needles. Diagnosis- DM type1 E10. 9  Indications: type 1 diabetes mellitus  Qty: 5 Adjustable Dose Pre-filled Pen Syringe, Refills: 0  Start date: 12/31/2021         CONTINUE these medications which have CHANGED    Details   losartan (COZAAR) 25 mg tablet Take 1 Tablet by mouth daily. Qty: 30 Tablet, Refills: 0  Start date: 1/1/2022         CONTINUE these medications which have NOT CHANGED    Details   cetirizine (ZYRTEC) 10 mg tablet TAKE 1 TABLET EVERY DAY BY ORAL ROUTE.    Qty: 90 Tab, Refills: 0         STOP taking these medications       insulin lispro (HumaLOG U-100 Insulin) 100 unit/mL injection Comments: Reason for Stopping:         traZODone (DESYREL) 50 mg tablet Comments:   Reason for Stopping:         gabapentin (NEURONTIN) 800 mg tablet Comments:   Reason for Stopping:                 NOTIFY YOUR PHYSICIAN FOR ANY OF THE FOLLOWING:   Fever over 101 degrees for 24 hours. Chest pain, shortness of breath, fever, chills, nausea, vomiting, diarrhea, change in mentation, falling, weakness, bleeding. Severe pain or pain not relieved by medications. Or, any other signs or symptoms that you may have questions about. PHYSICAL EXAMINATION AT DISCHARGE:  General:          Alert, cooperative, no distress, appears stated age. HEENT:           Atraumatic, anicteric sclerae, pink conjunctivae                          No oral ulcers, mucosa moist, throat clear, dentition fair  Neck:               Supple, symmetrical  Lungs:             Clear to auscultation bilaterally. No Wheezing or Rhonchi. No rales. Chest wall:      No tenderness  No Accessory muscle use. Heart:              Regular  rhythm,  No  murmur   No edema  Abdomen:        Soft, non-tender. Not distended. Bowel sounds normal  Extremities:     No cyanosis. No clubbing,                            Skin turgor normal, Capillary refill normal  Skin:                Not pale. Not Jaundiced  No rashes   Psych:             Not anxious or agitated.   Neurologic:      Alert, moves all extremities, answers questions appropriately and responds to commands = oriented today but still with slow mental processing      [address]:  Problem List as of 12/31/2021 Date Reviewed: 3/16/2021          Codes Class Noted - Resolved    Severe protein-calorie malnutrition ([facility] ICD-10-CM: E43  ICD-9-CM: 262  12/29/2021 - Present        Elevated troponin ICD-10-CM: R77.8  ICD-9-CM: 790.6  12/25/2021 - Present        DKA (diabetic ketoacidosis) ([facility] ICD-10-CM: E11.10  ICD-9-CM: 250.12  12/24/2021 - Present        Dehydration ICD-10-CM: E86.0  ICD-9-CM: 276.51 12/24/2021 - Present        History of diabetes mellitus ICD-10-CM: Z86.39  ICD-9-CM: V12.29  12/24/2021 - Present        ROOSEVELT (acute kidney injury) ([facility] ICD-10-CM: N17.9  ICD-9-CM: 584.9  12/24/2021 - Present        DKA, type 1 ([facility] ICD-10-CM: E10.10  ICD-9-CM: 250.13  12/24/2021 - Present        PNA (pneumonia) ICD-10-CM: J18.9  ICD-9-CM: 486  12/24/2021 - Present        Anemia of chronic disease ICD-10-CM: D63.8  ICD-9-CM: 285.29  10/21/2020 - Present        Benign hypertension ICD-10-CM: I10  ICD-9-CM: 401.1  10/21/2020 - Present        Chronic anxiety ICD-10-CM: F41.9  ICD-9-CM: 300.00  10/21/2020 - Present        Type 1 diabetes mellitus ([facility] ICD-10-CM: E10.9  ICD-9-CM: 250.01  10/21/2020 - Present    Overview Signed 10/21/2020 10:20 AM by Lm Alexander     Uncontrolled             Diabetic peripheral neuropathy ([facility] ICD-10-CM: E11.42  ICD-9-CM: 250.60, 357.2  10/21/2020 - Present        Gastroparesis ICD-10-CM: K31.84  ICD-9-CM: 536.3  10/21/2020 - Present        Insomnia ICD-10-CM: G47.00  ICD-9-CM: 780.52  10/21/2020 - Present              Greater than 35 minutes were spent with the patient on counseling and coordination of care    Signed:   Wilmar Michael MD  12/31/2021  10:40 AM

## 2022-01-01 LAB
GLUCOSE BLD STRIP.AUTO-MCNC: 110 MG/DL (ref 70–110)
GLUCOSE BLD STRIP.AUTO-MCNC: 150 MG/DL (ref 70–110)
GLUCOSE BLD STRIP.AUTO-MCNC: 174 MG/DL (ref 70–110)
GLUCOSE BLD STRIP.AUTO-MCNC: 69 MG/DL (ref 70–110)
PERFORMED BY, TECHID: ABNORMAL
PERFORMED BY, TECHID: NORMAL

## 2022-01-01 PROCEDURE — 74011636637 HC RX REV CODE- 636/637: Performed by: INTERNAL MEDICINE

## 2022-01-01 PROCEDURE — 74011250636 HC RX REV CODE- 250/636: Performed by: INTERNAL MEDICINE

## 2022-01-01 PROCEDURE — 74011250637 HC RX REV CODE- 250/637: Performed by: INTERNAL MEDICINE

## 2022-01-01 PROCEDURE — 65270000029 HC RM PRIVATE

## 2022-01-01 PROCEDURE — 82962 GLUCOSE BLOOD TEST: CPT

## 2022-01-01 PROCEDURE — 74011250637 HC RX REV CODE- 250/637: Performed by: NURSE PRACTITIONER

## 2022-01-01 RX ORDER — ONDANSETRON 2 MG/ML
4 INJECTION INTRAMUSCULAR; INTRAVENOUS
Status: DISCONTINUED | OUTPATIENT
Start: 2022-01-01 | End: 2022-01-03 | Stop reason: HOSPADM

## 2022-01-01 RX ADMIN — GABAPENTIN 300 MG: 300 CAPSULE ORAL at 09:06

## 2022-01-01 RX ADMIN — ONDANSETRON 4 MG: 2 INJECTION INTRAMUSCULAR; INTRAVENOUS at 10:39

## 2022-01-01 RX ADMIN — SODIUM CHLORIDE, PRESERVATIVE FREE 10 ML: 5 INJECTION INTRAVENOUS at 05:02

## 2022-01-01 RX ADMIN — ROSUVASTATIN 10 MG: 10 TABLET, FILM COATED ORAL at 22:44

## 2022-01-01 RX ADMIN — INSULIN LISPRO 2 UNITS: 100 INJECTION, SOLUTION INTRAVENOUS; SUBCUTANEOUS at 16:58

## 2022-01-01 RX ADMIN — INSULIN GLARGINE 20 UNITS: 100 INJECTION, SOLUTION SUBCUTANEOUS at 09:06

## 2022-01-01 RX ADMIN — SODIUM CHLORIDE, PRESERVATIVE FREE 10 ML: 5 INJECTION INTRAVENOUS at 22:45

## 2022-01-01 RX ADMIN — LOSARTAN POTASSIUM 25 MG: 25 TABLET, FILM COATED ORAL at 09:06

## 2022-01-01 RX ADMIN — CETIRIZINE HYDROCHLORIDE 10 MG: 10 TABLET, FILM COATED ORAL at 09:06

## 2022-01-01 RX ADMIN — ASPIRIN 81 MG: 81 TABLET, COATED ORAL at 09:06

## 2022-01-01 RX ADMIN — SODIUM CHLORIDE, PRESERVATIVE FREE 10 ML: 5 INJECTION INTRAVENOUS at 16:59

## 2022-01-01 RX ADMIN — METOPROLOL SUCCINATE 25 MG: 25 TABLET, EXTENDED RELEASE ORAL at 09:06

## 2022-01-01 NOTE — PROGRESS NOTES
Talked with pt's brother, Jason Harp, [phone] who had discussed upcoming discharge with his father, Justyn Salvador. Both relatives feel that it is not safe for Ruben Ambrose to go home because he lives alone and his only help at home is his father, who is sick with Covid-19. Other issues are broken insulin pump which will require drawing up insulin from a vial, which they think Ruben Ambrose needs educating on. Talked with Jamaal Ureña, EP admin, who has heard from the family and are requesting adm to EP. Her staff and admitting physician will need to review the request and let CM know of their decision. CM talked with Ruben Ambrose, and he is agreeable to EP admission on Monday. Dr. Marino Razo updated.

## 2022-01-01 NOTE — PROGRESS NOTES
Hospitalist Progress Note             Date of Service:  2022  NAME:  Agus Condon  :  1965  MRN:  [id number]    Hospital course / Assessment    Confusion- cont to improve, awaiting placement at [facility]  - unclear if this is a continuation of his previously documented altered mental status from DKA w/ coma, or something new  - we have alerady checked a ct head earlier in admission,   - ammonia wnl  - confusion improving, but not yet sharp enough to return home alone and take care of brittle diabetes        DKA with diabetic coma, in setting type 1 DM on home insulin pump- coma now resolved  - severely acidodic poa, admitted to ICU, treated with IV insulin, sp Bicarb drip   - improved , converted to basal bolus, his pump at bedside is not functioning, will not be able to address here, he will need to be discharged on insulin, can follow with his endocrine after that  - will need closer follow up for his insulin pump on dc  - CT head w/o acute finding to explain his obtunded state, though there is subacute/chronic lacunar infarct present- cont' with ASA, Statin   NSTEMI  - sp Heparin drip, cont ASA, BB, HI Statin   - NST wnl  Acute kidney injury  - treated with IVF support, avoid nephrotoxin, hold ARB  - Nephrology consulted, Improving Cr   Leukocytosis   - likely reactive due to DKA, resolved   Hypertension  - holding ARB, start on metoprolol , cont' monitor   Chronic Anemia   - Stable , cont' monitor      Code Status: Full code   DVT prophylaxis: pharmacologic and mechanical     Today Recommendation and Plan:   Cont' with  Antiplatelet, BB  and HI statin   Cardiology and nephrology following / plans   Cont' monitor [facility] BP            Hospital Problems  Date Reviewed: 3/16/2021          Codes Class Noted POA    Severe protein-calorie malnutrition ([facility] [address] ICD-10-CM: V66  ICD-9-CM: 262  2021 Unknown Elevated troponin ICD-10-CM: R77.8  ICD-9-CM: 790.6  12/25/2021 Unknown        DKA (diabetic ketoacidosis) ([facility] [address] ICD-10-CM: E11.10  ICD-9-CM: 250.12  12/24/2021 Unknown        Dehydration ICD-10-CM: E86.0  ICD-9-CM: 276.51  12/24/2021 Unknown        [facility]OOSEVELT (acute kidney injury) [facility]) ICD-10-CM: N17.9  ICD-9-CM: 584.9  12/24/2021 Unknown        Benign hypertension ICD-10-CM: I10  ICD-9-CM: 401.1  10/21/2020 Yes                Review of Systems:   A comprehensive review of systems was negative except for that written in the HPI. Vital Signs:    Last 24hrs VS reviewed since prior progress note. Most recent are:  Visit Vitals  /69 (BP 1 Location: Left upper arm, BP Patient Position: At rest)   Pulse 68   Temp 96.9 °F (36.1 °C)   Resp 18   Ht 5' 11\" (1.803 m)   Wt 56.2 kg (123 lb 14.4 oz)   SpO2 100%   BMI 17.28 kg/m²         Intake/Output Summary (Last 24 hours) at 1/1/2022 9374  Last data filed at 1/1/2022 0510  Gross per 24 hour   Intake 480 ml   Output    Net 480 ml        Physical Examination:             General:          Alert, cooperative, no distress, appears stated age.     HEENT:           Atraumatic, anicteric sclerae, pink conjunctivae                          No oral ulcers, mucosa moist, throat clear, dentition fair  Neck:               Supple, symmetrical  Lungs:             Clear to auscultation bilaterally.  No Wheezing or Rhonchi. No rales. Chest wall:      No tenderness  No Accessory muscle use. Heart:              Regular  rhythm,  No  murmur   No edema  Abdomen:        Soft, non-tender. Not distended.  Bowel sounds normal  Extremities:     No cyanosis.  No clubbing,                            Skin turgor normal, Capillary refill normal  Skin:                Not pale.  Not Jaundiced  No rashes   Psych:             Not anxious or agitated.   Neurologic:      Alert, moves all extremities, answers questions appropriately and responds to commands a&ox2       Data Review:    Review and/or order of clinical lab test  Review and/or order of tests in the radiology section of CPT  Review and/or order of tests in the medicine section of CPT      Labs:     Recent Labs     12/30/21  1000   WBC 10.2   HGB 13.1   HCT 41.5        Recent Labs     12/30/21  1000 12/30/21  0400    142   K 4.6 4.2    110   CO2 16* 19*   BUN 28* 27*   CREA 1.40 1.30   * 113*   CA 9.3 8.8   PHOS  --  2.4*     Recent Labs     12/30/21  1000 12/30/21  0400   ALT 32  --    AP 92  --    TBILI 0.6  --    TP 6.8  --    ALB 3.6 3.1*   GLOB 3.2  --      Recent Labs     12/30/21  1000   APTT 22.4*      No results for input(s): FE, TIBC, PSAT, FERR in the last 72 hours. No results found for: FOL, RBCF   No results for input(s): PH, PCO2, PO2 in the last 72 hours. No results for input(s): CPK, CKNDX, TROIQ in the last 72 hours.     No lab exists for component: CPKMB  Lab Results   Component Value Date/Time    Cholesterol, total 95 12/27/2021 03:46 AM    HDL Cholesterol 47 12/27/2021 03:46 AM    LDL, calculated 38.2 12/27/2021 03:46 AM    Triglyceride 49 12/27/2021 03:46 AM    CHOL/HDL Ratio 2.0 12/27/2021 03:46 AM     Lab Results   Component Value Date/Time    Glucose (POC) 69 (L) 01/01/2022 07:43 AM    Glucose (POC) 314 (H) 12/31/2021 08:34 PM    Glucose (POC) 160 (H) 12/31/2021 05:45 PM    Glucose (POC) 73 12/31/2021 11:39 AM    Glucose (POC) 91 12/31/2021 07:54 AM     Lab Results   Component Value Date/Time    Color Yellow 12/26/2021 01:45 AM    Appearance Cloudy 12/26/2021 01:45 AM    Specific gravity 1.013 12/26/2021 01:45 AM    pH (UA) 5.0 12/26/2021 01:45 AM    Protein 30 (A) 12/26/2021 01:45 AM    Glucose 500 (A) 12/26/2021 01:45 AM    Ketone Trace (A) 12/26/2021 01:45 AM    Bilirubin Negative 12/26/2021 01:45 AM    Urobilinogen 0.2 12/26/2021 01:45 AM    Nitrites Negative 12/26/2021 01:45 AM    Leukocyte Esterase Trace (A) 12/26/2021 01:45 AM    Epithelial cells Few 12/26/2021 01:45 AM    Bacteria Negative (A) 12/26/2021 01:45 AM    WBC 10-20 12/26/2021 01:45 AM    RBC 0-5 12/26/2021 01:45 AM         Medications Reviewed:     Current Facility-Administered Medications   Medication Dose Route Frequency    insulin glargine (LANTUS) injection 20 Units  20 Units SubCUTAneous DAILY    losartan (COZAAR) tablet 25 mg  25 mg Oral DAILY    aspirin delayed-release tablet 81 mg  81 mg Oral DAILY    rosuvastatin (CRESTOR) tablet 10 mg  10 mg Oral QHS    insulin lispro (HUMALOG) injection   SubCUTAneous AC&HS    glucose chewable tablet 16 g  4 Tablet Oral PRN    glucagon (GLUCAGEN) injection 1 mg  1 mg IntraMUSCular PRN    dextrose (D50W) injection syrg 12.5-25 g  25-50 mL IntraVENous PRN    metoprolol succinate (TOPROL-XL) XL tablet 25 mg  25 mg Oral DAILY    cetirizine (ZYRTEC) tablet 10 mg  10 mg Oral DAILY    gabapentin (NEURONTIN) capsule 300 mg  300 mg Oral DAILY    sodium chloride (NS) flush 5-10 mL  5-10 mL IntraVENous PRN    dextrose (D50W) injection syrg 12.5 g  25 mL IntraVENous PRN    sodium chloride (NS) flush 5-40 mL  5-40 mL IntraVENous Q8H     ______________________________________________________________________  EXPECTED LENGTH OF STAY: 3d 19h  ACTUAL LENGTH OF STAY:          8                 Thomas Pedro MD

## 2022-01-01 NOTE — PROGRESS NOTES
0761- Pt provided with warm blankets.    0730- Pt provided with hot coffee   0745- Pt sat up for breakfast.   1100- Pt up with assistance to use urinal.   1400- Pt provided with hot coffee

## 2022-01-01 NOTE — PROGRESS NOTES
Problem: Pressure Injury - Risk of  Goal: *Prevention of pressure injury  Description: Document Giuliano Scale and appropriate interventions in the flowsheet. Outcome: Progressing Towards Goal  Note: Pressure Injury Interventions:  Sensory Interventions: Assess changes in LOC    Moisture Interventions: Absorbent underpads    Activity Interventions: Increase time out of bed    Mobility Interventions: HOB 30 degrees or less    Nutrition Interventions: Document food/fluid/supplement intake    Friction and Shear Interventions: Minimize layers                Problem: Patient Education: Go to Patient Education Activity  Goal: Patient/Family Education  Outcome: Progressing Towards Goal     Problem: Falls - Risk of  Goal: *Absence of Falls  Description: Document Beverly Fall Risk and appropriate interventions in the flowsheet.   Outcome: Progressing Towards Goal  Note: Fall Risk Interventions:  Mobility Interventions: Bed/chair exit alarm    Mentation Interventions: Bed/chair exit alarm    Medication Interventions: Bed/chair exit alarm    Elimination Interventions: Call light in reach    History of Falls Interventions: Bed/chair exit alarm,Room close to nurse's station

## 2022-01-01 NOTE — PROGRESS NOTES
Assumed care of patient    2005-patient up in RR. Back to bed. VSS. Assessment completed. No needs voiced. 2117-scheduled medications given per MAR. refusing snack. Scheduled insulin given. No needs voiced. CBWR.     0000-patient asleep. No signs of distress noted. CBWR.     0510-rounds. Patient asleep. Aroused easily to voice. PIV flushed. No other needs voiced. CBWR.

## 2022-01-02 LAB
GLUCOSE BLD STRIP.AUTO-MCNC: 104 MG/DL (ref 70–110)
GLUCOSE BLD STRIP.AUTO-MCNC: 118 MG/DL (ref 70–110)
GLUCOSE BLD STRIP.AUTO-MCNC: 35 MG/DL (ref 70–110)
GLUCOSE BLD STRIP.AUTO-MCNC: 36 MG/DL (ref 70–110)
GLUCOSE BLD STRIP.AUTO-MCNC: 69 MG/DL (ref 70–110)
GLUCOSE BLD STRIP.AUTO-MCNC: 81 MG/DL (ref 70–110)
GLUCOSE BLD STRIP.AUTO-MCNC: 97 MG/DL (ref 70–110)
PERFORMED BY, TECHID: ABNORMAL
PERFORMED BY, TECHID: NORMAL
SARS-COV-2, COV2NT: NOT DETECTED

## 2022-01-02 PROCEDURE — 74011250637 HC RX REV CODE- 250/637: Performed by: INTERNAL MEDICINE

## 2022-01-02 PROCEDURE — 65270000029 HC RM PRIVATE

## 2022-01-02 PROCEDURE — 74011250637 HC RX REV CODE- 250/637: Performed by: NURSE PRACTITIONER

## 2022-01-02 PROCEDURE — 82962 GLUCOSE BLOOD TEST: CPT

## 2022-01-02 PROCEDURE — 74011636637 HC RX REV CODE- 636/637: Performed by: INTERNAL MEDICINE

## 2022-01-02 RX ADMIN — LOSARTAN POTASSIUM 25 MG: 25 TABLET, FILM COATED ORAL at 08:27

## 2022-01-02 RX ADMIN — INSULIN GLARGINE 20 UNITS: 100 INJECTION, SOLUTION SUBCUTANEOUS at 08:27

## 2022-01-02 RX ADMIN — METOPROLOL SUCCINATE 25 MG: 25 TABLET, EXTENDED RELEASE ORAL at 08:27

## 2022-01-02 RX ADMIN — SODIUM CHLORIDE, PRESERVATIVE FREE 10 ML: 5 INJECTION INTRAVENOUS at 06:41

## 2022-01-02 RX ADMIN — ASPIRIN 81 MG: 81 TABLET, COATED ORAL at 08:27

## 2022-01-02 RX ADMIN — CETIRIZINE HYDROCHLORIDE 10 MG: 10 TABLET, FILM COATED ORAL at 08:27

## 2022-01-02 RX ADMIN — ROSUVASTATIN 10 MG: 10 TABLET, FILM COATED ORAL at 21:15

## 2022-01-02 RX ADMIN — GABAPENTIN 300 MG: 300 CAPSULE ORAL at 08:27

## 2022-01-02 NOTE — PROGRESS NOTES
1900- Report received from off going nurse. Assumed care of patient    0- Patient up to bathroom    2000- Assessment completed. Patient requested a cup of coffee. Provided patient with request. Patient back up to bathroom    2345- Up to bathroom. Patient placed brief and extra toliet paper in toilet. Used towel to wipe up stool. Used toilet paper placed on toilet paper dispenser and in floor. Cleaned up bathroom and advised patient that we will use the bedside commode    0030- patient requested coffee. Staff nurse provided. 0130- Patient requested number to dietary. Said he was going to call down there and put in his order for breakfast \" under his name\" when questioned about who's name he wanted to put his order under he stated his doctor. 0639- Patient called for more coffee. Told patient there was no more coffee until morning.

## 2022-01-02 NOTE — PROGRESS NOTES
Hospitalist Progress Note     INTERNAL MEDICINE PROGRESS NOTE  Patient: Shefali Bruno   YOB: 1965   MRN: [id number]      Hospital course / Assessment    Principle Problems:  DKA with diabetic coma, in setting type 1 DM on home insulin pump- coma now resolved  - severely acidodic poa, admitted to ICU, treated with IV insulin, sp Bicarb drip   - improved , converted to basal bolus, his pump at bedside is not functioning, will not be able to address here, he will need to be discharged on insulin, can follow with his endocrine after that  - will need closer follow up for his insulin pump on dc  - CT head w/o acute finding to explain his obtunded state, though there is subacute/chronic lacunar infarct present, Ammonia normal, UDS negative, normal Lytes,  cont' with ASA, Statin   NSTEMI  - s/p Heparin drip, cont ASA, BB, HI Statin   - NST Normal   Acute kidney injury  - treated with IVF support, avoid nephrotoxin, hold ARB  - Nephrology consulted, Improving Cr   Leukocytosis   - likely reactive due to DKA, resolved   Hypertension  - holding ARB, start on metoprolol , cont' monitor   Chronic Anemia   - Stable , cont' monitor     Code Status: Full code   DVT prophylaxis: pharmacologic and mechanical  Today Recommendation and Plan:   Cont' with current medical supportive measures   To [facility] Monday , CM following     Disposition    Disposition: [facility] Monday     Subjective / ROS:   Patient is more alert and oriented, t states he feel pretty good and  had no CP or SOB       Medical Decision Making   Chart, Images and Lab data reviewed, necessary medical Orders placed   Discussed with nursing staff     Vitals:    01/01/22 0055 01/01/22 0504 01/01/22 0715 01/01/22 1956   BP: 139/71  121/69 (!) 145/75   Pulse: 81  68 70   Resp: 18  18 18   Temp: 98 °F (36.7 °C)  96.9 °F (36.1 °C) 97.8 °F (36.6 °C)   SpO2: 99%  100% 100%   Weight:  56.2 kg (123 lb 14.4 oz)     Height:         Temp (24hrs), Av.4 °F (36.3 °C), Min:96.9 °F (36.1 °C), Max:97.8 °F (36.6 °C)    No intake or output data in the 24 hours ending 22 0707    Physical Exam:   General Appearance:   Appears in no acute distress.,  HEENT:   Moist oral mucous membranes, conjunctiva clear,   Neck:   Supple  Lungs: No wheezes. , No rales. , Normal respiratory effort,   Heart:   Regular rate and rhythm  Abdomen:   Soft , Non-distended and Non-tender,   Extremities:   no edema of legs  Neuro:   alert, oriented, moves all extremities well    Current medications:     Current Facility-Administered Medications   Medication Dose Route Frequency    ondansetron (ZOFRAN) injection 4 mg  4 mg IntraVENous Q6H PRN    insulin glargine (LANTUS) injection 20 Units  20 Units SubCUTAneous DAILY    losartan (COZAAR) tablet 25 mg  25 mg Oral DAILY    aspirin delayed-release tablet 81 mg  81 mg Oral DAILY    rosuvastatin (CRESTOR) tablet 10 mg  10 mg Oral QHS    insulin lispro (HUMALOG) injection   SubCUTAneous AC&HS    glucose chewable tablet 16 g  4 Tablet Oral PRN    glucagon (GLUCAGEN) injection 1 mg  1 mg IntraMUSCular PRN    dextrose (D50W) injection syrg 12.5-25 g  25-50 mL IntraVENous PRN    metoprolol succinate (TOPROL-XL) XL tablet 25 mg  25 mg Oral DAILY    cetirizine (ZYRTEC) tablet 10 mg  10 mg Oral DAILY    gabapentin (NEURONTIN) capsule 300 mg  300 mg Oral DAILY    sodium chloride (NS) flush 5-10 mL  5-10 mL IntraVENous PRN    dextrose (D50W) injection syrg 12.5 g  25 mL IntraVENous PRN    sodium chloride (NS) flush 5-40 mL  5-40 mL IntraVENous Q8H          Laboratory and Radiology Data :      No results found for: FERR  WBC   Date Value Ref Range Status   2021 10.2 4.6 - 13.2 K/uL Final     No results found for: EMBER  No results found for: UEO    Microbiology    No results found for: GMS    Recent Results (from the past 24 hour(s))   GLUCOSE, POC    Collection Time: 22  7:43 AM   Result Value Ref Range    Glucose (POC) 69 (L) 70 - 110 mg/dL    Performed by Buzz Inman, POC    Collection Time: 01/01/22 10:41 AM   Result Value Ref Range    Glucose (POC) 150 (H) 70 - 110 mg/dL    Performed by Buzz Inman, POC    Collection Time: 01/01/22  4:14 PM   Result Value Ref Range    Glucose (POC) 174 (H) 70 - 110 mg/dL    Performed by Buzz Inman, POC    Collection Time: 01/01/22  7:59 PM   Result Value Ref Range    Glucose (POC) 110 70 - 110 mg/dL    Performed by Gia Mena    GLUCOSE, POC    Collection Time: 01/02/22  4:08 AM   Result Value Ref Range    Glucose (POC) 118 (H) 70 - 110 mg/dL    Performed by Gia Mena        XR Results:  Results from Hospital Encounter encounter on 12/24/21    XR CHEST PORT    Narrative  EXAM:  AP Portable Chest X-ray 1 view    INDICATION: Altered mental status    COMPARISON: February 1, 2021    _______________    FINDINGS:  Heart and mediastinal contours are within normal limits for portable  radiograph. There is moderate emphysema. Increased reticular interstitial lung  markings are present. There is mild prominence of the central pulmonary  vasculature which may represent mild pulmonary edema. No focal airspace disease. There are no pleural effusions. No acute osseous findings. ________________    Impression  Moderate emphysema with suggestion of mild pulmonary edema. No focal  airspace disease or effusion. CT Results:  Results from Hospital Encounter encounter on 12/24/21    CT HEAD WO CONT    Narrative  EXAM: CT of the head    INDICATION: Unresponsive. COMPARISON: Head CT 12/10/2018. TECHNIQUE: Axial CT imaging of the head was performed without nonionic  intravenous contrast. Multiplanar reformats were generated. One or more dose reduction techniques were used on this CT: automated exposure  control, adjustment of the mAs and/or kVp according to patient size, and  iterative reconstruction techniques.   The specific techniques used on this CT  exam have been documented in the patient's electronic medical record.    _______________    FINDINGS:    BRAIN: No evidence of intracranial hemorrhage. Lobular area of low attenuation  involving the posterior limb right internal capsule and thalamus (up to  approximately 2.0 x 0.8 cm), not present at prior CT. Small areas of chronic encephalomalacia within the left frontal and temporal  lobes, present previously. CALVARIA: No fracture or suspicious bone lesion. OTHER: None.    _______________    Impression  1. Right-sided deep cerebral lacunar infarct, not present at 2018 comparison  CT. Finding is age indeterminate, though most likely subacute or chronic. 2. No clearly acute findings. MRI Results:  No results found for this or any previous visit. Nuclear Medicine Results:  No results found for this or any previous visit. US Results:  No results found for this or any previous visit. IR Results:  No results found for this or any previous visit. VAS/US Results:  No results found for this or any previous visit. Bettyann Barthel M.D.   Hospitalist

## 2022-01-02 NOTE — PROGRESS NOTES
Problem: Pressure Injury - Risk of  Goal: *Prevention of pressure injury  Description: Document Giuliano Scale and appropriate interventions in the flowsheet. Outcome: Progressing Towards Goal  Note: Pressure Injury Interventions:  Sensory Interventions: Assess changes in LOC    Moisture Interventions: Absorbent underpads    Activity Interventions: Increase time out of bed    Mobility Interventions: HOB 30 degrees or less    Nutrition Interventions: Document food/fluid/supplement intake    Friction and Shear Interventions: Minimize layers                Problem: Patient Education: Go to Patient Education Activity  Goal: Patient/Family Education  Outcome: Progressing Towards Goal     Problem: DKA: Day 1  Goal: Discharge Planning  Outcome: Progressing Towards Goal     Problem: Falls - Risk of  Goal: *Absence of Falls  Description: Document Beverly Fall Risk and appropriate interventions in the flowsheet. Outcome: Progressing Towards Goal  Note: Fall Risk Interventions:  Mobility Interventions: Bed/chair exit alarm    Mentation Interventions: Bed/chair exit alarm    Medication Interventions: Bed/chair exit alarm    Elimination Interventions: Call light in reach    History of Falls Interventions: Bed/chair exit alarm,Room close to nurse's station         Problem: Patient Education: Go to Patient Education Activity  Goal: Patient/Family Education  Outcome: Progressing Towards Goal     Problem:  Activity Intolerance  Goal: *Oxygen saturation during activity within specified parameters  Outcome: Progressing Towards Goal  Goal: *Able to remain out of bed as prescribed  Outcome: Progressing Towards Goal     Problem: Patient Education: Go to Patient Education Activity  Goal: Patient/Family Education  Outcome: Progressing Towards Goal     Problem: Nutrition Deficit  Goal: *Optimize nutritional status  Outcome: Progressing Towards Goal     Problem: General Medical Care Plan  Goal: *Vital signs within specified parameters  Outcome: Progressing Towards Goal  Goal: *Labs within defined limits  Outcome: Progressing Towards Goal  Goal: *Absence of infection signs and symptoms  Outcome: Progressing Towards Goal  Goal: *Optimal pain control at patient's stated goal  Outcome: Progressing Towards Goal  Goal: *Skin integrity maintained  Outcome: Progressing Towards Goal  Goal: *Fluid volume balance  Outcome: Progressing Towards Goal  Goal: *Optimize nutritional status  Outcome: Progressing Towards Goal  Goal: *Anxiety reduced or absent  Outcome: Progressing Towards Goal  Goal: *Progressive mobility and function (eg: ADL's)  Outcome: Progressing Towards Goal     Problem: Patient Education: Go to Patient Education Activity  Goal: Patient/Family Education  Outcome: Progressing Towards Goal     Problem: Patient Education: Go to Patient Education Activity  Goal: Patient/Family Education  Outcome: Progressing Towards Goal     Problem: Risk for Spread of Infection  Goal: Prevent transmission of infectious organism to others  Description: Prevent the transmission of infectious organisms to other patients, staff members, and visitors.   Outcome: Progressing Towards Goal     Problem: Patient Education:  Go to Education Activity  Goal: Patient/Family Education  Outcome: Progressing Towards Goal

## 2022-01-02 NOTE — PROGRESS NOTES
Comprehensive Nutrition Assessment    Type and Reason for Visit: reassessment/consult    Nutrition Recommendations/Plan: 4CHO choice cardiac restricted diet with glucerna BID    IF TF is started recommend glucerna 1.5 start 30 mL/hr increase 10 mL/hr every 8 hrs to a goal rate of 50 mL/hr with 150 mL water flush every 4 hrs to provide 1800 kcal, 100g protein, and 1812 mL water with flushes included. Nutrition Assessment:  50 yo male PMH: DM, hypercholesterolemia, HTN   underweight BMI18.2. Pt admitted due to BG greater than 1000 in DKA. Pt wife manages insulin pump but went out of town and pt did not manage his pump very well. Currently NPO due to lethargy. Also being treated for PNA  Hgb A1c is pending    12/28/2021. AMS has improved pt starting oral diet today diabetic cardiac start glucerna BID due to underweight BMI 17.45 and Hgb A1c is still pending. 12/29/2021 pt has started oral diet yesterday but is refusing to eat or will only eat bites or sips of soup. MD has placed nutrition consult for possible need of TF recommendations as pt is now hospital day 5 with inadequate nutrition due to 3 days of being NPO due to AMS with DKA. IF TF is started recommend glucerna 1.5 start 30 mL/hr increase 10 mL/hr every 8 hrs to a goal rate of 50 mL/hr with 150 mL water flush every 4 hrs to provide 1800 kcal, 100g protein, and 1812 mL water with flushes included. 1/2/2021 pt is more alert but remains confused. Was able to eat 100% of snacks hoping this will translate to improved meal intake as well. TF recommendations are above if PO intake does not translate to meals as well. Recent BM 1/1/2021. MD feels pt is unable to handle own insulin injections and dosing 2/2  Confusion so pt is waiting for discharge either LTC or with family member.        Recent Results (from the past 24 hour(s))   GLUCOSE, POC    Collection Time: 01/01/22 10:41 AM   Result Value Ref Range    Glucose (POC) 150 (H) 70 - 110 mg/dL Performed by Zeinab Whyte    GLUCOSE, POC    Collection Time: 01/01/22  4:14 PM   Result Value Ref Range    Glucose (POC) 174 (H) 70 - 110 mg/dL    Performed by Franky Lee, POC    Collection Time: 01/01/22  7:59 PM   Result Value Ref Range    Glucose (POC) 110 70 - 110 mg/dL    Performed by Roetta Cranker    GLUCOSE, POC    Collection Time: 01/02/22  4:08 AM   Result Value Ref Range    Glucose (POC) 118 (H) 70 - 110 mg/dL    Performed by Roetta Cranker    GLUCOSE, POC    Collection Time: 01/02/22  7:14 AM   Result Value Ref Range    Glucose (POC) 69 (L) 70 - 110 mg/dL    Performed by Rossi Gallegos        Malnutrition Assessment:  Malnutrition Status: Moderate malnutrition    Context:  Acute illness     Findings of the 6 clinical characteristics of malnutrition:   Energy Intake:  7 - 50% or less of est energy requirements for 5 or more days  Weight Loss:  Unable to assess (no new weight since admission)     Body Fat Loss:  No significant body fat loss, Triceps,Orbital,Fat overlying ribs,Buccal region   Muscle Mass Loss:  No significant muscle mass loss, Temples (temporalis),Thigh (quadriceps),Scapula (trapezius),Hand (interosseous),Clavicles (pectoralis & deltoids),Calf  Fluid Accumulation:  No significant fluid accumulation,     Strength:  Normal  strength         Estimated Daily Nutrient Needs:  Energy (kcal): 3309-5621 kcal/day; Weight Used for Energy Requirements: Admission (60 kg)  Protein (g): 60-72 g/day; Weight Used for Protein Requirements: Admission (1-1.2 g/kg)  Fluid (ml/day): 1325-9998 mL/day; Method Used for Fluid Requirements: 1 ml/kcal      Nutrition Related Findings:  underweight BMI18.2. Pt admitted due to BG greater than 1000 in DKA. Pt wife manages insulin pump but went out of town and pt did not manage his pump very well. Currently NPO due to lethargy.  Also being treated for PNA      Wounds:    None       Current Nutrition Therapies:  ADULT DIET Dysphagia - Soft & Bite Sized; 3 carb choices (45 gm/meal); Low Fat/Low Chol/High Fiber/REINIER  ADULT ORAL NUTRITION SUPPLEMENT Breakfast, Dinner; Diabetic Supplement    Anthropometric Measures:  · Height:  5' 11\" (180.3 cm)  · Current Body Wt:  59 kg (130 lb)   · Admission Body Wt:  130 lb    · Usual Body Wt:        · Ideal Body Wt:  172 lbs:  75.6 %   · Adjusted Body Weight:   ; Weight Adjustment for: No adjustment   · Adjusted BMI:       · BMI Category:  Underweight (BMI less than 18.5)       Nutrition Diagnosis:   · Predicted inadequate energy intake related to cognitive or neurological impairment as evidenced by NPO or clear liquid status due to medical condition      Nutrition Interventions:   Food and/or Nutrient Delivery: Continue NPO (when safe recommend 4CHO choice cardiac diet)  Nutrition Education and Counseling: No recommendations at this time,Education not appropriate  Coordination of Nutrition Care: Continue to monitor while inpatient    Goals:  BG , Hgb A1c < 7, Pt to start oral diet with in 3 days or TF will be considered, BM every 1-3 days       Nutrition Monitoring and Evaluation:   Behavioral-Environmental Outcomes:    Food/Nutrient Intake Outcomes: Food and nutrient intake,Diet advancement/tolerance  Physical Signs/Symptoms Outcomes: Biochemical data,Meal time behavior,Weight     F/u: 1/6/2021    Discharge Planning:     Too soon to determine     Electronically signed by César Galindo on 1/2/2022 at 10:32 AM    Contact: ROSIE [phone]

## 2022-01-02 NOTE — PROGRESS NOTES
9385- bedside shift report completed and care of the patient assumed    0830- scheduled medications administered    1030- up in bathroom    1044- refusing to come out of the bathroom sitting on toilet with shower running, says the bathroom is warmer than the bed    1049- out of bathroom sitting in chair in room    1224- up on side of the bed eating lunch    1440- resting in bed with respirations noted

## 2022-01-03 VITALS
RESPIRATION RATE: 16 BRPM | DIASTOLIC BLOOD PRESSURE: 67 MMHG | WEIGHT: 118.9 LBS | BODY MASS INDEX: 16.65 KG/M2 | TEMPERATURE: 97.4 F | OXYGEN SATURATION: 98 % | HEART RATE: 70 BPM | HEIGHT: 71 IN | SYSTOLIC BLOOD PRESSURE: 99 MMHG

## 2022-01-03 LAB
GLUCOSE BLD STRIP.AUTO-MCNC: 146 MG/DL (ref 70–110)
GLUCOSE BLD STRIP.AUTO-MCNC: 165 MG/DL (ref 70–110)
GLUCOSE BLD STRIP.AUTO-MCNC: 248 MG/DL (ref 70–110)
PERFORMED BY, TECHID: ABNORMAL

## 2022-01-03 PROCEDURE — 74011636637 HC RX REV CODE- 636/637: Performed by: INTERNAL MEDICINE

## 2022-01-03 PROCEDURE — 74011250637 HC RX REV CODE- 250/637: Performed by: INTERNAL MEDICINE

## 2022-01-03 PROCEDURE — 82962 GLUCOSE BLOOD TEST: CPT

## 2022-01-03 PROCEDURE — 74011000250 HC RX REV CODE- 250: Performed by: NURSE PRACTITIONER

## 2022-01-03 PROCEDURE — 74011250637 HC RX REV CODE- 250/637: Performed by: NURSE PRACTITIONER

## 2022-01-03 RX ORDER — METOPROLOL SUCCINATE 25 MG/1
25 TABLET, EXTENDED RELEASE ORAL DAILY
Qty: 30 TABLET | Refills: 5 | Status: SHIPPED | OUTPATIENT
Start: 2022-01-03 | End: 2022-01-18

## 2022-01-03 RX ORDER — ROSUVASTATIN CALCIUM 10 MG/1
10 TABLET, COATED ORAL
Qty: 30 TABLET | Refills: 5 | Status: SHIPPED | OUTPATIENT
Start: 2022-01-03

## 2022-01-03 RX ORDER — INSULIN LISPRO 100 [IU]/ML
6 INJECTION, SOLUTION INTRAVENOUS; SUBCUTANEOUS
Qty: 1 EACH | Refills: 2 | Status: ON HOLD | OUTPATIENT
Start: 2022-01-03 | End: 2022-01-17

## 2022-01-03 RX ORDER — GABAPENTIN 300 MG/1
300 CAPSULE ORAL 3 TIMES DAILY
Qty: 90 CAPSULE | Refills: 1 | Status: SHIPPED | OUTPATIENT
Start: 2022-01-03

## 2022-01-03 RX ORDER — ASPIRIN 81 MG/1
81 TABLET ORAL DAILY
Qty: 30 TABLET | Refills: 5 | Status: SHIPPED | OUTPATIENT
Start: 2022-01-03

## 2022-01-03 RX ADMIN — LOSARTAN POTASSIUM 25 MG: 25 TABLET, FILM COATED ORAL at 08:38

## 2022-01-03 RX ADMIN — CETIRIZINE HYDROCHLORIDE 10 MG: 10 TABLET, FILM COATED ORAL at 08:38

## 2022-01-03 RX ADMIN — METOPROLOL SUCCINATE 25 MG: 25 TABLET, EXTENDED RELEASE ORAL at 08:38

## 2022-01-03 RX ADMIN — GABAPENTIN 300 MG: 300 CAPSULE ORAL at 08:38

## 2022-01-03 RX ADMIN — ASPIRIN 81 MG: 81 TABLET, COATED ORAL at 08:38

## 2022-01-03 RX ADMIN — SODIUM CHLORIDE, PRESERVATIVE FREE 10 ML: 5 INJECTION INTRAVENOUS at 06:09

## 2022-01-03 RX ADMIN — INSULIN GLARGINE 20 UNITS: 100 INJECTION, SOLUTION SUBCUTANEOUS at 08:38

## 2022-01-03 NOTE — PROGRESS NOTES
7424- bedside shift report completed and care of the patient assumed    [phone]- scheduled medication administered, patient sitting on the side of the bed talking on the phone and eating breakfast, eager to go to EP today    1020- report called to Yamil at [address] [place] [address] Avenue- taken to EP in wheelchair

## 2022-01-03 NOTE — DISCHARGE SUMMARY
Discharge Summary       Patient ID:  Jake Quevedo,   49 y.o., male  1965    PCP:  Claudine Rausch MD    Admit Date: 12/24/2021  7:14 PM  Discharge Date:  No discharge date for patient encounter. Length of stay: 10 day(s)  Code Status: Full Code  Discharging physician: Roge Ruano MD    Chief Complaint   Patient presents with    High Blood Sugar       Discharge Medications  Current Discharge Medication List      START taking these medications    Details   metoprolol succinate (TOPROL-XL) 25 mg XL tablet Take 1 Tablet by mouth daily. Qty: 30 Tablet, Refills: 5  Start date: 1/3/2022      rosuvastatin (CRESTOR) 10 mg tablet Take 1 Tablet by mouth nightly. Qty: 30 Tablet, Refills: 5  Start date: 1/3/2022      aspirin delayed-release 81 mg tablet Take 1 Tablet by mouth daily. Qty: 30 Tablet, Refills: 5  Start date: 1/3/2022      gabapentin (NEURONTIN) 300 mg capsule Take 1 Capsule by mouth three (3) times daily. Max Daily Amount: 900 mg. Qty: 90 Capsule, Refills: 1  Start date: 1/3/2022    Associated Diagnoses: Diabetic peripheral neuropathy (HCC)      insulin glargine (Lantus Solostar U-100 Insulin) 100 unit/mL (3 mL) inpn 20 Units by SubCUTAneous route daily. Give 1 month supply, to include any needed needles. Diagnosis- DM type1 E10. 9  Indications: type 1 diabetes mellitus  Qty: 5 Adjustable Dose Pre-filled Pen Syringe, Refills: 0  Start date: 12/31/2021         CONTINUE these medications which have CHANGED    Details   insulin lispro (HUMALOG) 100 unit/mL injection 6 Units by SubCUTAneous route Before breakfast, lunch, dinner and at bedtime. Qty: 1 Each, Refills: 2  Start date: 1/3/2022         CONTINUE these medications which have NOT CHANGED    Details   cetirizine (ZYRTEC) 10 mg tablet TAKE 1 TABLET EVERY DAY BY ORAL ROUTE.    Qty: 90 Tab, Refills: 0         STOP taking these medications       losartan (COZAAR) 50 mg tablet Comments:   Reason for Stopping:         traZODone (DESYREL) 50 mg tablet Comments:   Reason for Stopping:         gabapentin (NEURONTIN) 800 mg tablet Comments:   Reason for Stopping:                 Reason For admission    Active Problems:    Benign hypertension (10/21/2020)      DKA (diabetic ketoacidosis) ([facility] [address] (12/24/2021)      Dehydration (12/24/2021)      ROOSEVELT (acute kidney injury) ([facility] [address] (12/24/2021)      Elevated troponin (12/25/2021)      Severe protein-calorie malnutrition ([facility] [address] (12/29/2021)         HPI on Admission (per admitting physician):   Jodi Brooks is a 49 y.o. male  has a past medical history of Diabetes ([facility] [address] Hypercholesterolemia, and Hypertension. Patient seen at bedside. Patient came to the emergency room tonight for elevated blood sugar. It was unmanageable on the blood sugar monitor. Serum blood test showed over thousand patient had an increased anion gap and was in DKA. Insulin drip was ordered in the ED IV fluids. ER doctor treated for pneumonia with Zosyn. The radiologist read of the chest x-ray and my personal read of the x-ray does not see an infiltrate I will not continue Zosyn for pneumonia. Patient was placed in the ICU on an insulin drip BNP will be ordered every 4 hours. IV fluid ordered patient is dehydrated. Patient has not been managing his insulin pump well as his wife is out of town. His wife normally helps him with his insulin pump. Patient does not complain of any pain. His only complaint is being thirsty. Patient's troponin is elevated he does not complain of any chest pain. EKG in the emergency room showed tachycardia but no other acute findings. I will place a cardiology consult and cycle troponin every 6. Patient has also ROOSEVELT nephrology consult placed. Case management consult will be placed to assist patient with discharge planning and management of insulin pump.     Hospital Course and Discharge Diagnosis   The patient admitted for the following Principal Medical Problem:   DKA with diabetic coma, in setting type 1 DM on home insulin pump- coma - RESOLVED   - severely acidodic poa, admitted to ICU, treated with IV insulin, sp Bicarb drip   - improved , converted to basal bolus, his pump at bedside is not functioning, will not be able to address here, he will need to be discharged on insulin, can follow with his endocrine after that to resume insulin pump   - will need closer follow up for his insulin pump on dc  Confusion  - resolved , back to baseline   - CT head w/o acute finding to explain his obtunded state, though there is subacute/chronic lacunar infarct present, Ammonia normal, UDS negative, normal Lytes,  cont' with ASA, Statin  NSTEMI  - s/p Heparin drip, cont ASA, BB, HI Statin   - NST Normal   Acute kidney injury  - treated with IVF support, avoid nephrotoxin, d/c ARB, RESOLVED  Leukocytosis   - likely reactive due to DKA, RESOLVED  Hypertension  - stop  ARB, start on metoprolol   Chronic Anemia   - Stable  Neuropathy  - cont' with gabapentin     The patient condition became clinically stable and No new complain or complication during the hospital course. The Medication changes discussed with the patient and family on the discharge    Condition at discharge   Afebrile  Ambulating  Eating, Drinking, Voiding  Stable      Physical Exam on Discharge:  Visit Vitals  BP 99/67 (BP 1 Location: Left upper arm, BP Patient Position: At rest;Sitting)   Pulse 70   Temp 97.4 °F (36.3 °C)   Resp 16   Ht 5' 11\" (1.803 m)   Wt 53.9 kg (118 lb 14.4 oz)   SpO2 98%   BMI 16.58 kg/m²       General Appearance:   Appears in no acute distress. , Sitting up.,   Skin:   Skin warm & dry, No rash, No jaundice,   Lymph: There is no lymphadenopathy,   HEENT:   PERRLA, EOMI, Moist oral mucous membranes, conjunctiva clear,   Neck:   Supple, Without masses,   Lungs:   Clear, No wheezes. , No rales. , Normal respiratory effort,   Heart:   Regular rate and rhythm, No gallop,   Abdomen:   Soft , Non-distended, Normal bowel sounds and Non-tender,   Extremities:  no edema of legs, Normal pedal and radial pulses,   Neuro:    alert, oriented, affect appropriate, speech fluent, cranial nerves intact, no focal neurological deficits and moves all extremities well    Procedures:    Discharge Procedure Orders   REFERRAL TO HOME HEALTH   Referral Priority: Routine Referral Type: Home Health Evaluation   Referral Reason: Continuity of Care   Number of Visits Requested: 1        Consultants/Treatment Team:    Treatment Team: Attending Provider: Juan Newell MD; Consulting Provider: Kiran Pacheco MD; Consulting Provider: Trish Carmichael MD; Consulting Provider: Alyssa Palencia MD; Consulting Provider: Kamron Alberto NP; Consulting Provider: Isabel Tran MD; Staff Nurse: Taina Chavez; Charge Nurse: Titi Fraser    Disposition:    [facility] (SNF)    Follow Up   Diana Bell MD    Most Recent Labs:  Recent Results (from the past 24 hour(s))   GLUCOSE, POC    Collection Time: 01/02/22 11:02 AM   Result Value Ref Range    Glucose (POC) 97 70 - 110 mg/dL    Performed by Kyrie Jarrell, POC    Collection Time: 01/02/22  3:42 PM   Result Value Ref Range    Glucose (POC) 81 70 - 110 mg/dL    Performed by Kyrie Jarrell, POC    Collection Time: 01/02/22  7:50 PM   Result Value Ref Range    Glucose (POC) 35 (LL) 70 - 110 mg/dL    Performed by Geri Foote, POC    Collection Time: 01/02/22  7:54 PM   Result Value Ref Range    Glucose (POC) 36 (LL) 70 - 110 mg/dL    Performed by Geri Foote, POC    Collection Time: 01/02/22  8:42 PM   Result Value Ref Range    Glucose (POC) 104 70 - 110 mg/dL    Performed by Geri Foote, POC    Collection Time: 01/03/22 12:25 AM   Result Value Ref Range    Glucose (POC) 146 (H) 70 - 110 mg/dL    Performed by Geri Foote, POC    Collection Time: 01/03/22  3:36 AM   Result Value Ref Range    Glucose (POC) 248 (H) 70 - 110 mg/dL    Performed by Michaela Bernard POC    Collection Time: 01/03/22  7:43 AM   Result Value Ref Range    Glucose (POC) 165 (H) 70 - 110 mg/dL    Performed by Anni Foote      No results for input(s): BUN, NA, POTASSIUM, CHLORIDE, CO2 in the last 72 hours. No lab exists for component: GLUCOSE, CALCIUM, CREAT  No results for input(s): WBC, RBC, HCT, MCV, MCH, MCHC, RDW, HCTEXT, HCTEXT in the last 72 hours. No lab exists for component: HEMOGLOBIN, PLATELET    XR Results:  Results from Hospital Encounter encounter on 12/24/21    XR CHEST PORT    Narrative  EXAM:  AP Portable Chest X-ray 1 view    INDICATION: Altered mental status    COMPARISON: February 1, 2021    _______________    FINDINGS:  Heart and mediastinal contours are within normal limits for portable  radiograph. There is moderate emphysema. Increased reticular interstitial lung  markings are present. There is mild prominence of the central pulmonary  vasculature which may represent mild pulmonary edema. No focal airspace disease. There are no pleural effusions. No acute osseous findings. ________________    Impression  Moderate emphysema with suggestion of mild pulmonary edema. No focal  airspace disease or effusion. CT Results:  Results from Hospital Encounter encounter on 12/24/21    CT HEAD WO CONT    Narrative  EXAM: CT of the head    INDICATION: Unresponsive. COMPARISON: Head CT 12/10/2018. TECHNIQUE: Axial CT imaging of the head was performed without nonionic  intravenous contrast. Multiplanar reformats were generated. One or more dose reduction techniques were used on this CT: automated exposure  control, adjustment of the mAs and/or kVp according to patient size, and  iterative reconstruction techniques. The specific techniques used on this CT  exam have been documented in the patient's electronic medical record.    _______________    FINDINGS:    BRAIN: No evidence of intracranial hemorrhage.  Lobular area of low attenuation  involving the posterior limb right internal capsule and thalamus (up to  approximately 2.0 x 0.8 cm), not present at prior CT. Small areas of chronic encephalomalacia within the left frontal and temporal  lobes, present previously. CALVARIA: No fracture or suspicious bone lesion. OTHER: None.    _______________    Impression  1. Right-sided deep cerebral lacunar infarct, not present at 2018 comparison  CT. Finding is age indeterminate, though most likely subacute or chronic. 2. No clearly acute findings. MRI Results:  No results found for this or any previous visit. Nuclear Medicine Results:  No results found for this or any previous visit.         Alessandro Gonzalez MD   Hospitalist

## 2022-01-03 NOTE — PROGRESS NOTES
Problem: Pressure Injury - Risk of  Goal: *Prevention of pressure injury  Description: Document Giuliano Scale and appropriate interventions in the flowsheet. Outcome: Progressing Towards Goal  Note: Pressure Injury Interventions:  Sensory Interventions: Assess changes in LOC,Minimize linen layers    Moisture Interventions: Absorbent underpads,Minimize layers    Activity Interventions: Increase time out of bed    Mobility Interventions: Float heels,HOB 30 degrees or less    Nutrition Interventions: Document food/fluid/supplement intake,Offer support with meals,snacks and hydration    Friction and Shear Interventions: Minimize layers,Transferring/repositioning devices                Problem: Patient Education: Go to Patient Education Activity  Goal: Patient/Family Education  Outcome: Progressing Towards Goal     Problem: DKA: Day 1  Goal: Discharge Planning  Outcome: Progressing Towards Goal     Problem: Falls - Risk of  Goal: *Absence of Falls  Description: Document Beverly Fall Risk and appropriate interventions in the flowsheet. Outcome: Progressing Towards Goal  Note: Fall Risk Interventions:  Mobility Interventions: Bed/chair exit alarm,Patient to call before getting OOB,Utilize walker, cane, or other assistive device    Mentation Interventions: Bed/chair exit alarm    Medication Interventions: Bed/chair exit alarm,Patient to call before getting OOB    Elimination Interventions: Call light in reach,Toileting schedule/hourly rounds    History of Falls Interventions: Room close to nurse's station         Problem: Patient Education: Go to Patient Education Activity  Goal: Patient/Family Education  Outcome: Progressing Towards Goal     Problem:  Activity Intolerance  Goal: *Oxygen saturation during activity within specified parameters  Outcome: Progressing Towards Goal  Goal: *Able to remain out of bed as prescribed  Outcome: Progressing Towards Goal     Problem: Patient Education: Go to Patient Education Activity  Goal: Patient/Family Education  Outcome: Progressing Towards Goal     Problem: Nutrition Deficit  Goal: *Optimize nutritional status  Outcome: Progressing Towards Goal     Problem: General Medical Care Plan  Goal: *Vital signs within specified parameters  Outcome: Progressing Towards Goal  Goal: *Labs within defined limits  Outcome: Progressing Towards Goal  Goal: *Absence of infection signs and symptoms  Outcome: Progressing Towards Goal  Goal: *Optimal pain control at patient's stated goal  Outcome: Progressing Towards Goal  Goal: *Skin integrity maintained  Outcome: Progressing Towards Goal  Goal: *Fluid volume balance  Outcome: Progressing Towards Goal  Goal: *Optimize nutritional status  Outcome: Progressing Towards Goal  Goal: *Anxiety reduced or absent  Outcome: Progressing Towards Goal  Goal: *Progressive mobility and function (eg: ADL's)  Outcome: Progressing Towards Goal     Problem: Patient Education: Go to Patient Education Activity  Goal: Patient/Family Education  Outcome: Progressing Towards Goal     Problem: Patient Education: Go to Patient Education Activity  Goal: Patient/Family Education  Outcome: Progressing Towards Goal     Problem: Risk for Spread of Infection  Goal: Prevent transmission of infectious organism to others  Description: Prevent the transmission of infectious organisms to other patients, staff members, and visitors.   Outcome: Progressing Towards Goal     Problem: Patient Education:  Go to Education Activity  Goal: Patient/Family Education  Outcome: Progressing Towards Goal

## 2022-01-03 NOTE — PROGRESS NOTES
1900 - Shift report received from [address] [place] [address] - Patient lethargic. POC glucose 35, repeat POC glucose 36. Patient drank apple juice with two sugars. 2042 - POC glucose 104. Patient awake, alert, and interacting with nursing staff. 2100 - Vital signs assessed and WDL    2115 - Scheduled medication administered. CBWR.   6289 - Patient assisted to stand at bedside and use urinal. Clear yellow urine voided. Patient back in bed with warm blankets applied. 0025 - POC glucose 146. Vital signs assessed and WDL.     0100 - Patient given peanut butter crackers. Refused offer of fluids. 0230 - urinal emptied of clear yellow urine    0400 - patient confused, making remarks regarding leaving hospital to go home with father. Patient request for diet sprite.      0630 - IV flushed

## 2022-01-04 ENCOUNTER — HOSPITAL ENCOUNTER (OUTPATIENT)
Dept: LAB | Age: 57
Discharge: HOME | End: 2022-01-04
Payer: MEDICARE

## 2022-01-04 LAB — GLUCOSE SERPL-MCNC: 42 MG/DL (ref 70–110)

## 2022-01-04 PROCEDURE — 36415 COLL VENOUS BLD VENIPUNCTURE: CPT

## 2022-01-04 PROCEDURE — 82947 ASSAY GLUCOSE BLOOD QUANT: CPT

## 2022-01-11 ENCOUNTER — HOSPITAL ENCOUNTER (OUTPATIENT)
Dept: LAB | Age: 57
Discharge: HOME | End: 2022-01-11
Payer: MEDICARE

## 2022-01-11 LAB
C DIFF GDH STL QL: POSITIVE
C DIFF TOX A+B STL QL IA: NEGATIVE
INTERPRETATION: ABNORMAL
PCR REFLEX: ABNORMAL

## 2022-01-11 PROCEDURE — 87324 CLOSTRIDIUM AG IA: CPT

## 2022-01-11 PROCEDURE — 87493 C DIFF AMPLIFIED PROBE: CPT

## 2022-01-12 ENCOUNTER — HOSPITAL ENCOUNTER (OUTPATIENT)
Dept: LAB | Age: 57
Discharge: HOME | End: 2022-01-12
Payer: MEDICARE

## 2022-01-12 LAB
ALBUMIN SERPL-MCNC: 3.6 G/DL (ref 3.5–4.7)
ALBUMIN/GLOB SERPL: 1.2 {RATIO}
ALP SERPL-CCNC: 108 U/L (ref 38–126)
ALT SERPL-CCNC: 48 U/L (ref 3–72)
ANION GAP SERPL CALC-SCNC: 10 MMOL/L
AST SERPL W P-5'-P-CCNC: 36 U/L (ref 17–74)
BASOPHILS # BLD: 0.1 K/UL (ref 0–0.1)
BASOPHILS NFR BLD: 1 % (ref 0–2)
BILIRUB SERPL-MCNC: 0.7 MG/DL (ref 0.2–1)
BUN SERPL-MCNC: 35 MG/DL (ref 9–21)
BUN/CREAT SERPL: 25
CA-I BLD-MCNC: 8.6 MG/DL (ref 8.5–10.5)
CHLORIDE SERPL-SCNC: 98 MMOL/L (ref 94–111)
CO2 SERPL-SCNC: 22 MMOL/L (ref 21–33)
CREAT SERPL-MCNC: 1.4 MG/DL (ref 0.8–1.5)
DIFFERENTIAL METHOD BLD: ABNORMAL
EOSINOPHIL # BLD: 0.1 K/UL (ref 0–0.4)
EOSINOPHIL NFR BLD: 1 % (ref 0–5)
ERYTHROCYTE [DISTWIDTH] IN BLOOD BY AUTOMATED COUNT: 13.5 % (ref 11.6–14.5)
GLOBULIN SER CALC-MCNC: 3 G/DL
GLUCOSE SERPL-MCNC: 501 MG/DL (ref 70–110)
HCT VFR BLD AUTO: 36.8 % (ref 36–48)
HGB BLD-MCNC: 11.5 G/DL (ref 13–16)
IMM GRANULOCYTES # BLD AUTO: 0 K/UL (ref 0–0.04)
IMM GRANULOCYTES NFR BLD AUTO: 0 % (ref 0–0.5)
LYMPHOCYTES # BLD: 1.5 K/UL (ref 0.9–3.6)
LYMPHOCYTES NFR BLD: 18 % (ref 21–52)
MCH RBC QN AUTO: 29 PG (ref 24–34)
MCHC RBC AUTO-ENTMCNC: 31.3 G/DL (ref 31–37)
MCV RBC AUTO: 92.9 FL (ref 78–100)
MONOCYTES # BLD: 0.7 K/UL (ref 0.05–1.2)
MONOCYTES NFR BLD: 8 % (ref 3–10)
NEUTS SEG # BLD: 6.2 K/UL (ref 1.8–8)
NEUTS SEG NFR BLD: 72 % (ref 40–73)
NRBC # BLD: 0 K/UL (ref 0–0.01)
NRBC BLD-RTO: 0 PER 100 WBC
PLATELET # BLD AUTO: 239 K/UL (ref 135–420)
PMV BLD AUTO: 12.3 FL (ref 9.2–11.8)
POTASSIUM SERPL-SCNC: 5.1 MMOL/L (ref 3.2–5.1)
PROT SERPL-MCNC: 6.6 G/DL (ref 6.1–8.4)
RBC # BLD AUTO: 3.96 M/UL (ref 4.35–5.65)
SODIUM SERPL-SCNC: 130 MMOL/L (ref 135–145)
WBC # BLD AUTO: 8.6 K/UL (ref 4.6–13.2)

## 2022-01-12 PROCEDURE — 85025 COMPLETE CBC W/AUTO DIFF WBC: CPT

## 2022-01-12 PROCEDURE — 80053 COMPREHEN METABOLIC PANEL: CPT

## 2022-01-12 PROCEDURE — 36415 COLL VENOUS BLD VENIPUNCTURE: CPT

## 2022-01-16 ENCOUNTER — HOSPITAL ENCOUNTER (OUTPATIENT)
Age: 57
Setting detail: OBSERVATION
Discharge: NURSING, REHAB OR LONG-TERM CARE | End: 2022-01-18
Attending: INTERNAL MEDICINE | Admitting: INTERNAL MEDICINE
Payer: MEDICARE

## 2022-01-16 ENCOUNTER — APPOINTMENT (OUTPATIENT)
Dept: CT IMAGING | Age: 57
End: 2022-01-16
Attending: INTERNAL MEDICINE
Payer: MEDICARE

## 2022-01-16 ENCOUNTER — APPOINTMENT (OUTPATIENT)
Dept: GENERAL RADIOLOGY | Age: 57
End: 2022-01-16
Attending: INTERNAL MEDICINE
Payer: MEDICARE

## 2022-01-16 DIAGNOSIS — R55 SYNCOPE AND COLLAPSE: Primary | ICD-10-CM

## 2022-01-16 LAB
ALBUMIN SERPL-MCNC: 3.5 G/DL (ref 3.5–4.7)
ALBUMIN/GLOB SERPL: 1 {RATIO}
ALP SERPL-CCNC: 89 U/L (ref 38–126)
ALT SERPL-CCNC: 33 U/L (ref 3–72)
ANION GAP SERPL CALC-SCNC: 10 MMOL/L
APTT PPP: 32.3 SEC (ref 23–36.4)
APTT PPP: >180 SEC (ref 23–36.4)
AST SERPL W P-5'-P-CCNC: 29 U/L (ref 17–74)
BASOPHILS # BLD: 0.1 K/UL (ref 0–0.1)
BASOPHILS NFR BLD: 1 % (ref 0–2)
BILIRUB DIRECT SERPL-MCNC: 0.1 MG/DL (ref 0–0.3)
BILIRUB SERPL-MCNC: 0.6 MG/DL (ref 0.2–1)
BUN SERPL-MCNC: 35 MG/DL (ref 9–21)
BUN/CREAT SERPL: 19
CA-I BLD-MCNC: 9 MG/DL (ref 8.5–10.5)
CHLORIDE SERPL-SCNC: 102 MMOL/L (ref 94–111)
CO2 SERPL-SCNC: 26 MMOL/L (ref 21–33)
COVID-19 RAPID TEST, COVR: NOT DETECTED
CREAT SERPL-MCNC: 1.8 MG/DL (ref 0.8–1.5)
D DIMER PPP FEU-MCNC: 1.6 UG/ML(FEU)
DIFFERENTIAL METHOD BLD: ABNORMAL
EOSINOPHIL # BLD: 0.2 K/UL (ref 0–0.4)
EOSINOPHIL NFR BLD: 2 % (ref 0–5)
ERYTHROCYTE [DISTWIDTH] IN BLOOD BY AUTOMATED COUNT: 14 % (ref 11.6–14.5)
GLOBULIN SER CALC-MCNC: 3.4 G/DL
GLUCOSE BLD STRIP.AUTO-MCNC: 114 MG/DL (ref 70–110)
GLUCOSE BLD STRIP.AUTO-MCNC: 129 MG/DL (ref 70–110)
GLUCOSE BLD STRIP.AUTO-MCNC: 144 MG/DL (ref 70–110)
GLUCOSE SERPL-MCNC: 153 MG/DL (ref 70–110)
HCT VFR BLD AUTO: 39.5 % (ref 36–48)
HGB BLD-MCNC: 12.2 G/DL (ref 13–16)
IMM GRANULOCYTES # BLD AUTO: 0 K/UL (ref 0–0.04)
IMM GRANULOCYTES NFR BLD AUTO: 0 % (ref 0–0.5)
LACTATE SERPL-SCNC: 1.5 MMOL/L (ref 0.5–2)
LACTATE SERPL-SCNC: 3.5 MMOL/L (ref 0.5–2)
LIPASE SERPL-CCNC: 22 U/L (ref 10–57)
LYMPHOCYTES # BLD: 2.5 K/UL (ref 0.9–3.6)
LYMPHOCYTES NFR BLD: 28 % (ref 21–52)
MAGNESIUM SERPL-MCNC: 2.1 MG/DL (ref 1.7–2.8)
MCH RBC QN AUTO: 29.2 PG (ref 24–34)
MCHC RBC AUTO-ENTMCNC: 30.9 G/DL (ref 31–37)
MCV RBC AUTO: 94.5 FL (ref 78–100)
MONOCYTES # BLD: 0.7 K/UL (ref 0.05–1.2)
MONOCYTES NFR BLD: 8 % (ref 3–10)
NEUTS SEG # BLD: 5.4 K/UL (ref 1.8–8)
NEUTS SEG NFR BLD: 61 % (ref 40–73)
NRBC # BLD: 0 K/UL (ref 0–0.01)
NRBC BLD-RTO: 0 PER 100 WBC
PERFORMED BY, TECHID: ABNORMAL
PLATELET # BLD AUTO: 261 K/UL (ref 135–420)
PMV BLD AUTO: 12.5 FL (ref 9.2–11.8)
POTASSIUM SERPL-SCNC: 4.3 MMOL/L (ref 3.2–5.1)
PROT SERPL-MCNC: 6.9 G/DL (ref 6.1–8.4)
RBC # BLD AUTO: 4.18 M/UL (ref 4.35–5.65)
SARS-COV-2, COV2: NORMAL
SODIUM SERPL-SCNC: 138 MMOL/L (ref 135–145)
SPECIMEN SOURCE: NORMAL
THERAPEUTIC RANGE,PTTT: ABNORMAL SEC (ref 82–109)
THERAPEUTIC RANGE,PTTT: NORMAL SEC (ref 82–109)
TROPONIN I SERPL-MCNC: <0.02 NG/ML (ref 0.02–0.05)
WBC # BLD AUTO: 8.9 K/UL (ref 4.6–13.2)

## 2022-01-16 PROCEDURE — 74011636637 HC RX REV CODE- 636/637: Performed by: NURSE PRACTITIONER

## 2022-01-16 PROCEDURE — 83605 ASSAY OF LACTIC ACID: CPT

## 2022-01-16 PROCEDURE — G0378 HOSPITAL OBSERVATION PER HR: HCPCS

## 2022-01-16 PROCEDURE — 87635 SARS-COV-2 COVID-19 AMP PRB: CPT

## 2022-01-16 PROCEDURE — 85730 THROMBOPLASTIN TIME PARTIAL: CPT

## 2022-01-16 PROCEDURE — 85025 COMPLETE CBC W/AUTO DIFF WBC: CPT

## 2022-01-16 PROCEDURE — 99285 EMERGENCY DEPT VISIT HI MDM: CPT

## 2022-01-16 PROCEDURE — 93005 ELECTROCARDIOGRAM TRACING: CPT

## 2022-01-16 PROCEDURE — 96365 THER/PROPH/DIAG IV INF INIT: CPT

## 2022-01-16 PROCEDURE — 96366 THER/PROPH/DIAG IV INF ADDON: CPT

## 2022-01-16 PROCEDURE — 80048 BASIC METABOLIC PNL TOTAL CA: CPT

## 2022-01-16 PROCEDURE — 71045 X-RAY EXAM CHEST 1 VIEW: CPT

## 2022-01-16 PROCEDURE — 85379 FIBRIN DEGRADATION QUANT: CPT

## 2022-01-16 PROCEDURE — 82962 GLUCOSE BLOOD TEST: CPT

## 2022-01-16 PROCEDURE — 84484 ASSAY OF TROPONIN QUANT: CPT

## 2022-01-16 PROCEDURE — 74011250636 HC RX REV CODE- 250/636: Performed by: NURSE PRACTITIONER

## 2022-01-16 PROCEDURE — 74011000250 HC RX REV CODE- 250: Performed by: NURSE PRACTITIONER

## 2022-01-16 PROCEDURE — U0003 INFECTIOUS AGENT DETECTION BY NUCLEIC ACID (DNA OR RNA); SEVERE ACUTE RESPIRATORY SYNDROME CORONAVIRUS 2 (SARS-COV-2) (CORONAVIRUS DISEASE [COVID-19]), AMPLIFIED PROBE TECHNIQUE, MAKING USE OF HIGH THROUGHPUT TECHNOLOGIES AS DESCRIBED BY CMS-2020-01-R: HCPCS

## 2022-01-16 PROCEDURE — 83735 ASSAY OF MAGNESIUM: CPT

## 2022-01-16 PROCEDURE — 70450 CT HEAD/BRAIN W/O DYE: CPT

## 2022-01-16 PROCEDURE — 80076 HEPATIC FUNCTION PANEL: CPT

## 2022-01-16 PROCEDURE — 74011250636 HC RX REV CODE- 250/636: Performed by: INTERNAL MEDICINE

## 2022-01-16 PROCEDURE — 74011250637 HC RX REV CODE- 250/637: Performed by: NURSE PRACTITIONER

## 2022-01-16 PROCEDURE — 83690 ASSAY OF LIPASE: CPT

## 2022-01-16 PROCEDURE — 65270000029 HC RM PRIVATE

## 2022-01-16 RX ORDER — BISACODYL 5 MG
5 TABLET, DELAYED RELEASE (ENTERIC COATED) ORAL DAILY PRN
Status: DISCONTINUED | OUTPATIENT
Start: 2022-01-16 | End: 2022-01-18 | Stop reason: HOSPADM

## 2022-01-16 RX ORDER — INSULIN GLARGINE 100 [IU]/ML
15 INJECTION, SOLUTION SUBCUTANEOUS
Status: ON HOLD | COMMUNITY
End: 2022-01-31 | Stop reason: SDUPTHER

## 2022-01-16 RX ORDER — INSULIN LISPRO 100 [IU]/ML
6 INJECTION, SOLUTION INTRAVENOUS; SUBCUTANEOUS
Status: DISCONTINUED | OUTPATIENT
Start: 2022-01-16 | End: 2022-01-18 | Stop reason: HOSPADM

## 2022-01-16 RX ORDER — ROSUVASTATIN CALCIUM 10 MG/1
10 TABLET, COATED ORAL
Status: DISCONTINUED | OUTPATIENT
Start: 2022-01-16 | End: 2022-01-18 | Stop reason: HOSPADM

## 2022-01-16 RX ORDER — VANCOMYCIN HYDROCHLORIDE 250 MG/5ML
POWDER, FOR SOLUTION ORAL
Status: ON HOLD | COMMUNITY
Start: 2022-01-11 | End: 2022-01-17

## 2022-01-16 RX ORDER — VANCOMYCIN HYDROCHLORIDE 250 MG/1
250 CAPSULE ORAL 4 TIMES DAILY
Status: ON HOLD | COMMUNITY
End: 2022-01-29 | Stop reason: ALTCHOICE

## 2022-01-16 RX ORDER — HEPARIN SODIUM 1000 [USP'U]/ML
80 INJECTION, SOLUTION INTRAVENOUS; SUBCUTANEOUS ONCE
Status: COMPLETED | OUTPATIENT
Start: 2022-01-16 | End: 2022-01-16

## 2022-01-16 RX ORDER — GABAPENTIN 300 MG/1
300 CAPSULE ORAL 3 TIMES DAILY
Status: DISCONTINUED | OUTPATIENT
Start: 2022-01-16 | End: 2022-01-18 | Stop reason: HOSPADM

## 2022-01-16 RX ORDER — ASPIRIN 81 MG/1
81 TABLET ORAL DAILY
Status: DISCONTINUED | OUTPATIENT
Start: 2022-01-17 | End: 2022-01-18 | Stop reason: HOSPADM

## 2022-01-16 RX ORDER — CETIRIZINE HCL 10 MG
10 TABLET ORAL DAILY
Status: DISCONTINUED | OUTPATIENT
Start: 2022-01-17 | End: 2022-01-18 | Stop reason: HOSPADM

## 2022-01-16 RX ORDER — HEPARIN SODIUM 10000 [USP'U]/100ML
18-36 INJECTION, SOLUTION INTRAVENOUS
Status: DISCONTINUED | OUTPATIENT
Start: 2022-01-16 | End: 2022-01-17

## 2022-01-16 RX ORDER — SODIUM CHLORIDE 9 MG/ML
100 INJECTION, SOLUTION INTRAVENOUS CONTINUOUS
Status: DISCONTINUED | OUTPATIENT
Start: 2022-01-16 | End: 2022-01-18 | Stop reason: HOSPADM

## 2022-01-16 RX ORDER — INSULIN ASPART 100 [IU]/ML
INJECTION, SOLUTION INTRAVENOUS; SUBCUTANEOUS AS NEEDED
COMMUNITY

## 2022-01-16 RX ORDER — SODIUM CHLORIDE 0.9 % (FLUSH) 0.9 %
5-40 SYRINGE (ML) INJECTION EVERY 8 HOURS
Status: DISCONTINUED | OUTPATIENT
Start: 2022-01-16 | End: 2022-01-18 | Stop reason: HOSPADM

## 2022-01-16 RX ORDER — INSULIN GLARGINE 100 [IU]/ML
15 INJECTION, SOLUTION SUBCUTANEOUS
Status: DISCONTINUED | OUTPATIENT
Start: 2022-01-16 | End: 2022-01-17

## 2022-01-16 RX ORDER — ASPIRIN 325 MG
TABLET ORAL
Status: DISCONTINUED
Start: 2022-01-16 | End: 2022-01-16 | Stop reason: WASHOUT

## 2022-01-16 RX ORDER — ACETAMINOPHEN 325 MG/1
650 TABLET ORAL
Status: DISCONTINUED | OUTPATIENT
Start: 2022-01-16 | End: 2022-01-18 | Stop reason: HOSPADM

## 2022-01-16 RX ORDER — METOPROLOL SUCCINATE 25 MG/1
25 TABLET, EXTENDED RELEASE ORAL DAILY
Status: DISCONTINUED | OUTPATIENT
Start: 2022-01-17 | End: 2022-01-17

## 2022-01-16 RX ORDER — VANCOMYCIN HYDROCHLORIDE 250 MG/5ML
500 POWDER, FOR SOLUTION ORAL EVERY 6 HOURS
Status: DISCONTINUED | OUTPATIENT
Start: 2022-01-16 | End: 2022-01-16

## 2022-01-16 RX ADMIN — Medication 10 ML: at 21:54

## 2022-01-16 RX ADMIN — SODIUM CHLORIDE 100 ML/HR: 9 INJECTION, SOLUTION INTRAVENOUS at 13:51

## 2022-01-16 RX ADMIN — ROSUVASTATIN CALCIUM 10 MG: 10 TABLET, FILM COATED ORAL at 21:54

## 2022-01-16 RX ADMIN — ACETAMINOPHEN 650 MG: 325 TABLET ORAL at 21:54

## 2022-01-16 RX ADMIN — SODIUM CHLORIDE 1000 ML: 9 INJECTION, SOLUTION INTRAVENOUS at 11:02

## 2022-01-16 RX ADMIN — HEPARIN SODIUM 18 UNITS/KG/HR: 10000 INJECTION, SOLUTION INTRAVENOUS at 11:02

## 2022-01-16 RX ADMIN — SODIUM CHLORIDE 100 ML/HR: 9 INJECTION, SOLUTION INTRAVENOUS at 22:29

## 2022-01-16 RX ADMIN — GABAPENTIN 300 MG: 300 CAPSULE ORAL at 21:54

## 2022-01-16 RX ADMIN — INSULIN GLARGINE 15 UNITS: 100 INJECTION, SOLUTION SUBCUTANEOUS at 21:57

## 2022-01-16 RX ADMIN — HEPARIN SODIUM 4860 UNITS: 1000 INJECTION INTRAVENOUS; SUBCUTANEOUS at 11:01

## 2022-01-16 RX ADMIN — GABAPENTIN 300 MG: 300 CAPSULE ORAL at 16:34

## 2022-01-16 RX ADMIN — INSULIN LISPRO 6 UNITS: 100 INJECTION, SOLUTION INTRAVENOUS; SUBCUTANEOUS at 16:34

## 2022-01-16 NOTE — PROGRESS NOTES
Problem: Pressure Injury - Risk of  Goal: *Prevention of pressure injury  Description: Document Giuliano Scale and appropriate interventions in the flowsheet.   Outcome: Progressing Towards Goal  Note: Pressure Injury Interventions:  Sensory Interventions: Assess changes in LOC,Keep linens dry and wrinkle-free,Minimize linen layers         Activity Interventions: Increase time out of bed,PT/OT evaluation    Mobility Interventions: PT/OT evaluation    Nutrition Interventions: Document food/fluid/supplement intake                     Problem: Patient Education: Go to Patient Education Activity  Goal: Patient/Family Education  Outcome: Progressing Towards Goal     Problem: Syncope  Goal: *Absence of injury  Outcome: Progressing Towards Goal  Goal: Decrease or eliminate episodes of syncope  Outcome: Progressing Towards Goal     Problem: Patient Education: Go to Patient Education Activity  Goal: Patient/Family Education  Outcome: Progressing Towards Goal

## 2022-01-16 NOTE — Clinical Note
Status[de-identified] INPATIENT [101]   Type of Bed: Intensive Care [6]   Cardiac Monitoring Required?: Yes   Inpatient Hospitalization Certified Necessary for the Following Reasons: 3. Patient receiving treatment that can only be provided in an inpatient setting (further clarification in H&P documentation)   Admitting Diagnosis: Syncope and collapse [780. 2. ICD-9-CM]   Admitting Physician: Kelle Marti   Attending Physician: Suzy King [[id number]]   Estimated Length of Stay: 2 Midnights   Discharge Plan[de-identified] Extended Care Facility (e.g. Adult Home, Nursing Home, etc.)

## 2022-01-16 NOTE — ED TRIAGE NOTES
RN called code blue at EP for pt that went unresponsive while on the toilet. Pt was put into the wheelchair and into the bed, staff states that he was not breathing prior to being put into the bed. On arrival of ER MD pt was talking and breathing. Pt BG was 66, given peanut butter and re-check showed 115. Pt denies pain and is well known to this ER as a brittle diabetic. Per EP staff - pt had chest compressions done (approx. 5 compressions) prior to pt becoming responsive.

## 2022-01-16 NOTE — PROGRESS NOTES
1130 received care of pt from ED via stretcher. Pt is awake and alert. Denies pain and needs. Oriented to room and call bell system. Pt currently being treated for c diff. VSS. o2 sats 100% on RA. Pt states he odes not remember what happened this morning. Heparin gtt infusing. Bolus completed. DTI noted to b/l posterior heel    1210 Michi, NP in on rounds. Rapid COVID test done in ED. I asked NP if she wanted us to get a PCR. She states no because pt has no symptoms. 1320 rapid covid test is negative. 1500 in for orthostatics- pt refuses. States \"I just want to sleep for a little while\". 1710 pt had large loose BM. Linens changed. Brief changed. Moisture barrier cream applied to buttocks. 1725 heparin gtt stopped per protocol. aptt >212.

## 2022-01-16 NOTE — ED NOTES
On review of pt MAR it was noted that pt is no longer on Keppra, has a hx of seizures - unknown when pt was taken off med.

## 2022-01-16 NOTE — H&P
History and Physical    Subjective:     Justin Barlow is a 49 y.o. pleasant male resident of [facility] with a past medical history significant for HTN, HLP, DM-II, CVA, and recent C. difficile diarrhea on oral vancomycin, who presented to the emergency department today following a CODE BLUE alert and nursing home staff had performed CPR, to which patient subsequently woke up. At the time of my evaluation in the ICU, patient is alert, awake, oriented x3 and requesting a diet Coke. History is obtained from the patient. Patient states he does not understand why a CPR was performed. Patient complains of diarrhea in the last several days which he states is now improving after getting started on oral Vanco.  Patient states \"I am just really tired\". He denies chest pain, shortness of breath, fevers, chills or cough. patient states he feels weak from the diarrhea, and has occasional cramping abdominal pain associated with the diarrhea. ED work-up shows a lactic acidosis of 3.5, and a D-dimer of 1.6 for which they started heparin. ECG today shows sinus rhythm, prolonged QT, some ST depression in lead V4 new from prior. His UA is pending, chest x-ray is negative. creatinine is 1.8. Past Medical History:   Diagnosis Date    Diabetes ([address]     Hypercholesterolemia     Hypertension       Past Surgical History:   Procedure Laterality Date    HX ORTHOPAEDIC Left 08/30/2018    hip joint    HX ORTHOPAEDIC Right     broken knee     Family History   Problem Relation Age of Onset    Cancer Father         prostate    Cancer Maternal Uncle         prostate    Cancer Paternal Uncle         prostate      Social History     Tobacco Use    Smoking status: Never Smoker    Smokeless tobacco: Current User    Tobacco comment: 5+/day   Substance Use Topics    Alcohol use: Not Currently       Prior to Admission medications    Medication Sig Start Date End Date Taking?  Authorizing Provider   insulin glargine (Lantus U-100 Insulin) 100 unit/mL injection 15 Units by SubCUTAneous route ACB/HS. Indications: type 2 diabetes mellitus   Yes Other, MD Monisha   insulin aspart U-100 (NovoLOG Flexpen U-100 Insulin) 100 unit/mL (3 mL) inpn by SubCUTAneous route as needed. Other, MD Monisha   vancomycin [facility]) 50 mg/mL oral solution  1/11/22   Other, MD Monisha   insulin lispro (HUMALOG) 100 unit/mL injection 6 Units by SubCUTAneous route Before breakfast, lunch, dinner and at bedtime. 1/3/22   Jose David Wagner MD   metoprolol succinate (TOPROL-XL) 25 mg XL tablet Take 1 Tablet by mouth daily. 1/3/22   Jose David Wagner MD   rosuvastatin (CRESTOR) 10 mg tablet Take 1 Tablet by mouth nightly. 1/3/22   Jose David Wagner MD   aspirin delayed-release 81 mg tablet Take 1 Tablet by mouth daily. 1/3/22   Jose David Wagner MD   gabapentin (NEURONTIN) 300 mg capsule Take 1 Capsule by mouth three (3) times daily. Max Daily Amount: 900 mg. 1/3/22   Jose David Wagner MD   cetirizine (ZYRTEC) 10 mg tablet TAKE 1 TABLET EVERY DAY BY ORAL ROUTE.  4/22/21   Sarah Soliman MD     No Known Allergies     Review of Systems:  14 point ROS reviewed with patient, reported negative except as mentioned in HPI. Objective:     Vitals:  Visit Vitals  BP (!) 151/83   Pulse 94   Temp 98.4 °F (36.9 °C)   Resp 10   Ht 5' 11\" (1.803 m)   Wt 60.8 kg (134 lb)   SpO2 100%   BMI 18.69 kg/m²       Physical Exam:  General: Very pleasant male, thin, appears stated age, not ill or toxic appearing, is awake, alert, oriented x3, cooperative, no distress. Head:  Normocephalic, without obvious abnormality, atraumatic. Eyes:  Conjunctivae/corneas clear. Pupils equal, round, reactive to light. Extraocular movements intact. No icterus. Conjunctiva pink and moist.  Lungs:  Clear to auscultation bilaterally, no wheezes, no crackles  Chest wall: No tenderness or deformity.   Heart: Tachycardic, regular rhythm, S1, S2 normal, no murmur, click, rub, or gallop. Abdomen: Soft, flat, non-tender. Bowel sounds active. No palpable masses. Back:  No spine tenderness to palpation  Extremities:Extremities normal, atraumatic, no cyanosis or peripheral edema. Pulses: 2+ and symmetric all extremities. Skin: Not pale or jaundiced, overall skin color, texture, turgor normal.   Lymph nodes: Cervical nodes normal.  Neurologic: Awake and oriented, x3. No focal neurological deficit.       Labs:  Recent Results (from the past 24 hour(s))   EKG, 12 LEAD, SUBSEQUENT    Collection Time: 01/16/22  8:48 AM   Result Value Ref Range    Ventricular Rate 94 BPM    Atrial Rate 94 BPM    P-R Interval 162 ms    QRS Duration 98 ms    Q-T Interval 382 ms    QTC Calculation (Bezet) 478 ms    Calculated P Axis 86 degrees    Calculated R Axis 84 degrees    Calculated T Axis 74 degrees    Diagnosis       Sinus tachycardia  Ventricular premature complex  Right atrial enlargement  Probable anteroseptal infarct, old  Borderline ST depression, diffuse leads     EKG, 12 LEAD, INITIAL    Collection Time: 01/16/22  9:01 AM   Result Value Ref Range    Ventricular Rate 96 BPM    Atrial Rate 96 BPM    P-R Interval 174 ms    QRS Duration 88 ms    Q-T Interval 383 ms    QTC Calculation (Bezet) 484 ms    Calculated P Axis 85 degrees    Calculated R Axis 87 degrees    Calculated T Axis 78 degrees    Diagnosis       Sinus rhythm  Right atrial enlargement  Minimal ST depression, diffuse leads  Borderline prolonged QT interval     GLUCOSE, POC    Collection Time: 01/16/22  9:13 AM   Result Value Ref Range    Glucose (POC) 129 (H) 70 - 110 mg/dL    Performed by [facility]    CBC WITH AUTOMATED DIFF    Collection Time: 01/16/22  9:19 AM   Result Value Ref Range    WBC 8.9 4.6 - 13.2 K/uL    RBC 4.18 (L) 4.35 - 5.65 M/uL    HGB 12.2 (L) 13.0 - 16.0 g/dL    HCT 39.5 36.0 - 48.0 %    MCV 94.5 78.0 - 100.0 FL    MCH 29.2 24.0 - 34.0 PG    MCHC 30.9 (L) 31.0 - 37.0 g/dL    RDW 14.0 11.6 - 14.5 %    PLATELET 664 525 - 420 K/uL    MPV 12.5 (H) 9.2 - 11.8 FL    NRBC 0.0 0.0  WBC    ABSOLUTE NRBC 0.00 0.00 - 0.01 K/uL    NEUTROPHILS 61 40 - 73 %    LYMPHOCYTES 28 21 - 52 %    MONOCYTES 8 3 - 10 %    EOSINOPHILS 2 0 - 5 %    BASOPHILS 1 0 - 2 %    IMMATURE GRANULOCYTES 0 0 - 0.5 %    ABS. NEUTROPHILS 5.4 1.8 - 8.0 K/UL    ABS. LYMPHOCYTES 2.5 0.9 - 3.6 K/UL    ABS. MONOCYTES 0.7 0.05 - 1.2 K/UL    ABS. EOSINOPHILS 0.2 0.0 - 0.4 K/UL    ABS. BASOPHILS 0.1 0.0 - 0.1 K/UL    ABS. IMM. GRANS. 0.0 0.00 - 0.04 K/UL    DF AUTOMATED     D DIMER    Collection Time: 01/16/22  9:19 AM   Result Value Ref Range    D DIMER 1.60 (H) <0.46 ug/ml(FEU)   METABOLIC PANEL, BASIC    Collection Time: 01/16/22  9:19 AM   Result Value Ref Range    Sodium 138 135 - 145 mmol/L    Potassium 4.3 3.2 - 5.1 mmol/L    Chloride 102 94 - 111 mmol/L    CO2 26 21 - 33 mmol/L    Anion gap 10 mmol/L    Glucose 153 (H) 70 - 110 mg/dL    BUN 35 (H) 9 - 21 mg/dL    Creatinine 1.80 (H) 0.8 - 1.50 mg/dL    BUN/Creatinine ratio 19      GFR est AA 48 ml/min/1.73m2    GFR est non-AA 40 ml/min/1.73m2    Calcium 9.0 8.5 - 10.5 mg/dL   HEPATIC FUNCTION PANEL    Collection Time: 01/16/22  9:19 AM   Result Value Ref Range    Protein, total 6.9 6.1 - 8.4 g/dL    Albumin 3.5 3.5 - 4.7 g/dL    Globulin 3.4 g/dL    A-G Ratio 1.0      Bilirubin, total 0.6 0.2 - 1.0 mg/dL    Bilirubin, direct 0.1 0.0 - 0.3 mg/dL    Alk.  phosphatase 89 38 - 126 U/L    AST (SGOT) 29 17 - 74 U/L    ALT (SGPT) 33 3 - 72 U/L   TROPONIN I    Collection Time: 01/16/22  9:19 AM   Result Value Ref Range    Troponin-I, Qt. <0.02 (L) 0.02 - 0.05 ng/mL   LACTIC ACID    Collection Time: 01/16/22  9:19 AM   Result Value Ref Range    Lactic acid 3.5 (HH) 0.5 - 2.0 mmol/L   MAGNESIUM    Collection Time: 01/16/22  9:19 AM   Result Value Ref Range    Magnesium 2.1 1.7 - 2.8 mg/dL   PTT    Collection Time: 01/16/22  9:19 AM   Result Value Ref Range    aPTT 32.3 23.0 - 36.4 sec    aPTT, therapeutic range   82 - 109 sec       Imaging:  CT HEAD WO CONT    Result Date: 1/16/2022  EXAM: CT of the head INDICATION: Syncopal episode. COMPARISON: Head CT 12/25/2021 TECHNIQUE: Axial CT imaging of the head was performed without nonionic intravenous contrast. Multiplanar reformats were generated. One or more dose reduction techniques were used on this CT: automated exposure control, adjustment of the mAs and/or kVp according to patient size, and iterative reconstruction techniques. The specific techniques used on this CT exam have been documented in the patient's electronic medical record. _______________ FINDINGS: BRAIN: No evidence of intra-cranial hemorrhage or mass. Small area of encephalomalacia within the inferior left frontal lobe, unchanged. Prior deep cerebral infarct involving the thalamus and posterior limb internal capsule, unchanged. CALVARIA: No fracture or suspicious bone lesion. OTHER: None. _______________     1. No acute abnormality identified. XR CHEST PORT    Result Date: 1/16/2022  CLINICAL HISTORY:  Syncopal episode. COMPARISONS:  Chest x-ray 12/24/2021 TECHNIQUE:  single frontal view of the chest ------------------------------------------ FINDINGS: Lungs:  No consolidation or pleural fluid. Mediastinum: No significant cardiomegaly. Atherosclerotic arterial calcification. Bones: No evidence of acute fracture. Right clavicle fracture is not well assessed though potentially nonunited. Prior ORIF proximal left humerus fracture. ------------------------------------------    No acute cardiopulmonary process. Assessment & Plan:     #1: Syncope:  -s/p CPR at SNF, he awoke and was brought to ED.  -unclear etiology.  -admit to observation, monitor on telemetry  -his ddimer is 1.6 and heparin was ordered, will continue heparin and await his vq scan.   -Ecg shows SR/ST dep, new from prior and trop is 0.02 no chest pain or sob. -UA is pending.   CXR is negative.  -trend trop, check orthostatics and obtain carotids.  -Echo of 12/28/2021 shows a EF of 58% normal wall thickness. No valvular stenosis. -Cardiology consultation. #2: Acute kidney injury:  -Cr is 1.8 today, baseline is around 1.3 las dec 2021  -suspect prerenal etio due to dehydration r/t c-diff diarrhea last couple days  -start [employer]  -encourage oral intake. -BMP daily. #3: C. difficile diarrhea:  -was on oral vanc at [facility] which I will continue. #4: Hypertension:  Chronic, normotensive. Continue metoprolol per home dose. #5: Diabetes mellitus type 2:  -Chronic, continue Accu-Cheks and Lantus 15 units daily per home dose. #6:  History of CVA:  -chronic issue, stable. Ambulates self with walker at baseline. No new deficits  -continue aspirin. #7: History of seizures:  -States no longer on antiseizure medications. VTE prophylaxis: Heparin drip. CODE STATUS: Full code. Total time spent: 40 minutes. Plan of care discussed with patient.

## 2022-01-16 NOTE — PROGRESS NOTES
Care Management Interventions  PCP Verified by CM: Yes  Last Visit to PCP: 03/16/21  Mode of Transport at Discharge: Other (see comment) (POV)  Transition of Care Consult (CM Consult): Discharge Planning  Support Systems: [email or web address]  Confirm Follow Up Transport: Other (see comment) (If pt returns LTC, staff selene ensure that he gets F/U, if he returns home his father is him primary caretaker.)  The Plan for Transition of Care is Related to the Following Treatment Goals : Discharge planning with the pt, family, and LTC staff what needs are at the time of discharge. Pt may return to LTC or he may return home depending on his response to treatmen. The Patient and/or Patient Representative was Provided with a Choice of Provider and Agrees with the Discharge Plan?: Yes  Name of the Patient Representative Who was Provided with a Choice of Provider and Agrees with the Discharge Plan: PT  Discharge Location  Discharge Placement: [address] [place] [address]   Reason for Admission:   Chart reviewed and pt interviewed at bedside. Adm noted for Code Blue alert in LTC, at which time pt was transported to the ED for evaluation. Per Hospitalist note during CPR the pt woke up and when seen in the ICU he was awake and oriented x 3. Pt told CM that he did not remember anything except getting up to the bedside commode. He had no complaints except for feeling \" really tired. \" Lactic was 3.5 and D-dimer was 1.6. EKG revealed new changes. Hospitalist was consulted for adm and pt was placed in ICU. PMH:  Diabetes, Hypercholesterolemia, Hypertension, CVA. PSH:  Hip joint replaced on left, Broken knee on the right. DX:  Syncope,  [place],  C. Diff diarrhea, HTN, DM type 2, Hx of CVA, HX of Seizues.     RUR Score:    18%              PCP: First and Last name:   Diana Bell MD     Name of Practice:  Internal Medicine   Are you a current patient: Yes/No: y   Approximate date of last visit: 03/16/2021   Can you participate in a virtual visit if needed:     Do you (patient/family) have any concerns for transition/discharge? NO              Plan for utilizing home health:   TBD    Current Advanced Directive/Advance Care Plan:  Full Code    Healthcare Decision Maker:   Click here to complete [place] including selection of the Healthcare Decision Maker Relationship (ie \"Primary\")  Father, Ora Dancer, [phone] ( home and cell same.)            Transition of Care Plan:   Discharge planning to transition pt back to [address] [place] [address] in co-ordiantion with pt, family, and LTC staff. Pt was living independently prior to his hospitalization in December. His father is his primary care taker, if needed, and he was sick with Covid. Pt uses a walker to ambulate. CM following for balta Wilson with home health.

## 2022-01-17 ENCOUNTER — APPOINTMENT (OUTPATIENT)
Dept: NON INVASIVE DIAGNOSTICS | Age: 57
End: 2022-01-17
Attending: NURSE PRACTITIONER
Payer: MEDICARE

## 2022-01-17 ENCOUNTER — APPOINTMENT (OUTPATIENT)
Dept: VASCULAR SURGERY | Age: 57
End: 2022-01-17
Attending: INTERNAL MEDICINE
Payer: MEDICARE

## 2022-01-17 ENCOUNTER — APPOINTMENT (OUTPATIENT)
Dept: NUCLEAR MEDICINE | Age: 57
End: 2022-01-17
Attending: INTERNAL MEDICINE
Payer: MEDICARE

## 2022-01-17 LAB
ANION GAP SERPL CALC-SCNC: 6 MMOL/L
APPEARANCE UR: CLEAR
APTT PPP: 166.4 SEC (ref 23–36.4)
APTT PPP: 26.4 SEC (ref 23–36.4)
ATRIAL RATE: 94 BPM
ATRIAL RATE: 96 BPM
BACTERIA URNS QL MICRO: NEGATIVE /HPF
BASOPHILS # BLD: 0 K/UL (ref 0–0.1)
BASOPHILS NFR BLD: 0 % (ref 0–2)
BILIRUB UR QL: NEGATIVE
BUN SERPL-MCNC: 26 MG/DL (ref 9–21)
BUN/CREAT SERPL: 20
CA-I BLD-MCNC: 8.3 MG/DL (ref 8.5–10.5)
CALCULATED P AXIS, ECG09: 85 DEGREES
CALCULATED P AXIS, ECG09: 86 DEGREES
CALCULATED R AXIS, ECG10: 84 DEGREES
CALCULATED R AXIS, ECG10: 87 DEGREES
CALCULATED T AXIS, ECG11: 74 DEGREES
CALCULATED T AXIS, ECG11: 78 DEGREES
CHLORIDE SERPL-SCNC: 108 MMOL/L (ref 94–111)
CO2 SERPL-SCNC: 25 MMOL/L (ref 21–33)
COLOR UR: YELLOW
CREAT SERPL-MCNC: 1.3 MG/DL (ref 0.8–1.5)
DIAGNOSIS, 93000: NORMAL
DIAGNOSIS, 93000: NORMAL
DIFFERENTIAL METHOD BLD: ABNORMAL
ECHO AO ROOT DIAM: 3 CM
ECHO AO ROOT INDEX: 1.67 CM/M2
ECHO AV AREA PEAK VELOCITY: 2.4 CM2
ECHO AV AREA VTI: 2.3 CM2
ECHO AV AREA/BSA PEAK VELOCITY: 1.3 CM2/M2
ECHO AV AREA/BSA VTI: 1.3 CM2/M2
ECHO AV MEAN GRADIENT: 3 MMHG
ECHO AV MEAN VELOCITY: 0.8 M/S
ECHO AV PEAK GRADIENT: 5 MMHG
ECHO AV PEAK VELOCITY: 1.1 M/S
ECHO AV VELOCITY RATIO: 0.82
ECHO AV VTI: 25.2 CM
ECHO LA AREA 2C: 12.7 CM2
ECHO LA AREA 4C: 16.3 CM2
ECHO LA DIAMETER INDEX: 1.5 CM/M2
ECHO LA DIAMETER: 2.7 CM
ECHO LA MAJOR AXIS: 5.4 CM
ECHO LA MINOR AXIS: 4.4 CM
ECHO LA TO AORTIC ROOT RATIO: 0.9
ECHO LA VOL BP: 37 ML (ref 18–58)
ECHO LA VOL/BSA BIPLANE: 21 ML/M2 (ref 16–34)
ECHO LV E' LATERAL VELOCITY: 13 CM/S
ECHO LV E' SEPTAL VELOCITY: 14 CM/S
ECHO LV FRACTIONAL SHORTENING: 34 % (ref 28–44)
ECHO LV INTERNAL DIMENSION DIASTOLE INDEX: 1.78 CM/M2
ECHO LV INTERNAL DIMENSION DIASTOLIC: 3.2 CM (ref 4.2–5.9)
ECHO LV INTERNAL DIMENSION SYSTOLIC INDEX: 1.17 CM/M2
ECHO LV INTERNAL DIMENSION SYSTOLIC: 2.1 CM
ECHO LV IVSD: 1.1 CM (ref 0.6–1)
ECHO LV MASS 2D: 104.3 G (ref 88–224)
ECHO LV MASS INDEX 2D: 58 G/M2 (ref 49–115)
ECHO LV POSTERIOR WALL DIASTOLIC: 1.1 CM (ref 0.6–1)
ECHO LV RELATIVE WALL THICKNESS RATIO: 0.69
ECHO LVOT AREA: 3.1 CM2
ECHO LVOT AV VTI INDEX: 0.72
ECHO LVOT DIAM: 2 CM
ECHO LVOT MEAN GRADIENT: 1 MMHG
ECHO LVOT PEAK GRADIENT: 3 MMHG
ECHO LVOT PEAK VELOCITY: 0.9 M/S
ECHO LVOT STROKE VOLUME INDEX: 31.7 ML/M2
ECHO LVOT SV: 57.1 ML
ECHO LVOT VTI: 18.2 CM
ECHO MV A VELOCITY: 0.72 M/S
ECHO MV AREA VTI: 2.5 CM2
ECHO MV E DECELERATION TIME (DT): 232 MS
ECHO MV E VELOCITY: 0.78 M/S
ECHO MV E/A RATIO: 1.08
ECHO MV E/E' LATERAL: 6
ECHO MV E/E' RATIO (AVERAGED): 5.79
ECHO MV E/E' SEPTAL: 5.57
ECHO MV LVOT VTI INDEX: 1.24
ECHO MV MAX VELOCITY: 0.9 M/S
ECHO MV MEAN GRADIENT: 1 MMHG
ECHO MV MEAN VELOCITY: 0.5 M/S
ECHO MV PEAK GRADIENT: 3 MMHG
ECHO MV VTI: 22.6 CM
ECHO PV MAX VELOCITY: 0.9 M/S
ECHO PV PEAK GRADIENT: 3 MMHG
EOSINOPHIL # BLD: 0.1 K/UL (ref 0–0.4)
EOSINOPHIL NFR BLD: 1 % (ref 0–5)
EPITH CASTS URNS QL MICRO: ABNORMAL /LPF (ref 0–20)
ERYTHROCYTE [DISTWIDTH] IN BLOOD BY AUTOMATED COUNT: 13.7 % (ref 11.6–14.5)
GLUCOSE BLD STRIP.AUTO-MCNC: 105 MG/DL (ref 70–110)
GLUCOSE BLD STRIP.AUTO-MCNC: 108 MG/DL (ref 70–110)
GLUCOSE BLD STRIP.AUTO-MCNC: 342 MG/DL (ref 70–110)
GLUCOSE BLD STRIP.AUTO-MCNC: 69 MG/DL (ref 70–110)
GLUCOSE SERPL-MCNC: 66 MG/DL (ref 70–110)
GLUCOSE UR STRIP.AUTO-MCNC: 500 MG/DL
HCT VFR BLD AUTO: 35.5 % (ref 36–48)
HGB BLD-MCNC: 10.8 G/DL (ref 13–16)
HGB UR QL STRIP: NEGATIVE
IMM GRANULOCYTES # BLD AUTO: 0 K/UL (ref 0–0.04)
IMM GRANULOCYTES NFR BLD AUTO: 0 % (ref 0–0.5)
KETONES UR QL STRIP.AUTO: NEGATIVE MG/DL
LEUKOCYTE ESTERASE UR QL STRIP.AUTO: NEGATIVE
LYMPHOCYTES # BLD: 2.2 K/UL (ref 0.9–3.6)
LYMPHOCYTES NFR BLD: 22 % (ref 21–52)
MCH RBC QN AUTO: 28.4 PG (ref 24–34)
MCHC RBC AUTO-ENTMCNC: 30.4 G/DL (ref 31–37)
MCV RBC AUTO: 93.4 FL (ref 78–100)
MONOCYTES # BLD: 0.6 K/UL (ref 0.05–1.2)
MONOCYTES NFR BLD: 6 % (ref 3–10)
NEUTS SEG # BLD: 7.3 K/UL (ref 1.8–8)
NEUTS SEG NFR BLD: 71 % (ref 40–73)
NITRITE UR QL STRIP.AUTO: NEGATIVE
NRBC # BLD: 0 K/UL (ref 0–0.01)
NRBC BLD-RTO: 0 PER 100 WBC
P-R INTERVAL, ECG05: 162 MS
P-R INTERVAL, ECG05: 174 MS
PERFORMED BY, TECHID: ABNORMAL
PERFORMED BY, TECHID: ABNORMAL
PERFORMED BY, TECHID: NORMAL
PERFORMED BY, TECHID: NORMAL
PH UR STRIP: 5 [PH] (ref 5–8)
PLATELET # BLD AUTO: 237 K/UL (ref 135–420)
PMV BLD AUTO: 12.5 FL (ref 9.2–11.8)
POTASSIUM SERPL-SCNC: 3.9 MMOL/L (ref 3.2–5.1)
PROT UR STRIP-MCNC: ABNORMAL MG/DL
Q-T INTERVAL, ECG07: 382 MS
Q-T INTERVAL, ECG07: 383 MS
QRS DURATION, ECG06: 88 MS
QRS DURATION, ECG06: 98 MS
QTC CALCULATION (BEZET), ECG08: 478 MS
QTC CALCULATION (BEZET), ECG08: 484 MS
RBC # BLD AUTO: 3.8 M/UL (ref 4.35–5.65)
RBC #/AREA URNS HPF: ABNORMAL /HPF (ref 0–2)
SARS-COV-2, NAA: NOT DETECTED
SODIUM SERPL-SCNC: 139 MMOL/L (ref 135–145)
SP GR UR REFRACTOMETRY: 1.01 (ref 1–1.03)
THERAPEUTIC RANGE,PTTT: ABNORMAL SEC (ref 82–109)
THERAPEUTIC RANGE,PTTT: NORMAL SEC (ref 82–109)
TROPONIN I SERPL-MCNC: <0.02 NG/ML (ref 0.02–0.05)
UA: UC IF INDICATED,UAUC: ABNORMAL
UROBILINOGEN UR QL STRIP.AUTO: 0.2 EU/DL (ref 0.2–1)
VENTRICULAR RATE, ECG03: 94 BPM
VENTRICULAR RATE, ECG03: 96 BPM
WBC # BLD AUTO: 10.3 K/UL (ref 4.6–13.2)
WBC URNS QL MICRO: ABNORMAL /HPF (ref 0–4)
YEAST URNS QL MICRO: PRESENT

## 2022-01-17 PROCEDURE — 80048 BASIC METABOLIC PNL TOTAL CA: CPT

## 2022-01-17 PROCEDURE — 74011000250 HC RX REV CODE- 250: Performed by: NURSE PRACTITIONER

## 2022-01-17 PROCEDURE — G0378 HOSPITAL OBSERVATION PER HR: HCPCS

## 2022-01-17 PROCEDURE — 65270000029 HC RM PRIVATE

## 2022-01-17 PROCEDURE — 74011250637 HC RX REV CODE- 250/637: Performed by: NURSE PRACTITIONER

## 2022-01-17 PROCEDURE — 65270000034 HC RM STERILE ISOLATION

## 2022-01-17 PROCEDURE — 85730 THROMBOPLASTIN TIME PARTIAL: CPT

## 2022-01-17 PROCEDURE — 84484 ASSAY OF TROPONIN QUANT: CPT

## 2022-01-17 PROCEDURE — 82962 GLUCOSE BLOOD TEST: CPT

## 2022-01-17 PROCEDURE — 74011250636 HC RX REV CODE- 250/636: Performed by: NURSE PRACTITIONER

## 2022-01-17 PROCEDURE — 93306 TTE W/DOPPLER COMPLETE: CPT

## 2022-01-17 PROCEDURE — 96366 THER/PROPH/DIAG IV INF ADDON: CPT

## 2022-01-17 PROCEDURE — A9540 TC99M MAA: HCPCS

## 2022-01-17 PROCEDURE — 93880 EXTRACRANIAL BILAT STUDY: CPT

## 2022-01-17 PROCEDURE — 74011636637 HC RX REV CODE- 636/637: Performed by: NURSE PRACTITIONER

## 2022-01-17 PROCEDURE — 81001 URINALYSIS AUTO W/SCOPE: CPT

## 2022-01-17 PROCEDURE — 85025 COMPLETE CBC W/AUTO DIFF WBC: CPT

## 2022-01-17 PROCEDURE — 36415 COLL VENOUS BLD VENIPUNCTURE: CPT

## 2022-01-17 RX ORDER — LOPERAMIDE HYDROCHLORIDE 2 MG/1
4 CAPSULE ORAL
COMMUNITY

## 2022-01-17 RX ORDER — METOPROLOL SUCCINATE 25 MG/1
12.5 TABLET, EXTENDED RELEASE ORAL DAILY
Qty: 60 TABLET | Refills: 0 | Status: SHIPPED | OUTPATIENT
Start: 2022-01-18

## 2022-01-17 RX ORDER — METOPROLOL SUCCINATE 25 MG/1
12.5 TABLET, EXTENDED RELEASE ORAL DAILY
Status: DISCONTINUED | OUTPATIENT
Start: 2022-01-18 | End: 2022-01-18 | Stop reason: HOSPADM

## 2022-01-17 RX ORDER — VANCOMYCIN HYDROCHLORIDE 250 MG/1
250 CAPSULE ORAL 4 TIMES DAILY
Status: DISCONTINUED | OUTPATIENT
Start: 2022-01-17 | End: 2022-01-18 | Stop reason: HOSPADM

## 2022-01-17 RX ORDER — INSULIN GLARGINE 100 [IU]/ML
15 INJECTION, SOLUTION SUBCUTANEOUS
Status: DISCONTINUED | OUTPATIENT
Start: 2022-01-17 | End: 2022-01-18 | Stop reason: HOSPADM

## 2022-01-17 RX ADMIN — VANCOMYCIN HYDROCHLORIDE 250 MG: 250 CAPSULE ORAL at 12:22

## 2022-01-17 RX ADMIN — SODIUM CHLORIDE 100 ML/HR: 9 INJECTION, SOLUTION INTRAVENOUS at 12:57

## 2022-01-17 RX ADMIN — ROSUVASTATIN CALCIUM 10 MG: 10 TABLET, FILM COATED ORAL at 21:11

## 2022-01-17 RX ADMIN — VANCOMYCIN HYDROCHLORIDE 250 MG: 250 CAPSULE ORAL at 17:38

## 2022-01-17 RX ADMIN — GABAPENTIN 300 MG: 300 CAPSULE ORAL at 17:38

## 2022-01-17 RX ADMIN — Medication 10 ML: at 05:38

## 2022-01-17 RX ADMIN — ASPIRIN 81 MG: 81 TABLET, COATED ORAL at 08:33

## 2022-01-17 RX ADMIN — GABAPENTIN 300 MG: 300 CAPSULE ORAL at 08:33

## 2022-01-17 RX ADMIN — VANCOMYCIN HYDROCHLORIDE 250 MG: 250 CAPSULE ORAL at 21:12

## 2022-01-17 RX ADMIN — METOPROLOL SUCCINATE 25 MG: 25 TABLET, EXTENDED RELEASE ORAL at 08:33

## 2022-01-17 RX ADMIN — GABAPENTIN 300 MG: 300 CAPSULE ORAL at 21:11

## 2022-01-17 RX ADMIN — CETIRIZINE HYDROCHLORIDE 10 MG: 10 TABLET, FILM COATED ORAL at 08:33

## 2022-01-17 RX ADMIN — INSULIN GLARGINE 15 UNITS: 100 INJECTION, SOLUTION SUBCUTANEOUS at 08:33

## 2022-01-17 RX ADMIN — INSULIN LISPRO 6 UNITS: 100 INJECTION, SOLUTION INTRAVENOUS; SUBCUTANEOUS at 17:38

## 2022-01-17 NOTE — PROGRESS NOTES
Pt to unit at this time via W/C. Pt is awake, alert and oriented x4. Pt has no IV accesses. Pt denies pain. Pt is in stable condition.

## 2022-01-17 NOTE — ROUTINE PROCESS
Bedside shift change report given to TIFFANY Valerio RN (oncoming nurse) by Emeli Castellanos LPN (offgoing nurse). Report included the following information SBAR, Kardex, Intake/Output, Recent Results, Med Rec Status and Quality Measures. 1900  Pt downgraded to telemetry per provider. 1945  Shift assessment completed. Pt A&Ox3 and states his only pain is the excoriation of his perineal and anal areas. Barrier cream had previously been applied per pt.      2100 Clarified with provider what precautions pt is on. Only a droplet precaution in the orders. Pt is both Droplet (need a PCR) and Enteric for C Diff. 2130 2200 medications administered. Pt has no problem swallowing. Nares swabbed for PCR and taken to the lab for processing. [address] and had nurse bring pt's 250mg PO Vancomycin to the unit for administration. This medication is not in the hospitals formulary. Will miss dose tonight and give to day shift to forward to pharmacy. 0030  Pt C/O urinary retention and has not urinated yet this shift. Attempted using the urinal earlier without success. Bladder scab shows 675ml of urine. Provider notified and received an order to straight cath pt. 550 ml return. Urine specimen taken to the lab for ordered labs. 0100  Lab in to draw blood for APTT and Troponin. Pt tolerated well. 0155  Pictures and measurements taken of pt's heels. Heels cleaned with wound  and Mepilex's placed on heels. Inquired about Prevalon boots for pt. Nursing supervisor stated there were none. 0300  Pt lying in bed with eyes closed. No AD noted. 0625  FSBG 108. Spoke with [facility] from nuc med. She stated she would be up to get pt around 8:30AM.     0645  Bedside shift change report given to DARIA Campos (oncoming nurse) by TIFFANY Valerio RN (offgoing nurse). Report included the following information SBAR, Kardex, Intake/Output, MAR, Recent Results, Med Rec Status and Quality Measures.

## 2022-01-17 NOTE — DISCHARGE SUMMARY
Physician Discharge Summary       Patient: Jacob Mcqueen MRN: [id number]     YOB: 1965  Age: 49 y.o. Sex: male    PCP: Sarah Soliman MD    Allergies: Patient has no known allergies. Admit date: 1/16/2022  Admitting Provider: Paulino Farrell MD    Discharge date: 1/17/2022  Discharging Provider: Maribell Butcher NP    * Admission Diagnoses: Syncope and collapse [R55]    * Discharge Diagnoses:    Hospital Problems as of 1/17/2022 Date Reviewed: 3/16/2021          Codes Class Noted - Resolved POA    Syncope and collapse ICD-10-CM: R55  ICD-9-CM: 780.2  1/16/2022 - Present Unknown            Admit HPI 1/16/22:   Jacob Mcqueen is a 49 y.o. pleasant male resident of [facility] with a past medical history significant for HTN, HLP, DM-II, CVA, and recent C. difficile diarrhea on oral vancomycin, who presented to the emergency department today following a CODE BLUE alert and nursing home staff had performed CPR, to which patient subsequently woke up. At the time of my evaluation in the ICU, patient is alert, awake, oriented x3 and requesting a diet Coke. History is obtained from the patient. Patient states he does not understand why a CPR was performed. Patient complains of diarrhea in the last several days which he states is now improving after getting started on oral Vanco.  Patient states \"I am just really tired\". He denies chest pain, shortness of breath, fevers, chills or cough. patient states he feels weak from the diarrhea, and has occasional cramping abdominal pain associated with the diarrhea. ED work-up shows a lactic acidosis of 3.5, and a D-dimer of 1.6 for which they started heparin. ECG today shows sinus rhythm, prolonged QT, some ST depression in lead V4 new from prior. His UA is pending, chest x-ray is negative. creatinine is 1.8. Hospital Course: Uncomplicated.  As below  #1: Syncope:  -s/p CPR at [facility], he awoke and was brought to ED.  -unclear etiology. Head CT neg  -admit to observation, monitor on telemetry  -his ddimer is 1.6 and heparin drip was ordered-  -Vq scan shows no moderate or large segmental perfusion defects to indicate the presence of pulmonary emboli.  -Ecg shows SR/ST dep, new from prior, no chest pain or sob. -UA is negative.  CXR is negative.  -trop negative x 4, patient refused orthostatic vitals.  -carotid dopplers-->Less than 50% stenosis in bilateral carotid arteries.  -Echo of 12/28/2021 shows a EF of 58% normal wall thickness.  No valvular stenosis. -outpatient echo with bubble study, if not completed here, per cardio     #2: Acute kidney injury:  -Cr 1.3, improved with IV fluids  -admit Cr is 1.8, baseline is around 1.3 las dec 2021  -suspect prerenal etio due to dehydration r/t c-diff diarrhea last couple days.  -encourage oral intake. -BMP daily.     #3: C. difficile diarrhea:  -was on oral vanc at [facility] which I will continue.     #4: Hypertension:  Chronic, normotensive.  Continue metoprolol at lower dose due to bradycardia. .     #5: Diabetes mellitus type 2:  -Chronic, continue Accu-Cheks and Lantus 15 units daily per home dose.     #6:  History of CVA:  -chronic issue, stable. Ambulates self with walker at baseline. No new deficits  -continue aspirin.     #7: History of seizures:  -States no longer on antiseizure medications. Procedures: None required. Consults:  Cardiology  Discharge Exam:  General:  Alert, awake, oriented x 3, pleasant, cooperative, no acute distress. Head:  Normocephalic, without obvious abnormality, atraumatic. Eyes:  Conjunctivae/corneas clear. Pupils equal, round, reactive to light. Extraocular movements intact. Lungs:   Clear to auscultation bilaterally. No crackles, no wheeze. Chest wall:  No tenderness or deformity. Heart:  Regular rate and rhythm, S1, S2 normal, no murmur, click, rub, or gallop. Abdomen:   Soft, non-tender. Bowel sounds active.  No palpable masses   Extremities: Extremities normal, atraumatic, no cyanosis or peripheral edema. Pulses: 2+ and symmetric all extremities. Skin: Skin color, texture, turgor normal. No rashes or lesions. Lymph nodes: Cervical, supraclavicular, and axillary nodes normal.   Neurologic: CNII-XII intact. Normal strength, sensation, and reflexes throughout. * Discharge Condition: stable  * Disposition: [facility] ([facility])    Discharge Medications:  Current Discharge Medication List      CONTINUE these medications which have CHANGED    Details   metoprolol succinate (TOPROL-XL) 25 mg XL tablet Take 0.5 Tablets by mouth daily. Qty: 60 Tablet, Refills: 0  Start date: 1/18/2022         CONTINUE these medications which have NOT CHANGED    Details   loperamide (IMODIUM) 2 mg capsule Take 4 mg by mouth every four (4) hours as needed for Diarrhea. insulin glargine (Lantus U-100 Insulin) 100 unit/mL injection 15 Units by SubCUTAneous route ACB/HS. Indications: type 2 diabetes mellitus      vancomycin (VANCOCIN) 250 mg capsule Take 250 mg by mouth four (4) times daily. Take for 14 days, starting 1/11/22. Last dose on 1/25/22. insulin aspart U-100 (NovoLOG Flexpen U-100 Insulin) 100 unit/mL (3 mL) inpn by SubCUTAneous route as needed. SLIDING SCALE INSTRUCTIONS FROM [facility]  100-160= 2 UNITS  161-220= 4 UNITS  221-280= 6 UNITS  281-340= 8 UNITS  341-400= 10 UNITS  401-460= 12 UNITS  >461= 14 UNITS AND CALL MD      rosuvastatin (CRESTOR) 10 mg tablet Take 1 Tablet by mouth nightly. Qty: 30 Tablet, Refills: 5      aspirin delayed-release 81 mg tablet Take 1 Tablet by mouth daily. Qty: 30 Tablet, Refills: 5      gabapentin (NEURONTIN) 300 mg capsule Take 1 Capsule by mouth three (3) times daily. Max Daily Amount: 900 mg. Qty: 90 Capsule, Refills: 1    Associated Diagnoses: Diabetic peripheral neuropathy (HCC)      cetirizine (ZYRTEC) 10 mg tablet TAKE 1 TABLET EVERY DAY BY ORAL ROUTE.    Qty: 90 Tab, Refills: 0         STOP taking these medications       insulin lispro (HUMALOG) 100 unit/mL injection Comments:   Reason for Stopping:               * Follow-up Care/Patient Instructions:   Activity: Activity as tolerated  Diet: Diabetic Diet      Follow-up Information     Follow up With Specialties Details Why Contact Info    Claudine Rausch MD Internal Medicine   [address] [facility] Cardiology  In 2 weeks  [place] [address] [address]  [phone]          Signed:  Darryle Oris, NP  1/17/2022  4:35 PM

## 2022-01-17 NOTE — PROGRESS NOTES
Admission Medication Reconciliation:    Information obtained from:  [address] [place] [address]    Comments/Recommendations: Reviewed PTA medications and patient's allergies. DISCONTINUED NOVOLOG 6 UNITS BEFORE MEALS AND BEDTIME  ADDED SLIDING SCALE INSULIN TO NOVOLOG FLEXPEN ORDER  ADDED LOPERAMIDE PRN ORDER        Allergies:  Patient has no known allergies. Significant PMH/Disease States:   Past Medical History:   Diagnosis Date    Diabetes ([address]     Hypercholesterolemia     Hypertension      Chief Complaint for this Admission:    Chief Complaint   Patient presents with    Unresponsive     Prior to Admission Medications:   Prior to Admission Medications   Prescriptions Last Dose Informant Patient Reported? Taking?   aspirin delayed-release 81 mg tablet   No No   Sig: Take 1 Tablet by mouth daily. cetirizine (ZYRTEC) 10 mg tablet   No No   Sig: TAKE 1 TABLET EVERY DAY BY ORAL ROUTE.    gabapentin (NEURONTIN) 300 mg capsule   No No   Sig: Take 1 Capsule by mouth three (3) times daily. Max Daily Amount: 900 mg.   insulin aspart U-100 (NovoLOG Flexpen U-100 Insulin) 100 unit/mL (3 mL) inpn   Yes No   Sig: by SubCUTAneous route as needed. SLIDING SCALE INSTRUCTIONS FROM [facility]  100-160= 2 UNITS  161-220= 4 UNITS  221-280= 6 UNITS  281-340= 8 UNITS  341-400= 10 UNITS  401-460= 12 UNITS  >461= 14 UNITS AND CALL MD   insulin glargine (Lantus U-100 Insulin) 100 unit/mL injection   Yes Yes   Sig: 15 Units by SubCUTAneous route ACB/HS. Indications: type 2 diabetes mellitus   loperamide (IMODIUM) 2 mg capsule   Yes Yes   Sig: Take 4 mg by mouth every four (4) hours as needed for Diarrhea.   metoprolol succinate (TOPROL-XL) 25 mg XL tablet   No No   Sig: Take 1 Tablet by mouth daily. rosuvastatin (CRESTOR) 10 mg tablet   No No   Sig: Take 1 Tablet by mouth nightly. vancomycin (VANCOCIN) 250 mg capsule 1/15/2022 at Unknown time  Yes Yes   Sig: Take 250 mg by mouth four (4) times daily. Take for 14 days, starting 1/11/22. Last dose on 1/25/22.       Facility-Administered Medications: None       SEBASTIAN Cardona

## 2022-01-17 NOTE — PROGRESS NOTES
Nutrition Note    Pt is being discharged back to [address]. He was on  a soft and bite size diet with no sugar or salt packet and fruit for dessert while in the nursing home. While a resident, patient refused to eat, to take insulin, and to drink supplements, causing uncontrolled glucose and no improvement to severe protein calorie malnutrition as assessed last admission. Pt's usual weight 118 pounds or 53.6 kg.  EN refused by patient   Current diet upon discharge carbohydrate consistent cardiac diet. RD to assess patient's willingness to consume current diet in nursing home.      Electronically signed by Violette Whitman RD on 1/17/2022 at 5:14 PM    Contact: [phone]

## 2022-01-17 NOTE — PROGRESS NOTES
PT screen performed. Per Nursing Pt is getting up to bathroom without difficulty. Pt reported he is moving around and hopefully with return \"home\" soon. Pt has no need for skilled PT at this time.

## 2022-01-17 NOTE — CONSULTS
[facility] CARDIOLOGY  CARDIOLOGY CONSULTATION    REASON FOR CONSULT: Syncope    REQUESTING PROVIDER: Catherine Myers NP    CHIEF COMPLAINT: Syncope    HISTORY OF PRESENT ILLNESS:  Anali Chavarria is a 49y.o. year-old male with past medical history significant for DM, HTN, HLD, CKD, CVA, and NSTEMI in 12/2021 who was seen today for evaluation of syncope. The patient presented to the [facility] ER on 1/16/22 from his residence at [facility]. Per staff he was found unresponsive on the toilet. A code blue was called and he received 5 compressions then he woke up. He was bradycardic in the 40s on arrival to the ER. He was given a 1 liter fluid bolus in the ER and a heparin drip was started for presumptive PE. He has had no further bradycardia since admission. Records from hospital admission course thus far reviewed. Telemetry reviewed. No events overnight. The patient is in sinus rhythm. INPATIENT MEDICATIONS:  Home medications reviewed.     Current Facility-Administered Medications:     insulin glargine (LANTUS) injection 15 Units, 15 Units, SubCUTAneous, ACB/HS, Maryana CARNEY, NP, 15 Units at 01/17/22 0833    [START ON 1/18/2022] metoprolol succinate (TOPROL-XL) XL tablet 12.5 mg, 12.5 mg, Oral, DAILY, Jojo Julien NP    vancomycin (VANCOCIN) capsule 250 mg- PT SUPPLIED CHECK PT BIN (Patient Supplied), 250 mg, Oral, QID, Oba, Oluwabunmi S, NP, 250 mg at 01/17/22 1222    heparin 25,000 units in D5W 250 ml infusion, 18-36 Units/kg/hr, IntraVENous, TITRATE, Boston Granados MD, Stopped at 01/17/22 1611    sodium chloride (NS) flush 5-40 mL, 5-40 mL, IntraVENous, Q8H, Oba, Oluwabunmi S, NP, 10 mL at 01/17/22 0538    acetaminophen (TYLENOL) tablet 650 mg, 650 mg, Oral, Q6H PRN, Oba, Oluwabunmi S, NP, 650 mg at 01/16/22 2154    bisacodyL (DULCOLAX) tablet 5 mg, 5 mg, Oral, DAILY PRN, Oba, Oluwabunmi S, NP    aspirin delayed-release tablet 81 mg, 81 mg, Oral, DAILY, Oba, Oluwabunmi S, NP, 81 mg at 01/17/22 0833    cetirizine (ZYRTEC) tablet 10 mg, 10 mg, Oral, DAILY, Oba, Oluwabunmi S, NP, 10 mg at 01/17/22 5339    gabapentin (NEURONTIN) capsule 300 mg, 300 mg, Oral, TID, Oba, Oluwabunmi S, NP, 300 mg at 01/17/22 0833    insulin lispro (HUMALOG) injection 6 Units, 6 Units, SubCUTAneous, AC&HS, Oba, Oluwabunmi S, NP, 6 Units at 01/16/22 1634    rosuvastatin (CRESTOR) tablet 10 mg, 10 mg, Oral, QHS, Oba, Oluwabunmi S, NP, 10 mg at 01/16/22 2154    0.9% sodium chloride infusion, 100 mL/hr, IntraVENous, CONTINUOUS, Oba, Oluwabunmi S, NP, Last Rate: 100 mL/hr at 01/17/22 1257, 100 mL/hr at 01/17/22 1257     ALLERGIES:  Allergies reviewed with the patient,No Known Allergies . FAMILY HISTORY:  Family history reviewed. SOCIAL HISTORY:  Notable for no tobacco use, no alcohol use, and no illicit drug use. REVIEW OF SYSTEMS:  Complete review of systems performed, pertinents noted above, all other systems are negative. PHYSICAL EXAMINATION:  Vital sign assessment reveal a blood pressure of 152/74 and pulse rate of 64. Cardiovascular exam has a heart with a regular rate and rhythm on telemetry. Respiratory exam reveals no oxygen use. Lymphatic exam reveals no edema. Neurologic exam is nonfocal.     Recent labs results and imaging reviewed.   Notable findings include:   Recent Results (from the past 24 hour(s))   PTT    Collection Time: 01/16/22  4:40 PM   Result Value Ref Range    aPTT >180.0 (HH) 23.0 - 36.4 sec    aPTT, therapeutic range   82 - 109 sec   TROPONIN I    Collection Time: 01/16/22  8:13 PM   Result Value Ref Range    Troponin-I, Qt. <0.02 (L) 0.02 - 0.05 ng/mL   GLUCOSE, POC    Collection Time: 01/16/22  9:18 PM   Result Value Ref Range    Glucose (POC) 114 (H) 70 - 110 mg/dL    Performed by [employer]    SARS-COV-2    Collection Time: 01/16/22 10:00 PM   Result Value Ref Range    SARS-CoV-2 Please find results under separate order     SARS-COV-2 BY ELY    Collection Time: 01/16/22 10:00 PM   Result Value Ref Range    SARS-CoV-2, ELY Not detected Not detected   URINALYSIS W/ REFLEX CULTURE    Collection Time: 01/17/22 12:25 AM    Specimen: Urine   Result Value Ref Range    Color Yellow      Appearance Clear      Specific gravity 1.014 1.005 - 1.030      pH (UA) 5.0 5.0 - 8.0      Protein Trace (A) Negative mg/dL    Glucose 500 (A) Negative mg/dL    Ketone Negative Negative mg/dL    Bilirubin Negative Negative      Blood Negative Negative      Urobilinogen 0.2 0.2 - 1.0 EU/dL    Nitrites Negative Negative      Leukocyte Esterase Negative Negative      WBC 0-4 0 - 4 /hpf    RBC 0-5 0 - 2 /hpf    Epithelial cells Few 0 - 20 /lpf    Bacteria Negative (A) None /hpf    UA:UC IF INDICATED Culture not indicated by UA result Culture not indicated by UA result      Yeast Present     TROPONIN I    Collection Time: 01/17/22 12:59 AM   Result Value Ref Range    Troponin-I, Qt. <0.02 (L) 0.02 - 0.05 ng/mL   PTT    Collection Time: 01/17/22 12:59 AM   Result Value Ref Range    aPTT 166.4 (HH) 23.0 - 36.4 sec    aPTT, therapeutic range   82 - 966 sec   METABOLIC PANEL, BASIC    Collection Time: 01/17/22  3:23 AM   Result Value Ref Range    Sodium 139 135 - 145 mmol/L    Potassium 3.9 3.2 - 5.1 mmol/L    Chloride 108 94 - 111 mmol/L    CO2 25 21 - 33 mmol/L    Anion gap 6 mmol/L    Glucose 66 (L) 70 - 110 mg/dL    BUN 26 (H) 9 - 21 mg/dL    Creatinine 1.30 0.8 - 1.50 mg/dL    BUN/Creatinine ratio 20      GFR est AA >60 ml/min/1.73m2    GFR est non-AA 58 ml/min/1.73m2    Calcium 8.3 (L) 8.5 - 10.5 mg/dL   CBC WITH AUTOMATED DIFF    Collection Time: 01/17/22  3:23 AM   Result Value Ref Range    WBC 10.3 4.6 - 13.2 K/uL    RBC 3.80 (L) 4.35 - 5.65 M/uL    HGB 10.8 (L) 13.0 - 16.0 g/dL    HCT 35.5 (L) 36.0 - 48.0 %    MCV 93.4 78.0 - 100.0 FL    MCH 28.4 24.0 - 34.0 PG    MCHC 30.4 (L) 31.0 - 37.0 g/dL    RDW 13.7 11.6 - 14.5 %    PLATELET 305 848 - 758 K/uL    MPV 12.5 (H) 9.2 - 11.8 FL    NRBC 0.0 0.0  WBC    ABSOLUTE NRBC 0.00 0.00 - 0.01 K/uL    NEUTROPHILS 71 40 - 73 %    LYMPHOCYTES 22 21 - 52 %    MONOCYTES 6 3 - 10 %    EOSINOPHILS 1 0 - 5 %    BASOPHILS 0 0 - 2 %    IMMATURE GRANULOCYTES 0 0 - 0.5 %    ABS. NEUTROPHILS 7.3 1.8 - 8.0 K/UL    ABS. LYMPHOCYTES 2.2 0.9 - 3.6 K/UL    ABS. MONOCYTES 0.6 0.05 - 1.2 K/UL    ABS. EOSINOPHILS 0.1 0.0 - 0.4 K/UL    ABS. BASOPHILS 0.0 0.0 - 0.1 K/UL    ABS. IMM.  GRANS. 0.0 0.00 - 0.04 K/UL    DF AUTOMATED     GLUCOSE, POC    Collection Time: 01/17/22  6:25 AM   Result Value Ref Range    Glucose (POC) 108 70 - 110 mg/dL    Performed by Grabiel Robison    PTT    Collection Time: 01/17/22 10:10 AM   Result Value Ref Range    aPTT 26.4 23.0 - 36.4 sec    aPTT, therapeutic range   82 - 109 sec   GLUCOSE, POC    Collection Time: 01/17/22 11:05 AM   Result Value Ref Range    Glucose (POC) 105 70 - 110 mg/dL    Performed by Lm Lepe    DUPLEX CAROTID BILATERAL    Collection Time: 01/17/22 12:22 PM   Result Value Ref Range    Left CCA dist sys 76.4 cm/s    Left CCA dist cullen 21.7 cm/s    LEFT COMMON CAROTID ARTERY MID S 90.10 cm/s    LEFT COMMON CAROTID ARTERY MID D 21.70 cm/s    Left CCA prox sys 109.0 cm/s    Left CCA prox cullen 20.5 cm/s    Left ICA dist sys 76.4 cm/s    Left ICA dist cullen 20.5 cm/s    Left ICA mid sys 70.8 cm/s    Left ICA mid cullen 19.9 cm/s    Left ICA prox sys 62.7 cm/s    Left ICA prox cullen 14.9 cm/s    Left subclavian mid .0 cm/s    Left ECA sys 88.8 cm/s    Left vertebral sys 23.6 cm/s    Right cca dist sys 68.0 cm/s    Right CCA dist cullen 16.1 cm/s    RIGHT COMMON CAROTID ARTERY MID S 77.80 cm/s    RIGHT COMMON CAROTID ARTERY MID D 15.40 cm/s    Right CCA prox sys 129.0 cm/s    Right CCA prox cullen 16.8 cm/s    Right ICA dist sys 83.4 cm/s    Right ICA dist cullen 25.9 cm/s    Right ICA mid sys 77.1 cm/s    Right ICA mid cullen 21.7 cm/s    Right ICA prox sys 65.2 cm/s    Right ICA prox cullen 23.1 cm/s    Right subclavian mid .0 cm/s    Right eca sys 86.2 cm/s    Right vertebral sys 67.3 cm/s    RIGHT VERTEBRAL ARTERY D 21.70 cm/s    Right ICA/CCA sys 1.22     Left ICA/CCA sys 1.00      Discussed case with Dr. Susan Todd, and our impression and recommendations are as follows:  1. Syncope: EKGs and telemetry indicate no pauses or blocks. Patient is refusing orthostatic BP checks. VQ scan negative for PE. Will obtain bubble study to rule out atrial septal defect. Consider event monitoring as outpatient. He was bradycardic on arrival to the ER. Will decrease metoprolol XL to 12.5 mg daily. Thank you for involving us in the care of this patient. Please do not hesitate to call me or Dr. Susan Todd if additional questions arise.

## 2022-01-17 NOTE — PROGRESS NOTES
0730-Bedside shift change report given to [address] (oncoming nurse) by Chris Shankar RN (offgoing nurse). Report included the following information SBAR, Kardex and Recent Results. 0845-Medication administered. Pt tolerated well. Pt sitting up eating breakfast tray    0900-Pt down to [email or web address] med     0950-Pt back to unit at this time. 1230-Pt sitting up in bed eating lunch tray. Heparin drip increased to 16units/kg/hr    1615-Heparin drip stopped.  Pt transferred to [facility]

## 2022-01-17 NOTE — PROGRESS NOTES
Progress Note    Patient: Florecita Kaba MRN: [id number]  SSN: [id number]    YOB: 1965  Age: 49 y.o. Sex: male      Admit Date: 1/16/2022    LOS: 1 day     Subjective:   denies any acute complaints  states diarrhea continues  no abdominal pain or discomfort, no fever, or chills. No chest pain or sob. Echo and cardiac clearance is pending    Objective:     Vitals:    01/17/22 0328 01/17/22 0400 01/17/22 0845 01/17/22 1200   BP: 138/63  (!) 152/74    Pulse: 70 70 64 68   Resp: 12  13    Temp: 97.6 °F (36.4 °C)  97.8 °F (36.6 °C)    SpO2: 99%      Weight:       Height:            Intake and Output:  Current Shift: No intake/output data recorded. Last three shifts: 01/15 1901 - 01/17 0700  In: 3566.6 [P.O.:850; I.V.:2716.6]  Out: 550 [Urine:550]    Physical Exam:   Physical Exam  Vitals and nursing note reviewed. Constitutional:       Appearance: Normal appearance. Comments: Thin middle aged male in no acute distress   HENT:      Head: Normocephalic and atraumatic. Nose: Nose normal.      Mouth/Throat:      Mouth: Mucous membranes are moist.   Eyes:      Extraocular Movements: Extraocular movements intact. Pupils: Pupils are equal, round, and reactive to light. Cardiovascular:      Rate and Rhythm: Normal rate and regular rhythm. Pulses: Normal pulses. Heart sounds: Normal heart sounds. Pulmonary:      Effort: Pulmonary effort is normal.      Breath sounds: Normal breath sounds. Abdominal:      General: Abdomen is flat. Bowel sounds are normal.      Palpations: Abdomen is soft. Musculoskeletal:         General: Normal range of motion. Cervical back: Normal range of motion and neck supple. Skin:     General: Skin is warm and dry. Capillary Refill: Capillary refill takes less than 2 seconds. Neurological:      General: No focal deficit present. Mental Status: He is alert and oriented to person, place, and time. Mental status is at baseline. Psychiatric:         Mood and Affect: Mood normal.         Behavior: Behavior normal.         Thought Content:  Thought content normal.         Lab/Data Review:  Recent Results (from the past 24 hour(s))   GLUCOSE, POC    Collection Time: 01/16/22  4:18 PM   Result Value Ref Range    Glucose (POC) 144 (H) 70 - 110 mg/dL    Performed by Sacha Carrasco    PTT    Collection Time: 01/16/22  4:40 PM   Result Value Ref Range    aPTT >180.0 (HH) 23.0 - 36.4 sec    aPTT, therapeutic range   82 - 109 sec   TROPONIN I    Collection Time: 01/16/22  8:13 PM   Result Value Ref Range    Troponin-I, Qt. <0.02 (L) 0.02 - 0.05 ng/mL   GLUCOSE, POC    Collection Time: 01/16/22  9:18 PM   Result Value Ref Range    Glucose (POC) 114 (H) 70 - 110 mg/dL    Performed by Solange Leigh    SARS-COV-2    Collection Time: 01/16/22 10:00 PM   Result Value Ref Range    SARS-CoV-2 Please find results under separate order     SARS-COV-2 BY ELY    Collection Time: 01/16/22 10:00 PM   Result Value Ref Range    SARS-CoV-2, ELY Not detected Not detected   URINALYSIS W/ REFLEX CULTURE    Collection Time: 01/17/22 12:25 AM    Specimen: Urine   Result Value Ref Range    Color Yellow      Appearance Clear      Specific gravity 1.014 1.005 - 1.030      pH (UA) 5.0 5.0 - 8.0      Protein Trace (A) Negative mg/dL    Glucose 500 (A) Negative mg/dL    Ketone Negative Negative mg/dL    Bilirubin Negative Negative      Blood Negative Negative      Urobilinogen 0.2 0.2 - 1.0 EU/dL    Nitrites Negative Negative      Leukocyte Esterase Negative Negative      WBC 0-4 0 - 4 /hpf    RBC 0-5 0 - 2 /hpf    Epithelial cells Few 0 - 20 /lpf    Bacteria Negative (A) None /hpf    UA:UC IF INDICATED Culture not indicated by UA result Culture not indicated by UA result      Yeast Present     TROPONIN I    Collection Time: 01/17/22 12:59 AM   Result Value Ref Range    Troponin-I, Qt. <0.02 (L) 0.02 - 0.05 ng/mL   PTT    Collection Time: 01/17/22 12:59 AM   Result Value Ref Range    aPTT 166.4 (HH) 23.0 - 36.4 sec    aPTT, therapeutic range   82 - 008 sec   METABOLIC PANEL, BASIC    Collection Time: 01/17/22  3:23 AM   Result Value Ref Range    Sodium 139 135 - 145 mmol/L    Potassium 3.9 3.2 - 5.1 mmol/L    Chloride 108 94 - 111 mmol/L    CO2 25 21 - 33 mmol/L    Anion gap 6 mmol/L    Glucose 66 (L) 70 - 110 mg/dL    BUN 26 (H) 9 - 21 mg/dL    Creatinine 1.30 0.8 - 1.50 mg/dL    BUN/Creatinine ratio 20      GFR est AA >60 ml/min/1.73m2    GFR est non-AA 58 ml/min/1.73m2    Calcium 8.3 (L) 8.5 - 10.5 mg/dL   CBC WITH AUTOMATED DIFF    Collection Time: 01/17/22  3:23 AM   Result Value Ref Range    WBC 10.3 4.6 - 13.2 K/uL    RBC 3.80 (L) 4.35 - 5.65 M/uL    HGB 10.8 (L) 13.0 - 16.0 g/dL    HCT 35.5 (L) 36.0 - 48.0 %    MCV 93.4 78.0 - 100.0 FL    MCH 28.4 24.0 - 34.0 PG    MCHC 30.4 (L) 31.0 - 37.0 g/dL    RDW 13.7 11.6 - 14.5 %    PLATELET 064 823 - 607 K/uL    MPV 12.5 (H) 9.2 - 11.8 FL    NRBC 0.0 0.0  WBC    ABSOLUTE NRBC 0.00 0.00 - 0.01 K/uL    NEUTROPHILS 71 40 - 73 %    LYMPHOCYTES 22 21 - 52 %    MONOCYTES 6 3 - 10 %    EOSINOPHILS 1 0 - 5 %    BASOPHILS 0 0 - 2 %    IMMATURE GRANULOCYTES 0 0 - 0.5 %    ABS. NEUTROPHILS 7.3 1.8 - 8.0 K/UL    ABS. LYMPHOCYTES 2.2 0.9 - 3.6 K/UL    ABS. MONOCYTES 0.6 0.05 - 1.2 K/UL    ABS. EOSINOPHILS 0.1 0.0 - 0.4 K/UL    ABS. BASOPHILS 0.0 0.0 - 0.1 K/UL    ABS. IMM.  GRANS. 0.0 0.00 - 0.04 K/UL    DF AUTOMATED     GLUCOSE, POC    Collection Time: 01/17/22  6:25 AM   Result Value Ref Range    Glucose (POC) 108 70 - 110 mg/dL    Performed by Naomi Fitzpatrick    PTT    Collection Time: 01/17/22 10:10 AM   Result Value Ref Range    aPTT 26.4 23.0 - 36.4 sec    aPTT, therapeutic range   82 - 109 sec   GLUCOSE, POC    Collection Time: 01/17/22 11:05 AM   Result Value Ref Range    Glucose (POC) 105 70 - 110 mg/dL    Performed by Marlo Snellen    DUPLEX CAROTID BILATERAL    Collection Time: 01/17/22 12:22 PM   Result Value Ref Range    Left CCA dist sys 76.4 cm/s    Left CCA dist cullen 21.7 cm/s    LEFT COMMON CAROTID ARTERY MID S 90.10 cm/s    LEFT COMMON CAROTID ARTERY MID D 21.70 cm/s    Left CCA prox sys 109.0 cm/s    Left CCA prox cullen 20.5 cm/s    Left ICA dist sys 76.4 cm/s    Left ICA dist cullen 20.5 cm/s    Left ICA mid sys 70.8 cm/s    Left ICA mid cullen 19.9 cm/s    Left ICA prox sys 62.7 cm/s    Left ICA prox cullen 14.9 cm/s    Left subclavian mid .0 cm/s    Left ECA sys 88.8 cm/s    Left vertebral sys 23.6 cm/s    Right cca dist sys 68.0 cm/s    Right CCA dist cullen 16.1 cm/s    RIGHT COMMON CAROTID ARTERY MID S 77.80 cm/s    RIGHT COMMON CAROTID ARTERY MID D 15.40 cm/s    Right CCA prox sys 129.0 cm/s    Right CCA prox cullen 16.8 cm/s    Right ICA dist sys 83.4 cm/s    Right ICA dist cullen 25.9 cm/s    Right ICA mid sys 77.1 cm/s    Right ICA mid cullen 21.7 cm/s    Right ICA prox sys 65.2 cm/s    Right ICA prox cullen 23.1 cm/s    Right subclavian mid .0 cm/s    Right eca sys 86.2 cm/s    Right vertebral sys 67.3 cm/s    RIGHT VERTEBRAL ARTERY D 21.70 cm/s    Right ICA/CCA sys 1.22     Left ICA/CCA sys 1.00         Assessment:     Active Problems:    Syncope and collapse (1/16/2022)        Plan:   #1: Syncope:  -s/p CPR at [facility], he awoke and was brought to ED.  -unclear etiology.  -admit to observation, monitor on telemetry  -his ddimer is 1.6 and heparin was ordered-  -Vq scan shows no moderate or large segmental perfusion defects to indicate the presence of pulmonary emboli.  -Ecg shows SR/ST dep, new from prior and trop is 0.02 no chest pain or sob. -UA is negative. CXR is negative.  -trop negative x 4, patient refused orthostatic vitals  -carotid dopplersLess than 50% stenosis in bilateral carotid arteries   -Echo of 12/28/2021 shows a EF of 58% normal wall thickness. No valvular stenosis. -repeat echo with bubble study.   -further recs per cardiology.     #2: Acute kidney injury:  -Cr 1.3, improved with IV fluids  -admit Cr is 1.8, baseline is around 1.3 las dec 2021  -suspect prerenal etio due to dehydration r/t c-diff diarrhea last couple days  -start Jodee@L-3 GCS  -encourage oral intake. -BMP daily.     #3: C. difficile diarrhea:  -was on oral vanc at [facility] which I will continue.     #4: Hypertension:  Chronic, normotensive. Continue metoprolol per home dose.     #5: Diabetes mellitus type 2:  -Chronic, continue Accu-Cheks and Lantus 15 units daily per home dose.     #6: History of CVA:  -chronic issue, stable. Ambulates self with walker at baseline. No new deficits  -continue aspirin.     #7: History of seizures:  -States no longer on antiseizure medications.     VTE prophylaxis: Heparin drip. CODE STATUS: Full code. Total time spent: 40 minutes. Plan of care discussed with patient.   anticipate d/c in am.     Signed By: Arlyn Duarte NP     January 17, 2022

## 2022-01-18 VITALS
DIASTOLIC BLOOD PRESSURE: 79 MMHG | TEMPERATURE: 97.7 F | HEIGHT: 71 IN | HEART RATE: 73 BPM | BODY MASS INDEX: 19.32 KG/M2 | OXYGEN SATURATION: 99 % | WEIGHT: 138 LBS | SYSTOLIC BLOOD PRESSURE: 118 MMHG | RESPIRATION RATE: 18 BRPM

## 2022-01-18 LAB
GLUCOSE BLD STRIP.AUTO-MCNC: 152 MG/DL (ref 70–110)
GLUCOSE BLD STRIP.AUTO-MCNC: 188 MG/DL (ref 70–110)
GLUCOSE BLD STRIP.AUTO-MCNC: 56 MG/DL (ref 70–110)
LEFT CCA DIST DIAS: 21.7 CM/S
LEFT CCA DIST SYS: 76.4 CM/S
LEFT CCA MID DIAS: 21.7 CM/S
LEFT CCA MID SYS: 90.1 CM/S
LEFT CCA PROX DIAS: 20.5 CM/S
LEFT CCA PROX SYS: 109 CM/S
LEFT ECA SYS: 88.8 CM/S
LEFT ICA DIST DIAS: 20.5 CM/S
LEFT ICA DIST SYS: 76.4 CM/S
LEFT ICA MID DIAS: 19.9 CM/S
LEFT ICA MID SYS: 70.8 CM/S
LEFT ICA PROX DIAS: 14.9 CM/S
LEFT ICA PROX SYS: 62.7 CM/S
LEFT ICA/CCA SYS: 1
LEFT VERTEBRAL SYS: 23.6 CM/S
PERFORMED BY, TECHID: ABNORMAL
RIGHT CCA DIST DIAS: 16.1 CM/S
RIGHT CCA DIST SYS: 68 CM/S
RIGHT CCA MID DIAS: 15.4 CM/S
RIGHT CCA MID SYS: 77.8 CM/S
RIGHT CCA PROX DIAS: 16.8 CM/S
RIGHT CCA PROX SYS: 129 CM/S
RIGHT ECA SYS: 86.2 CM/S
RIGHT ICA DIST DIAS: 25.9 CM/S
RIGHT ICA DIST SYS: 83.4 CM/S
RIGHT ICA MID DIAS: 21.7 CM/S
RIGHT ICA MID SYS: 77.1 CM/S
RIGHT ICA PROX DIAS: 23.1 CM/S
RIGHT ICA PROX SYS: 65.2 CM/S
RIGHT ICA/CCA SYS: 1.22
RIGHT VERTEBRAL DIAS: 21.7 CM/S
RIGHT VERTEBRAL SYS: 67.3 CM/S
VAS LEFT SUBCLAVIAN MID PSV: 130 CM/S
VAS RIGHT SUBCLAVIAN MID PSV: 103 CM/S

## 2022-01-18 PROCEDURE — 74011250637 HC RX REV CODE- 250/637: Performed by: NURSE PRACTITIONER

## 2022-01-18 PROCEDURE — 74011636637 HC RX REV CODE- 636/637: Performed by: NURSE PRACTITIONER

## 2022-01-18 PROCEDURE — G0378 HOSPITAL OBSERVATION PER HR: HCPCS

## 2022-01-18 PROCEDURE — 82962 GLUCOSE BLOOD TEST: CPT

## 2022-01-18 RX ADMIN — ASPIRIN 81 MG: 81 TABLET, COATED ORAL at 09:16

## 2022-01-18 RX ADMIN — VANCOMYCIN HYDROCHLORIDE 250 MG: 250 CAPSULE ORAL at 09:16

## 2022-01-18 RX ADMIN — GABAPENTIN 300 MG: 300 CAPSULE ORAL at 09:16

## 2022-01-18 RX ADMIN — CETIRIZINE HYDROCHLORIDE 10 MG: 10 TABLET, FILM COATED ORAL at 09:16

## 2022-01-18 RX ADMIN — INSULIN GLARGINE 15 UNITS: 100 INJECTION, SOLUTION SUBCUTANEOUS at 09:15

## 2022-01-18 RX ADMIN — METOPROLOL SUCCINATE 12.5 MG: 25 TABLET, EXTENDED RELEASE ORAL at 09:16

## 2022-01-18 NOTE — PROGRESS NOTES
1900 - Assumed care of pt, shift report given    1944 - VSS. Assessment completed. Pt is a&ox4 with periods of confusion resting in bed at this time. Will continue to monitor. CBWR     2109 - Blood glucose 69, per hospitalist hold lispro and lantus. Snack given    2112- HS medication given    0045 - VSS. Pt up to bathroom and back to bed with assist.     0502 - Pt OOB when alarm sounded. When this nurse walked into pt was standing at end of bed turning off bed alarm. Educated pt not to turn off alarms. Pt stated he was going to town, redirected pt. Offered to toilet, pt denies needing to use the bathroom at this time. Pt currently resting in bed, bed alarm on, gripper socks in place, side rails up x2, CBWR.

## 2022-01-18 NOTE — PROGRESS NOTES
Blood sugar recheck after OJ and meal; 188. Coverage was NOT given at this was recheck. Scheduled Lantus given.

## 2022-01-18 NOTE — PROGRESS NOTES
Received pt awake and alert. Lung sounds clear. Bowel sounds positive. PP positive. Pt has no IV access, no O2. Pt does have various abrasions on the BLE. Pt  Denies pain or discomfort. Blood sugar 56; asymptomatic. One cup OJ given at this time.

## 2022-01-18 NOTE — PROGRESS NOTES
Patient was discharged back to his residence at the nursing home prior to being seen. His information was reviewed from yesterday. His echocardiogram indicated a preserved ejection fraction with no aortic stenosis and no atrial septal defect or PFO on color Doppler. A bubble study could not be performed. Would recommend to continue metoprolol at the reduced dose of 12.5 mg once daily. Suspect that the patient had a vasovagal episode while having a bowel movement on 1/16/2022 which prompted his hospitalization. Will have a Holter monitor placed as outpatient and plan to follow-up in about 2 weeks in the nursing home.

## 2022-01-19 ENCOUNTER — HOSPITAL ENCOUNTER (OUTPATIENT)
Dept: LAB | Age: 57
Discharge: HOME | End: 2022-01-19
Payer: MEDICARE

## 2022-01-19 LAB — GLUCOSE SERPL-MCNC: 295 MG/DL (ref 70–110)

## 2022-01-19 PROCEDURE — 36415 COLL VENOUS BLD VENIPUNCTURE: CPT

## 2022-01-19 PROCEDURE — 82947 ASSAY GLUCOSE BLOOD QUANT: CPT

## 2022-01-21 ENCOUNTER — HOSPITAL ENCOUNTER (OUTPATIENT)
Dept: NON INVASIVE DIAGNOSTICS | Age: 57
Discharge: HOME | End: 2022-01-21
Attending: NURSE PRACTITIONER
Payer: MEDICARE

## 2022-01-21 ENCOUNTER — HOSPITAL ENCOUNTER (OUTPATIENT)
Dept: LAB | Age: 57
Discharge: HOME | End: 2022-01-21
Attending: NURSE PRACTITIONER
Payer: MEDICARE

## 2022-01-21 ENCOUNTER — TELEPHONE (OUTPATIENT)
Dept: INTERNAL MEDICINE CLINIC | Age: 57
End: 2022-01-21

## 2022-01-21 LAB — GLUCOSE SERPL-MCNC: 576 MG/DL (ref 70–110)

## 2022-01-21 PROCEDURE — 93226 XTRNL ECG REC<48 HR SCAN A/R: CPT

## 2022-01-21 PROCEDURE — 82947 ASSAY GLUCOSE BLOOD QUANT: CPT

## 2022-01-21 PROCEDURE — 36415 COLL VENOUS BLD VENIPUNCTURE: CPT

## 2022-01-21 NOTE — TELEPHONE ENCOUNTER
Lianet at hospital lab called with a critical glucose of 576. I did call back and tell Lianet patient is now at EP and to call critical to EP.

## 2022-01-24 LAB
GLUCOSE BLD STRIP.AUTO-MCNC: 132 MG/DL (ref 70–110)
PERFORMED BY, TECHID: ABNORMAL

## 2022-01-25 ENCOUNTER — HOSPITAL ENCOUNTER (OUTPATIENT)
Dept: LAB | Age: 57
Discharge: HOME | End: 2022-01-25
Payer: MEDICARE

## 2022-01-25 LAB — GLUCOSE SERPL-MCNC: 757 MG/DL (ref 70–110)

## 2022-01-25 PROCEDURE — 82947 ASSAY GLUCOSE BLOOD QUANT: CPT

## 2022-01-27 ENCOUNTER — OFFICE VISIT (OUTPATIENT)
Dept: FAMILY MEDICINE CLINIC | Age: 57
End: 2022-01-27
Payer: MEDICARE

## 2022-01-27 DIAGNOSIS — E78.2 MIXED HYPERLIPIDEMIA: ICD-10-CM

## 2022-01-27 DIAGNOSIS — E10.10 TYPE 1 DIABETES MELLITUS WITH KETOACIDOSIS WITHOUT COMA (HCC): ICD-10-CM

## 2022-01-27 DIAGNOSIS — I10 BENIGN HYPERTENSION: ICD-10-CM

## 2022-01-27 DIAGNOSIS — E11.42 DIABETIC PERIPHERAL NEUROPATHY (HCC): Primary | ICD-10-CM

## 2022-01-27 PROCEDURE — 99309 SBSQ NF CARE MODERATE MDM 30: CPT | Performed by: NURSE PRACTITIONER

## 2022-01-27 PROCEDURE — G8432 DEP SCR NOT DOC, RNG: HCPCS | Performed by: NURSE PRACTITIONER

## 2022-01-29 ENCOUNTER — HOSPITAL ENCOUNTER (OUTPATIENT)
Age: 57
Setting detail: OBSERVATION
Discharge: HOME | End: 2022-01-31
Attending: INTERNAL MEDICINE | Admitting: INTERNAL MEDICINE
Payer: MEDICARE

## 2022-01-29 ENCOUNTER — APPOINTMENT (OUTPATIENT)
Dept: CT IMAGING | Age: 57
End: 2022-01-29
Attending: INTERNAL MEDICINE
Payer: MEDICARE

## 2022-01-29 DIAGNOSIS — R19.7 INTRACTABLE DIARRHEA: ICD-10-CM

## 2022-01-29 DIAGNOSIS — N17.9 ACUTE KIDNEY INJURY (HCC): Primary | ICD-10-CM

## 2022-01-29 DIAGNOSIS — E86.0 DEHYDRATION: ICD-10-CM

## 2022-01-29 LAB
ALBUMIN SERPL-MCNC: 3.7 G/DL (ref 3.5–4.7)
ALBUMIN/GLOB SERPL: 1.3 {RATIO}
ALP SERPL-CCNC: 88 U/L (ref 38–126)
ALT SERPL-CCNC: 40 U/L (ref 3–72)
ANION GAP SERPL CALC-SCNC: 9 MMOL/L
AST SERPL W P-5'-P-CCNC: 55 U/L (ref 17–74)
BASOPHILS # BLD: 0.1 K/UL (ref 0–0.1)
BASOPHILS NFR BLD: 1 % (ref 0–2)
BILIRUB SERPL-MCNC: 0.4 MG/DL (ref 0.2–1)
BUN SERPL-MCNC: 52 MG/DL (ref 9–21)
BUN/CREAT SERPL: 18
CA-I BLD-MCNC: 8.8 MG/DL (ref 8.5–10.5)
CHLORIDE SERPL-SCNC: 99 MMOL/L (ref 94–111)
CO2 SERPL-SCNC: 27 MMOL/L (ref 21–33)
CREAT SERPL-MCNC: 2.9 MG/DL (ref 0.8–1.5)
DIFFERENTIAL METHOD BLD: ABNORMAL
EOSINOPHIL # BLD: 0.2 K/UL (ref 0–0.4)
EOSINOPHIL NFR BLD: 2 % (ref 0–5)
ERYTHROCYTE [DISTWIDTH] IN BLOOD BY AUTOMATED COUNT: 13.8 % (ref 11.6–14.5)
GLOBULIN SER CALC-MCNC: 2.9 G/DL
GLUCOSE BLD STRIP.AUTO-MCNC: 150 MG/DL (ref 70–110)
GLUCOSE SERPL-MCNC: 195 MG/DL (ref 70–110)
HCT VFR BLD AUTO: 37.2 % (ref 36–48)
HGB BLD-MCNC: 11.5 G/DL (ref 13–16)
IMM GRANULOCYTES # BLD AUTO: 0 K/UL (ref 0–0.04)
IMM GRANULOCYTES NFR BLD AUTO: 0 % (ref 0–0.5)
LYMPHOCYTES # BLD: 2.3 K/UL (ref 0.9–3.6)
LYMPHOCYTES NFR BLD: 19 % (ref 21–52)
MAGNESIUM SERPL-MCNC: 2.3 MG/DL (ref 1.7–2.8)
MCH RBC QN AUTO: 28.8 PG (ref 24–34)
MCHC RBC AUTO-ENTMCNC: 30.9 G/DL (ref 31–37)
MCV RBC AUTO: 93 FL (ref 78–100)
MONOCYTES # BLD: 1 K/UL (ref 0.05–1.2)
MONOCYTES NFR BLD: 8 % (ref 3–10)
NEUTS SEG # BLD: 8.5 K/UL (ref 1.8–8)
NEUTS SEG NFR BLD: 70 % (ref 40–73)
NRBC # BLD: 0 K/UL (ref 0–0.01)
NRBC BLD-RTO: 0 PER 100 WBC
PERFORMED BY, TECHID: ABNORMAL
PLATELET # BLD AUTO: 238 K/UL (ref 135–420)
PMV BLD AUTO: 12.1 FL (ref 9.2–11.8)
POTASSIUM SERPL-SCNC: 4 MMOL/L (ref 3.2–5.1)
PROT SERPL-MCNC: 6.6 G/DL (ref 6.1–8.4)
RBC # BLD AUTO: 4 M/UL (ref 4.35–5.65)
SODIUM SERPL-SCNC: 135 MMOL/L (ref 135–145)
WBC # BLD AUTO: 12.1 K/UL (ref 4.6–13.2)

## 2022-01-29 PROCEDURE — 80053 COMPREHEN METABOLIC PANEL: CPT

## 2022-01-29 PROCEDURE — 65270000029 HC RM PRIVATE

## 2022-01-29 PROCEDURE — 75810000293 HC SIMP/SUPERF WND  RPR

## 2022-01-29 PROCEDURE — 90471 IMMUNIZATION ADMIN: CPT

## 2022-01-29 PROCEDURE — 82962 GLUCOSE BLOOD TEST: CPT

## 2022-01-29 PROCEDURE — 99283 EMERGENCY DEPT VISIT LOW MDM: CPT

## 2022-01-29 PROCEDURE — 36415 COLL VENOUS BLD VENIPUNCTURE: CPT

## 2022-01-29 PROCEDURE — 74011636637 HC RX REV CODE- 636/637: Performed by: NURSE PRACTITIONER

## 2022-01-29 PROCEDURE — 74011000250 HC RX REV CODE- 250: Performed by: NURSE PRACTITIONER

## 2022-01-29 PROCEDURE — 87324 CLOSTRIDIUM AG IA: CPT

## 2022-01-29 PROCEDURE — 74011000250 HC RX REV CODE- 250: Performed by: INTERNAL MEDICINE

## 2022-01-29 PROCEDURE — 74011250636 HC RX REV CODE- 250/636: Performed by: INTERNAL MEDICINE

## 2022-01-29 PROCEDURE — 70450 CT HEAD/BRAIN W/O DYE: CPT

## 2022-01-29 PROCEDURE — 83735 ASSAY OF MAGNESIUM: CPT

## 2022-01-29 PROCEDURE — 85025 COMPLETE CBC W/AUTO DIFF WBC: CPT

## 2022-01-29 PROCEDURE — G0378 HOSPITAL OBSERVATION PER HR: HCPCS

## 2022-01-29 PROCEDURE — 90715 TDAP VACCINE 7 YRS/> IM: CPT | Performed by: INTERNAL MEDICINE

## 2022-01-29 PROCEDURE — 74011250637 HC RX REV CODE- 250/637: Performed by: NURSE PRACTITIONER

## 2022-01-29 PROCEDURE — 74011250636 HC RX REV CODE- 250/636: Performed by: NURSE PRACTITIONER

## 2022-01-29 RX ORDER — DEXTROSE MONOHYDRATE 100 MG/ML
125-250 INJECTION, SOLUTION INTRAVENOUS AS NEEDED
Status: DISCONTINUED | OUTPATIENT
Start: 2022-01-29 | End: 2022-01-31 | Stop reason: HOSPADM

## 2022-01-29 RX ORDER — LOPERAMIDE HYDROCHLORIDE 2 MG/1
4 CAPSULE ORAL
Status: DISCONTINUED | OUTPATIENT
Start: 2022-01-29 | End: 2022-01-31 | Stop reason: HOSPADM

## 2022-01-29 RX ORDER — SODIUM CHLORIDE 9 MG/ML
125 INJECTION, SOLUTION INTRAVENOUS CONTINUOUS
Status: DISCONTINUED | OUTPATIENT
Start: 2022-01-29 | End: 2022-01-31 | Stop reason: HOSPADM

## 2022-01-29 RX ORDER — ROSUVASTATIN CALCIUM 10 MG/1
10 TABLET, COATED ORAL
Status: DISCONTINUED | OUTPATIENT
Start: 2022-01-29 | End: 2022-01-31 | Stop reason: HOSPADM

## 2022-01-29 RX ORDER — ENOXAPARIN SODIUM 100 MG/ML
30 INJECTION SUBCUTANEOUS DAILY
Status: DISCONTINUED | OUTPATIENT
Start: 2022-01-30 | End: 2022-01-31 | Stop reason: HOSPADM

## 2022-01-29 RX ORDER — DEXTROSE 50 % IN WATER (D50W) INTRAVENOUS SYRINGE
25-50 AS NEEDED
Status: DISCONTINUED | OUTPATIENT
Start: 2022-01-29 | End: 2022-01-29

## 2022-01-29 RX ORDER — METOPROLOL SUCCINATE 25 MG/1
12.5 TABLET, EXTENDED RELEASE ORAL DAILY
Status: DISCONTINUED | OUTPATIENT
Start: 2022-01-30 | End: 2022-01-31 | Stop reason: HOSPADM

## 2022-01-29 RX ORDER — ONDANSETRON 2 MG/ML
4 INJECTION INTRAMUSCULAR; INTRAVENOUS
Status: DISCONTINUED | OUTPATIENT
Start: 2022-01-29 | End: 2022-01-31 | Stop reason: HOSPADM

## 2022-01-29 RX ORDER — ASPIRIN 81 MG/1
81 TABLET ORAL DAILY
Status: DISCONTINUED | OUTPATIENT
Start: 2022-01-30 | End: 2022-01-31 | Stop reason: HOSPADM

## 2022-01-29 RX ORDER — MAGNESIUM SULFATE 100 %
4 CRYSTALS MISCELLANEOUS AS NEEDED
Status: DISCONTINUED | OUTPATIENT
Start: 2022-01-29 | End: 2022-01-31 | Stop reason: HOSPADM

## 2022-01-29 RX ORDER — LIDOCAINE HYDROCHLORIDE AND EPINEPHRINE 20; 10 MG/ML; UG/ML
10 INJECTION, SOLUTION INFILTRATION; PERINEURAL ONCE
Status: COMPLETED | OUTPATIENT
Start: 2022-01-29 | End: 2022-01-29

## 2022-01-29 RX ORDER — CETIRIZINE HCL 10 MG
10 TABLET ORAL DAILY
Status: DISCONTINUED | OUTPATIENT
Start: 2022-01-30 | End: 2022-01-31 | Stop reason: HOSPADM

## 2022-01-29 RX ORDER — SODIUM CHLORIDE 0.9 % (FLUSH) 0.9 %
5-40 SYRINGE (ML) INJECTION EVERY 8 HOURS
Status: DISCONTINUED | OUTPATIENT
Start: 2022-01-29 | End: 2022-01-31 | Stop reason: HOSPADM

## 2022-01-29 RX ORDER — GABAPENTIN 300 MG/1
300 CAPSULE ORAL 3 TIMES DAILY
Status: DISCONTINUED | OUTPATIENT
Start: 2022-01-29 | End: 2022-01-31 | Stop reason: HOSPADM

## 2022-01-29 RX ORDER — ACETAMINOPHEN 325 MG/1
650 TABLET ORAL
Status: DISCONTINUED | OUTPATIENT
Start: 2022-01-29 | End: 2022-01-31 | Stop reason: HOSPADM

## 2022-01-29 RX ORDER — SODIUM CHLORIDE 0.9 % (FLUSH) 0.9 %
5-40 SYRINGE (ML) INJECTION AS NEEDED
Status: DISCONTINUED | OUTPATIENT
Start: 2022-01-29 | End: 2022-01-31 | Stop reason: HOSPADM

## 2022-01-29 RX ORDER — INSULIN GLARGINE 100 [IU]/ML
15 INJECTION, SOLUTION SUBCUTANEOUS
Status: DISCONTINUED | OUTPATIENT
Start: 2022-01-29 | End: 2022-01-30

## 2022-01-29 RX ORDER — ACETAMINOPHEN 650 MG/1
650 SUPPOSITORY RECTAL
Status: DISCONTINUED | OUTPATIENT
Start: 2022-01-29 | End: 2022-01-31 | Stop reason: HOSPADM

## 2022-01-29 RX ORDER — ONDANSETRON 4 MG/1
4 TABLET, ORALLY DISINTEGRATING ORAL
Status: DISCONTINUED | OUTPATIENT
Start: 2022-01-29 | End: 2022-01-31 | Stop reason: HOSPADM

## 2022-01-29 RX ORDER — POLYETHYLENE GLYCOL 3350 17 G/17G
17 POWDER, FOR SOLUTION ORAL DAILY PRN
Status: DISCONTINUED | OUTPATIENT
Start: 2022-01-29 | End: 2022-01-31 | Stop reason: HOSPADM

## 2022-01-29 RX ORDER — INSULIN LISPRO 100 [IU]/ML
INJECTION, SOLUTION INTRAVENOUS; SUBCUTANEOUS
Status: DISCONTINUED | OUTPATIENT
Start: 2022-01-29 | End: 2022-01-31 | Stop reason: HOSPADM

## 2022-01-29 RX ADMIN — INSULIN LISPRO 2 UNITS: 100 INJECTION, SOLUTION INTRAVENOUS; SUBCUTANEOUS at 21:21

## 2022-01-29 RX ADMIN — INSULIN GLARGINE 15 UNITS: 100 INJECTION, SOLUTION SUBCUTANEOUS at 21:21

## 2022-01-29 RX ADMIN — SODIUM CHLORIDE 100 ML/HR: 9 INJECTION, SOLUTION INTRAVENOUS at 21:22

## 2022-01-29 RX ADMIN — GABAPENTIN 300 MG: 300 CAPSULE ORAL at 21:21

## 2022-01-29 RX ADMIN — LIDOCAINE HYDROCHLORIDE AND EPINEPHRINE 200 MG: 20; 10 INJECTION, SOLUTION INFILTRATION; PERINEURAL at 17:12

## 2022-01-29 RX ADMIN — TETANUS TOXOID, REDUCED DIPHTHERIA TOXOID AND ACELLULAR PERTUSSIS VACCINE, ADSORBED 0.5 ML: 5; 2.5; 8; 8; 2.5 SUSPENSION INTRAMUSCULAR at 17:12

## 2022-01-29 RX ADMIN — SODIUM CHLORIDE, PRESERVATIVE FREE 10 ML: 5 INJECTION INTRAVENOUS at 21:22

## 2022-01-29 RX ADMIN — SODIUM CHLORIDE 1000 ML: 9 INJECTION, SOLUTION INTRAVENOUS at 17:11

## 2022-01-29 RX ADMIN — ROSUVASTATIN CALCIUM 10 MG: 10 TABLET, FILM COATED ORAL at 21:21

## 2022-01-29 NOTE — ED TRIAGE NOTES
Pt fell at EP, does not remember how he fell. Pt has lac to back of left head. Not on  Blood thinners, no LOC. Bleeding is controlled. Pt has hx of brain bleed per nurse at EP.

## 2022-01-29 NOTE — ED PROVIDER NOTES
EMERGENCY DEPARTMENT HISTORY AND PHYSICAL EXAM      Date: 1/29/2022  Patient Name: Kiran Sepulveda    History of Presenting Illness     Chief Complaint   Patient presents with    Fall       History Provided By: Patient    HPI: Kiran Sepulveda, 49 y.o. male with a past medical history significant diabetes, hypertension, hyperlipidemia and chronic c.diff, cardiac arrest presents to the ED with cc of fall with head injury and scalp laceration from [facility] just PTA. He states he doesn't remember the details of the event. He is being treated for C. Diff currently, and says he was treated once before, but this time it isn't going away. He has frequent watery stools, no abdominal pain, no chest pain, no shortness of breath, fever or chills. No back pain. There are no other complaints, changes, or physical findings at this time. PCP: None    No current facility-administered medications on file prior to encounter. Current Outpatient Medications on File Prior to Encounter   Medication Sig Dispense Refill    loperamide (IMODIUM) 2 mg capsule Take 4 mg by mouth every four (4) hours as needed for Diarrhea.  metoprolol succinate (TOPROL-XL) 25 mg XL tablet Take 0.5 Tablets by mouth daily. 60 Tablet 0    insulin aspart U-100 (NovoLOG Flexpen U-100 Insulin) 100 unit/mL (3 mL) inpn by SubCUTAneous route as needed. [facility]  100-160= 2 UNITS  161-220= 4 UNITS  221-280= 6 UNITS  281-340= 8 UNITS  341-400= 10 UNITS  401-460= 12 UNITS  >461= 14 UNITS AND CALL MD      insulin glargine (Lantus U-100 Insulin) 100 unit/mL injection 15 Units by SubCUTAneous route ACB/HS. Indications: type 2 diabetes mellitus      vancomycin (VANCOCIN) 250 mg capsule Take 250 mg by mouth four (4) times daily. Take for 14 days, starting 1/11/22. Last dose on 1/25/22.  rosuvastatin (CRESTOR) 10 mg tablet Take 1 Tablet by mouth nightly.  30 Tablet 5    aspirin delayed-release 81 mg tablet Take 1 Tablet by mouth daily. 30 Tablet 5    gabapentin (NEURONTIN) 300 mg capsule Take 1 Capsule by mouth three (3) times daily. Max Daily Amount: 900 mg. 90 Capsule 1    cetirizine (ZYRTEC) 10 mg tablet TAKE 1 TABLET EVERY DAY BY ORAL ROUTE.  90 Tab 0       Past History     Past Medical History:  Past Medical History:   Diagnosis Date    Diabetes ([address]     Hypercholesterolemia     Hypertension        Past Surgical History:  Past Surgical History:   Procedure Laterality Date    HX ORTHOPAEDIC Left 08/30/2018    hip joint    HX ORTHOPAEDIC Right     broken knee       Family History:  Family History   Problem Relation Age of Onset    Cancer Father         prostate    Cancer Maternal Uncle         prostate    Cancer Paternal Uncle         prostate       Social History:  Social History     Tobacco Use    Smoking status: Never Smoker    Smokeless tobacco: Current User    Tobacco comment: 5+/day   Vaping Use    Vaping Use: Never used   Substance Use Topics    Alcohol use: Not Currently    Drug use: Never       Allergies:  No Known Allergies      Review of Systems     Review of Systems   Constitutional: Negative for activity change and chills. HENT: Negative for congestion, ear discharge, rhinorrhea, sinus pain and sore throat. Respiratory: Negative for cough, shortness of breath and wheezing. Cardiovascular: Negative for palpitations and leg swelling. Gastrointestinal: Negative for abdominal pain, nausea and vomiting. Genitourinary: Negative for flank pain. Musculoskeletal: Negative for back pain, myalgias and neck pain. Skin: Positive for wound. Negative for rash. Neurological: Positive for headaches. Negative for dizziness. Psychiatric/Behavioral: Negative for behavioral problems and confusion. Physical Exam     Physical Exam  Vitals and nursing note reviewed. Constitutional:       General: He is not in acute distress. Appearance: He is well-developed and normal weight. He is not toxic-appearing. HENT:      Head: Normocephalic and atraumatic. Mouth/Throat:      Pharynx: No pharyngeal swelling, oropharyngeal exudate, posterior oropharyngeal erythema or uvula swelling. Tonsils: No tonsillar exudate or tonsillar abscesses. 3+ on the right. 3+ on the left. Eyes:      Extraocular Movements: Extraocular movements intact. Pupils: Pupils are equal, round, and reactive to light. Cardiovascular:      Rate and Rhythm: Normal rate and regular rhythm. Pulmonary:      Effort: Pulmonary effort is normal. No respiratory distress. Breath sounds: Normal breath sounds. No stridor. No wheezing. Chest:      Chest wall: No tenderness. Abdominal:      General: Abdomen is flat. Palpations: Abdomen is soft. Tenderness: There is no abdominal tenderness. There is no guarding. Hernia: No hernia is present. Musculoskeletal:         General: No swelling, tenderness or deformity. Normal range of motion. Cervical back: Normal range of motion and neck supple. Lymphadenopathy:      Cervical: No cervical adenopathy. Skin:     General: Skin is warm and dry. Capillary Refill: Capillary refill takes less than 2 seconds. Findings: Laceration present. Neurological:      General: No focal deficit present. Mental Status: He is alert and oriented to person, place, and time. Cranial Nerves: No cranial nerve deficit.    Psychiatric:         Mood and Affect: Mood normal.         Behavior: Behavior normal.         Lab and Diagnostic Study Results     Labs -     Recent Results (from the past 12 hour(s))   CBC WITH AUTOMATED DIFF    Collection Time: 01/29/22  5:20 PM   Result Value Ref Range    WBC 12.1 4.6 - 13.2 K/uL    RBC 4.00 (L) 4.35 - 5.65 M/uL    HGB 11.5 (L) 13.0 - 16.0 g/dL    HCT 37.2 36.0 - 48.0 %    MCV 93.0 78.0 - 100.0 FL    MCH 28.8 24.0 - 34.0 PG    MCHC 30.9 (L) 31.0 - 37.0 g/dL    RDW 13.8 11.6 - 14.5 %    PLATELET 136 323 - 420 K/uL    MPV 12.1 (H) 9.2 - 11.8 FL    NRBC 0.0 0.0  WBC    ABSOLUTE NRBC 0.00 0.00 - 0.01 K/uL    NEUTROPHILS 70 40 - 73 %    LYMPHOCYTES 19 (L) 21 - 52 %    MONOCYTES 8 3 - 10 %    EOSINOPHILS 2 0 - 5 %    BASOPHILS 1 0 - 2 %    IMMATURE GRANULOCYTES 0 0 - 0.5 %    ABS. NEUTROPHILS 8.5 (H) 1.8 - 8.0 K/UL    ABS. LYMPHOCYTES 2.3 0.9 - 3.6 K/UL    ABS. MONOCYTES 1.0 0.05 - 1.2 K/UL    ABS. EOSINOPHILS 0.2 0.0 - 0.4 K/UL    ABS. BASOPHILS 0.1 0.0 - 0.1 K/UL    ABS. IMM. GRANS. 0.0 0.00 - 0.04 K/UL    DF AUTOMATED     METABOLIC PANEL, COMPREHENSIVE    Collection Time: 01/29/22  5:20 PM   Result Value Ref Range    Sodium 135 135 - 145 mmol/L    Potassium 4.0 3.2 - 5.1 mmol/L    Chloride 99 94 - 111 mmol/L    CO2 27 21 - 33 mmol/L    Anion gap 9 mmol/L    Glucose 195 (H) 70 - 110 mg/dL    BUN 52 (H) 9 - 21 mg/dL    Creatinine 2.90 (H) 0.8 - 1.50 mg/dL    BUN/Creatinine ratio 18      GFR est AA 28 ml/min/1.73m2    GFR est non-AA 23 ml/min/1.73m2    Calcium 8.8 8.5 - 10.5 mg/dL    Bilirubin, total 0.4 0.2 - 1.0 mg/dL    AST (SGOT) 55 17 - 74 U/L    ALT (SGPT) 40 3 - 72 U/L    Alk. phosphatase 88 38 - 126 U/L    Protein, total 6.6 6.1 - 8.4 g/dL    Albumin 3.7 3.5 - 4.7 g/dL    Globulin 2.9 g/dL    A-G Ratio 1.3     MAGNESIUM    Collection Time: 01/29/22  5:20 PM   Result Value Ref Range    Magnesium 2.3 1.7 - 2.8 mg/dL       Radiologic Studies -   @lastxrresult@  CT Results  (Last 48 hours)               01/29/22 1436  CT HEAD WO CONT Final result    Impression:      1. No acute intracranial abnormalities. 2. Left frontal encephalomalacia again identified, unchanged. Chronic lacunar   infarct involving right internal capsule again identified, unchanged.        Narrative:  EXAM: CT head       CLINICAL INDICATION/HISTORY: Fall, head trauma, head pain       COMPARISON: 1/16/2022       TECHNIQUE: Axial CT imaging of the head was performed without intravenous   contrast. One or more dose reduction techniques were used on this CT: automated   exposure control, adjustment of the mAs and/or kVp according to patient size,   and iterative reconstruction techniques. The specific techniques used on this   CT exam have been documented in the patient's electronic medical record. Digital   Imaging and Communications in Medicine (DICOM) format image data are available   to nonaffiliated external healthcare facilities or entities on a secured, media   free, reciprocally searchable basis with patient authorization for at least a   12-month period after this study. _______________       FINDINGS:       BRAIN AND EXTRA-AXIAL SPACES: There is no intracranial hemorrhage, mass effect,   or midline shift. Left frontal encephalomalacia again identified, unchanged. Chronic lacunar infarct involving right internal capsule, unchanged. There are   no abnormal extra-axial fluid collections. CALVARIUM: Intact. SINUSES: Clear. OTHER: None.       _______________               CXR Results  (Last 48 hours)    None            Medical Decision Making   - I am the first provider for this patient. - I reviewed the vital signs, available nursing notes, past medical history, past surgical history, family history and social history. - Initial assessment performed. The patients presenting problems have been discussed, and they are in agreement with the care plan formulated and outlined with them. I have encouraged them to ask questions as they arise throughout their visit. Vital Signs-Reviewed the patient's vital signs.   Patient Vitals for the past 12 hrs:   Temp Pulse Resp BP SpO2   01/29/22 1555 -- 68 -- 104/68 --   01/29/22 1532 -- -- -- 112/64 --   01/29/22 1344 97.6 °F (36.4 °C) 67 18 -- 100 %       Records Reviewed: Nursing Notes, Old Medical Records, Previous Radiology Studies and Previous Laboratory Studies  ED Course:          Provider Notes (Medical Decision Making):     MDM  Number of Diagnoses or Management Options  Diagnosis management comments: Creat increased from 1.3 --> 2.9 with persistent diarrhea. Patient looks moderately dehydrated. Will admit for [place], with iv hydration. He got 1 liter NS so far. Von Rosales NP will admit. Procedures   Medical Decision Makingedical Decision Making  Performed by: Gibran Benson MD  PROCEDURES:  Wound Repair    Date/Time: 1/29/2022 5:10 PM  Performed by: attendingPreparation: skin prepped with ChloraPrep  Pre-procedure re-eval: Immediately prior to the procedure, the patient was reevaluated and found suitable for the planned procedure and any planned medications. Time out: Immediately prior to the procedure a time out was called to verify the correct patient, procedure, equipment, staff and marking as appropriate. .  Location details: scalp  Wound length:2.6 - 7.5 cm  Anesthesia: local infiltration    Anesthesia:  Local Anesthetic: lidocaine 2% with epinephrine  Anesthetic total: 10 mL  Foreign bodies: no foreign bodies  Debridement: none  Skin closure: staples (#4)  Approximation: close  Dressing: antibiotic ointment  Patient tolerance: patient tolerated the procedure well with no immediate complications  My total time at bedside, performing this procedure was 1-15 minutes. Disposition   Disposition: Admitted to Floor Medical Floor the case was discussed with the admitting physician         DISCHARGE PLAN:  1. Current Discharge Medication List      CONTINUE these medications which have NOT CHANGED    Details   loperamide (IMODIUM) 2 mg capsule Take 4 mg by mouth every four (4) hours as needed for Diarrhea.      metoprolol succinate (TOPROL-XL) 25 mg XL tablet Take 0.5 Tablets by mouth daily. Qty: 60 Tablet, Refills: 0      insulin aspart U-100 (NovoLOG Flexpen U-100 Insulin) 100 unit/mL (3 mL) inpn by SubCUTAneous route as needed.  [facility]  100-160= 2 UNITS  161-220= 4 UNITS  221-280= 6 UNITS  281-340= 8 UNITS  341-400= 10 UNITS  401-460= 12 UNITS  >461= 14 UNITS AND CALL MD      insulin glargine (Lantus U-100 Insulin) 100 unit/mL injection 15 Units by SubCUTAneous route ACB/HS. Indications: type 2 diabetes mellitus      vancomycin (VANCOCIN) 250 mg capsule Take 250 mg by mouth four (4) times daily. Take for 14 days, starting 1/11/22. Last dose on 1/25/22. rosuvastatin (CRESTOR) 10 mg tablet Take 1 Tablet by mouth nightly. Qty: 30 Tablet, Refills: 5      aspirin delayed-release 81 mg tablet Take 1 Tablet by mouth daily. Qty: 30 Tablet, Refills: 5      gabapentin (NEURONTIN) 300 mg capsule Take 1 Capsule by mouth three (3) times daily. Max Daily Amount: 900 mg. Qty: 90 Capsule, Refills: 1    Associated Diagnoses: Diabetic peripheral neuropathy (HCC)      cetirizine (ZYRTEC) 10 mg tablet TAKE 1 TABLET EVERY DAY BY ORAL ROUTE. Qty: 90 Tab, Refills: 0           2. Follow-up Information    None       3. Return to ED if worse   4. Current Discharge Medication List            Diagnosis     Clinical Impression:   1. Acute kidney injury ([address]    2. Dehydration    3. Intractable diarrhea        Attestations:    Arlen Earl MD    Please note that this dictation was completed with [employer], the computer voice recognition software. Quite often unanticipated grammatical, syntax, homophones, and other interpretive errors are inadvertently transcribed by the computer software. Please disregard these errors. Please excuse any errors that have escaped final proofreading. Thank you.

## 2022-01-30 VITALS
WEIGHT: 118 LBS | BODY MASS INDEX: 16.52 KG/M2 | HEIGHT: 71 IN | SYSTOLIC BLOOD PRESSURE: 133 MMHG | OXYGEN SATURATION: 100 % | HEART RATE: 68 BPM | RESPIRATION RATE: 16 BRPM | TEMPERATURE: 98.4 F | DIASTOLIC BLOOD PRESSURE: 74 MMHG

## 2022-01-30 LAB
ANION GAP SERPL CALC-SCNC: 8 MMOL/L
BUN SERPL-MCNC: 43 MG/DL (ref 9–21)
BUN/CREAT SERPL: 20
C DIFF GDH STL QL: NEGATIVE
C DIFF TOX A+B STL QL IA: NEGATIVE
CA-I BLD-MCNC: 8.3 MG/DL (ref 8.5–10.5)
CHLORIDE SERPL-SCNC: 106 MMOL/L (ref 94–111)
CO2 SERPL-SCNC: 26 MMOL/L (ref 21–33)
CREAT SERPL-MCNC: 2.2 MG/DL (ref 0.8–1.5)
ERYTHROCYTE [DISTWIDTH] IN BLOOD BY AUTOMATED COUNT: 13.7 % (ref 11.6–14.5)
GLUCOSE BLD STRIP.AUTO-MCNC: 126 MG/DL (ref 70–110)
GLUCOSE BLD STRIP.AUTO-MCNC: 149 MG/DL (ref 70–110)
GLUCOSE BLD STRIP.AUTO-MCNC: 151 MG/DL (ref 70–110)
GLUCOSE BLD STRIP.AUTO-MCNC: 154 MG/DL (ref 70–110)
GLUCOSE BLD STRIP.AUTO-MCNC: 169 MG/DL (ref 70–110)
GLUCOSE BLD STRIP.AUTO-MCNC: 35 MG/DL (ref 70–110)
GLUCOSE BLD STRIP.AUTO-MCNC: 41 MG/DL (ref 70–110)
GLUCOSE BLD STRIP.AUTO-MCNC: 48 MG/DL (ref 70–110)
GLUCOSE BLD STRIP.AUTO-MCNC: 94 MG/DL (ref 70–110)
GLUCOSE SERPL-MCNC: 121 MG/DL (ref 70–110)
GLUCOSE SERPL-MCNC: 83 MG/DL (ref 70–110)
HCT VFR BLD AUTO: 35.3 % (ref 36–48)
HGB BLD-MCNC: 10.9 G/DL (ref 13–16)
INTERPRETATION: NORMAL
MAGNESIUM SERPL-MCNC: 2.1 MG/DL (ref 1.7–2.8)
MCH RBC QN AUTO: 28.8 PG (ref 24–34)
MCHC RBC AUTO-ENTMCNC: 30.9 G/DL (ref 31–37)
MCV RBC AUTO: 93.4 FL (ref 78–100)
NRBC # BLD: 0 K/UL (ref 0–0.01)
NRBC BLD-RTO: 0 PER 100 WBC
PERFORMED BY, TECHID: ABNORMAL
PERFORMED BY, TECHID: NORMAL
PHOSPHATE SERPL-MCNC: 4.2 MG/DL (ref 2.5–4.5)
PLATELET # BLD AUTO: 218 K/UL (ref 135–420)
PMV BLD AUTO: 12.4 FL (ref 9.2–11.8)
POTASSIUM SERPL-SCNC: 3.7 MMOL/L (ref 3.2–5.1)
RBC # BLD AUTO: 3.78 M/UL (ref 4.35–5.65)
SODIUM SERPL-SCNC: 140 MMOL/L (ref 135–145)
WBC # BLD AUTO: 12.5 K/UL (ref 4.6–13.2)

## 2022-01-30 PROCEDURE — 74011636637 HC RX REV CODE- 636/637: Performed by: NURSE PRACTITIONER

## 2022-01-30 PROCEDURE — 82962 GLUCOSE BLOOD TEST: CPT

## 2022-01-30 PROCEDURE — 96372 THER/PROPH/DIAG INJ SC/IM: CPT

## 2022-01-30 PROCEDURE — 36415 COLL VENOUS BLD VENIPUNCTURE: CPT

## 2022-01-30 PROCEDURE — G0378 HOSPITAL OBSERVATION PER HR: HCPCS

## 2022-01-30 PROCEDURE — 74011250637 HC RX REV CODE- 250/637: Performed by: INTERNAL MEDICINE

## 2022-01-30 PROCEDURE — 74011000250 HC RX REV CODE- 250: Performed by: NURSE PRACTITIONER

## 2022-01-30 PROCEDURE — 80048 BASIC METABOLIC PNL TOTAL CA: CPT

## 2022-01-30 PROCEDURE — 83735 ASSAY OF MAGNESIUM: CPT

## 2022-01-30 PROCEDURE — 74011636637 HC RX REV CODE- 636/637: Performed by: INTERNAL MEDICINE

## 2022-01-30 PROCEDURE — 74011250636 HC RX REV CODE- 250/636: Performed by: NURSE PRACTITIONER

## 2022-01-30 PROCEDURE — 65270000029 HC RM PRIVATE

## 2022-01-30 PROCEDURE — 84100 ASSAY OF PHOSPHORUS: CPT

## 2022-01-30 PROCEDURE — 74011250637 HC RX REV CODE- 250/637: Performed by: NURSE PRACTITIONER

## 2022-01-30 PROCEDURE — 85027 COMPLETE CBC AUTOMATED: CPT

## 2022-01-30 PROCEDURE — 82947 ASSAY GLUCOSE BLOOD QUANT: CPT

## 2022-01-30 RX ORDER — INSULIN GLARGINE 100 [IU]/ML
15 INJECTION, SOLUTION SUBCUTANEOUS
Status: DISCONTINUED | OUTPATIENT
Start: 2022-01-30 | End: 2022-01-31 | Stop reason: HOSPADM

## 2022-01-30 RX ORDER — VANCOMYCIN HYDROCHLORIDE 250 MG/1
250 CAPSULE ORAL EVERY 6 HOURS
Status: DISCONTINUED | OUTPATIENT
Start: 2022-01-30 | End: 2022-01-31

## 2022-01-30 RX ADMIN — GABAPENTIN 300 MG: 300 CAPSULE ORAL at 09:08

## 2022-01-30 RX ADMIN — Medication 16 G: at 07:52

## 2022-01-30 RX ADMIN — VANCOMYCIN HYDROCHLORIDE 250 MG: 250 CAPSULE ORAL at 23:42

## 2022-01-30 RX ADMIN — INSULIN GLARGINE 15 UNITS: 100 INJECTION, SOLUTION SUBCUTANEOUS at 21:41

## 2022-01-30 RX ADMIN — ROSUVASTATIN CALCIUM 10 MG: 10 TABLET, FILM COATED ORAL at 21:42

## 2022-01-30 RX ADMIN — METOPROLOL SUCCINATE 12.5 MG: 25 TABLET, EXTENDED RELEASE ORAL at 09:08

## 2022-01-30 RX ADMIN — ASPIRIN 81 MG: 81 TABLET, COATED ORAL at 09:08

## 2022-01-30 RX ADMIN — GABAPENTIN 300 MG: 300 CAPSULE ORAL at 16:12

## 2022-01-30 RX ADMIN — ENOXAPARIN SODIUM 30 MG: 30 INJECTION SUBCUTANEOUS at 09:08

## 2022-01-30 RX ADMIN — SODIUM CHLORIDE 100 ML/HR: 9 INJECTION, SOLUTION INTRAVENOUS at 07:28

## 2022-01-30 RX ADMIN — VANCOMYCIN HYDROCHLORIDE 250 MG: 250 CAPSULE ORAL at 17:08

## 2022-01-30 RX ADMIN — SODIUM CHLORIDE, PRESERVATIVE FREE 10 ML: 5 INJECTION INTRAVENOUS at 21:45

## 2022-01-30 RX ADMIN — VANCOMYCIN HYDROCHLORIDE 250 MG: 250 CAPSULE ORAL at 12:17

## 2022-01-30 RX ADMIN — SODIUM CHLORIDE 100 ML/HR: 9 INJECTION, SOLUTION INTRAVENOUS at 17:42

## 2022-01-30 RX ADMIN — CETIRIZINE HYDROCHLORIDE 10 MG: 10 TABLET, FILM COATED ORAL at 09:08

## 2022-01-30 RX ADMIN — GABAPENTIN 300 MG: 300 CAPSULE ORAL at 21:41

## 2022-01-30 RX ADMIN — SODIUM CHLORIDE, PRESERVATIVE FREE 10 ML: 5 INJECTION INTRAVENOUS at 14:55

## 2022-01-30 RX ADMIN — INSULIN LISPRO 2 UNITS: 100 INJECTION, SOLUTION INTRAVENOUS; SUBCUTANEOUS at 21:41

## 2022-01-30 RX ADMIN — SODIUM CHLORIDE, PRESERVATIVE FREE 10 ML: 5 INJECTION INTRAVENOUS at 06:33

## 2022-01-30 NOTE — H&P
History and Physical    Subjective:     Slime Bahena is a 49 y.o. male from the [facility] with a past medical history significant for hyperlipidemia, hypertension, diabetes type 2, CVA, and a recent diagnosis of C. difficile, patient presented to the ED after a fall at [facility] which he sustained a laceration to the back of his head. While in the ED labs was drawn and found patient to be in acute kidney injury. BUN/creatinine in the ED was 52/2.9 and increase from 26/1.3 on 1/17/22. Patient reports that he continues to have diarrhea form previous C. Diff infection, but it has improved significantly since he was treated with oral vancomycin which was completed on 1/25/2022. Patient reports that he does not remember much about his fall, denies dizziness, chest pain,  palpitations, shortness of breath and headache. Admit to telemetry for observation. Patient assessed in the ED, at the bedside, patient is alert and oriented, there is no acute distress noted. Patient agrees to admission for a diagnosis of acute kidney injury, treatment to include IV fluids, monitor electrolytes, and nephrology consultation. Past Medical History:   Diagnosis Date    Diabetes ([address]     Hypercholesterolemia     Hypertension       Past Surgical History:   Procedure Laterality Date    HX ORTHOPAEDIC Left 08/30/2018    hip joint    HX ORTHOPAEDIC Right     broken knee     Family History   Problem Relation Age of Onset    Cancer Father         prostate    Cancer Maternal Uncle         prostate    Cancer Paternal Uncle         prostate      Social History     Tobacco Use    Smoking status: Never Smoker    Smokeless tobacco: Current User    Tobacco comment: 5+/day   Substance Use Topics    Alcohol use: Not Currently       Prior to Admission medications    Medication Sig Start Date End Date Taking?  Authorizing Provider   loperamide (IMODIUM) 2 mg capsule Take 4 mg by mouth every four (4) hours as needed for Diarrhea. Yes Provider, Historical   metoprolol succinate (TOPROL-XL) 25 mg XL tablet Take 0.5 Tablets by mouth daily. 1/18/22  Yes Elvis Singh MD   insulin aspart U-100 (NovoLOG Flexpen U-100 Insulin) 100 unit/mL (3 mL) inpn by SubCUTAneous route as needed. [facility]  100-160= 2 UNITS  161-220= 4 UNITS  221-280= 6 UNITS  281-340= 8 UNITS  341-400= 10 UNITS  401-460= 12 UNITS  >461= 14 UNITS AND CALL MD   Yes Other, MD Monisha   insulin glargine (Lantus U-100 Insulin) 100 unit/mL injection 15 Units by SubCUTAneous route ACB/HS. Indications: type 2 diabetes mellitus   Yes Other, MD Monisha   rosuvastatin (CRESTOR) 10 mg tablet Take 1 Tablet by mouth nightly. 1/3/22  Yes Jose Hicks MD   aspirin delayed-release 81 mg tablet Take 1 Tablet by mouth daily. 1/3/22  Yes Jose Hicks MD   gabapentin (NEURONTIN) 300 mg capsule Take 1 Capsule by mouth three (3) times daily.  Max Daily Amount: 900 mg. 1/3/22  Yes Jose Hicks MD   cetirizine (ZYRTEC) 10 mg tablet TAKE 1 TABLET EVERY DAY BY ORAL ROUTE.  4/22/21  Yes Bri Westbrook MD     No Known Allergies     REVIEW OF SYSTEMS:       Total of 12 systems reviewed as follows:    Positive = Red  Constitutional: Negative for malaise/fatigue and weakness, negative for fever and chills   HENT: Negative for ear pain, headaches, negative for loss of sense of taste and smell   Eyes: Negative for blurred vision and double vision   Skin: Negative for itching, negative for open areas   Cardiovascular: Negative for chest pain, palpitations, negative for swelling   Respiratory: Negative for shortness or breath, negative for cough, negative for sputum production   Gastrointestinal: Negative for abdominal pain, constipation, nausea, vomiting, and diarrhea   Genitourinary: Negative for dysuria, frequency, and hematuria   Musculoskeletal: Negative for joint pain and myalgias   Neurological: Negative for dizziness, seizures, and headaches   Psychiatric: Negative for depression and anxiousness        Objective:   VITALS:    Visit Vitals  /74 (BP 1 Location: Left lower arm, BP Patient Position: At rest)   Pulse 68   Temp 97 °F (36.1 °C)   Resp 18   Ht 5' 11\" (1.803 m)   Wt 51.7 kg (114 lb)   SpO2 100%   BMI 15.90 kg/m²       PHYSICAL EXAM:  Positive = Red  Constitutional: Alert and oriented x 3 and no noted acute distress appears to be stated age. HENT: Atraumatic, nose midline, oropharynx clear ad moist, trachea midline, no supraclavicular   Eyes: Conjunctiva normal and pupils equal   Skin: Laceration to back posterior head, left heel wound. Cardiovascular: Regular rate and rhythm, normal heart sounds, no murmurs, pulses palpable, no noted edema   Respiratory: Lungs clear throughout, no wheezes, rales, or rhonchi, effort normal   Gastrointestinal: Appearance normal, bowel sounds are normal, bowl soft and non-tender   Genitourinary: Deferred   Musculoskeletal: Generalized weakness, uses walker   Neurological: Alert and oriented x 3, awake. No facial droop. No slurred speech. Hand grasps equal. Strength 5/5 in all extremities.  Intact sensations   Psychiatric: Affect normal, Answers questions appropriately     __________________________________________________  Care Plan discussed with:    Comments   Patient X    Family      RN     Care Manager                    Consultant:      _______________________________________________________________________  Expected  Disposition:   Home with Family X   HH/PT/OT/RN    SNF/LTC    HARDEEP    ________________________________________________________________________    Labs:  Recent Results (from the past 24 hour(s))   CBC WITH AUTOMATED DIFF    Collection Time: 01/29/22  5:20 PM   Result Value Ref Range    WBC 12.1 4.6 - 13.2 K/uL    RBC 4.00 (L) 4.35 - 5.65 M/uL    HGB 11.5 (L) 13.0 - 16.0 g/dL    HCT 37.2 36.0 - 48.0 %    MCV 93.0 78.0 - 100.0 FL    MCH 28.8 24.0 - 34.0 PG    MCHC 30.9 (L) 31.0 - 37.0 g/dL    RDW 13.8 11.6 - 14.5 %    PLATELET 485 525 - 834 K/uL    MPV 12.1 (H) 9.2 - 11.8 FL    NRBC 0.0 0.0  WBC    ABSOLUTE NRBC 0.00 0.00 - 0.01 K/uL    NEUTROPHILS 70 40 - 73 %    LYMPHOCYTES 19 (L) 21 - 52 %    MONOCYTES 8 3 - 10 %    EOSINOPHILS 2 0 - 5 %    BASOPHILS 1 0 - 2 %    IMMATURE GRANULOCYTES 0 0 - 0.5 %    ABS. NEUTROPHILS 8.5 (H) 1.8 - 8.0 K/UL    ABS. LYMPHOCYTES 2.3 0.9 - 3.6 K/UL    ABS. MONOCYTES 1.0 0.05 - 1.2 K/UL    ABS. EOSINOPHILS 0.2 0.0 - 0.4 K/UL    ABS. BASOPHILS 0.1 0.0 - 0.1 K/UL    ABS. IMM. GRANS. 0.0 0.00 - 0.04 K/UL    DF AUTOMATED     METABOLIC PANEL, COMPREHENSIVE    Collection Time: 01/29/22  5:20 PM   Result Value Ref Range    Sodium 135 135 - 145 mmol/L    Potassium 4.0 3.2 - 5.1 mmol/L    Chloride 99 94 - 111 mmol/L    CO2 27 21 - 33 mmol/L    Anion gap 9 mmol/L    Glucose 195 (H) 70 - 110 mg/dL    BUN 52 (H) 9 - 21 mg/dL    Creatinine 2.90 (H) 0.8 - 1.50 mg/dL    BUN/Creatinine ratio 18      GFR est AA 28 ml/min/1.73m2    GFR est non-AA 23 ml/min/1.73m2    Calcium 8.8 8.5 - 10.5 mg/dL    Bilirubin, total 0.4 0.2 - 1.0 mg/dL    AST (SGOT) 55 17 - 74 U/L    ALT (SGPT) 40 3 - 72 U/L    Alk.  phosphatase 88 38 - 126 U/L    Protein, total 6.6 6.1 - 8.4 g/dL    Albumin 3.7 3.5 - 4.7 g/dL    Globulin 2.9 g/dL    A-G Ratio 1.3     MAGNESIUM    Collection Time: 01/29/22  5:20 PM   Result Value Ref Range    Magnesium 2.3 1.7 - 2.8 mg/dL   GLUCOSE, POC    Collection Time: 01/29/22  8:12 PM   Result Value Ref Range    Glucose (POC) 150 (H) 70 - 110 mg/dL    Performed by Melania Robles        Imaging:  CT HEAD WO CONT    Result Date: 1/29/2022  EXAM: CT head CLINICAL INDICATION/HISTORY: Fall, head trauma, head pain COMPARISON: 1/16/2022 TECHNIQUE: Axial CT imaging of the head was performed without intravenous contrast. One or more dose reduction techniques were used on this CT: automated exposure control, adjustment of the mAs and/or kVp according to patient size, and iterative reconstruction techniques. The specific techniques used on this CT exam have been documented in the patient's electronic medical record. Digital Imaging and Communications in Medicine (DICOM) format image data are available to nonaffiliated external healthcare facilities or entities on a secured, media free, reciprocally searchable basis with patient authorization for at least a 12-month period after this study. _______________ FINDINGS: BRAIN AND EXTRA-AXIAL SPACES: There is no intracranial hemorrhage, mass effect, or midline shift. Left frontal encephalomalacia again identified, unchanged. Chronic lacunar infarct involving right internal capsule, unchanged. There are no abnormal extra-axial fluid collections. CALVARIUM: Intact. SINUSES: Clear. OTHER: None. _______________     1. No acute intracranial abnormalities. 2. Left frontal encephalomalacia again identified, unchanged. Chronic lacunar infarct involving right internal capsule again identified, unchanged. Assessment & Plan:     Acute kidney injury  -Likely secondary to dehydration versus diarrhea  -start  ml/hr  -strict I/O  -Avoid nephrotoxic agents. No NSAIDS, No ACEI/ARBs. No Diuretics  -avoid lowering BP drastically  -monitor for fluid overload  -consult nephrologist  -renal panel daily, monitor K+ and Phos levels closely    C. difficile diarrhea  -recent diagnosis, oral Vancomycin completed on 1/25  -diarrhea continues  -continue [place] Imodium  -WBC: 12.1 and patient afebrile  -continue Enteric precautions, C.  Diff ordered in ED    Left Heel Wound  -continue Santyl daily  -wound nurse consulted      Hypertension:  --Chronic/controlled  -Continue metoprolol    Diabetes mellitus type 2:  -Chronic  -Continue scheduled Lantus and sliding scale  -Accu-Cheks before meals and before bedtime     History of CVA:  -Chronic  -Patient reports that he uses a walker for ambulation  -Continue aspirin and statin    Falls  -Consult to PT    TOTAL TIME:  45 Minutes    Code Status: Full    Prophylaxis: Lovenox    Electronically Signed : [facility]    Please note that this dictation was completed with [employer], the computer voice recognition software. Quite often unanticipated grammatical, syntax, homophones, and other interpretive errors are inadvertently transcribed by the computer software. Please disregard these errors. Please excuse any errors that have escaped final proofreading. Thank you.

## 2022-01-30 NOTE — PROGRESS NOTES
Care Management Interventions  PCP Verified by CM: Yes  Last Visit to PCP: 03/16/21  Palliative Care Criteria Met (RRAT>21 & CHF Dx)?: No  Mode of Transport at Discharge: Other (see comment) (Pt will be transported to [facility] [address] by wheelchair.)  Transition of Care Consult (CM Consult): Discharge Planning  Health Maintenance Reviewed: Yes  Physical Therapy Consult: Yes  Support Systems: Parent(s),Other Family Member(s)  Confirm Follow Up Transport:  (Follow-up with be coordinated by the staff at [address] [place] [address] for Transition of Care is Related to the Following Treatment Goals : [address] [place] [address]  with transition to [facility] with his PLOF. The Patient and/or Patient Representative was Provided with a Choice of Provider and Agrees with the Discharge Plan?: Yes  Name of the Patient Representative Who was Provided with a Choice of Provider and Agrees with the Discharge Plan: PT  Discharge Location  Patient [facility] Reason for Admission:  Chart reviewed and noted adm for CC  Fall sustaining lac to the back of his head. While in the ED labs were drawn and found pt to be in [place] with BUN 52 & CR 2.9. Pt reports that he continues to have diarrhea from previous C. Diff, but that it has sign improved since taking oral vanc, which he completed on 1/25/2022. Pt does not recall any events from his fall. He is alert and oriented and able to answer all questions approp. PMH: DM, hypercholesterolemia, hypertension. PSH:  Orthopaedic left hip joint, Orthopedic right knee. DX:  [place], C.difficile diarrhea, Left Heel wound, HTN, DM type 2, hx of CVA.     RUR Score:     21%      PCP: First and Last name:  None     Name of Practice:   Internal Medicine   Are you a current patient: Yes/No:  Y   Approximate date of last visit: 03/16/2021 Can you do a virtual visit with your PCP:   With help             Resources/supports as identified by patient/family:                   Top Challenges facing patient (as identified by patient/family and CM): Finances/Medication cost?  no                  Transportation? no              Support system or lack thereof?  no                     Living arrangements?     no           Self-care/ADLs/Cognition? no          Current Advanced Directive/Advance Care Plan:  Full Code      Healthcare Decision Maker:   Click here to complete HealthCare Decision Makers including selection of the Healthcare Decision Maker Relationship (ie \"Primary\")  FatherEmiliano, [phone]  ( cell and home same.)        Payor Source Payor: [address] [place] [address],9D / Plan: [employer] [employer] / Product Type: Managed Care Medicare /                             Plan for utilizing home health:  N/A                   Transition of Care Plan:     Discharge planning with MDT and EP to transition pt to EP and his PLOF.

## 2022-01-30 NOTE — PROGRESS NOTES
History of Present Illness  Florecita Kaba is a 49 y.o. male who presents today for: Follow-up after hypoglycemic episode. Past Medical History  Past Medical History:   Diagnosis Date    Diabetes ([address]     Hypercholesterolemia     Hypertension         Surgical History  Past Surgical History:   Procedure Laterality Date    HX ORTHOPAEDIC Left 08/30/2018    hip joint    HX ORTHOPAEDIC Right     broken knee        Current Medications  No current facility-administered medications for this visit. No current outpatient medications on file.      Facility-Administered Medications Ordered in Other Visits   Medication Dose Route Frequency    insulin glargine (LANTUS) injection 15 Units  15 Units SubCUTAneous QHS    vancomycin (VANCOCIN) capsule 250 mg  250 mg Oral Q6H    [START ON 1/31/2022] collagenase (SANTYL) 250 unit/gram ointment (Patient Supplied)   Topical DAILY    sodium chloride (NS) flush 5-40 mL  5-40 mL IntraVENous Q8H    sodium chloride (NS) flush 5-40 mL  5-40 mL IntraVENous PRN    acetaminophen (TYLENOL) tablet 650 mg  650 mg Oral Q6H PRN    Or    acetaminophen (TYLENOL) suppository 650 mg  650 mg Rectal Q6H PRN    polyethylene glycol (MIRALAX) packet 17 g  17 g Oral DAILY PRN    ondansetron (ZOFRAN ODT) tablet 4 mg  4 mg Oral Q8H PRN    Or    ondansetron (ZOFRAN) injection 4 mg  4 mg IntraVENous Q6H PRN    enoxaparin (LOVENOX) injection 30 mg  30 mg SubCUTAneous DAILY    0.9% sodium chloride infusion  100 mL/hr IntraVENous CONTINUOUS    aspirin delayed-release tablet 81 mg  81 mg Oral DAILY    cetirizine (ZYRTEC) tablet 10 mg  10 mg Oral DAILY    gabapentin (NEURONTIN) capsule 300 mg  300 mg Oral TID    loperamide (IMODIUM) capsule 4 mg  4 mg Oral QID PRN    metoprolol succinate (TOPROL-XL) XL tablet 12.5 mg  12.5 mg Oral DAILY    rosuvastatin (CRESTOR) tablet 10 mg  10 mg Oral QHS    insulin lispro (HUMALOG) injection   SubCUTAneous AC&HS    glucose chewable tablet 16 g  4 Tablet Oral PRN    glucagon (GLUCAGEN) injection 1 mg  1 mg IntraMUSCular PRN    dextrose 10% infusion 125-250 mL  125-250 mL IntraVENous PRN       Allergies/Drug Reactions  No Known Allergies     Family History  Family History   Problem Relation Age of Onset    Cancer Father         prostate    Cancer Maternal Uncle         prostate    Cancer Paternal Uncle         prostate        Social History  Social History     Tobacco Use    Smoking status: Never Smoker    Smokeless tobacco: Current User    Tobacco comment: 5+/day   Vaping Use    Vaping Use: Never used   Substance Use Topics    Alcohol use: Not Currently    Drug use: Never        Health Maintenance   Topic Date Due    Hepatitis C Screening  Never done    Eye Exam Retinal or Dilated  Never done    Shingrix Vaccine Age 50> (1 of 2) Never done    Medicare Yearly Exam  Never done    Foot Exam Q1  07/26/2021    Flu Vaccine (1) 09/01/2021    MICROALBUMIN Q1  01/26/2022    A1C test (Diabetic or Prediabetic)  12/25/2022    Lipid Screen  12/27/2022    Colorectal Cancer Screening Combo  02/06/2030    DTaP/Tdap/Td series (2 - Td or Tdap) 01/29/2032    COVID-19 Vaccine  Completed    Pneumococcal 0-64 years  Aged [employer] History   Administered Date(s) Administered    COVID-19, Pfizer Purple top, DILUTE for use, 12+ yrs, 30mcg/0.3mL dose 03/02/2021    Influenza Vaccine 10/07/2019    Pneumococcal Conjugate (PCV-7) 07/17/2013    Tdap 01/29/2022       Physical Exam  Vital signs:   Vitals:    01/25/22 1338   BP: 133/74   Pulse: 68   Resp: 16   Temp: 98.4 °F (36.9 °C)   SpO2: 100%   Weight: 118 lb (53.5 kg)   Height: 5' 11\" (1.803 m)       General: alert, oriented, not in distress  Head: scalp normal, atraumatic  Eyes: pupils are equal and reactive, full and intact EOM's  Ears: patent ear canal, intact tympanic membrane  Nose: normal turbinates, no congestion or discharge  Lips/Mouth: moist lips and buccal mucosa, non-enlarged tonsils, pink throat  Neck: supple, no JVD, no lymphadenopathy, non-palpable thyroid  Chest/Lungs: clear breath sounds, no wheezing or crackles  Heart: normal rate, regular rhythm, no murmur  Abdomen: soft, non-distended, non-tender, normal bowel sounds, no organomegaly, no masses  Extremities: no focal deformities, no edema  Skin: no active skin lesions    Laboratory/Tests:  Hospital Outpatient Visit on 01/25/2022   Component Date Value Ref Range Status    Glucose 01/25/2022 757* 70 - 110 mg/dL Final    CALLED TO AND READ BACK BY EP EAST @1845 ON [id number] MDS. Hospital Outpatient Visit on 01/21/2022   Component Date Value Ref Range Status    Glucose 01/21/2022 576* 70 - 110 mg/dL Final    CALLED TO AND READ BACK BY CAMELIA AT 1445 1/21/22 PCO. Hospital Outpatient Visit on 01/19/2022   Component Date Value Ref Range Status    Glucose 01/19/2022 295* 70 - 110 mg/dL Final   No results displayed because visit has over 200 results. Hospital Outpatient Visit on 01/11/2022   Component Date Value Ref Range Status    [address] ANTIGEN 01/11/2022 Positive* Negative   Final    C. difficile toxin 01/11/2022 Negative  Negative   Final    PCR Reflex 01/11/2022 See Reflex order for C. difficile (DNA)    Final    INTERPRETATION 01/11/2022 Indeterminate for Toxigenic C. difficile, DNA confirmation to follow. * Negative for toxigenic C. difficile   Final    WBC 01/12/2022 8.6  4.6 - 13.2 K/uL Final    RBC 01/12/2022 3.96* 4.35 - 5.65 M/uL Final    HGB 01/12/2022 11.5* 13.0 - 16.0 g/dL Final    HCT 01/12/2022 36.8  36.0 - 48.0 % Final    MCV 01/12/2022 92.9  78.0 - 100.0 FL Final    MCH 01/12/2022 29.0  24.0 - 34.0 PG Final    MCHC 01/12/2022 31.3  31.0 - 37.0 g/dL Final    RDW 01/12/2022 13.5  11.6 - 14.5 % Final    PLATELET 52/09/6859 996  135 - 420 K/uL Final    MPV 01/12/2022 12.3* 9.2 - 11.8 FL Final    NRBC 01/12/2022 0.0  0.0  WBC Final    ABSOLUTE NRBC 01/12/2022 0.00  0.00 - 0.01 K/uL Final    NEUTROPHILS 01/12/2022 72  40 - 73 % Final    LYMPHOCYTES 01/12/2022 18* 21 - 52 % Final    MONOCYTES 01/12/2022 8  3 - 10 % Final    EOSINOPHILS 01/12/2022 1  0 - 5 % Final    BASOPHILS 01/12/2022 1  0 - 2 % Final    IMMATURE GRANULOCYTES 01/12/2022 0  0 - 0.5 % Final    ABS. NEUTROPHILS 01/12/2022 6.2  1.8 - 8.0 K/UL Final    ABS. LYMPHOCYTES 01/12/2022 1.5  0.9 - 3.6 K/UL Final    ABS. MONOCYTES 01/12/2022 0.7  0.05 - 1.2 K/UL Final    ABS. EOSINOPHILS 01/12/2022 0.1  0.0 - 0.4 K/UL Final    ABS. BASOPHILS 01/12/2022 0.1  0.0 - 0.1 K/UL Final    ABS. IMM. GRANS. 01/12/2022 0.0  0.00 - 0.04 K/UL Final    DF 01/12/2022 AUTOMATED    Final    Sodium 01/12/2022 130* 135 - 145 mmol/L Final    Potassium 01/12/2022 5.1  3.2 - 5.1 mmol/L Final    Chloride 01/12/2022 98  94 - 111 mmol/L Final    CO2 01/12/2022 22  21 - 33 mmol/L Final    Anion gap 01/12/2022 10  mmol/L Final    Glucose 01/12/2022 501* 70 - 110 mg/dL Final    CALLED TO AND READ BACK BY SHERRY PETERSEN ON EP EAST AT 0918    BUN 01/12/2022 35* 9 - 21 mg/dL Final    Creatinine 01/12/2022 1.40  0.8 - 1.50 mg/dL Final    BUN/Creatinine ratio 01/12/2022 25    Final    GFR est AA 01/12/2022 >60  ml/min/1.73m2 Final    GFR est non-AA 01/12/2022 53  ml/min/1.73m2 Final    Comment: Estimated GFR is calculated using the IDMS-traceable Modification of Diet in Renal Disease (MDRD) Study equation, reported for both  Americans (GFRAA) and non- Americans (GFRNA), and normalized to 1.73m2 body surface area. The physician must decide which value applies to the patient. The MDRD study equation should only be used in individuals age 25 or older. It has not been validated for the following: pregnant women, patients with serious comorbid conditions, or on certain medications, or persons with extremes of body size, muscle mass, or nutritional status.       Calcium 01/12/2022 8.6  8.5 - 10.5 mg/dL Final    Bilirubin, total 01/12/2022 0.7  0.2 - 1.0 mg/dL Final    AST (SGOT) 01/12/2022 36  17 - 74 U/L Final    ALT (SGPT) 01/12/2022 48  3 - 72 U/L Final    Alk. phosphatase 01/12/2022 108  38 - 126 U/L Final    Protein, total 01/12/2022 6.6  6.1 - 8.4 g/dL Final    Albumin 01/12/2022 3.6  3.5 - 4.7 g/dL Final    Globulin 01/12/2022 3.0  g/dL Final    A-G Ratio 01/12/2022 1.2    Final   Hospital Outpatient Visit on 01/04/2022   Component Date Value Ref Range Status    Glucose 01/04/2022 42* 70 - 110 mg/dL Final    CALLED TO AND READ BACK BY SHERRY PETERSEN ON EP EAST AT 0926   No results displayed because visit has over 200 results. Patient is a 53-year-old male resident of [facility]. Patient's blood glucose levels have been very labile due to inconsistency with eating meals and confusion. Patient has previous medical history significant for hypertension, hyperlipidemia, type 2 diabetes and previous stroke. He has had several ED visits due to hypoglycemia and diabetic ketoacidosis. Patient was seen today after previous hypoglycemic episode earlier with reported Blood glucose = 54 on 1/26/2022 by nursing staff. At the time of reported blood glucose patient did not want to eat or drink, nor would he take his prescribed Oral glucose tablets. Advised nursing staff to inform patient he would need IM glucagon injection or ER visit so that his hypoglycemia would resolve. Patient refused both options. Repeat Blood glucose = 68. Blood glucose = 100 on 1/26/2022 at 2145 after eating snacks and drinking soda. Patient currently prescribed NovoLog sliding scale and Lantus 15 units twice daily. Patient was recently treated for C. difficile I will and completed vancomycin therapy on 1/25/2022. Physical examination at bedside the patient on 1/27/2022 performed. Patient is very thin and there is no decreased ROM of upper or lower extremities.   Nursing staff reports patient is very unsteady on his feet and has had falls in the past.  Patient able to answer questions appropriately, but there is some cognitive dysfunction related to previous CVA. Assessment/Plan:    1. Type 1 diabetes. Continue Insulin NovoLog sliding scale:   100-160= 2 UNITS  161-220= 4 UNITS  221-280= 6 UNITS  281-340= 8 UNITS  341-400= 10 UNITS  401-460= 12 UNITS  >461= 14 UNITS AND CALL MD  And Insulin Glargine inject 15 units SUBQ before breakfast and at bedtime for management of type 1 diabetes. 2.  Diabetic peripheral neuropathy. Continue Gabapentin 300 mg capsule 3 times daily for management of diabetic peripheral neuropathy. 3.  Benign hypertension. Continue Metoprolol 25 mg XL tablet, take 12.5 mg daily for management of benign hypertension. 4.  Mixed hyperlipidemia. Continue Rosuvastatin 10 mg tablet nightly for management of hyperlipidemia. I have discussed the diagnosis with the patient and the intended plan as seen in the above orders. The patient has received an after-visit summary and questions were answered concerning future plans. I have discussed medication side effects and warnings with the patient as well. I have reviewed the plan of care with the patient, accepted their input and they are in agreement with the treatment goals.        Demario Barlow NP  January 30, 2022

## 2022-01-30 NOTE — PROGRESS NOTES
Hospitalist Progress Note     INTERNAL MEDICINE PROGRESS NOTE  Patient: Olga Rodríguez   YOB: 1965   MRN: [id number]      Hospital course / Assessment    Principle Problems:  Acute kidney injury  - Likely secondary to dehydration and diarrhea  - Avoid nephrotoxic agents. No NSAIDS, No ACEI/ARBs. No Diuretics  - IVF support, avoid lowering BP drastically, monitor for fluid overload, Cr 2.9-->2.2  - need to be well hydrated at [facility] may related to dehydration and hypoglycemia   -CT head no acute process, Consult to PT  C. difficile diarrhea  -recent diagnosis, oral Vancomycin completed on 1/25, -WBC: 12.1 and patient afebrile  -diarrhea continues  -continue HI Imodium-continue Enteric precautions, C.  Diff ordered in ED, PO vanc   Diabetes mellitus type 2 with hypoglycemia   - Lantus changed to 15 QHS instead of BID , monitor BS closely , Diabetic diet   -Accu-Cheks before meals and before bedtime  Left Heel Wound  -continue Santyl daily  -wound nurse consulted   Hypertension:  --Chronic/controlled  -Continue metoprolo   History of CVA:  -Patient reports that he uses a walker for ambulation  -Continue aspirin and statin       Code Status: Full code   DVT prophylaxis: pharmacologic and mechanical  Today Recommendation and Plan:   Cont' with IVF and monitor renal function   Monitor BS closely   Cut Lantus to 15 unit QHS and monitor   PT evaluation   Fall precautions   PO vanc   Home in AM     Disposition    Disposition: back to [facility] once renal function improved     Subjective / ROS:   Patient states she feel better       Medical Decision Making   Chart, Images and Lab data reviewed, necessary medical Orders placed   Discussed with nursing staff     Vitals:    01/29/22 1959 01/30/22 0024 01/30/22 0408 01/30/22 0723   BP: 120/74 139/72 (!) 92/48 129/71   Pulse: 68 67 70 85   Resp: 18 16 16 17   Temp: 97 °F (36.1 °C) 97 °F (36.1 °C) 98.2 °F (36.8 °C) 97.8 °F (36.6 °C) SpO2: 100% 100% 100% 100%   Weight: 51.7 kg (114 lb)      Height: 5' 11\" (1.803 m)        Temp (24hrs), Av.5 °F (36.4 °C), Min:97 °F (36.1 °C), Max:98.2 °F (36.8 °C)    No intake or output data in the 24 hours ending 22 0801    Physical Exam:   General Appearance:   Appears in no acute distress.,  HEENT:   Moist oral mucous membranes, conjunctiva clear,   Neck:   Supple  Lungs: No wheezes. , No rales. , Normal respiratory effort,   Heart:   Regular rate and rhythm  Abdomen:   Soft , Non-distended and Non-tender,   Extremities:   no edema of legs  Neuro:   alert, oriented, moves all extremities well    Current medications:     Current Facility-Administered Medications   Medication Dose Route Frequency    sodium chloride (NS) flush 5-40 mL  5-40 mL IntraVENous Q8H    sodium chloride (NS) flush 5-40 mL  5-40 mL IntraVENous PRN    acetaminophen (TYLENOL) tablet 650 mg  650 mg Oral Q6H PRN    Or    acetaminophen (TYLENOL) suppository 650 mg  650 mg Rectal Q6H PRN    polyethylene glycol (MIRALAX) packet 17 g  17 g Oral DAILY PRN    ondansetron (ZOFRAN ODT) tablet 4 mg  4 mg Oral Q8H PRN    Or    ondansetron (ZOFRAN) injection 4 mg  4 mg IntraVENous Q6H PRN    enoxaparin (LOVENOX) injection 30 mg  30 mg SubCUTAneous DAILY    0.9% sodium chloride infusion  100 mL/hr IntraVENous CONTINUOUS    aspirin delayed-release tablet 81 mg  81 mg Oral DAILY    cetirizine (ZYRTEC) tablet 10 mg  10 mg Oral DAILY    gabapentin (NEURONTIN) capsule 300 mg  300 mg Oral TID    insulin glargine (LANTUS) injection 15 Units  15 Units SubCUTAneous ACB/HS    loperamide (IMODIUM) capsule 4 mg  4 mg Oral QID PRN    metoprolol succinate (TOPROL-XL) XL tablet 12.5 mg  12.5 mg Oral DAILY    rosuvastatin (CRESTOR) tablet 10 mg  10 mg Oral QHS    collagenase (SANTYL) 250 unit/gram ointment   Topical DAILY    insulin lispro (HUMALOG) injection   SubCUTAneous AC&HS    glucose chewable tablet 16 g  4 Tablet Oral PRN    glucagon (GLUCAGEN) injection 1 mg  1 mg IntraMUSCular PRN    dextrose 10% infusion 125-250 mL  125-250 mL IntraVENous PRN          Laboratory and Radiology Data :      No results found for: FERR  WBC   Date Value Ref Range Status   01/30/2022 12.5 4.6 - 13.2 K/uL Final     No results found for: EMBER  No results found for: UEO    Microbiology    No results found for: GMS    Recent Results (from the past 24 hour(s))   CBC WITH AUTOMATED DIFF    Collection Time: 01/29/22  5:20 PM   Result Value Ref Range    WBC 12.1 4.6 - 13.2 K/uL    RBC 4.00 (L) 4.35 - 5.65 M/uL    HGB 11.5 (L) 13.0 - 16.0 g/dL    HCT 37.2 36.0 - 48.0 %    MCV 93.0 78.0 - 100.0 FL    MCH 28.8 24.0 - 34.0 PG    MCHC 30.9 (L) 31.0 - 37.0 g/dL    RDW 13.8 11.6 - 14.5 %    PLATELET 997 508 - 968 K/uL    MPV 12.1 (H) 9.2 - 11.8 FL    NRBC 0.0 0.0  WBC    ABSOLUTE NRBC 0.00 0.00 - 0.01 K/uL    NEUTROPHILS 70 40 - 73 %    LYMPHOCYTES 19 (L) 21 - 52 %    MONOCYTES 8 3 - 10 %    EOSINOPHILS 2 0 - 5 %    BASOPHILS 1 0 - 2 %    IMMATURE GRANULOCYTES 0 0 - 0.5 %    ABS. NEUTROPHILS 8.5 (H) 1.8 - 8.0 K/UL    ABS. LYMPHOCYTES 2.3 0.9 - 3.6 K/UL    ABS. MONOCYTES 1.0 0.05 - 1.2 K/UL    ABS. EOSINOPHILS 0.2 0.0 - 0.4 K/UL    ABS. BASOPHILS 0.1 0.0 - 0.1 K/UL    ABS. IMM. GRANS. 0.0 0.00 - 0.04 K/UL    DF AUTOMATED     METABOLIC PANEL, COMPREHENSIVE    Collection Time: 01/29/22  5:20 PM   Result Value Ref Range    Sodium 135 135 - 145 mmol/L    Potassium 4.0 3.2 - 5.1 mmol/L    Chloride 99 94 - 111 mmol/L    CO2 27 21 - 33 mmol/L    Anion gap 9 mmol/L    Glucose 195 (H) 70 - 110 mg/dL    BUN 52 (H) 9 - 21 mg/dL    Creatinine 2.90 (H) 0.8 - 1.50 mg/dL    BUN/Creatinine ratio 18      GFR est AA 28 ml/min/1.73m2    GFR est non-AA 23 ml/min/1.73m2    Calcium 8.8 8.5 - 10.5 mg/dL    Bilirubin, total 0.4 0.2 - 1.0 mg/dL    AST (SGOT) 55 17 - 74 U/L    ALT (SGPT) 40 3 - 72 U/L    Alk.  phosphatase 88 38 - 126 U/L    Protein, total 6.6 6.1 - 8.4 g/dL    Albumin 3.7 3.5 - 4.7 g/dL    Globulin 2.9 g/dL    A-G Ratio 1.3     MAGNESIUM    Collection Time: 01/29/22  5:20 PM   Result Value Ref Range    Magnesium 2.3 1.7 - 2.8 mg/dL   GLUCOSE, POC    Collection Time: 01/29/22  8:12 PM   Result Value Ref Range    Glucose (POC) 150 (H) 70 - 110 mg/dL    Performed by Sue Dhillon, BASIC    Collection Time: 01/30/22  4:00 AM   Result Value Ref Range    Sodium 140 135 - 145 mmol/L    Potassium 3.7 3.2 - 5.1 mmol/L    Chloride 106 94 - 111 mmol/L    CO2 26 21 - 33 mmol/L    Anion gap 8 mmol/L    Glucose 83 70 - 110 mg/dL    BUN 43 (H) 9 - 21 mg/dL    Creatinine 2.20 (H) 0.8 - 1.50 mg/dL    BUN/Creatinine ratio 20      GFR est AA 38 ml/min/1.73m2    GFR est non-AA 31 ml/min/1.73m2    Calcium 8.3 (L) 8.5 - 10.5 mg/dL   MAGNESIUM    Collection Time: 01/30/22  4:00 AM   Result Value Ref Range    Magnesium 2.1 1.7 - 2.8 mg/dL   CBC W/O DIFF    Collection Time: 01/30/22  4:00 AM   Result Value Ref Range    WBC 12.5 4.6 - 13.2 K/uL    RBC 3.78 (L) 4.35 - 5.65 M/uL    HGB 10.9 (L) 13.0 - 16.0 g/dL    HCT 35.3 (L) 36.0 - 48.0 %    MCV 93.4 78.0 - 100.0 FL    MCH 28.8 24.0 - 34.0 PG    MCHC 30.9 (L) 31.0 - 37.0 g/dL    RDW 13.7 11.6 - 14.5 %    PLATELET 841 299 - 429 K/uL    MPV 12.4 (H) 9.2 - 11.8 FL    NRBC 0.0 0.0  WBC    ABSOLUTE NRBC 0.00 0.00 - 0.01 K/uL   PHOSPHORUS    Collection Time: 01/30/22  4:00 AM   Result Value Ref Range    Phosphorus 4.2 2.5 - 4.5 mg/dL   GLUCOSE, POC    Collection Time: 01/30/22  7:23 AM   Result Value Ref Range    Glucose (POC) 41 (LL) 70 - 110 mg/dL    Performed by Bita Spencer    GLUCOSE, POC    Collection Time: 01/30/22  7:25 AM   Result Value Ref Range    Glucose (POC) 35 (LL) 70 - 110 mg/dL    Performed by Bita Spencer    GLUCOSE, POC    Collection Time: 01/30/22  7:35 AM   Result Value Ref Range    Glucose (POC) 48 (LL) 70 - 110 mg/dL    Performed by Angela [employer]        [facility]:  [facility]    XR CHEST PORT    Narrative  CLINICAL HISTORY:  Syncopal episode. COMPARISONS:  Chest x-ray 12/24/2021    TECHNIQUE:  single frontal view of the chest    ------------------------------------------    FINDINGS:    Lungs:  No consolidation or pleural fluid. Mediastinum: No significant cardiomegaly. Atherosclerotic arterial  calcification. Bones: No evidence of acute fracture. Right clavicle fracture is not well  assessed though potentially nonunited. Prior ORIF proximal left humerus  fracture.      ------------------------------------------    Impression  No acute cardiopulmonary process. CT Results:  Results from Hospital Encounter encounter on 01/29/22    CT HEAD WO CONT    Narrative  EXAM: CT head    CLINICAL INDICATION/HISTORY: Fall, head trauma, head pain    COMPARISON: 1/16/2022    TECHNIQUE: Axial CT imaging of the head was performed without intravenous  contrast. One or more dose reduction techniques were used on this CT: automated  exposure control, adjustment of the mAs and/or kVp according to patient size,  and iterative reconstruction techniques. The specific techniques used on this  CT exam have been documented in the patient's electronic medical record. Digital  Imaging and Communications in Medicine (DICOM) format image data are available  to nonaffiliated external healthcare facilities or entities on a secured, media  free, reciprocally searchable basis with patient authorization for at least a  12-month period after this study. _______________    FINDINGS:    BRAIN AND EXTRA-AXIAL SPACES: There is no intracranial hemorrhage, mass effect,  or midline shift. Left frontal encephalomalacia again identified, unchanged. Chronic lacunar infarct involving right internal capsule, unchanged. There are  no abnormal extra-axial fluid collections. CALVARIUM: Intact. SINUSES: Clear. OTHER: None.    _______________    Impression  1.  No acute intracranial abnormalities. 2. Left frontal encephalomalacia again identified, unchanged. Chronic lacunar  infarct involving right internal capsule again identified, unchanged. MRI Results:  No results found for this or any previous visit. Nuclear Medicine Results:  Results from [place] encounter on 01/16/22    NM LUNG SCAN PERF    Narrative  EXAM: PERFUSION SCINTIGRAPHY    INDICATION: Elevated d-dimer. Shortness of breath. .    COMPARISON: None. RADIOPHARMACEUTICAL: mCi Tc-99m DTPA aerosol by inhalation and mCi Tc-99m MAA IV    FINDINGS:    CHEST RADIOGRAPH:  Correlation is made to a semierect portable chest radiograph  dated 1/16/2022. There are no confluent infiltrates or pleural effusions. PERFUSION SCINTIGRAPHY:  The MAA perfusion images demonstrate minimal peripheral  perfusion inhomogeneities, no moderate or large segmental perfusion defects. Impression  No moderate or large segmental perfusion defects to indicate the presence of  pulmonary emboli. US Results:  No results found for this or any previous visit. IR Results:  No results found for this or any previous visit. VAS/US Results:  No results found for this or any previous visit. Lenora Mcghee M.D.   Hospitalist

## 2022-01-30 NOTE — PROGRESS NOTES
Am medications administered per order. Swallowed pills easily. ALL insulin medication held this am d/t persistent low BS. MD aware. Patient with visitor at bedside, in better mood this am with company.

## 2022-01-30 NOTE — PROGRESS NOTES
0700- assumed care of patient and received report, alert, Mepilex dressing bilateral heels in place and intact - not due to be changed, vital signs taken and BS - BS 41mg/dl, second check 35 mg/dl - 1 coke and 200 ml of apple juice given, third check is 47 mg/dl, patient does not show any signs of hypoglycemia - called lab for STAT blood drawn, oral glucose given per protocol. 3 staples noted on head - no bleeding, normal pupil respons, assisted to commode - 1 loose stool, call bell in reach, bed alarm on, slipper socks on, bed in low position, 2 side rails up, video monitor for safety. Continue C-Diff Enteric Precautions - stool results pending. 1321- patient got out of bed and had a fall, bed alarm was on, found patient on floor in left laying position, no new visible signs of injuries, alert, free movement of joints, bed alarm was on, and call bell in reach. 1656- notified Zabrina Mercedes and Hospitalist Dr. Babar Pacheco about fall - new orders, continue fall risk precautions.  mg/dl. Modified Fall Risk Preventions   - added floor pad to side of bed (left), side rail up x 3, walker available, bedside commode and urinal at bedside! 1220- BS taken 149 mg/dl, alert, no distress. 1450- BS 94 mg/dl d- coca cola given. 1700- x4 loose stools throughout the shift - received his vancomycin orally. No further distress. 1830- 169 mg/dl recheck.

## 2022-01-30 NOTE — PROGRESS NOTES
Comprehensive Nutrition Assessment    Type and Reason for Visit: Initial,Positive nutrition screen    Nutrition Recommendations/Plan: 4CHO choice cardiac diet with glucerna BID    Nutrition Assessment:  48 yo male PMH: HLD, HTN, DM, CVA, C-diff   underweight BMI 15.9. Admitted after fall in NH. Pt also with [facility]. continues to have diarrhea from recent C-diff completed vancomycin 1/25. Recheck for C-diff receive Immodium PRN. Currently on Diabetic/Cardiac diet with glucerna BID. Recent Results (from the past 24 hour(s))   C. DIFFICILE AG & TOXIN A/B    Collection Time: 01/29/22  5:00 PM   Result Value Ref Range    GDH ANTIGEN Negative Negative      C. difficile toxin Negative Negative      INTERPRETATION Negative for toxigenic C. difficile Negative for toxigenic C. difficile     CBC WITH AUTOMATED DIFF    Collection Time: 01/29/22  5:20 PM   Result Value Ref Range    WBC 12.1 4.6 - 13.2 K/uL    RBC 4.00 (L) 4.35 - 5.65 M/uL    HGB 11.5 (L) 13.0 - 16.0 g/dL    HCT 37.2 36.0 - 48.0 %    MCV 93.0 78.0 - 100.0 FL    MCH 28.8 24.0 - 34.0 PG    MCHC 30.9 (L) 31.0 - 37.0 g/dL    RDW 13.8 11.6 - 14.5 %    PLATELET 876 566 - 215 K/uL    MPV 12.1 (H) 9.2 - 11.8 FL    NRBC 0.0 0.0  WBC    ABSOLUTE NRBC 0.00 0.00 - 0.01 K/uL    NEUTROPHILS 70 40 - 73 %    LYMPHOCYTES 19 (L) 21 - 52 %    MONOCYTES 8 3 - 10 %    EOSINOPHILS 2 0 - 5 %    BASOPHILS 1 0 - 2 %    IMMATURE GRANULOCYTES 0 0 - 0.5 %    ABS. NEUTROPHILS 8.5 (H) 1.8 - 8.0 K/UL    ABS. LYMPHOCYTES 2.3 0.9 - 3.6 K/UL    ABS. MONOCYTES 1.0 0.05 - 1.2 K/UL    ABS. EOSINOPHILS 0.2 0.0 - 0.4 K/UL    ABS. BASOPHILS 0.1 0.0 - 0.1 K/UL    ABS. IMM.  GRANS. 0.0 0.00 - 0.04 K/UL    DF AUTOMATED     METABOLIC PANEL, COMPREHENSIVE    Collection Time: 01/29/22  5:20 PM   Result Value Ref Range    Sodium 135 135 - 145 mmol/L    Potassium 4.0 3.2 - 5.1 mmol/L    Chloride 99 94 - 111 mmol/L    CO2 27 21 - 33 mmol/L    Anion gap 9 mmol/L    Glucose 195 (H) 70 - 110 mg/dL    BUN 52 (H) 9 - 21 mg/dL    Creatinine 2.90 (H) 0.8 - 1.50 mg/dL    BUN/Creatinine ratio 18      GFR est AA 28 ml/min/1.73m2    GFR est non-AA 23 ml/min/1.73m2    Calcium 8.8 8.5 - 10.5 mg/dL    Bilirubin, total 0.4 0.2 - 1.0 mg/dL    AST (SGOT) 55 17 - 74 U/L    ALT (SGPT) 40 3 - 72 U/L    Alk.  phosphatase 88 38 - 126 U/L    Protein, total 6.6 6.1 - 8.4 g/dL    Albumin 3.7 3.5 - 4.7 g/dL    Globulin 2.9 g/dL    A-G Ratio 1.3     MAGNESIUM    Collection Time: 01/29/22  5:20 PM   Result Value Ref Range    Magnesium 2.3 1.7 - 2.8 mg/dL   GLUCOSE, POC    Collection Time: 01/29/22  8:12 PM   Result Value Ref Range    Glucose (POC) 150 (H) 70 - 110 mg/dL    Performed by Sue hDillon, BASIC    Collection Time: 01/30/22  4:00 AM   Result Value Ref Range    Sodium 140 135 - 145 mmol/L    Potassium 3.7 3.2 - 5.1 mmol/L    Chloride 106 94 - 111 mmol/L    CO2 26 21 - 33 mmol/L    Anion gap 8 mmol/L    Glucose 83 70 - 110 mg/dL    BUN 43 (H) 9 - 21 mg/dL    Creatinine 2.20 (H) 0.8 - 1.50 mg/dL    BUN/Creatinine ratio 20      GFR est AA 38 ml/min/1.73m2    GFR est non-AA 31 ml/min/1.73m2    Calcium 8.3 (L) 8.5 - 10.5 mg/dL   MAGNESIUM    Collection Time: 01/30/22  4:00 AM   Result Value Ref Range    Magnesium 2.1 1.7 - 2.8 mg/dL   CBC W/O DIFF    Collection Time: 01/30/22  4:00 AM   Result Value Ref Range    WBC 12.5 4.6 - 13.2 K/uL    RBC 3.78 (L) 4.35 - 5.65 M/uL    HGB 10.9 (L) 13.0 - 16.0 g/dL    HCT 35.3 (L) 36.0 - 48.0 %    MCV 93.4 78.0 - 100.0 FL    MCH 28.8 24.0 - 34.0 PG    MCHC 30.9 (L) 31.0 - 37.0 g/dL    RDW 13.7 11.6 - 14.5 %    PLATELET 432 860 - 298 K/uL    MPV 12.4 (H) 9.2 - 11.8 FL    NRBC 0.0 0.0  WBC    ABSOLUTE NRBC 0.00 0.00 - 0.01 K/uL   PHOSPHORUS    Collection Time: 01/30/22  4:00 AM   Result Value Ref Range    Phosphorus 4.2 2.5 - 4.5 mg/dL   GLUCOSE, POC    Collection Time: 01/30/22  7:23 AM   Result Value Ref Range    Glucose (POC) 41 (LL) 70 - 110 mg/dL    Performed by Caroline Henriquez Lorrie    GLUCOSE, POC    Collection Time: 01/30/22  7:25 AM   Result Value Ref Range    Glucose (POC) 35 (LL) 70 - 110 mg/dL    Performed by Haleigh Boss    GLUCOSE, POC    Collection Time: 01/30/22  7:35 AM   Result Value Ref Range    Glucose (POC) 48 (LL) 70 - 110 mg/dL    Performed by [address], POC    Collection Time: 01/30/22  8:00 AM   Result Value Ref Range    Glucose (POC) 126 (H) 70 - 110 mg/dL    Performed by [address], RANDOM    Collection Time: 01/30/22  8:15 AM   Result Value Ref Range    Glucose 121 (H) 70 - 110 mg/dL   GLUCOSE, POC    Collection Time: 01/30/22  9:35 AM   Result Value Ref Range    Glucose (POC) 154 (H) 70 - 110 mg/dL    Performed by Allison Gaspar        Malnutrition Assessment:  Malnutrition Status:  Insufficient data (C-diff)    Context:  Acute illness     Findings of the 6 clinical characteristics of malnutrition:   Energy Intake:  Mild decrease in energy intake (specify) (eating less than 50% of meals)  Weight Loss:  Unable to assess     Body Fat Loss:  Unable to assess,     Muscle Mass Loss:  Unable to assess,    Fluid Accumulation:  Unable to assess,     Strength:  Not performed         Estimated Daily Nutrient Needs:  Energy (kcal): 2093-0696 kcal/day; Weight Used for Energy Requirements: Admission (52 kg)  Protein (g): 52-62 g/day; Weight Used for Protein Requirements: Admission (1-1.2 g/kg)  Fluid (ml/day): 9531-9140 mL/day; Method Used for Fluid Requirements: 1 ml/kcal      Nutrition Related Findings:  underweight BMI 15.9. Admitted after fall in NH. Pt also with [facility]. continues to have diarrhea from recent C-diff completed vancomycin 1/25. Recheck for C-diff receive Immodium PRN. Currently on Diabetic/Cardiac diet with glucerna BID. Wounds:    None       Current Nutrition Therapies:  ADULT ORAL NUTRITION SUPPLEMENT Breakfast, Dinner; Diabetic Supplement  ADULT DIET Regular; 4 carb choices (60 gm/meal);  Low Fat/Low Chol/High Fiber/REINIER    Anthropometric Measures:  · Height:  5' 11\" (180.3 cm)  · Current Body Wt:  51.7 kg (114 lb)   · Admission Body Wt:  114 lb    · Usual Body Wt:        · Ideal Body Wt:  172 lbs:  66.3 %   · Adjusted Body Weight:   ; Weight Adjustment for: No adjustment   · Adjusted BMI:       · BMI Category:  Underweight (BMI less than 18.5)       Nutrition Diagnosis:   · Underweight related to altered GI function as evidenced by diarrhea,BMI      Nutrition Interventions:   Food and/or Nutrient Delivery: Continue current diet,Start oral nutrition supplement  Nutrition Education and Counseling: Education not appropriate  Coordination of Nutrition Care: Continue to monitor while inpatient    Goals:  Pt to eat greater than 75% of meals, BM every 1-3 days, BMI 18.5-24.9, k+ 3.5-5.1, BG        Nutrition Monitoring and Evaluation:   Behavioral-Environmental Outcomes:    Food/Nutrient Intake Outcomes: Food and nutrient intake,Supplement intake  Physical Signs/Symptoms Outcomes: Biochemical data,Meal time behavior,Weight,GI status,Diarrhea     F/u: 2/2/2022    Discharge Planning:    Continue current diet,Continue oral nutrition supplement     Electronically signed by Sammie Olguin on 1/30/2022 at 11:43 AM    Contact: ROSIE [phone]

## 2022-01-31 VITALS
TEMPERATURE: 97.9 F | SYSTOLIC BLOOD PRESSURE: 172 MMHG | BODY MASS INDEX: 16.53 KG/M2 | RESPIRATION RATE: 16 BRPM | DIASTOLIC BLOOD PRESSURE: 87 MMHG | OXYGEN SATURATION: 99 % | HEIGHT: 71 IN | WEIGHT: 118.1 LBS | HEART RATE: 74 BPM

## 2022-01-31 LAB
ANION GAP SERPL CALC-SCNC: 9 MMOL/L
BASOPHILS # BLD: 0 K/UL (ref 0–0.1)
BASOPHILS NFR BLD: 0 % (ref 0–2)
BUN SERPL-MCNC: 23 MG/DL (ref 9–21)
BUN/CREAT SERPL: 18
CA-I BLD-MCNC: 8.2 MG/DL (ref 8.5–10.5)
CHLORIDE SERPL-SCNC: 109 MMOL/L (ref 94–111)
CO2 SERPL-SCNC: 23 MMOL/L (ref 21–33)
CREAT SERPL-MCNC: 1.3 MG/DL (ref 0.8–1.5)
DIFFERENTIAL METHOD BLD: ABNORMAL
EOSINOPHIL # BLD: 0.2 K/UL (ref 0–0.4)
EOSINOPHIL NFR BLD: 2 % (ref 0–5)
ERYTHROCYTE [DISTWIDTH] IN BLOOD BY AUTOMATED COUNT: 13.9 % (ref 11.6–14.5)
GLUCOSE BLD STRIP.AUTO-MCNC: 116 MG/DL (ref 70–110)
GLUCOSE BLD STRIP.AUTO-MCNC: 193 MG/DL (ref 70–110)
GLUCOSE BLD STRIP.AUTO-MCNC: 81 MG/DL (ref 70–110)
GLUCOSE BLD STRIP.AUTO-MCNC: 95 MG/DL (ref 70–110)
GLUCOSE SERPL-MCNC: 112 MG/DL (ref 70–110)
HCT VFR BLD AUTO: 33.1 % (ref 36–48)
HGB BLD-MCNC: 10 G/DL (ref 13–16)
IMM GRANULOCYTES # BLD AUTO: 0 K/UL (ref 0–0.04)
IMM GRANULOCYTES NFR BLD AUTO: 0 % (ref 0–0.5)
LYMPHOCYTES # BLD: 1.2 K/UL (ref 0.9–3.6)
LYMPHOCYTES NFR BLD: 16 % (ref 21–52)
MCH RBC QN AUTO: 28.7 PG (ref 24–34)
MCHC RBC AUTO-ENTMCNC: 30.2 G/DL (ref 31–37)
MCV RBC AUTO: 95.1 FL (ref 78–100)
MONOCYTES # BLD: 0.6 K/UL (ref 0.05–1.2)
MONOCYTES NFR BLD: 7 % (ref 3–10)
NEUTS SEG # BLD: 5.8 K/UL (ref 1.8–8)
NEUTS SEG NFR BLD: 75 % (ref 40–73)
NRBC # BLD: 0 K/UL (ref 0–0.01)
NRBC BLD-RTO: 0 PER 100 WBC
PERFORMED BY, TECHID: ABNORMAL
PERFORMED BY, TECHID: ABNORMAL
PERFORMED BY, TECHID: NORMAL
PERFORMED BY, TECHID: NORMAL
PLATELET # BLD AUTO: 198 K/UL (ref 135–420)
PMV BLD AUTO: 12.7 FL (ref 9.2–11.8)
POTASSIUM SERPL-SCNC: 4.1 MMOL/L (ref 3.2–5.1)
RBC # BLD AUTO: 3.48 M/UL (ref 4.35–5.65)
SODIUM SERPL-SCNC: 141 MMOL/L (ref 135–145)
WBC # BLD AUTO: 7.7 K/UL (ref 4.6–13.2)

## 2022-01-31 PROCEDURE — 74011250637 HC RX REV CODE- 250/637: Performed by: NURSE PRACTITIONER

## 2022-01-31 PROCEDURE — 74011000250 HC RX REV CODE- 250: Performed by: NURSE PRACTITIONER

## 2022-01-31 PROCEDURE — 36415 COLL VENOUS BLD VENIPUNCTURE: CPT

## 2022-01-31 PROCEDURE — 96372 THER/PROPH/DIAG INJ SC/IM: CPT

## 2022-01-31 PROCEDURE — 82962 GLUCOSE BLOOD TEST: CPT

## 2022-01-31 PROCEDURE — 74011250636 HC RX REV CODE- 250/636: Performed by: NURSE PRACTITIONER

## 2022-01-31 PROCEDURE — 97161 PT EVAL LOW COMPLEX 20 MIN: CPT

## 2022-01-31 PROCEDURE — 85025 COMPLETE CBC W/AUTO DIFF WBC: CPT

## 2022-01-31 PROCEDURE — 80048 BASIC METABOLIC PNL TOTAL CA: CPT

## 2022-01-31 PROCEDURE — G0378 HOSPITAL OBSERVATION PER HR: HCPCS

## 2022-01-31 RX ORDER — ACETAMINOPHEN 325 MG/1
650 TABLET ORAL
COMMUNITY

## 2022-01-31 RX ORDER — INSULIN GLARGINE 100 [IU]/ML
15 INJECTION, SOLUTION SUBCUTANEOUS
Qty: 1 ML | Refills: 5 | Status: SHIPPED | OUTPATIENT
Start: 2022-01-31 | End: 2022-06-29

## 2022-01-31 RX ORDER — DEXTROSE 40 %
1 GEL (GRAM) ORAL AS NEEDED
COMMUNITY
End: 2022-06-29

## 2022-01-31 RX ORDER — INSULIN GLARGINE 100 [IU]/ML
15 INJECTION, SOLUTION SUBCUTANEOUS 2 TIMES DAILY
COMMUNITY
End: 2022-03-11

## 2022-01-31 RX ADMIN — CETIRIZINE HYDROCHLORIDE 10 MG: 10 TABLET, FILM COATED ORAL at 09:59

## 2022-01-31 RX ADMIN — SODIUM CHLORIDE, PRESERVATIVE FREE 10 ML: 5 INJECTION INTRAVENOUS at 05:17

## 2022-01-31 RX ADMIN — GABAPENTIN 300 MG: 300 CAPSULE ORAL at 09:59

## 2022-01-31 RX ADMIN — ASPIRIN 81 MG: 81 TABLET, COATED ORAL at 09:59

## 2022-01-31 RX ADMIN — METOPROLOL SUCCINATE 12.5 MG: 25 TABLET, EXTENDED RELEASE ORAL at 09:59

## 2022-01-31 RX ADMIN — ENOXAPARIN SODIUM 30 MG: 30 INJECTION SUBCUTANEOUS at 09:59

## 2022-01-31 NOTE — PROGRESS NOTES
Levi 88 care of patient. 1930-patient asleep. Aroused easily. VS obtained. Assessment completed. CBWR.     2146 Patient asleep. Aroused easily to voice. Scheduled medications given per MAR. 100% of snack eaten. Bed alarm on. Safety measures in place. 2300 Patient up to restroom using walker. Back to bed. Bed alarm on.     0120 rounds. Patient asleep. . No needs voiced. CBWR    0530-wound care completed. IV fluid rate changed and new bag. Juice given for BG of 81. Safety measures in place.

## 2022-01-31 NOTE — DISCHARGE SUMMARY
Discharge Summary       Patient ID:  Olga Rodríguez,   49 y.o., male  1965    PCP:  None    Admit Date: 1/29/2022  1:42 PM  Discharge Date:  No discharge date for patient encounter. Length of stay: 2 day(s)  Code Status: Full Code  Discharging physician: Perlita Louise MD    Chief Complaint   Patient presents with    Fall       Discharge Medications  Current Discharge Medication List      CONTINUE these medications which have CHANGED    Details   insulin glargine (Lantus U-100 Insulin) 100 unit/mL injection 15 Units by SubCUTAneous route nightly. Indications: type 2 diabetes mellitus  Qty: 1 mL, Refills: 5  Start date: 1/31/2022         CONTINUE these medications which have NOT CHANGED    Details   metoprolol succinate (TOPROL-XL) 25 mg XL tablet Take 0.5 Tablets by mouth daily. Qty: 60 Tablet, Refills: 0      insulin aspart U-100 (NovoLOG Flexpen U-100 Insulin) 100 unit/mL (3 mL) inpn by SubCUTAneous route as needed. SLIDING SCALE INSTRUCTIONS FROM [facility]  100-160= 2 UNITS  161-220= 4 UNITS  221-280= 6 UNITS  281-340= 8 UNITS  341-400= 10 UNITS  401-460= 12 UNITS  >461= 14 UNITS AND CALL MD      rosuvastatin (CRESTOR) 10 mg tablet Take 1 Tablet by mouth nightly. Qty: 30 Tablet, Refills: 5      aspirin delayed-release 81 mg tablet Take 1 Tablet by mouth daily. Qty: 30 Tablet, Refills: 5      gabapentin (NEURONTIN) 300 mg capsule Take 1 Capsule by mouth three (3) times daily. Max Daily Amount: 900 mg. Qty: 90 Capsule, Refills: 1    Associated Diagnoses: Diabetic peripheral neuropathy (HCC)      cetirizine (ZYRTEC) 10 mg tablet TAKE 1 TABLET EVERY DAY BY ORAL ROUTE. Qty: 90 Tab, Refills: 0      loperamide (IMODIUM) 2 mg capsule Take 4 mg by mouth every four (4) hours as needed for Diarrhea.          STOP taking these medications       vancomycin (VANCOCIN) 250 mg capsule Comments:   Reason for Stopping:                 Reason For admission    Active Problems:    Dehydration (12/24/2021)      Diarrhea (1/29/2022)      Acute kidney failure ([facility] [address] (1/29/2022)         HPI on Admission (per admitting physician):   José Miguel De La Garza is a 49 y.o. male from the [facility] with a past medical history significant for hyperlipidemia, hypertension, diabetes type 2, CVA, and a recent diagnosis of C. difficile, patient presented to the ED after a fall at [facility] which he sustained a laceration to the back of his head. While in the ED labs was drawn and found patient to be in acute kidney injury. BUN/creatinine in the ED was 52/2.9 and increase from 26/1.3 on 1/17/22. Patient reports that he continues to have diarrhea form previous C. Diff infection, but it has improved significantly since he was treated with oral vancomycin which was completed on 1/25/2022. Patient reports that he does not remember much about his fall, denies dizziness, chest pain,  palpitations, shortness of breath and headache. Admit to telemetry for observation. Patient assessed in the ED, at the bedside, patient is alert and oriented, there is no acute distress noted. Patient agrees to admission for a diagnosis of acute kidney injury, treatment to include IV fluids, monitor electrolytes, and nephrology consultation. Hospital Course and Discharge Diagnosis   The patient admitted for the following Principal Medical Problem:   Acute kidney injury - RESOLVED   - Likely secondary to dehydration and diarrhea  - Avoid nephrotoxic agents. No NSAIDS, No ACEI/ARBs. No Diuretics  - IVF support, avoid lowering BP drastically, monitor for fluid overload, Cr 2.9-->2.2-->1.3  - need to be well hydrated at [facility] may related to dehydration and hypoglycemia   -CT head no acute process, Consult to PT  C. difficile diarrhea - IMPROVED   -recent diagnosis, oral Vancomycin completed on 1/25, -WBC: 12.1 and patient afebrile  -diarrhea continues  -continue AR Imodium-continue Enteric precautions, C.  Diff ordered in ED, PO vanc   Diabetes mellitus type 2 with hypoglycemia   - Lantus changed to 15 QHS instead of BID , monitor BS closely , Diabetic diet   -Accu-Cheks before meals and before bedtime  Left Heel Wound  -continue Santyl daily  -wound nurse consulted   Hypertension:  --Chronic/controlled  -Continue metoprolo   History of CVA:  -Patient reports that he uses a walker for ambulation  -Continue aspirin and statin:       The patient condition became clinically stable and No new complain or complication during the hospital course. The Medication changes discussed with the patient and family on the discharge    Condition at discharge   Afebrile  Ambulating  Eating, Drinking, Voiding  Stable      Physical Exam on Discharge:  Visit Vitals  /62 (BP 1 Location: Left upper arm, BP Patient Position: At rest)   Pulse 70   Temp 98 °F (36.7 °C)   Resp 16   Ht 5' 11\" (1.803 m)   Wt 53.6 kg (118 lb 1.6 oz)   SpO2 97%   BMI 16.47 kg/m²       General Appearance:   Appears in no acute distress. , Sitting up.,   Skin:   Skin warm & dry, No rash, No jaundice,   Lymph: There is no lymphadenopathy,   HEENT:   PERRLA, EOMI, Moist oral mucous membranes, conjunctiva clear,   Neck:   Supple, Without masses,   Lungs:   Clear, No wheezes. , No rales. , Normal respiratory effort,   Heart:   Regular rate and rhythm, No gallop,   Abdomen:   Soft , Non-distended, Normal bowel sounds and Non-tender,   Extremities:  no edema of legs, Normal pedal and radial pulses,   Neuro:    alert, oriented, affect appropriate, speech fluent, cranial nerves intact, no focal neurological deficits and moves all extremities well    Procedures:    No discharge procedures on file.      Consultants/Treatment Team:    Treatment Team: Attending Provider: Macy Kong MD; Consulting Provider: Clemencia Hewitt MD; Primary Nurse: Anni Turner; Staff Nurse: Peña Crespo RN    Disposition:    [facility]     Follow Up   None    Most Recent Labs:  Recent Results (from the past 24 hour(s))   GLUCOSE, POC    Collection Time: 01/30/22  9:35 AM   Result Value Ref Range    Glucose (POC) 154 (H) 70 - 110 mg/dL    Performed by [address], POC    Collection Time: 01/30/22 12:17 PM   Result Value Ref Range    Glucose (POC) 149 (H) 70 - 110 mg/dL    Performed by [address]in, POC    Collection Time: 01/30/22  2:51 PM   Result Value Ref Range    Glucose (POC) 94 70 - 110 mg/dL    Performed by [address]in, POC    Collection Time: 01/30/22  6:23 PM   Result Value Ref Range    Glucose (POC) 169 (H) 70 - 110 mg/dL    Performed by [address]in, POC    Collection Time: 01/30/22  8:08 PM   Result Value Ref Range    Glucose (POC) 151 (H) 70 - 110 mg/dL    Performed by [id number], POC    Collection Time: 01/31/22  1:21 AM   Result Value Ref Range    Glucose (POC) 116 (H) 70 - 110 mg/dL    Performed by [id number] [address] Tello, BASIC    Collection Time: 01/31/22  3:33 AM   Result Value Ref Range    Sodium 141 135 - 145 mmol/L    Potassium 4.1 3.2 - 5.1 mmol/L    Chloride 109 94 - 111 mmol/L    CO2 23 21 - 33 mmol/L    Anion gap 9 mmol/L    Glucose 112 (H) 70 - 110 mg/dL    BUN 23 (H) 9 - 21 mg/dL    Creatinine 1.30 0.8 - 1.50 mg/dL    BUN/Creatinine ratio 18      GFR est AA >60 ml/min/1.73m2    GFR est non-AA 58 ml/min/1.73m2    Calcium 8.2 (L) 8.5 - 10.5 mg/dL   CBC WITH AUTOMATED DIFF    Collection Time: 01/31/22  3:33 AM   Result Value Ref Range    WBC 7.7 4.6 - 13.2 K/uL    RBC 3.48 (L) 4.35 - 5.65 M/uL    HGB 10.0 (L) 13.0 - 16.0 g/dL    HCT 33.1 (L) 36.0 - 48.0 %    MCV 95.1 78.0 - 100.0 FL    MCH 28.7 24.0 - 34.0 PG    MCHC 30.2 (L) 31.0 - 37.0 g/dL    RDW 13.9 11.6 - 14.5 %    PLATELET 933 971 - 057 K/uL    MPV 12.7 (H) 9.2 - 11.8 FL    NRBC 0.0 0.0  WBC    ABSOLUTE NRBC 0.00 0.00 - 0.01 K/uL    NEUTROPHILS 75 (H) 40 - 73 %    LYMPHOCYTES 16 (L) 21 - 52 %    MONOCYTES 7 3 - 10 %    EOSINOPHILS 2 0 - 5 %    BASOPHILS 0 0 - 2 %    IMMATURE GRANULOCYTES 0 0 - 0.5 %    ABS. NEUTROPHILS 5.8 1.8 - 8.0 K/UL    ABS. LYMPHOCYTES 1.2 0.9 - 3.6 K/UL    ABS. MONOCYTES 0.6 0.05 - 1.2 K/UL    ABS. EOSINOPHILS 0.2 0.0 - 0.4 K/UL    ABS. BASOPHILS 0.0 0.0 - 0.1 K/UL    ABS. IMM. GRANS. 0.0 0.00 - 0.04 K/UL    DF AUTOMATED     GLUCOSE, POC    Collection Time: 01/31/22  5:28 AM   Result Value Ref Range    Glucose (POC) 81 70 - 110 mg/dL    Performed by eDsirae Moise      Recent Labs     01/31/22  0333   BUN 23*      CO2 23     Recent Labs     01/31/22  0333   WBC 7.7   RBC 3.48*   HCT 33.1*   MCV 95.1   MCH 28.7   MCHC 30.2*   RDW 13.9       XR Results:  Results from Hospital Encounter encounter on 01/16/22    XR CHEST PORT    Narrative  CLINICAL HISTORY:  Syncopal episode. COMPARISONS:  Chest x-ray 12/24/2021    TECHNIQUE:  single frontal view of the chest    ------------------------------------------    FINDINGS:    Lungs:  No consolidation or pleural fluid. Mediastinum: No significant cardiomegaly. Atherosclerotic arterial  calcification. Bones: No evidence of acute fracture. Right clavicle fracture is not well  assessed though potentially nonunited. Prior ORIF proximal left humerus  fracture.      ------------------------------------------    Impression  No acute cardiopulmonary process. CT Results:  Results from Hospital Encounter encounter on 01/29/22    CT HEAD WO CONT    Narrative  EXAM: CT head    CLINICAL INDICATION/HISTORY: Fall, head trauma, head pain    COMPARISON: 1/16/2022    TECHNIQUE: Axial CT imaging of the head was performed without intravenous  contrast. One or more dose reduction techniques were used on this CT: automated  exposure control, adjustment of the mAs and/or kVp according to patient size,  and iterative reconstruction techniques. The specific techniques used on this  CT exam have been documented in the patient's electronic medical record.  Digital  Imaging and Communications in Medicine (DICOM) format image data are available  to nonaffiliated external healthcare facilities or entities on a secured, media  free, reciprocally searchable basis with patient authorization for at least a  12-month period after this study. _______________    FINDINGS:    BRAIN AND EXTRA-AXIAL SPACES: There is no intracranial hemorrhage, mass effect,  or midline shift. Left frontal encephalomalacia again identified, unchanged. Chronic lacunar infarct involving right internal capsule, unchanged. There are  no abnormal extra-axial fluid collections. CALVARIUM: Intact. SINUSES: Clear. OTHER: None.    _______________    Impression  1. No acute intracranial abnormalities. 2. Left frontal encephalomalacia again identified, unchanged. Chronic lacunar  infarct involving right internal capsule again identified, unchanged. MRI Results:  No results found for this or any previous visit. Nuclear Medicine Results:  Results from [place] encounter on 01/16/22    NM LUNG SCAN PERF    Narrative  EXAM: PERFUSION SCINTIGRAPHY    INDICATION: Elevated d-dimer. Shortness of breath. .    COMPARISON: None. RADIOPHARMACEUTICAL: mCi Tc-99m DTPA aerosol by inhalation and mCi Tc-99m MAA IV    FINDINGS:    CHEST RADIOGRAPH:  Correlation is made to a semierect portable chest radiograph  dated 1/16/2022. There are no confluent infiltrates or pleural effusions. PERFUSION SCINTIGRAPHY:  The MAA perfusion images demonstrate minimal peripheral  perfusion inhomogeneities, no moderate or large segmental perfusion defects. Impression  No moderate or large segmental perfusion defects to indicate the presence of  pulmonary emboli.         Amira Will MD   Hospitalist

## 2022-01-31 NOTE — PROGRESS NOTES
Problem: Mobility Impaired (Adult and Pediatric)  Goal: *Acute Goals and Plan of Care (Insert Text)  Description: Pt is MOD (I)  with gait and is MOD (I) with transfers. PLOF: Ambulates with RW in LTC facility, A with ADLs from staff as needed. Outcome: Resolved/Met     Problem: Patient Education: Go to Patient Education Activity  Goal: Patient/Family Education  Outcome: Resolved/Met   PHYSICAL THERAPY EVALUATION AND DISCHARGE    Patient: José Miguel De La Garza (88 y.o. male)  Date: 1/31/2022  Primary Diagnosis: Acute kidney failure ([facility] [address] [N17.9]  Dehydration [E86.0]  Diarrhea [R19.7]        Precautions:   Fall    ASSESSMENT :  Based on the objective data described below, the patient presents with fall and c-diff. He is at the bedside eating breakfast without complaints. He performs mobility without difficulty and returns to bed to finish breakfast. He can return to LTC once medically stable. Patient does not require further skilled intervention at this level of care. PLAN :  Recommendations and Planned Interventions:   No formal PT needs identified at this time. Discharge Recommendations: LTC as prior  Further Equipment Recommendations for Discharge: N/A     SUBJECTIVE:   Patient stated it's so lonely in here.     OBJECTIVE DATA SUMMARY:     Past Medical History:   Diagnosis Date    Diabetes ([facility] [address]     Hypercholesterolemia     Hypertension      Past Surgical History:   Procedure Laterality Date    HX ORTHOPAEDIC Left 08/30/2018    hip joint    HX ORTHOPAEDIC Right     broken knee     Barriers to Learning/Limitations: None  Compensate with: N/A  Home Situation:   Home Situation  Home Environment: Long term care  One/Two Story Residence: One story  Living Alone: No  Support Systems: [facility]  Patient Expects to be Discharged to[de-identified] [address] [facility]  Current DME Used/Available at Home: Walker, rolling  Critical Behavior:  Neurologic State: Alert  Orientation Level: Oriented to person;Oriented to place;Oriented to time  Cognition: Follows commands     Strength:    Strength: Within functional limits    Tone & Sensation:   Tone: Normal  Sensation: Intact  Range Of Motion:   AROM: Within functional limits    Posture:  Posture (WDL): Within defined limits      Functional Mobility:  Bed Mobility:  Rolling: Modified independent  Supine to Sit: Modified independent  Sit to Supine: Modified independent  Scooting: Modified independent  Transfers:  Sit to Stand: Modified independent  Stand to Sit: Modified independent  Balance:   Sitting: Intact  Standing: Intact  Ambulation/Gait Training:  Distance (ft): 40 Feet (ft)  Assistive Device: Walker, rolling  Ambulation - Level of Assistance: Modified independent     Gait Description (WDL): Exceptions to WDL    Pain:  Pain level pre-treatment: 0/10   Pain level post-treatment: 0/10  Pain Location: N/A    Activity Tolerance:   Good  Please refer to the flowsheet for vital signs taken during this treatment. After treatment:   []         Patient left in no apparent distress sitting up in chair  [x]         Patient left in no apparent distress in bed  [x]         Call bell left within reach  [x]         Nursing notified  []         Caregiver present  []         Bed alarm activated  []         SCDs applied    COMMUNICATION/EDUCATION:   [x]         Role of Physical Therapy in the acute care setting. [x]         Fall prevention education was provided and the patient/caregiver indicated understanding. [x]         Patient/family have participated as able in goal setting and plan of care. [x]         Patient/family agree to work toward stated goals and plan of care. []         Patient understands intent and goals of therapy, but is neutral about his/her participation. []         Patient is unable to participate in goal setting/plan of care: ongoing with therapy staff.  []         Other:     Thank you for this referral.  Reyna Miranda, PT, DPT   Time Calculation: 10 mins

## 2022-02-01 ENCOUNTER — HOSPITAL ENCOUNTER (OUTPATIENT)
Dept: LAB | Age: 57
Discharge: HOME | End: 2022-02-01
Payer: MEDICARE

## 2022-02-01 LAB — GLUCOSE SERPL-MCNC: 60 MG/DL (ref 70–110)

## 2022-02-01 PROCEDURE — 36415 COLL VENOUS BLD VENIPUNCTURE: CPT

## 2022-02-01 PROCEDURE — 82947 ASSAY GLUCOSE BLOOD QUANT: CPT

## 2022-02-02 ENCOUNTER — HOSPITAL ENCOUNTER (OUTPATIENT)
Dept: LAB | Age: 57
Discharge: HOME | End: 2022-02-02
Payer: MEDICARE

## 2022-02-02 LAB
ALBUMIN SERPL-MCNC: 3.4 G/DL (ref 3.5–4.7)
ALBUMIN/GLOB SERPL: 1.2 {RATIO}
ALP SERPL-CCNC: 72 U/L (ref 38–126)
ALT SERPL-CCNC: 20 U/L (ref 3–72)
ANION GAP SERPL CALC-SCNC: 9 MMOL/L
AST SERPL W P-5'-P-CCNC: 20 U/L (ref 17–74)
BILIRUB SERPL-MCNC: 0.6 MG/DL (ref 0.2–1)
BUN SERPL-MCNC: 14 MG/DL (ref 9–21)
BUN/CREAT SERPL: 11
CA-I BLD-MCNC: 8.6 MG/DL (ref 8.5–10.5)
CHLORIDE SERPL-SCNC: 108 MMOL/L (ref 94–111)
CO2 SERPL-SCNC: 25 MMOL/L (ref 21–33)
CREAT SERPL-MCNC: 1.3 MG/DL (ref 0.8–1.5)
GLOBULIN SER CALC-MCNC: 2.9 G/DL
GLUCOSE SERPL-MCNC: 32 MG/DL (ref 70–110)
POTASSIUM SERPL-SCNC: 3.8 MMOL/L (ref 3.2–5.1)
PROT SERPL-MCNC: 6.3 G/DL (ref 6.1–8.4)
SODIUM SERPL-SCNC: 142 MMOL/L (ref 135–145)

## 2022-02-02 PROCEDURE — 80053 COMPREHEN METABOLIC PANEL: CPT

## 2022-02-02 PROCEDURE — 36415 COLL VENOUS BLD VENIPUNCTURE: CPT

## 2022-02-09 ENCOUNTER — HOSPITAL ENCOUNTER (OUTPATIENT)
Dept: LAB | Age: 57
Discharge: HOME | End: 2022-02-09
Payer: MEDICARE

## 2022-02-09 LAB — GLUCOSE SERPL-MCNC: 487 MG/DL (ref 70–110)

## 2022-02-09 PROCEDURE — 82947 ASSAY GLUCOSE BLOOD QUANT: CPT

## 2022-02-09 PROCEDURE — 36415 COLL VENOUS BLD VENIPUNCTURE: CPT

## 2022-02-09 NOTE — H&P
[facility] - HISTORY AND PHYSICAL    Name:  Gina Solano  MR#:  [id number]  :  1965  ACCOUNT #:  [de-identified]  ADMIT DATE:  2022    CHIEF COMPLAINT:  Hypoglycemia. HISTORY OF PRESENT ILLNESS:  This is a 65-year-old gentleman with a history of brittle diabetes, hypertension, dyslipidemia, and noncompliance, as well as a recent history of C. diff, who presented to the hospital because of hypoglycemia. He was admitted and treated aggressively. The patient currently reports he is doing better. He denies any headache, vision changes, nausea, vomiting, or diarrhea. Per nursing staff, he is not always allowing them to give him his medications including his Lantus. As a consequence of this, his blood sugars have been fluctuating wildly. On a day that his blood sugar is averaging in the 200s that is actually a good day for him. PAST MEDICAL HISTORY:  C. diff diarrhea, type 2 diabetes, chronic left heel wound, hypertension, dyslipidemia, and CVA with residual weakness. MEDICATIONS:  Include,  1. Imodium 4 mg 6 hours as needed. 2.  Zyrtec 10 mg daily. 3.  Gabapentin 300 t.i.d.  4.  Aspirin 81 mg daily. 5.  Crestor 10 mg daily. 6.  Sliding scale insulin Lantus 15 units nightly. 7.  Toprol XL 25 mg daily. ALLERGIES:  NONE. FAMILY HISTORY:  Significant for hypertension and dyslipidemia. SOCIAL HISTORY:  He does not currently smoke. He does not drink alcohol. REVIEW OF SYSTEMS:  A 12-point review of systems was obtained and is negative unless otherwise specified in the HPI. GENERAL:  The patient report some mild weight loss. He does report fatigue. HEENT:  No headache, vision changes, or difficulty swallowing. RESPIRATORY:  No shortness of breath or cough. CARDIOVASCULAR:  No chest pain or palpitations. ABDOMEN:  No nausea, vomiting, or diarrhea. GENITOURINARY:  No dysuria, change in urinary frequency.   EXTREMITIES:  Positive for weakness. Positive for gait abnormality. NEUROLOGIC:  No current paresthesia although he does have peripheral neuropathy. PHYSICAL EXAMINATION:  VITAL SIGNS:  Temperature is 97.2, blood pressure is 150/78, pulse 94, respiratory rate 20, oxygen saturation 95%. GENERAL:  Very pleasant gentleman, somewhat debilitated, no acute distress, lying in bed. HEENT:  Sclerae anicteric. Mucous membranes are moist.  RESPIRATORY:  Clear to auscultation. No wheezes, rhonchi, crackles. CARDIOVASCULAR:  Slightly tachycardic, 1/6 murmur at left sternal border. ABDOMEN:  Bowel sounds are positive. Nontender, nondistended. No hepatosplenomegaly. EXTREMITIES:  No cyanosis, clubbing, edema. LABORATORY DATA:  From 02/02, sodium 142, potassium 3.8, chloride 108, bicarb 25, BUN 14, creatinine 1.3. Blood sugars over the past four days have ranged 86 to a high of 391. ASSESSMENT:  1.  Brittle type 2 diabetes, uncontrolled. 2.  Hypertension. 3.  History of hypoglycemia. 4.  History of dyslipidemia. 5.  History of Clostridium difficile and acute renal failure. PLAN:  At this point, the patient's compliance is really impacting the care we are providing here at [facility]. His blood sugars fluctuate quite wildly and frankly were never controlled, that is unless he had his insulin pump. At this juncture, we need to prevent hypoglycemia and our targets for blood glucose control at this point should be in the mid 200s. The patient will be due for a repeat CMP to monitor his renal function. His last creatinine was 1.3. It was discussed with the patient at length that in order for us to take proper care of him, he really needed to comply with us. It is unclear to me if his compliance will improve. I spent extensive time discussing this with the patient; Dioni Medel, the patient's nurse; and the rest of the staff.     Total time spent on this encounter was approximately 35 minutes, more than 50% was spent in counseling and coordination of care including reviewing the documentation from the EMR, writing orders, discussion with the patient, and further consultations.       Gulshan Trinh.MD [email or web address]  D:  02/09/2022 11:58  T:  02/09/2022 14:58  JOB #:  [id number]

## 2022-03-09 ENCOUNTER — HOSPITAL ENCOUNTER (OUTPATIENT)
Dept: LAB | Age: 57
Discharge: HOME | End: 2022-03-09
Payer: MEDICARE

## 2022-03-09 LAB
ALBUMIN SERPL-MCNC: 3.5 G/DL (ref 3.5–4.7)
ALBUMIN/GLOB SERPL: 1.2 {RATIO}
ALP SERPL-CCNC: 81 U/L (ref 38–126)
ALT SERPL-CCNC: 29 U/L (ref 3–72)
ANION GAP SERPL CALC-SCNC: 10 MMOL/L
AST SERPL W P-5'-P-CCNC: 24 U/L (ref 17–74)
BASOPHILS # BLD: 0.1 K/UL (ref 0–0.1)
BASOPHILS NFR BLD: 1 % (ref 0–2)
BILIRUB SERPL-MCNC: 0.6 MG/DL (ref 0.2–1)
BUN SERPL-MCNC: 32 MG/DL (ref 9–21)
BUN/CREAT SERPL: 21
CA-I BLD-MCNC: 8.5 MG/DL (ref 8.5–10.5)
CHLORIDE SERPL-SCNC: 104 MMOL/L (ref 94–111)
CO2 SERPL-SCNC: 26 MMOL/L (ref 21–33)
CREAT SERPL-MCNC: 1.5 MG/DL (ref 0.8–1.5)
DIFFERENTIAL METHOD BLD: ABNORMAL
EOSINOPHIL # BLD: 0.1 K/UL (ref 0–0.4)
EOSINOPHIL NFR BLD: 1 % (ref 0–5)
ERYTHROCYTE [DISTWIDTH] IN BLOOD BY AUTOMATED COUNT: 14.1 % (ref 11.6–14.5)
GLOBULIN SER CALC-MCNC: 3 G/DL
GLUCOSE SERPL-MCNC: 305 MG/DL (ref 70–110)
HCT VFR BLD AUTO: 32.3 % (ref 36–48)
HGB BLD-MCNC: 9.9 G/DL (ref 13–16)
IMM GRANULOCYTES # BLD AUTO: 0.1 K/UL (ref 0–0.04)
IMM GRANULOCYTES NFR BLD AUTO: 0 % (ref 0–0.5)
LYMPHOCYTES # BLD: 1.1 K/UL (ref 0.9–3.6)
LYMPHOCYTES NFR BLD: 8 % (ref 21–52)
MCH RBC QN AUTO: 29.5 PG (ref 24–34)
MCHC RBC AUTO-ENTMCNC: 30.7 G/DL (ref 31–37)
MCV RBC AUTO: 96.1 FL (ref 78–100)
MONOCYTES # BLD: 0.8 K/UL (ref 0.05–1.2)
MONOCYTES NFR BLD: 6 % (ref 3–10)
NEUTS SEG # BLD: 11.4 K/UL (ref 1.8–8)
NEUTS SEG NFR BLD: 84 % (ref 40–73)
NRBC # BLD: 0 K/UL (ref 0–0.01)
NRBC BLD-RTO: 0 PER 100 WBC
PLATELET # BLD AUTO: 212 K/UL (ref 135–420)
PMV BLD AUTO: 11.9 FL (ref 9.2–11.8)
POTASSIUM SERPL-SCNC: 5 MMOL/L (ref 3.2–5.1)
PROT SERPL-MCNC: 6.5 G/DL (ref 6.1–8.4)
RBC # BLD AUTO: 3.36 M/UL (ref 4.35–5.65)
SODIUM SERPL-SCNC: 140 MMOL/L (ref 135–145)
WBC # BLD AUTO: 13.5 K/UL (ref 4.6–13.2)

## 2022-03-09 PROCEDURE — 36415 COLL VENOUS BLD VENIPUNCTURE: CPT

## 2022-03-09 PROCEDURE — 80053 COMPREHEN METABOLIC PANEL: CPT

## 2022-03-09 PROCEDURE — 85025 COMPLETE CBC W/AUTO DIFF WBC: CPT

## 2022-03-11 ENCOUNTER — VIRTUAL VISIT (OUTPATIENT)
Dept: FAMILY MEDICINE CLINIC | Age: 57
End: 2022-03-11
Payer: MEDICARE

## 2022-03-11 DIAGNOSIS — E11.42 DIABETIC PERIPHERAL NEUROPATHY (HCC): ICD-10-CM

## 2022-03-11 DIAGNOSIS — Z86.73 HISTORY OF CVA (CEREBROVASCULAR ACCIDENT): ICD-10-CM

## 2022-03-11 DIAGNOSIS — E11.65 TYPE 2 DIABETES MELLITUS WITH HYPERGLYCEMIA, WITH LONG-TERM CURRENT USE OF INSULIN (HCC): Primary | ICD-10-CM

## 2022-03-11 DIAGNOSIS — Z91.199 POOR COMPLIANCE: ICD-10-CM

## 2022-03-11 DIAGNOSIS — I10 ESSENTIAL HYPERTENSION: ICD-10-CM

## 2022-03-11 DIAGNOSIS — Z79.4 TYPE 2 DIABETES MELLITUS WITH HYPERGLYCEMIA, WITH LONG-TERM CURRENT USE OF INSULIN (HCC): Primary | ICD-10-CM

## 2022-03-11 PROCEDURE — G8432 DEP SCR NOT DOC, RNG: HCPCS | Performed by: NURSE PRACTITIONER

## 2022-03-11 PROCEDURE — 99308 SBSQ NF CARE LOW MDM 20: CPT | Performed by: NURSE PRACTITIONER

## 2022-03-11 RX ORDER — METOPROLOL TARTRATE 25 MG/1
0.5 TABLET, FILM COATED ORAL DAILY
COMMUNITY
Start: 2022-02-23 | End: 2022-03-11

## 2022-03-11 RX ORDER — GABAPENTIN 100 MG/1
2 CAPSULE ORAL 3 TIMES DAILY
COMMUNITY
Start: 2022-02-07

## 2022-03-18 PROBLEM — E10.10 DKA, TYPE 1 (HCC): Status: ACTIVE | Noted: 2021-12-24

## 2022-03-18 PROBLEM — E86.0 DEHYDRATION: Status: ACTIVE | Noted: 2021-12-24

## 2022-03-18 PROBLEM — E43 SEVERE PROTEIN-CALORIE MALNUTRITION (HCC): Status: ACTIVE | Noted: 2021-12-29

## 2022-03-18 PROBLEM — R19.7 DIARRHEA: Status: ACTIVE | Noted: 2022-01-29

## 2022-03-19 PROBLEM — N17.9 ACUTE KIDNEY FAILURE (HCC): Status: ACTIVE | Noted: 2022-01-29

## 2022-03-19 PROBLEM — F41.9 CHRONIC ANXIETY: Status: ACTIVE | Noted: 2020-10-21

## 2022-03-19 PROBLEM — K31.84 GASTROPARESIS: Status: ACTIVE | Noted: 2020-10-21

## 2022-03-19 PROBLEM — E11.42 DIABETIC PERIPHERAL NEUROPATHY (HCC): Status: ACTIVE | Noted: 2020-10-21

## 2022-03-19 PROBLEM — D63.8 ANEMIA OF CHRONIC DISEASE: Status: ACTIVE | Noted: 2020-10-21

## 2022-03-19 PROBLEM — E11.10 DKA (DIABETIC KETOACIDOSIS) (HCC): Status: ACTIVE | Noted: 2021-12-24

## 2022-03-19 PROBLEM — R77.8 ELEVATED TROPONIN: Status: ACTIVE | Noted: 2021-12-25

## 2022-03-19 PROBLEM — Z86.39 HISTORY OF DIABETES MELLITUS: Status: ACTIVE | Noted: 2021-12-24

## 2022-03-20 PROBLEM — N17.9 AKI (ACUTE KIDNEY INJURY) (HCC): Status: ACTIVE | Noted: 2021-12-24

## 2022-03-20 PROBLEM — E10.9 TYPE 1 DIABETES MELLITUS (HCC): Status: ACTIVE | Noted: 2020-10-21

## 2022-03-20 PROBLEM — J18.9 PNA (PNEUMONIA): Status: ACTIVE | Noted: 2021-12-24

## 2022-03-20 PROBLEM — I10 BENIGN HYPERTENSION: Status: ACTIVE | Noted: 2020-10-21

## 2022-03-20 PROBLEM — R55 SYNCOPE AND COLLAPSE: Status: ACTIVE | Noted: 2022-01-16

## 2022-03-20 PROBLEM — G47.00 INSOMNIA: Status: ACTIVE | Noted: 2020-10-21

## 2022-03-22 NOTE — PROGRESS NOTES
Slime Bahena is a 49 y.o.male who resides at [facility] and presents today via doxy. me video accompanied by NADJA Gordon RN for monthly face-to-face visit. The patient  has a medical history significant for   Past Medical History:   Diagnosis Date    Depression     Diabetes ([facility]     GERD (gastroesophageal reflux disease)     Hypercholesterolemia     Hypertension     Seizures ([facility]     Stroke [facility])    Patient has longstanding history of poor compliance often times refusing his insulin. He does not work to improve his dietary and lifestyle modifications as he consumes large quantities of sweets per nursing home staff. Patient's average blood sugar in review of report today is roughly 400. Of note he has not had a recent A1c I will order that today. Subjective:           Past Medical History:   Diagnosis Date    Depression     Diabetes ([facility]     GERD (gastroesophageal reflux disease)     Hypercholesterolemia     Hypertension     Seizures (HCC)     Stroke [facility])      Past Surgical History:   Procedure Laterality Date    HX ORTHOPAEDIC Left 08/30/2018    hip joint    HX ORTHOPAEDIC Right     broken knee     Social History     Socioeconomic History    Marital status:    Tobacco Use    Smoking status: Never Smoker    Smokeless tobacco: Current User    Tobacco comment: 5+/day   Vaping Use    Vaping Use: Never used   Substance and Sexual Activity    Alcohol use: Not Currently    Drug use: Never    Sexual activity: Not Currently     Partners: Female     Current Outpatient Medications   Medication Sig Dispense Refill    gabapentin (NEURONTIN) 100 mg capsule Take 2 Capsules by mouth three (3) times daily.  insulin glargine (Lantus U-100 Insulin) 100 unit/mL injection 15 Units by SubCUTAneous route nightly. Indications: type 2 diabetes mellitus 1 mL 5    acetaminophen (TylenoL) 325 mg tablet Take 650 mg by mouth every four (4) hours as needed for Pain or Fever.       dextrose 40% (Glutose-15) 40 % oral gel Take 1 Tube by mouth as needed for Hypoglycemia.  collagenase (SantyL) 250 unit/gram ointment Apply  to affected area daily. Clean left heel with wound cleanser. Apply to eschar at dressing change.  loperamide (IMODIUM) 2 mg capsule Take 4 mg by mouth every four (4) hours as needed for Diarrhea.  metoprolol succinate (TOPROL-XL) 25 mg XL tablet Take 0.5 Tablets by mouth daily. 60 Tablet 0    insulin aspart U-100 (NovoLOG Flexpen U-100 Insulin) 100 unit/mL (3 mL) inpn by SubCUTAneous route as needed. SL[facility]ING SC[facility]  100-160= 2 UNITS  161-220= 4 UNITS  221-280= 6 UNITS  281-340= 8 UNITS  341-400= 10 UNITS  401-460= 12 UNITS  >461= 14 UNITS AND CALL MD      rosuvastatin (CRESTOR) 10 mg tablet Take 1 Tablet by mouth nightly. 30 Tablet 5    aspirin delayed-release 81 mg tablet Take 1 Tablet by mouth daily. 30 Tablet 5    gabapentin (NEURONTIN) 300 mg capsule Take 1 Capsule by mouth three (3) times daily. Max Daily Amount: 900 mg. 90 Capsule 1    cetirizine (ZYRTEC) 10 mg tablet TAKE 1 TABLET EVERY DAY BY ORAL ROUTE.  90 Tab 0     No Known Allergies  The patient has a family history of    REVIEW OF SYSTEMS  Review of Systems   Constitutional: Negative for fever. Respiratory: Negative for cough, shortness of breath and wheezing. Cardiovascular: Negative for chest pain, palpitations and leg swelling. Gastrointestinal: Negative for abdominal pain, constipation, diarrhea, nausea and vomiting. Genitourinary: Negative for dysuria, frequency and urgency. Musculoskeletal: Negative for falls. Skin: Negative for rash. Neurological: Negative for tremors. Psychiatric/Behavioral: Negative for depression, substance abuse and suicidal ideas. The patient is not nervous/anxious and does not have insomnia. Objective: There were no vitals taken for this visit.     Current Outpatient Medications   Medication Instructions    acetaminophen (TYLENOL) 650 mg, Oral, EVERY 4 HOURS AS NEEDED    aspirin delayed-release 81 mg, Oral, DAILY    cetirizine (ZYRTEC) 10 mg tablet TAKE 1 TABLET EVERY DAY BY ORAL ROUTE.  collagenase (SantyL) 250 unit/gram ointment Topical, DAILY, Clean left heel with wound cleanser. Apply to eschar at dressing change.  dextrose 40% (Glutose-15) 40 % oral gel 1 Tube, Oral, AS NEEDED    gabapentin (NEURONTIN) 100 mg capsule 2 Capsules, Oral, 3 TIMES DAILY    gabapentin (NEURONTIN) 300 mg, Oral, 3 TIMES DAILY    insulin aspart U-100 (NovoLOG Flexpen U-100 Insulin) 100 unit/mL (3 mL) inpn SubCUTAneous, AS NEEDED, SLIDING SCALE INSTRUCTIONS FROM [facility] [id number]= 2 [id number]= 4 [id number]= 6 [id number]= 8 [id number]= 10 [id number]= 12 UNITS>461= 14 UNITS AND CALL MD    insulin glargine (LANTUS U-100 INSULIN) 15 Units, SubCUTAneous, EVERY BEDTIME    loperamide (IMODIUM) 4 mg, Oral, EVERY 4 HOURS AS NEEDED    metoprolol succinate (TOPROL-XL) 12.5 mg, Oral, DAILY    rosuvastatin (CRESTOR) 10 mg, Oral, EVERY BEDTIME        PHYSICAL EXAM  Physical Exam  Constitutional:       General: He is not in acute distress. Appearance: He is not ill-appearing. HENT:      Head: Normocephalic and atraumatic. Right Ear: External ear normal.      Left Ear: External ear normal.   Eyes:      General: No scleral icterus. Right eye: No discharge. Left eye: No discharge. Pulmonary:      Effort: Pulmonary effort is normal. No respiratory distress. Musculoskeletal:      Right lower leg: No edema. Left lower leg: No edema. Skin:     Findings: No erythema or rash. Neurological:      Mental Status: He is alert. Mental status is at baseline. Assessment/Plan:     Diagnoses and all orders for this visit:    1. Type 2 diabetes mellitus with hyperglycemia, with long-term current use of insulin (HCC)  -     HEMOGLOBIN A1C W/O EAG    2.  Poor compliance  -     HEMOGLOBIN A1C W/O EAG    3. Diabetic peripheral neuropathy ([facility]    4. Essential hypertension    5. History of CVA (cerebrovascular accident)        Follow-up and Dispositions    · Return in about 1 month (around 4/11/2022). Ez Franco, who was evaluated through a synchronous (real-time) audio-video encounter, and/or his healthcare decision maker, is aware that it is a billable service, with coverage as determined by his insurance carrier. He provided verbal consent to proceed: Yes, and patient identification was verified. It was conducted pursuant to the emergency declaration under the [facility], [address] [place] [address] authority and the [employer] and [employer] General Act. A caregiver was present when appropriate. Ability to conduct physical exam was limited. I was in the office. The patient was at home. The patient continues to need long-term care secondary to the inability to care for self. Disclaimer:    I have discussed the diagnosis with the patient and/or their caretaker and the intended plan as seen above. The patient and/or their caregiver understands our medical plan. The risks, benefits and significant side effects of all medications have been reviewed. Anticipated time course and progression of condition reviewed. All questions have been addressed.           Germania Traore NP

## 2022-04-22 ENCOUNTER — HOSPITAL ENCOUNTER (OUTPATIENT)
Dept: LAB | Age: 57
Discharge: HOME | End: 2022-04-22
Payer: MEDICARE

## 2022-04-22 LAB
ANION GAP SERPL CALC-SCNC: 11 MMOL/L
BUN SERPL-MCNC: 36 MG/DL (ref 9–21)
BUN/CREAT SERPL: 21
CA-I BLD-MCNC: 9 MG/DL (ref 8.5–10.5)
CHLORIDE SERPL-SCNC: 103 MMOL/L (ref 94–111)
CO2 SERPL-SCNC: 25 MMOL/L (ref 21–33)
CREAT SERPL-MCNC: 1.7 MG/DL (ref 0.8–1.5)
GLUCOSE SERPL-MCNC: 369 MG/DL (ref 70–110)
POTASSIUM SERPL-SCNC: 5.1 MMOL/L (ref 3.2–5.1)
SODIUM SERPL-SCNC: 139 MMOL/L (ref 135–145)

## 2022-04-22 PROCEDURE — 36415 COLL VENOUS BLD VENIPUNCTURE: CPT

## 2022-04-22 PROCEDURE — 80048 BASIC METABOLIC PNL TOTAL CA: CPT

## 2022-05-03 ENCOUNTER — HOSPITAL ENCOUNTER (OUTPATIENT)
Dept: LAB | Age: 57
Discharge: HOME | End: 2022-05-03
Payer: MEDICARE

## 2022-05-03 LAB
EST. AVERAGE GLUCOSE BLD GHB EST-MCNC: 214 MG/DL
FOLATE SERPL-MCNC: 14.1 NG/ML (ref 3.1–17.5)
HBA1C MFR BLD: 9.1 % (ref 4.2–5.6)
VIT B12 SERPL-MCNC: 798 PG/ML (ref 211–911)

## 2022-05-03 PROCEDURE — 82607 VITAMIN B-12: CPT

## 2022-05-03 PROCEDURE — 83036 HEMOGLOBIN GLYCOSYLATED A1C: CPT

## 2022-05-03 PROCEDURE — 36415 COLL VENOUS BLD VENIPUNCTURE: CPT

## 2022-05-18 ENCOUNTER — TRANSCRIBE ORDER (OUTPATIENT)
Dept: SCHEDULING | Age: 57
End: 2022-05-18

## 2022-05-18 DIAGNOSIS — I83.224 VARICOSE VEINS OF LEFT LOWER EXTREMITY WITH BOTH ULCER OF HEEL AND MIDFOOT AND INFLAMMATION (HCC): ICD-10-CM

## 2022-05-18 DIAGNOSIS — L97.429 VARICOSE VEINS OF LEFT LOWER EXTREMITY WITH BOTH ULCER OF HEEL AND MIDFOOT AND INFLAMMATION (HCC): ICD-10-CM

## 2022-05-18 DIAGNOSIS — I73.9 PERIPHERAL VASCULAR DISEASE, UNSPECIFIED (HCC): Primary | ICD-10-CM

## 2022-05-26 ENCOUNTER — TRANSCRIBE ORDER (OUTPATIENT)
Dept: SCHEDULING | Age: 57
End: 2022-05-26

## 2022-05-27 ENCOUNTER — HOSPITAL ENCOUNTER (OUTPATIENT)
Dept: VASCULAR SURGERY | Age: 57
Discharge: HOME | End: 2022-05-27
Attending: INTERNAL MEDICINE
Payer: MEDICARE

## 2022-05-27 DIAGNOSIS — L97.429 VARICOSE VEINS OF LEFT LOWER EXTREMITY WITH BOTH ULCER OF HEEL AND MIDFOOT AND INFLAMMATION (HCC): ICD-10-CM

## 2022-05-27 DIAGNOSIS — I73.9 PERIPHERAL VASCULAR DISEASE, UNSPECIFIED (HCC): ICD-10-CM

## 2022-05-27 DIAGNOSIS — I83.224 VARICOSE VEINS OF LEFT LOWER EXTREMITY WITH BOTH ULCER OF HEEL AND MIDFOOT AND INFLAMMATION (HCC): ICD-10-CM

## 2022-05-27 PROCEDURE — 93926 LOWER EXTREMITY STUDY: CPT

## 2022-05-31 LAB
LEFT ARM BP: 114 MMHG
LEFT CFA DIST SYS PSV: 50 CM/S
LEFT CFA MID SYS PSV: 81 CM/S
LEFT CFA PROX SYS PSV: 98 CM/S
LEFT DIST ATA VELOCITY: 144 CM/S
LEFT MID ATA VELOCITY: 119 CM/S
LEFT POP A DIST VEL RATIO: 0.9
LEFT POP A MID VEL RATIO: 0.9
LEFT POP A PROX VEL RATIO: 1.82
LEFT POPLITEAL DIST SYS PSV: 74 CM/S
LEFT POPLITEAL MID SYS PSV: 82 CM/S
LEFT POPLITEAL PROX SYS PSV: 91 CM/S
LEFT PROX ATA VELOCITY: 343 CM/S
LEFT PROX PFA A PSV: 78 CM/S
LEFT PROX PTA PSV: 77 CM/S
LEFT PTA MID SYS PSV: 45 CM/S
LEFT SFA DIST VEL RATIO: 0.86
LEFT SFA MID VEL RATIO: 0.4
LEFT SFA PROX VEL RATIO: 1.48
LEFT SUPER FEMORAL DIST SYS PSV: 50 CM/S
LEFT SUPER FEMORAL MID SYS PSV: 58 CM/S
LEFT SUPER FEMORAL PROX SYS PSV: 145 CM/S
LEFT TBI: 0.62
LEFT TOE PRESSURE: 72 MMHG
RIGHT ARM BP: 116 MMHG
RIGHT TBI: 0.51
RIGHT TOE PRESSURE: 59 MMHG
VAS LEFT DORSALIS PEDIS BP: >255 MMHG
VAS RIGHT DORSALIS PEDIS BP: >255 MMHG

## 2022-06-29 ENCOUNTER — OFFICE VISIT (OUTPATIENT)
Dept: INTERNAL MEDICINE CLINIC | Age: 57
End: 2022-06-29
Payer: MEDICARE

## 2022-06-29 DIAGNOSIS — Z91.81 AT HIGH RISK FOR FALLS: ICD-10-CM

## 2022-06-29 DIAGNOSIS — R53.81 PHYSICAL DECONDITIONING: ICD-10-CM

## 2022-06-29 DIAGNOSIS — Z78.9 IMPAIRED MOBILITY AND ADLS: ICD-10-CM

## 2022-06-29 DIAGNOSIS — Z91.199 NONCOMPLIANCE BY REFUSING INTERVENTION OR SUPPORT: ICD-10-CM

## 2022-06-29 DIAGNOSIS — G93.49 ENCEPHALOPATHY CHRONIC: Primary | ICD-10-CM

## 2022-06-29 DIAGNOSIS — E43 SEVERE PROTEIN-CALORIE MALNUTRITION (HCC): ICD-10-CM

## 2022-06-29 DIAGNOSIS — Z74.09 IMPAIRED MOBILITY AND ADLS: ICD-10-CM

## 2022-06-29 PROCEDURE — 99213 OFFICE O/P EST LOW 20 MIN: CPT | Performed by: FAMILY MEDICINE

## 2022-06-29 RX ORDER — MAGNESIUM SULFATE 100 %
4 CRYSTALS MISCELLANEOUS AS NEEDED
COMMUNITY

## 2022-06-29 RX ORDER — INSULIN LISPRO 100 [IU]/ML
INJECTION, SOLUTION INTRAVENOUS; SUBCUTANEOUS
COMMUNITY

## 2022-07-01 ENCOUNTER — OFFICE VISIT (OUTPATIENT)
Dept: FAMILY MEDICINE CLINIC | Age: 57
End: 2022-07-01
Payer: MEDICARE

## 2022-07-01 DIAGNOSIS — E10.10 TYPE 1 DIABETES MELLITUS WITH KETOACIDOSIS WITHOUT COMA (HCC): ICD-10-CM

## 2022-07-01 DIAGNOSIS — E11.42 DIABETIC PERIPHERAL NEUROPATHY (HCC): ICD-10-CM

## 2022-07-01 DIAGNOSIS — Z86.73 HISTORY OF CVA (CEREBROVASCULAR ACCIDENT): ICD-10-CM

## 2022-07-01 DIAGNOSIS — I10 ESSENTIAL HYPERTENSION: Primary | ICD-10-CM

## 2022-07-01 DIAGNOSIS — E78.2 MIXED HYPERLIPIDEMIA: ICD-10-CM

## 2022-07-01 PROCEDURE — 99308 SBSQ NF CARE LOW MDM 20: CPT | Performed by: NURSE PRACTITIONER

## 2022-07-01 PROCEDURE — G8432 DEP SCR NOT DOC, RNG: HCPCS | Performed by: NURSE PRACTITIONER

## 2022-07-01 PROCEDURE — 3046F HEMOGLOBIN A1C LEVEL >9.0%: CPT | Performed by: NURSE PRACTITIONER

## 2022-07-05 VITALS
TEMPERATURE: 99.1 F | DIASTOLIC BLOOD PRESSURE: 50 MMHG | OXYGEN SATURATION: 97 % | HEART RATE: 61 BPM | SYSTOLIC BLOOD PRESSURE: 99 MMHG | RESPIRATION RATE: 20 BRPM

## 2022-07-05 NOTE — PROGRESS NOTES
Abran Garcia (: 1965) is a 49 y.o. male seen at [facility] on 22 for the following chief concerns(s):  Chronic condition management    ASSESSMENT/PLAN:  1. Encephalopathy chronic  2. At high risk for falls  3. Severe protein-calorie malnutrition ([address]  4. Impaired mobility and ADLs  5. Physical deconditioning  6. Noncompliance by refusing intervention or support    Throughout his interview, current became highly agitated stating that he would not except any support from nursing staff, also that he would not comply with recommendations for improved safety while he was visiting his home, especially the fact that he would be driving was concerning to me. When I discussed with him that this is both a medical order and was illegal and that given his history of a seizure I am a mandated  to the [facility], he threatened that he would have \"a crazy person\" come to my home if I reported him and that he may himself come to my home in this case. I advised that that is highly inappropriate for him to threaten me and that at this point I was going to excuse myself from the interview, also that I would not be signing his discharge as I did not think that he has a safe plan in place therefore, he would be leaving  [address] \"if he so chose as he appears to have medical capacity; while he was not fully oriented to time, in my medical opinion he understands his chronic medical conditions as well as risks with going home- including but not limited to falls which she could not get up from the floor that could result in serious injury or death. Given some of the statements that he made regarding goals of care/advance care planning, I advised that additional staff take time to further clarify his CODE STATUS as it sounds like he may want for DNR and his father supported his choice at the time though this may have been an emotionally charged response.     +ADDENDUM; update from administration, the  was called and updated on the current situation, advice was given that I could file with the Police Department if I wanted for restraining order which I do not at this time. Administration called [employer] and the local ombudsman and left a voice mail for both. +ADDENDUM; from nursing that patient has decided he will not be leaving [facility] at this time. I have signed orders from his endocrinology nurse practitioner for therapy services, and I will continue to serve as Mr. Morales's PCP if he agrees to not threaten me again in the future if we have a disagreement over his treatment plan otherwise, we will plan to transition his care to Dr. Eugenio Olguin. SUBJECTIVE/OBJECTIVE:  I was asked by  administration to speak with Jamari Soni about his request to leave [facility]. Staff at  unanimously feel that this is unsafe and that he should remain under 24-hour nursing care. Patient had been adamant that he was going to leave, so there was a care plan meeting and negotiation for a brief \"respite\"/vaaion trial of 10 days with supplying his necessary medications to see if he is able to care for himself at home, with plans that he return to  thereafter. On review of neurology notes by nurse practitioner Edmon Apgar, she clearly states that while his encephalopathy is improving she has safety concerns for him to return home, especially fall prevention, she recommended that he not return home unless he has daily assistance to help with his meals/medication management, that he will use his walker \"with every step\", and that in capital letters \"he cannot drive\". Iggy's father was present for his interview today, and assist with HPI. When talking with her, he states that he is leaving tomorrow regardless of what staff recommends, he is adamant about this stating that he is only 50years of age\" needs to get my life together\".   He is able to state that he has serious health conditions including diabetes which she describes as \"severe, fluctuates\" and that he uses a pump to help maintain control as well as a sensor on his arm for continuous blood glucose monitoring, he states that goal blood sugar should be approximately 1 20-1 70, that in the past he has had episodes where his blood sugars were greater than 1000 resulting in him passing out although he is not sure of how this happened and states that physicians are not sure of how this happened either. He did not recall history of fall resulting in hematoma and lengthy hospitalization for treatment. He states that he prefers his wheelchair for mobilization but is very confident that he can walk and mobilize short distances approximately 100 feet without using his walker, that he may need a cane for some gait assistance. There is about general goals of care that he has \"made peace with God \"and \"I am ready to go \"when his time comes, that his body has been through enough and that while he does not want to die that he also would not want any forms of artificial life support to keep him alive if his health deteriorated to points that it has in the past.  We reviewed neurology note, patient was adamant that he did not want any care assistance, no home health nurses or physical therapy occupational therapy as he did not want people in his home, he clearly stated that he would absolutely not be using his walker at all times and in regards to his driving he states that he will drive at his will regardless of not having a license and that he has been recommended by neurology to not drive, he states that he will be careful and has places to go, he is aware that this is against the law and that he will not be caught unless someone turns him in and references his father.       Past Medical History:   Diagnosis Date    Depression     Diabetes ([facility]     GERD (gastroesophageal reflux disease)     Hypercholesterolemia     Hypertension     [facility]     Stroke ([facility] Past Surgical History:   Procedure Laterality Date    HX ORTHOPAEDIC Left 08/30/2018    hip joint    HX ORTHOPAEDIC Right     broken knee     Family History   Problem Relation Age of Onset    Cancer Father         prostate    Cancer Maternal Uncle         prostate    Cancer Paternal Uncle         prostate       Social History     Socioeconomic History    Marital status:      Spouse name: Not on file    Number of children: Not on file    Years of education: Not on file    Highest education level: Not on file   Occupational History    Not on file   Tobacco Use    Smoking status: Never Smoker    Smokeless tobacco: Current User    Tobacco comment: 5+/day   Vaping Use    Vaping Use: Never used   Substance and Sexual Activity    Alcohol use: Not Currently    Drug use: Never    Sexual activity: Not Currently     Partners: Female   Other Topics Concern    Not on file   Social History Narrative    Not on file     Social Determinants of Health     Financial Resource Strain:     Difficulty of Paying Living Expenses: Not on file   Food Insecurity:     Worried About Running Out of Food in the Last Year: Not on file    Kristin of Food in the Last Year: Not on file   Transportation Needs:     Lack of Transportation (Medical): Not on file    Lack of Transportation (Non-Medical):  Not on file   Physical Activity:     Days of Exercise per Week: Not on file    Minutes of Exercise per Session: Not on file   Stress:     Feeling of Stress : Not on file   Social Connections:     Frequency of Communication with Friends and Family: Not on file    Frequency of Social Gatherings with Friends and Family: Not on file    Attends Adventism Services: Not on file    Active Member of Clubs or Organizations: Not on file    Attends Club or Organization Meetings: Not on file    Marital Status: Not on file   Intimate Partner Violence:     Fear of Current or Ex-Partner: Not on file    Emotionally Abused: Not on file    Physically Abused: Not on file    Sexually Abused: Not on file   Housing Stability:     Unable to Pay for Housing in the Last Year: Not on file    Number of Places Lived in the Last Year: Not on file    Unstable Housing in the Last Year: Not on file     Social History     Tobacco Use   Smoking Status Never Smoker   Smokeless Tobacco Current User   Tobacco Comment    5+/day       Current Outpatient Medications   Medication Sig Dispense Refill    SITagliptin (Januvia) 25 mg tablet Take 25 mg by mouth daily.  insulin lispro (HumaLOG U-100 Insulin) 100 unit/mL cartridge by SubCUTAneous route. Use via insulin pump at basal rate of 0.550 units per hour      glucose (TRUEplus Glucose) 4 gram chewable tablet Take 4 Tablets by mouth as needed. For blood sugar less than 55      gabapentin (NEURONTIN) 100 mg capsule Take 2 Capsules by mouth three (3) times daily.  acetaminophen (TylenoL) 325 mg tablet Take 650 mg by mouth every four (4) hours as needed for Pain or Fever.  loperamide (IMODIUM) 2 mg capsule Take 4 mg by mouth every four (4) hours as needed for Diarrhea.  metoprolol succinate (TOPROL-XL) 25 mg XL tablet Take 0.5 Tablets by mouth daily. 60 Tablet 0    insulin aspart U-100 (NovoLOG Flexpen U-100 Insulin) 100 unit/mL (3 mL) inpn by SubCUTAneous route as needed. [facility]ING [facility]CTIONS [facility]  250-310= 2 units  311-370= 4 units  371-430= 6 units  431-490= 8 units  491-550= 10 units  551-610= 12 units      rosuvastatin (CRESTOR) 10 mg tablet Take 1 Tablet by mouth nightly. 30 Tablet 5    aspirin delayed-release 81 mg tablet Take 1 Tablet by mouth daily. 30 Tablet 5    gabapentin (NEURONTIN) 300 mg capsule Take 1 Capsule by mouth three (3) times daily. Max Daily Amount: 900 mg.  (Patient taking differently: Take 600 mg by mouth three (3) times daily.) 90 Capsule 1    cetirizine (ZYRTEC) 10 mg tablet TAKE 1 TABLET EVERY DAY BY ORAL ROUTE.  90 Tab 0     No Known Allergies    T: 97.7, HR: 80, BP: 110/45, RR: 18, O2: 97% on RA. Physical Exam  Constitutional:       General: He is not in acute distress. Appearance: Normal appearance. He is ill-appearing (chronically). He is not toxic-appearing. Comments: Frail, cachectic appearing. Cardiovascular:      Rate and Rhythm: Normal rate and regular rhythm. Heart sounds: Normal heart sounds. Pulmonary:      Effort: No respiratory distress. Breath sounds: Normal breath sounds. Abdominal:      General: Bowel sounds are normal.      Palpations: Abdomen is soft. Tenderness: There is no abdominal tenderness. Skin:     General: Skin is warm and dry. Neurological:      Mental Status: He is alert. He is disoriented. Gait: Gait abnormal.      Comments: Patient;  Month: July, day: \"I do not care\", ER: 2022. Psychiatric:      Comments: Labile affect, cooperative-agitated and verbally abusive. Lab Results   Component Value Date/Time    WBC 13.5 (H) 03/09/2022 08:47 PM    HGB 9.9 (L) 03/09/2022 08:47 PM    HCT 32.3 (L) 03/09/2022 08:47 PM    PLATELET 261 39/55/5911 08:47 PM    MCV 96.1 03/09/2022 08:47 PM     Lab Results   Component Value Date/Time    Sodium 139 04/22/2022 07:25 AM    Potassium 5.1 04/22/2022 07:25 AM    Chloride 103 04/22/2022 07:25 AM    CO2 25 04/22/2022 07:25 AM    Anion gap 11 04/22/2022 07:25 AM    Glucose 369 (H) 04/22/2022 07:25 AM    BUN 36 (H) 04/22/2022 07:25 AM    Creatinine 1.70 (H) 04/22/2022 07:25 AM    BUN/Creatinine ratio 21 04/22/2022 07:25 AM    GFR est AA 51 04/22/2022 07:25 AM    GFR est non-AA 42 04/22/2022 07:25 AM    Calcium 9.0 04/22/2022 07:25 AM    Bilirubin, total 0.6 03/09/2022 08:47 PM    Alk.  phosphatase 81 03/09/2022 08:47 PM    Protein, total 6.5 03/09/2022 08:47 PM    Albumin 3.5 03/09/2022 08:47 PM    Globulin 3.0 03/09/2022 08:47 PM    A-G Ratio 1.2 03/09/2022 08:47 PM    ALT (SGPT) 29 03/09/2022 08:47 PM    AST (SGOT) 24 03/09/2022 08:47 PM Lab Results   Component Value Date/Time    Cholesterol, total 95 12/27/2021 03:46 AM    HDL Cholesterol 47 12/27/2021 03:46 AM    LDL-C, External 74 02/26/2021 12:00 AM    LDL, calculated 38.2 12/27/2021 03:46 AM    VLDL, calculated 9.8 12/27/2021 03:46 AM    Triglyceride 49 12/27/2021 03:46 AM    CHOL/HDL Ratio 2.0 12/27/2021 03:46 AM       On this date 07/05/22 I have spent >45 minutes reviewing previous notes, test results and face to face with the patient for interview/exam, discussing working diagnosis and treatment plan as well as care coordination with administration, nursing supervisor, staff. Medical decision making complexity: moderate-high.     Bethany Ivy MD   Family & Geriatric Medicine

## 2022-07-11 NOTE — PROGRESS NOTES
Annette Ayon is a 49 y.o.male who resides at [facility] and presents today in his home,  accompanied by NADJA Al RN for monthly face-to-face visit. The patient  has a medical history significant for   Past Medical History:   Diagnosis Date    Depression     Diabetes ([facility]     GERD (gastroesophageal reflux disease)     Hypercholesterolemia     Hypertension     Seizures ([facility]     Stroke [facility])    Patient has longstanding history of poor compliance often times refusing his insulin. He does not work to improve his dietary and lifestyle modifications as he consumes large quantities of sweets per nursing home staff. Patient has history of type 1 diabetes. Blood sugars are labile and dependent upon his dietary habits which are poor. His most recent A1c May 2022 of 9.1%. Of note there is documentation that the patient is on Januvia 25 mg once daily. Subjective:           Past Medical History:   Diagnosis Date    Depression     Diabetes ([facility]     GERD (gastroesophageal reflux disease)     Hypercholesterolemia     Hypertension     Seizures (HCC)     Stroke [facility])      Past Surgical History:   Procedure Laterality Date    HX ORTHOPAEDIC Left 08/30/2018    hip joint    HX ORTHOPAEDIC Right     broken knee     Social History     Socioeconomic History    Marital status:    Tobacco Use    Smoking status: Never Smoker    Smokeless tobacco: Current User    Tobacco comment: 5+/day   Vaping Use    Vaping Use: Never used   Substance and Sexual Activity    Alcohol use: Not Currently    Drug use: Never    Sexual activity: Not Currently     Partners: Female     Current Outpatient Medications   Medication Sig Dispense Refill    insulin lispro (HumaLOG U-100 Insulin) 100 unit/mL cartridge by SubCUTAneous route. Use via insulin pump at basal rate of 0.550 units per hour      glucose (TRUEplus Glucose) 4 gram chewable tablet Take 4 Tablets by mouth as needed.  For blood sugar less than 55  gabapentin (NEURONTIN) 100 mg capsule Take 2 Capsules by mouth three (3) times daily.  acetaminophen (TylenoL) 325 mg tablet Take 650 mg by mouth every four (4) hours as needed for Pain or Fever.  loperamide (IMODIUM) 2 mg capsule Take 4 mg by mouth every four (4) hours as needed for Diarrhea.  metoprolol succinate (TOPROL-XL) 25 mg XL tablet Take 0.5 Tablets by mouth daily. 60 Tablet 0    insulin aspart U-100 (NovoLOG Flexpen U-100 Insulin) 100 unit/mL (3 mL) inpn by SubCUTAneous route as needed. [facility] SC[facility]  250-310= 2 units  311-370= 4 units  371-430= 6 units  431-490= 8 units  491-550= 10 units  551-610= 12 units      rosuvastatin (CRESTOR) 10 mg tablet Take 1 Tablet by mouth nightly. 30 Tablet 5    aspirin delayed-release 81 mg tablet Take 1 Tablet by mouth daily. 30 Tablet 5    gabapentin (NEURONTIN) 300 mg capsule Take 1 Capsule by mouth three (3) times daily. Max Daily Amount: 900 mg. (Patient taking differently: Take 600 mg by mouth three (3) times daily.) 90 Capsule 1    cetirizine (ZYRTEC) 10 mg tablet TAKE 1 TABLET EVERY DAY BY ORAL ROUTE.  90 Tab 0     No Known Allergies  The patient has a family history of    REVIEW OF SYSTEMS  Review of Systems   Constitutional: Negative for fever. Respiratory: Negative for cough, shortness of breath and wheezing. Cardiovascular: Negative for chest pain, palpitations and leg swelling. Gastrointestinal: Negative for abdominal pain, constipation, diarrhea, nausea and vomiting. Genitourinary: Negative for dysuria, frequency and urgency. Musculoskeletal: Negative for falls. Skin: Negative for rash. Neurological: Negative for tremors. Psychiatric/Behavioral: Negative for depression, substance abuse and suicidal ideas. The patient is not nervous/anxious and does not have insomnia.         Objective:     Visit Vitals  BP (!) 99/50 (BP Patient Position: Sitting, BP Cuff Size: Adult)   Pulse 61   Temp 99.1 °F (37.3 °C) (Temporal)   Resp 20   SpO2 97%       Current Outpatient Medications   Medication Instructions    acetaminophen (TYLENOL) 650 mg, Oral, EVERY 4 HOURS AS NEEDED    aspirin delayed-release 81 mg, Oral, DAILY    cetirizine (ZYRTEC) 10 mg tablet TAKE 1 TABLET EVERY DAY BY ORAL ROUTE.  gabapentin (NEURONTIN) 100 mg capsule 2 Capsules, Oral, 3 TIMES DAILY    gabapentin (NEURONTIN) 300 mg, Oral, 3 TIMES DAILY    glucose (TRUEplus Glucose) 4 gram chewable tablet 4 Tablets, Oral, AS NEEDED, For blood sugar less than 55     insulin aspart U-100 (NovoLOG Flexpen U-100 Insulin) 100 unit/mL (3 mL) inpn SubCUTAneous, AS NEEDED, SLIDING SCALE INSTRUCTIONS FROM [facility] [id number]= 2 [id number]= 4 [id number]= 6 [id number]= 8 [id number]= 10 [id number]= 12 units    insulin lispro (HumaLOG U-100 Insulin) 100 unit/mL cartridge SubCUTAneous, Use via insulin pump at basal rate of 0.550 units per hour     loperamide (IMODIUM) 4 mg, Oral, EVERY 4 HOURS AS NEEDED    metoprolol succinate (TOPROL-XL) 12.5 mg, Oral, DAILY    rosuvastatin (CRESTOR) 10 mg, Oral, EVERY BEDTIME        PHYSICAL EXAM  Physical Exam  Constitutional:       General: He is not in acute distress. Appearance: He is not ill-appearing. HENT:      Head: Normocephalic and atraumatic. Right Ear: External ear normal.      Left Ear: External ear normal.   Eyes:      General: No scleral icterus. Right eye: No discharge. Left eye: No discharge. Pulmonary:      Effort: Pulmonary effort is normal. No respiratory distress. Musculoskeletal:      Right lower leg: No edema. Left lower leg: No edema. Skin:     Findings: No erythema or rash. Neurological:      Mental Status: He is alert. Mental status is at baseline. Assessment/Plan:     Diagnoses and all orders for this visit:    1.  Essential hypertension  -     METABOLIC PANEL, COMPREHENSIVE  -Labs today any changes to current treatment contingent upon those findings    2. Diabetic peripheral neuropathy (HCC)  The current medical regimen is effective;  continue present plan and medications. 3. Mixed hyperlipidemia  -     LIPID PANEL  -Labs today any changes to current treatment contingent upon those findings    4. Type 1 diabetes mellitus with ketoacidosis without coma (HCC)  -     METABOLIC PANEL, COMPREHENSIVE  -     C-PEPTIDE  Repeating CMP and checking fasting a.m. C-peptide  I discontinued the Januvia as there is currently no indication for DPP 4 inhibitors in the setting of diabetes type 1. [address] [place] [address] staff is aware. 5. History of CVA (cerebrovascular accident)    6. Lives in nursing home        Follow-up and Dispositions    · Return in about 1 month (around 8/1/2022). The patient continues to need long-term care secondary to the inability to care for self. Disclaimer:    I have discussed the diagnosis with the patient and/or their caretaker and the intended plan as seen above. The patient and/or their caregiver understands our medical plan. The risks, benefits and significant side effects of all medications have been reviewed. Anticipated time course and progression of condition reviewed. All questions have been addressed.           Ángela Wells NP

## 2022-08-23 ENCOUNTER — TELEPHONE (OUTPATIENT)
Dept: INTERNAL MEDICINE CLINIC | Age: 57
End: 2022-08-23

## 2022-08-23 NOTE — TELEPHONE ENCOUNTER
Melanie Fisher at [address] called regarding needing a hard copy for Gabapentin, states 2 separate orders and 2 separate strengths. Per Dr Josselyn Campbell called Melanie Fisher back and she states that patient is taking 100 mg 2 tabs by mouth TID and 300 mg 2 capsules by mouth TID(patient is being given 2400 mg of Gabapentin per day).

## 2022-08-23 NOTE — TELEPHONE ENCOUNTER
Dr Domenic Campbell spoke to pharmacist at the hospital and gave verbal order for the Rx's to be filled to [facility](pharmacist) - C/w Hep SubQ    Dispo: will attempt TOV today. Creatinine seems to be improving. Continue to hydrate and hold acyclovir. No new lesions seen on exam. - C/w Hep SubQ    Dispo: Will attempt TOV today. Creatinine seems to be improving. Continue to hydrate and hold acyclovir. No new lesions seen on exam. Cognition still fluctuating.

## 2022-09-07 ENCOUNTER — OFFICE VISIT (OUTPATIENT)
Dept: INTERNAL MEDICINE CLINIC | Age: 57
End: 2022-09-07
Payer: MEDICARE

## 2022-09-07 DIAGNOSIS — E10.65 TYPE 1 DIABETES MELLITUS WITH HYPERGLYCEMIA (HCC): ICD-10-CM

## 2022-09-07 DIAGNOSIS — Z78.9 IMPAIRED MOBILITY AND ADLS: Primary | ICD-10-CM

## 2022-09-07 DIAGNOSIS — I10 HYPERTENSION, UNSPECIFIED TYPE: ICD-10-CM

## 2022-09-07 DIAGNOSIS — E43 SEVERE PROTEIN-CALORIE MALNUTRITION (HCC): ICD-10-CM

## 2022-09-07 DIAGNOSIS — Z74.09 IMPAIRED MOBILITY AND ADLS: Primary | ICD-10-CM

## 2022-09-07 DIAGNOSIS — G62.9 NEUROPATHY: ICD-10-CM

## 2022-09-07 PROCEDURE — 3046F HEMOGLOBIN A1C LEVEL >9.0%: CPT | Performed by: FAMILY MEDICINE

## 2022-09-07 PROCEDURE — 99309 SBSQ NF CARE MODERATE MDM 30: CPT | Performed by: FAMILY MEDICINE

## 2022-09-08 NOTE — PROGRESS NOTES
John Francis (: 1965) is a 49 y.o. male seen at [facility] on 22 for the following chief concern(s):  Chronic condition management    ASSESSMENT/PLAN:  1. Impaired mobility and ADLs  2. Type 1 diabetes mellitus with hyperglycemia (HCC)  3. Neuropathy  4. Hypertension, unspecified type  5. Severe protein-calorie malnutrition (HCC)    Overall, Shirley aMy is doing well. Nurse supervisor any myself poke directly w/ PT; plan for evaluation tomorrow. Diabetes is managed by NP Vinod Clancy. Will down titrate Gabapentin to 700mg TID. Low range blood pressure is asymptomatic, continue close monitoring. Continue nutrition support. Update labs: CBC, CMP, TSH, lipids      SUBJECTIVE/OBJECTIVE:  Per nursing, no acute concerns. Today, Shirley May states that he feels \"good\", he does not have any specific medical questions/concerns at this time. His primary health goal at this time is to [facility]". He would still like for PT evaluation and treatment. We reviewed his sugar log in brief, checks generally above goal w/ isolated low range reads- treatment deferred to NP [facility]. We discussed his Gabapentin dosing- 800mg TID; pt states hx helps w/ his neuropathy and previously was on 800mg BID. My office nurse and I called to pharmacy to confirm the dosing before last fill, he has been on this since 2022. Pt is interested in gentle de-escalation of Gabapentin dosing to help improve safety profile. We reviewed his low range BP today, he reports hx this can be variable, he denies dizziness. He likes his current diet, no dysphagia. Interval hx, 22;  I was asked by EP administration to speak with Shirley May about his request to leave [facility]. Staff at  unanimously feel that this is unsafe and that he should remain under 24-hour nursing care.   Initial plans for short-term leave, however, pt stated he would not comply w/ standard safety recommendations- including Neurology recs to \"he cannot drive\", so leave/discharge would have been against medical advise. Pt opted to remain at [facility]. He opted to maintain care under my service and offered nursing apologies for his previous behaviors/outburst.     Past Medical History:   Diagnosis Date    Depression     Diabetes ([facility]     GERD (gastroesophageal reflux disease)     Hypercholesterolemia     Hypertension     Seizures ([facility]     Stroke [facility])      Past Surgical History:   Procedure Laterality Date    HX ORTHOPAEDIC Left 08/30/2018    hip joint    HX ORTHOPAEDIC Right     broken knee     Family History   Problem Relation Age of Onset    Cancer Father         prostate    Cancer Maternal Uncle         prostate    Cancer Paternal Uncle         prostate       Social History     Socioeconomic History    Marital status:      Spouse name: Not on file    Number of children: Not on file    Years of education: Not on file    Highest education level: Not on file   Occupational History    Not on file   Tobacco Use    Smoking status: Never    Smokeless tobacco: Current    Tobacco comments:     5+/day   Vaping Use    Vaping Use: Never used   Substance and Sexual Activity    Alcohol use: Not Currently    Drug use: Never    Sexual activity: Not Currently     Partners: Female   Other Topics Concern    Not on file   Social History Narrative    Not on file     Social Determinants of Health     Financial Resource Strain: Not on file   Food Insecurity: Not on file   Transportation Needs: Not on file   Physical Activity: Not on file   Stress: Not on file   Social Connections: Not on file   Intimate Partner Violence: Not on file   Housing Stability: Not on file     Social History     Tobacco Use   Smoking Status Never   Smokeless Tobacco Current   Tobacco Comments    5+/day       Current Outpatient Medications   Medication Sig Dispense Refill    insulin lispro (HumaLOG U-100 Insulin) 100 unit/mL cartridge by SubCUTAneous route.  Use via insulin pump at basal rate of 0.550 units per hour      glucose (TRUEplus Glucose) 4 gram chewable tablet Take 4 Tablets by mouth as needed. For blood sugar less than 55      gabapentin (NEURONTIN) 100 mg capsule Take 2 Capsules by mouth three (3) times daily. acetaminophen (TylenoL) 325 mg tablet Take 650 mg by mouth every four (4) hours as needed for Pain or Fever. loperamide (IMODIUM) 2 mg capsule Take 4 mg by mouth every four (4) hours as needed for Diarrhea.      metoprolol succinate (TOPROL-XL) 25 mg XL tablet Take 0.5 Tablets by mouth daily. 60 Tablet 0    insulin aspart U-100 (NovoLOG Flexpen U-100 Insulin) 100 unit/mL (3 mL) inpn by SubCUTAneous route as needed. [facility]  250-310= 2 units  311-370= 4 units  371-430= 6 units  431-490= 8 units  491-550= 10 units  551-610= 12 units      rosuvastatin (CRESTOR) 10 mg tablet Take 1 Tablet by mouth nightly. 30 Tablet 5    aspirin delayed-release 81 mg tablet Take 1 Tablet by mouth daily. 30 Tablet 5    gabapentin (NEURONTIN) 300 mg capsule Take 1 Capsule by mouth three (3) times daily. Max Daily Amount: 900 mg. (Patient taking differently: Take 600 mg by mouth three (3) times daily.) 90 Capsule 1    cetirizine (ZYRTEC) 10 mg tablet TAKE 1 TABLET EVERY DAY BY ORAL ROUTE.  90 Tab 0     No Known Allergies    T: 98.2F, HR: 65, BP: 98/59, RR: 20, O2: 98% on RA. Physical Exam  Constitutional:       General: He is not in acute distress. Appearance: Normal appearance. He is not ill-appearing (chronically) or toxic-appearing. Comments: Frail, cachectic appearing. Seated in his bed. Cardiovascular:      Rate and Rhythm: Normal rate and regular rhythm. Heart sounds: Normal heart sounds. Pulmonary:      Effort: No respiratory distress. Breath sounds: Normal breath sounds. Abdominal:      General: Bowel sounds are normal.      Palpations: Abdomen is soft. Tenderness: There is no abdominal tenderness.    Skin: General: Skin is warm and dry. Neurological:      Mental Status: He is alert. Mental status is at baseline. Psychiatric:         Mood and Affect: Mood normal.      Comments: Calm, pleasant, cooperative. Lab Results   Component Value Date/Time    WBC 13.5 (H) 03/09/2022 08:47 PM    HGB 9.9 (L) 03/09/2022 08:47 PM    HCT 32.3 (L) 03/09/2022 08:47 PM    PLATELET 851 36/60/3097 08:47 PM    MCV 96.1 03/09/2022 08:47 PM     Lab Results   Component Value Date/Time    Sodium 139 04/22/2022 07:25 AM    Potassium 5.1 04/22/2022 07:25 AM    Chloride 103 04/22/2022 07:25 AM    CO2 25 04/22/2022 07:25 AM    Anion gap 11 04/22/2022 07:25 AM    Glucose 369 (H) 04/22/2022 07:25 AM    BUN 36 (H) 04/22/2022 07:25 AM    Creatinine 1.70 (H) 04/22/2022 07:25 AM    BUN/Creatinine ratio 21 04/22/2022 07:25 AM    GFR est AA 51 04/22/2022 07:25 AM    GFR est non-AA 42 04/22/2022 07:25 AM    Calcium 9.0 04/22/2022 07:25 AM    Bilirubin, total 0.6 03/09/2022 08:47 PM    Alk. phosphatase 81 03/09/2022 08:47 PM    Protein, total 6.5 03/09/2022 08:47 PM    Albumin 3.5 03/09/2022 08:47 PM    Globulin 3.0 03/09/2022 08:47 PM    A-G Ratio 1.2 03/09/2022 08:47 PM    ALT (SGPT) 29 03/09/2022 08:47 PM    AST (SGOT) 24 03/09/2022 08:47 PM     Lab Results   Component Value Date/Time    Cholesterol, total 95 12/27/2021 03:46 AM    HDL Cholesterol 47 12/27/2021 03:46 AM    LDL-C, External 74 02/26/2021 12:00 AM    LDL, calculated 38.2 12/27/2021 03:46 AM    VLDL, calculated 9.8 12/27/2021 03:46 AM    Triglyceride 49 12/27/2021 03:46 AM    CHOL/HDL Ratio 2.0 12/27/2021 03:46 AM       On this date 09/07/22 I have spent >25 minutes reviewing previous notes, test results and face to face with the patient for interview/exam, discussing working diagnosis and treatment plan as well as care coordination with nursing supervisor, staff. Medical decision making complexity: moderate-high.     Seferino Fernandes MD   Family & Geriatric Medicine

## 2022-10-10 ENCOUNTER — TELEPHONE (OUTPATIENT)
Dept: INTERNAL MEDICINE CLINIC | Age: 57
End: 2022-10-10

## 2022-10-10 NOTE — TELEPHONE ENCOUNTER
Jeri Crews from  called to report that around 12pm patient had finished with therapy and he fell to his knees. He is ok and didn't hurt himself. He was able to get up and walk. They did take his blood pressure and it was low (92/43) and they are monitoring it and it is going back up.

## 2022-11-09 ENCOUNTER — OFFICE VISIT (OUTPATIENT)
Dept: INTERNAL MEDICINE CLINIC | Age: 57
End: 2022-11-09

## 2022-11-09 DIAGNOSIS — Z78.9 IMPAIRED MOBILITY AND ADLS: ICD-10-CM

## 2022-11-09 DIAGNOSIS — E10.65 TYPE 1 DIABETES MELLITUS WITH HYPERGLYCEMIA (HCC): Primary | ICD-10-CM

## 2022-11-09 DIAGNOSIS — Z74.09 IMPAIRED MOBILITY AND ADLS: ICD-10-CM

## 2022-12-14 ENCOUNTER — OFFICE VISIT (OUTPATIENT)
Dept: INTERNAL MEDICINE CLINIC | Age: 57
End: 2022-12-14
Payer: MEDICARE

## 2022-12-14 DIAGNOSIS — G62.9 NEUROPATHY: ICD-10-CM

## 2022-12-14 DIAGNOSIS — Z78.9 IMPAIRED MOBILITY AND ADLS: ICD-10-CM

## 2022-12-14 DIAGNOSIS — E10.65 TYPE 1 DIABETES MELLITUS WITH HYPERGLYCEMIA (HCC): Primary | ICD-10-CM

## 2022-12-14 DIAGNOSIS — Z74.09 IMPAIRED MOBILITY AND ADLS: ICD-10-CM

## 2022-12-14 PROCEDURE — 99308 SBSQ NF CARE LOW MDM 20: CPT | Performed by: FAMILY MEDICINE

## 2022-12-14 PROCEDURE — G8432 DEP SCR NOT DOC, RNG: HCPCS | Performed by: FAMILY MEDICINE

## 2023-01-19 NOTE — ED PROVIDER NOTES
EMERGENCY DEPARTMENT HISTORY AND PHYSICAL EXAM      Date: 1/16/2022  Patient Name: Earl Blanchard      History of Presenting Illness     Chief Complaint   Patient presents with    Unresponsive       History Provided By: Nette Elizondo Nursing    HPI: Earl Blanchard, 49 y.o. male with a past medical history significant for diabetes with history of DKA, neuropathy and gastroparesis; HTN; HLD, CKD, lacunar infarct, non-STEMI, chronic anemia that is brought from the [facility] after a Code Blue was called. On going to the EP, the patient was awake and talking and the Code Blue called off. Nursing states that the pt became unresponsive and CPR was started, then he woke up. His blood sugar was in the 150s. He denied chest pain or headache. On monitor, pt noted to become bradycardic in the 40s. He was brought to the ER for evaluation. He is on C Diff precautions. Pt not complaining of anything except diarrhea and mild lower abdominal discomfort. There are no other complaints, changes, or physical findings at this time. PCP: iDana Guerra., MD    Current Facility-Administered Medications   Medication Dose Route Frequency Provider Last Rate Last Admin    sodium chloride 0.9 % bolus infusion 1,000 mL  1,000 mL IntraVENous ONCE Titi Carmichael MD        heparin (porcine) 1,000 unit/mL injection 4,860 Units  80 Units/kg IntraVENous ONCE Mu Granados MD        heparin 25,000 units in D5W 250 ml infusion  18-36 Units/kg/hr IntraVENous TITRATE Mu Granados MD         Current Outpatient Medications   Medication Sig Dispense Refill    insulin glargine (Lantus U-100 Insulin) 100 unit/mL injection 15 Units by SubCUTAneous route ACB/HS. Indications: type 2 diabetes mellitus      insulin aspart U-100 (NovoLOG Flexpen U-100 Insulin) 100 unit/mL (3 mL) inpn by SubCUTAneous route as needed.       vancomycin (FIRVANQ) 50 mg/mL oral solution       insulin lispro (HUMALOG) 100 unit/mL Yes... injection 6 Units by SubCUTAneous route Before breakfast, lunch, dinner and at bedtime. 1 Each 2    metoprolol succinate (TOPROL-XL) 25 mg XL tablet Take 1 Tablet by mouth daily. 30 Tablet 5    rosuvastatin (CRESTOR) 10 mg tablet Take 1 Tablet by mouth nightly. 30 Tablet 5    aspirin delayed-release 81 mg tablet Take 1 Tablet by mouth daily. 30 Tablet 5    gabapentin (NEURONTIN) 300 mg capsule Take 1 Capsule by mouth three (3) times daily. Max Daily Amount: 900 mg. 90 Capsule 1    cetirizine (ZYRTEC) 10 mg tablet TAKE 1 TABLET EVERY DAY BY ORAL ROUTE.  90 Tab 0       Past History     Past Medical History:  Past Medical History:   Diagnosis Date    Diabetes ([facility] [address]     Hypercholesterolemia     Hypertension        Past Surgical History:  Past Surgical History:   Procedure Laterality Date    HX ORTHOPAEDIC Left 08/30/2018    hip joint    HX ORTHOPAEDIC Right     broken knee       Family History:  Family History   Problem Relation Age of Onset    Cancer Father         prostate    Cancer Maternal Uncle         prostate    Cancer Paternal Uncle         prostate       Social History:  Social History     Tobacco Use    Smoking status: Never Smoker    Smokeless tobacco: Current User    Tobacco comment: 5+/day   Vaping Use    Vaping Use: Never used   Substance Use Topics    Alcohol use: Not Currently    Drug use: Never       Allergies:  No Known Allergies      Review of Systems     Review of Systems   Constitutional: Negative for chills and fever. HENT: Negative for trouble swallowing. Eyes: Negative for visual disturbance. Respiratory: Negative for cough and shortness of breath. Cardiovascular: Negative for chest pain and palpitations. Gastrointestinal: Positive for diarrhea. Negative for abdominal pain, nausea and vomiting. Genitourinary: Negative for dysuria and flank pain. Musculoskeletal: Negative for arthralgias and myalgias. Neurological: Negative for weakness and headaches. Psychiatric/Behavioral: Negative for confusion. Physical Exam     Physical Exam  Vitals and nursing note reviewed. Constitutional:       General: He is not in acute distress. Appearance: He is well-developed. He is not ill-appearing or diaphoretic. HENT:      Head: Normocephalic. Mouth/Throat:      Pharynx: Oropharynx is clear. Eyes:      Extraocular Movements: Extraocular movements intact. Pupils: Pupils are equal, round, and reactive to light. Cardiovascular:      Rate and Rhythm: Normal rate and regular rhythm. Heart sounds: Normal heart sounds. Pulmonary:      Effort: Pulmonary effort is normal. No respiratory distress. Breath sounds: No wheezing. Abdominal:      General: Bowel sounds are normal. There is no distension. Palpations: Abdomen is soft. Tenderness: There is no guarding. Comments: Mild lower abdominal pain   Musculoskeletal:         General: No tenderness. Normal range of motion. Cervical back: Normal range of motion and neck supple. Right lower leg: No edema. Left lower leg: No edema. Skin:     General: Skin is warm and dry. Neurological:      Mental Status: He is alert and oriented to person, place, and time. Cranial Nerves: No cranial nerve deficit. Sensory: No sensory deficit.          Lab and Diagnostic Study Results     Labs -     Recent Results (from the past 12 hour(s))   EKG, 12 LEAD, SUBSEQUENT    Collection Time: 01/16/22  8:48 AM   Result Value Ref Range    Ventricular Rate 94 BPM    Atrial Rate 94 BPM    P-R Interval 162 ms    QRS Duration 98 ms    Q-T Interval 382 ms    QTC Calculation (Bezet) 478 ms    Calculated P Axis 86 degrees    Calculated R Axis 84 degrees    Calculated T Axis 74 degrees    Diagnosis       Sinus tachycardia  Ventricular premature complex  Right atrial enlargement  Probable anteroseptal infarct, old  Borderline ST depression, diffuse leads     EKG, 12 LEAD, INITIAL    Collection Time: 01/16/22  9:01 AM   Result Value Ref Range    Ventricular Rate 96 BPM    Atrial Rate 96 BPM    P-R Interval 174 ms    QRS Duration 88 ms    Q-T Interval 383 ms    QTC Calculation (Bezet) 484 ms    Calculated P Axis 85 degrees    Calculated R Axis 87 degrees    Calculated T Axis 78 degrees    Diagnosis       Sinus rhythm  Right atrial enlargement  Minimal ST depression, diffuse leads  Borderline prolonged QT interval     GLUCOSE, POC    Collection Time: 01/16/22  9:13 AM   Result Value Ref Range    Glucose (POC) 129 (H) 70 - 110 mg/dL    Performed by [facility]    CBC WITH AUTOMATED DIFF    Collection Time: 01/16/22  9:19 AM   Result Value Ref Range    WBC 8.9 4.6 - 13.2 K/uL    RBC 4.18 (L) 4.35 - 5.65 M/uL    HGB 12.2 (L) 13.0 - 16.0 g/dL    HCT 39.5 36.0 - 48.0 %    MCV 94.5 78.0 - 100.0 FL    MCH 29.2 24.0 - 34.0 PG    MCHC 30.9 (L) 31.0 - 37.0 g/dL    RDW 14.0 11.6 - 14.5 %    PLATELET 411 798 - 582 K/uL    MPV 12.5 (H) 9.2 - 11.8 FL    NRBC 0.0 0.0  WBC    ABSOLUTE NRBC 0.00 0.00 - 0.01 K/uL    NEUTROPHILS 61 40 - 73 %    LYMPHOCYTES 28 21 - 52 %    MONOCYTES 8 3 - 10 %    EOSINOPHILS 2 0 - 5 %    BASOPHILS 1 0 - 2 %    IMMATURE GRANULOCYTES 0 0 - 0.5 %    ABS. NEUTROPHILS 5.4 1.8 - 8.0 K/UL    ABS. LYMPHOCYTES 2.5 0.9 - 3.6 K/UL    ABS. MONOCYTES 0.7 0.05 - 1.2 K/UL    ABS. EOSINOPHILS 0.2 0.0 - 0.4 K/UL    ABS. BASOPHILS 0.1 0.0 - 0.1 K/UL    ABS. IMM.  GRANS. 0.0 0.00 - 0.04 K/UL    DF AUTOMATED     D DIMER    Collection Time: 01/16/22  9:19 AM   Result Value Ref Range    D DIMER 1.60 (H) <0.46 ug/ml(FEU)   METABOLIC PANEL, BASIC    Collection Time: 01/16/22  9:19 AM   Result Value Ref Range    Sodium 138 135 - 145 mmol/L    Potassium 4.3 3.2 - 5.1 mmol/L    Chloride 102 94 - 111 mmol/L    CO2 26 21 - 33 mmol/L    Anion gap 10 mmol/L    Glucose 153 (H) 70 - 110 mg/dL    BUN 35 (H) 9 - 21 mg/dL    Creatinine 1.80 (H) 0.8 - 1.50 mg/dL    BUN/Creatinine ratio 19      GFR est AA 48 ml/min/1.73m2 GFR est non-AA 40 ml/min/1.73m2    Calcium 9.0 8.5 - 10.5 mg/dL   HEPATIC FUNCTION PANEL    Collection Time: 01/16/22  9:19 AM   Result Value Ref Range    Protein, total 6.9 6.1 - 8.4 g/dL    Albumin 3.5 3.5 - 4.7 g/dL    Globulin 3.4 g/dL    A-G Ratio 1.0      Bilirubin, total 0.6 0.2 - 1.0 mg/dL    Bilirubin, direct 0.1 0.0 - 0.3 mg/dL    Alk. phosphatase 89 38 - 126 U/L    AST (SGOT) 29 17 - 74 U/L    ALT (SGPT) 33 3 - 72 U/L   TROPONIN I    Collection Time: 01/16/22  9:19 AM   Result Value Ref Range    Troponin-I, Qt. <0.02 (L) 0.02 - 0.05 ng/mL   LACTIC ACID    Collection Time: 01/16/22  9:19 AM   Result Value Ref Range    Lactic acid 3.5 (HH) 0.5 - 2.0 mmol/L   MAGNESIUM    Collection Time: 01/16/22  9:19 AM   Result Value Ref Range    Magnesium 2.1 1.7 - 2.8 mg/dL       Radiologic Studies -   [unfilled]  CT Results  (Last 48 hours)               01/16/22 0935  CT HEAD WO CONT Final result    Impression:      1. No acute abnormality identified. Narrative:  EXAM: CT of the head       INDICATION: Syncopal episode. COMPARISON: Head CT 12/25/2021       TECHNIQUE: Axial CT imaging of the head was performed without nonionic   intravenous contrast. Multiplanar reformats were generated. One or more dose reduction techniques were used on this CT: automated exposure   control, adjustment of the mAs and/or kVp according to patient size, and   iterative reconstruction techniques. The specific techniques used on this CT   exam have been documented in the patient's electronic medical record.       _______________       FINDINGS:       BRAIN: No evidence of intra-cranial hemorrhage or mass. Small area of   encephalomalacia within the inferior left frontal lobe, unchanged. Prior deep   cerebral infarct involving the thalamus and posterior limb internal capsule,   unchanged. CALVARIA: No fracture or suspicious bone lesion.         OTHER: None.       _______________               CXR Results (Last 48 hours)               01/16/22 0949  XR CHEST PORT Final result    Impression:      No acute cardiopulmonary process. Narrative:  CLINICAL HISTORY:  Syncopal episode. COMPARISONS:  Chest x-ray 12/24/2021       TECHNIQUE:  single frontal view of the chest       ------------------------------------------       FINDINGS:       Lungs:  No consolidation or pleural fluid. Mediastinum: No significant cardiomegaly. Atherosclerotic arterial   calcification. Bones: No evidence of acute fracture. Right clavicle fracture is not well   assessed though potentially nonunited. Prior ORIF proximal left humerus   fracture.           ------------------------------------------             Medical Decision Making and ED Course   - I am the first and primary provider for this patient AND AM THE PRIMARY PROVIDER OF RECORD. - I reviewed the vital signs, available nursing notes, past medical history, past surgical history, family history and social history. - Initial assessment performed. The patients presenting problems have been discussed, and the staff are in agreement with the care plan formulated and outlined with them. I have encouraged them to ask questions as they arise throughout their visit. Vital Signs-Reviewed the patient's vital signs. Patient Vitals for the past 12 hrs:   Temp Pulse Resp BP SpO2   01/16/22 1010 97.1 °F (36.2 °C) 97 18 (!) 141/85 100 %   01/16/22 0919     100 %   01/16/22 0916  100 18 132/87    01/16/22 0905  99 18 (!) 138/121 99 %       EKG interpretation: (Preliminary): Performed at 0848  Rhythm: normal sinus rhythm; and regular . Rate (approx.): 94; Axis: normal; W[place] interval: normal; QRS interval: normal ; ST/T wave: ST depression in II; III; aVF; ? Mild ST elevation in aVL? Other findings: QTc 478. Poor R progression from V1-3   Repeat EKG at  901 am shows no significant difference.      Records Reviewed: Nursing Notes     ED Course:   Syncopal episode; possible cardiac related with bradycardia and inferior ST depression. Has C Diff with diarrhea and mild lower abdominal pain             Consultations:       Consultations:   10:31 AM  Case discussed with Dr Kayleigh Ly; he recommends heparin pending VQ scan. Admit to the ICU      Procedures and Critical Care         CRITICAL CARE NOTE :  9:01 AM  Amount of Critical Care Time: 30 (minutes)    IMPENDING DETERIORATION -Cardiovascular  ASSOCIATED RISK FACTORS - Dysrhythmia  MANAGEMENT- Supervision of Care  INTERPRETATION -  ECG  INTERVENTIONS - heparin  CASE REVIEW - Hospitalist/Intensivist  TREATMENT RESPONSE -Stable  PERFORMED BY - Self    NOTES   :  I have spent critical care time involved in lab review, consultations with specialist, family decision- making, bedside attention and documentation. This time excludes time spent in any separate billed procedures. During this entire length of time I was immediately available to the patient . Chela Hughes MD        Disposition     Disposition: Admit      Diagnosis     Clinical Impression:   1. Syncope and collapse        Attestations:    Chela Hughes MD    Please note that this dictation was completed with [employer], the computer voice recognition software. Quite often unanticipated grammatical, syntax, homophones, and other interpretive errors are inadvertently transcribed by the computer software. Please disregard these errors. Please excuse any errors that have escaped final proofreading. Thank you.

## 2023-02-16 ENCOUNTER — OFFICE VISIT (OUTPATIENT)
Dept: FAMILY MEDICINE CLINIC | Facility: CLINIC | Age: 58
End: 2023-02-16

## 2023-02-16 DIAGNOSIS — E10.69 TYPE 1 DIABETES MELLITUS WITH OTHER SPECIFIED COMPLICATION (HCC): ICD-10-CM

## 2023-02-16 DIAGNOSIS — E55.9 VITAMIN D DEFICIENCY: ICD-10-CM

## 2023-02-16 DIAGNOSIS — I10 ESSENTIAL (PRIMARY) HYPERTENSION: Primary | ICD-10-CM

## 2023-02-17 ENCOUNTER — HOSPITAL ENCOUNTER (OUTPATIENT)
Age: 58
Discharge: HOME | End: 2023-02-17

## 2023-02-17 LAB
25(OH)D3 SERPL-MCNC: 49.8 NG/ML (ref 30–100)
ALBUMIN SERPL-MCNC: 3.2 G/DL (ref 3.4–5)
ALBUMIN/GLOB SERPL: 1.1 (ref 0.8–1.7)
ALP SERPL-CCNC: 93 U/L (ref 45–117)
ALT SERPL-CCNC: 22 U/L (ref 16–61)
ANION GAP SERPL CALC-SCNC: 4 MMOL/L (ref 3–18)
AST SERPL W P-5'-P-CCNC: 13 U/L (ref 10–38)
BASOPHILS # BLD: 0.1 K/UL (ref 0–0.1)
BASOPHILS NFR BLD: 1 % (ref 0–2)
BILIRUB SERPL-MCNC: 0.3 MG/DL (ref 0.2–1)
BUN SERPL-MCNC: 30 MG/DL (ref 7–18)
BUN/CREAT SERPL: 16 (ref 12–20)
CA-I BLD-MCNC: 8.5 MG/DL (ref 8.5–10.1)
CHLORIDE SERPL-SCNC: 106 MMOL/L (ref 100–111)
CHOLEST SERPL-MCNC: 107 MG/DL
CO2 SERPL-SCNC: 30 MMOL/L (ref 21–32)
CREAT SERPL-MCNC: 1.9 MG/DL (ref 0.6–1.3)
DIFFERENTIAL METHOD BLD: ABNORMAL
EOSINOPHIL # BLD: 0.3 K/UL (ref 0–0.4)
EOSINOPHIL NFR BLD: 4 % (ref 0–5)
ERYTHROCYTE [DISTWIDTH] IN BLOOD BY AUTOMATED COUNT: 14.2 % (ref 11.6–14.5)
GLOBULIN SER CALC-MCNC: 3 G/DL (ref 2–4)
GLUCOSE SERPL-MCNC: 170 MG/DL (ref 74–99)
HCT VFR BLD AUTO: 32.8 % (ref 36–48)
HDLC SERPL-MCNC: 54 MG/DL (ref 40–60)
HDLC SERPL: 2 (ref 0–5)
HGB BLD-MCNC: 10.4 G/DL (ref 13–16)
IMM GRANULOCYTES # BLD AUTO: 0 K/UL (ref 0–0.04)
IMM GRANULOCYTES NFR BLD AUTO: 0 % (ref 0–0.5)
LDLC SERPL CALC-MCNC: 40.2 MG/DL (ref 0–100)
LIPID PANEL: NORMAL
LYMPHOCYTES # BLD: 2.3 K/UL (ref 0.9–3.6)
LYMPHOCYTES NFR BLD: 34 % (ref 21–52)
MCH RBC QN AUTO: 30.1 PG (ref 24–34)
MCHC RBC AUTO-ENTMCNC: 31.7 G/DL (ref 31–37)
MCV RBC AUTO: 95.1 FL (ref 78–100)
MONOCYTES # BLD: 0.6 K/UL (ref 0.05–1.2)
MONOCYTES NFR BLD: 8 % (ref 3–10)
NEUTS SEG # BLD: 3.7 K/UL (ref 1.8–8)
NEUTS SEG NFR BLD: 53 % (ref 40–73)
NRBC # BLD: 0 K/UL (ref 0–0.01)
NRBC BLD-RTO: 0 PER 100 WBC
PLATELET # BLD AUTO: 144 K/UL (ref 135–420)
PMV BLD AUTO: 12.9 FL (ref 9.2–11.8)
POTASSIUM SERPL-SCNC: 4.3 MMOL/L (ref 3.5–5.5)
PROT SERPL-MCNC: 6.2 G/DL (ref 6.4–8.2)
RBC # BLD AUTO: 3.45 M/UL (ref 4.35–5.65)
SODIUM SERPL-SCNC: 140 MMOL/L (ref 136–145)
TRIGL SERPL-MCNC: 64 MG/DL
TSH SERPL DL<=0.05 MIU/L-ACNC: 1.54 UIU/ML (ref 0.36–3.74)
VLDLC SERPL CALC-MCNC: 12.8 MG/DL
WBC # BLD AUTO: 6.9 K/UL (ref 4.6–13.2)

## 2023-02-17 PROCEDURE — 84443 ASSAY THYROID STIM HORMONE: CPT

## 2023-02-17 PROCEDURE — 82306 VITAMIN D 25 HYDROXY: CPT

## 2023-02-17 PROCEDURE — 80053 COMPREHEN METABOLIC PANEL: CPT

## 2023-02-17 PROCEDURE — 36415 COLL VENOUS BLD VENIPUNCTURE: CPT

## 2023-02-17 PROCEDURE — 85025 COMPLETE CBC W/AUTO DIFF WBC: CPT

## 2023-02-17 PROCEDURE — 80061 LIPID PANEL: CPT

## 2023-02-20 PROBLEM — E43 SEVERE PROTEIN-CALORIE MALNUTRITION (HCC): Status: RESOLVED | Noted: 2021-12-29 | Resolved: 2023-02-20

## 2023-02-20 NOTE — PROGRESS NOTES
Subjective:   Trever Minaya is a 56 y.o. male who was seen for No chief complaint on file. Patient has no concerns. No issues per nursing staff. Patient has been eating well. No falls or injuries. Sleeping well. Denies fever, rash, sob, chest pain, constipation. Today's visit conducted prior to the patient traveling to his dad's home accompanied by his father. Patient is appropriate smiling laughing. He is followed by [facility] end[facility]inology. Prior to Admission medications    Medication Sig Start Date End Date Taking? Authorizing Provider   acetaminophen (TYLENOL) 325 MG tablet Take 650 mg by mouth every 4 hours as needed    Ar Automatic Reconciliation   aspirin 81 MG EC tablet Take 81 mg by mouth daily 1/3/22   Ar Automatic Reconciliation   cetirizine (ZYRTEC) 10 MG tablet TAKE 1 TABLET EVERY DAY BY ORAL ROUTE. 21   Ar Automatic Reconciliation   gabapentin (NEURONTIN) 100 MG capsule Take 2 capsules by mouth 3 times daily. 22   Ar Automatic Reconciliation   gabapentin (NEURONTIN) 300 MG capsule Take 300 mg by mouth 3 times daily.  1/3/22   Ar Automatic Reconciliation   glucose-vitamin C 4-6 GM-MG CHEW chewable tablet Take 4 tablets by mouth as needed    Ar Automatic Reconciliation   insulin aspart (NOVOLOG) 100 UNIT/ML injection pen Inject into the skin as needed    Ar Automatic Reconciliation   insulin lispro (HUMALOG) 100 UNIT/ML injection cartridge Inject into the skin    Ar Automatic Reconciliation   loperamide (IMODIUM) 2 MG capsule Take 4 mg by mouth every 4 hours as needed    Ar Automatic Reconciliation   metoprolol succinate (TOPROL XL) 25 MG extended release tablet Take 12.5 mg by mouth daily 22   Ar Automatic Reconciliation   rosuvastatin (CRESTOR) 10 MG tablet Take 10 mg by mouth 1/3/22   Ar Automatic Reconciliation     Not on File  Social History     Tobacco Use    Smoking status: Never    Smokeless tobacco: Current    Tobacco comments:     Quit smokin+/day Substance Use Topics    Alcohol use: Not Currently    Drug use: Never        Review of Systems   As noted above in HPI. Objective: There were no vitals taken for this visit. General: alert, not in distress  Chest/Lungs: clear breath sounds, no wheezing or crackles  Heart: normal rate, regular rhythm, no murmur  Extremities: no focal deformities, no edema  Skin: no active skin lesions      Assessment & Plan:     1. Essential (primary) hypertension    The current medical regimen is effective;  continue present plan and medications.    - TSH; Future    2. Vitamin D deficiency    The current medical regimen is effective;  continue present plan and medications. - Vitamin D 25 Hydroxy; Future    3. Lives in nursing home  Stable; no behavioral issues per staff    4. Type 1 diabetes mellitus with other specified complication ([facility] [address]  Keep all fu with endo    The patient continues to need ICF care secondary to inability to care for daily needs. Patient will be seen every 60 days or sooner if needed. Pt is medically stable at this time. This was a face to face visit with nursing present.      Total time: 25 min    spent in records review; discussion with nursing staff; assessment of pt as well as diagnostic orders/review    PATRICK Mercado - CNP  February 20, 2023

## 2023-03-10 NOTE — PROGRESS NOTES
Cesar Ozuna (: 1965) is a 56 y.o. male seen at [facility] on 22 for the following chief concern(s):  Chronic condition management    Rounds were performed w/ charge nurse. This is an electronic conversion of paper note before EPIC consolidation. ASSESSMENT/PLAN:  1. Type 1 diabetes mellitus with hyperglycemia (HCC)  2. Neuropathy  3. Impaired mobility and ADLs    Daniella Monk appears medically stable at this time. His mood sounds and appears well controlled on Fluoxetine 10mg QD, Gabapentin 300mg TID. We can consider gentle up-titration of Gabapentin or transition from Gabapentin to Lyrica if needed for improved pain control- caution renal function and at risk for delirium. He is on PT/OT list for evaluation/treatment of mobility impairment. He is following with Endocrinology for management of his diabetes. SUBJECTIVE/OBJECTIVE:  Daniella Monk is a 56year old gentleman with past medical history significant for uncontrolled DMII complicated by polyneuropathy, hypertension, syncope w/ collapse, anxiety, encephalopathy, impaired mobility, malnutrition, frailty. Per nursing, there are no acute concerns, patient remains at baseline- he continues to have delayed sleep phase and often misses morning medications, high fall risk. Pt's father reportedly requesting dose increase to Gabapentin for mood symptoms. Sugars remain elevated- average 200's and isolated elevation to 411. Per pt, no specific health questions concerns. His mobility has been doing well, no recent falls. Pt feels neuropathic pain may have increased after adjustment to his Gabapentin and that his mood is doing well.           Past Medical History:   Diagnosis Date    Depression     Diabetes ([facility] [address]     GERD (gastroesophageal reflux disease)     Hypercholesterolemia     Hypertension     Seizures ([facility] [address]     Stroke [facility])      Past Surgical History:   Procedure Laterality Date    HX ORTHOPAEDIC Left 2018    hip joint HX ORTHOPAEDIC Right     broken knee     Family History   Problem Relation Age of Onset    Cancer Father         prostate    Cancer Maternal Uncle         prostate    Cancer Paternal Uncle         prostate       Social History     Socioeconomic History    Marital status:      Spouse name: Not on file    Number of children: Not on file    Years of education: Not on file    Highest education level: Not on file   Occupational History    Not on file   Tobacco Use    Smoking status: Never    Smokeless tobacco: Current    Tobacco comments:     5+/day   Vaping Use    Vaping Use: Never used   Substance and Sexual Activity    Alcohol use: Not Currently    Drug use: Never    Sexual activity: Not Currently     Partners: Female   Other Topics Concern    Not on file   Social History Narrative    Not on file     Social Determinants of Health     Financial Resource Strain: Not on file   Food Insecurity: Not on file   Transportation Needs: Not on file   Physical Activity: Not on file   Stress: Not on file   Social Connections: Not on file   Intimate Partner Violence: Not on file   Housing Stability: Not on file     Social History     Tobacco Use   Smoking Status Never   Smokeless Tobacco Current   Tobacco Comments    5+/day       Current Outpatient Medications   Medication Sig Dispense Refill    insulin lispro (HumaLOG U-100 Insulin) 100 unit/mL cartridge by SubCUTAneous route. Use via insulin pump at basal rate of 0.550 units per hour      glucose (TRUEplus Glucose) 4 gram chewable tablet Take 4 Tablets by mouth as needed. For blood sugar less than 55      gabapentin (NEURONTIN) 100 mg capsule Take 2 Capsules by mouth three (3) times daily. acetaminophen (TylenoL) 325 mg tablet Take 650 mg by mouth every four (4) hours as needed for Pain or Fever.       loperamide (IMODIUM) 2 mg capsule Take 4 mg by mouth every four (4) hours as needed for Diarrhea.      metoprolol succinate (TOPROL-XL) 25 mg XL tablet Take 0.5 Tablets by mouth daily. 60 Tablet 0    insulin aspart U-100 (NovoLOG Flexpen U-100 Insulin) 100 unit/mL (3 mL) inpn by SubCUTAneous route as needed. SLIDING SCALE INSTRUCTIONS FROM [facility]  250-310= 2 units  311-370= 4 units  371-430= 6 units  431-490= 8 units  491-550= 10 units  551-610= 12 units      rosuvastatin (CRESTOR) 10 mg tablet Take 1 Tablet by mouth nightly. 30 Tablet 5    aspirin delayed-release 81 mg tablet Take 1 Tablet by mouth daily. 30 Tablet 5    gabapentin (NEURONTIN) 300 mg capsule Take 1 Capsule by mouth three (3) times daily. Max Daily Amount: 900 mg. (Patient taking differently: Take 600 mg by mouth three (3) times daily.) 90 Capsule 1    cetirizine (ZYRTEC) 10 mg tablet TAKE 1 TABLET EVERY DAY BY ORAL ROUTE.  90 Tab 0     No Known Allergies    Vitals: T: 98.7F, HR: 59, BP: 119/65, RR: 20, O2: 98% on RA    Physical Exam  Constitutional:       General: He is not in acute distress. Appearance: Normal appearance. He is not ill-appearing (chronically) or toxic-appearing. Comments: Frail, cachectic appearing. Seated in his bed. Cardiovascular:      Rate and Rhythm: Normal rate and regular rhythm. Heart sounds: Normal heart sounds. Pulmonary:      Effort: No respiratory distress. Breath sounds: Normal breath sounds. Abdominal:      General: Bowel sounds are normal.      Palpations: Abdomen is soft. Tenderness: There is no abdominal tenderness. Skin:     General: Skin is warm and dry. Neurological:      Mental Status: He is alert. Mental status is at baseline. Psychiatric:         Mood and Affect: Mood normal.      Comments: Calm, pleasant, cooperative.      Lab Results   Component Value Date/Time    WBC 13.5 (H) 03/09/2022 08:47 PM    HGB 9.9 (L) 03/09/2022 08:47 PM    HCT 32.3 (L) 03/09/2022 08:47 PM    PLATELET 472 42/27/2688 08:47 PM    MCV 96.1 03/09/2022 08:47 PM     Lab Results   Component Value Date/Time    Sodium 139 04/22/2022 07:25 AM    Potassium 5.1 04/22/2022 07:25 AM    Chloride 103 04/22/2022 07:25 AM    CO2 25 04/22/2022 07:25 AM    Anion gap 11 04/22/2022 07:25 AM    Glucose 369 (H) 04/22/2022 07:25 AM    BUN 36 (H) 04/22/2022 07:25 AM    Creatinine 1.70 (H) 04/22/2022 07:25 AM    BUN/Creatinine ratio 21 04/22/2022 07:25 AM    GFR est AA 51 04/22/2022 07:25 AM    GFR est non-AA 42 04/22/2022 07:25 AM    Calcium 9.0 04/22/2022 07:25 AM    Bilirubin, total 0.6 03/09/2022 08:47 PM    Alk. phosphatase 81 03/09/2022 08:47 PM    Protein, total 6.5 03/09/2022 08:47 PM    Albumin 3.5 03/09/2022 08:47 PM    Globulin 3.0 03/09/2022 08:47 PM    A-G Ratio 1.2 03/09/2022 08:47 PM    ALT (SGPT) 29 03/09/2022 08:47 PM    AST (SGOT) 24 03/09/2022 08:47 PM     Lab Results   Component Value Date/Time    Cholesterol, total 95 12/27/2021 03:46 AM    HDL Cholesterol 47 12/27/2021 03:46 AM    LDL-C, External 74 02/26/2021 12:00 AM    LDL, calculated 38.2 12/27/2021 03:46 AM    VLDL, calculated 9.8 12/27/2021 03:46 AM    Triglyceride 49 12/27/2021 03:46 AM    CHOL/HDL Ratio 2.0 12/27/2021 03:46 AM       On this date 12/14/22 I have spent >15 minutes reviewing previous notes, test results and face to face with the patient for interview/exam, discussing working diagnosis and treatment plan as well as care coordination with nursing supervisor, staff. Medical decision making complexity: moderate-high.     Nathan Wang MD   Family & Geriatric Medicine

## 2023-03-15 ENCOUNTER — HOSPITAL ENCOUNTER (OUTPATIENT)
Age: 58
Discharge: HOME | End: 2023-03-18

## 2023-03-15 LAB
IRON SATN MFR SERPL: 24 % (ref 20–50)
IRON SERPL-MCNC: 58 UG/DL (ref 50–175)
TIBC SERPL-MCNC: 240 UG/DL (ref 250–450)

## 2023-03-15 PROCEDURE — 36415 COLL VENOUS BLD VENIPUNCTURE: CPT

## 2023-03-15 PROCEDURE — 83540 ASSAY OF IRON: CPT

## 2023-04-11 ENCOUNTER — APPOINTMENT (OUTPATIENT)
Age: 58
End: 2023-04-11
Payer: MEDICARE

## 2023-04-11 ENCOUNTER — HOSPITAL ENCOUNTER (OUTPATIENT)
Age: 58
Setting detail: OBSERVATION
Discharge: HOME | End: 2023-04-12
Attending: EMERGENCY MEDICINE | Admitting: INTERNAL MEDICINE
Payer: MEDICARE
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
## 2023-04-11 DIAGNOSIS — N17.9 ACUTE KIDNEY INJURY (HCC): Primary | ICD-10-CM

## 2023-04-11 PROBLEM — E10.9 TYPE 1 DIABETES MELLITUS (HCC): Status: ACTIVE | Noted: 2020-10-21

## 2023-04-11 PROBLEM — Z86.73 HISTORY OF CVA (CEREBROVASCULAR ACCIDENT): Status: ACTIVE | Noted: 2023-04-11

## 2023-04-11 PROBLEM — N28.9 ACUTE KIDNEY INSUFFICIENCY: Status: ACTIVE | Noted: 2023-04-11

## 2023-04-11 PROBLEM — I10 BENIGN HYPERTENSION: Status: ACTIVE | Noted: 2020-10-21

## 2023-04-11 PROBLEM — I95.9 HYPOTENSION: Status: ACTIVE | Noted: 2023-04-11
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
## 2023-04-11 LAB
ANION GAP SERPL CALC-SCNC: 8 MMOL/L (ref 3–18)
BASOPHILS # BLD: 0.1 K/UL (ref 0–0.1)
BASOPHILS NFR BLD: 1 % (ref 0–2)
BUN SERPL-MCNC: 36 MG/DL (ref 7–18)
BUN/CREAT SERPL: 15 (ref 12–20)
CA-I BLD-MCNC: 8.6 MG/DL (ref 8.5–10.1)
CHLORIDE SERPL-SCNC: 109 MMOL/L (ref 100–111)
CO2 SERPL-SCNC: 26 MMOL/L (ref 21–32)
CREAT SERPL-MCNC: 2.37 MG/DL (ref 0.6–1.3)
DIFFERENTIAL METHOD BLD: ABNORMAL
EOSINOPHIL # BLD: 0.3 K/UL (ref 0–0.4)
EOSINOPHIL NFR BLD: 4 % (ref 0–5)
ERYTHROCYTE [DISTWIDTH] IN BLOOD BY AUTOMATED COUNT: 13.4 % (ref 11.6–14.5)
GLUCOSE SERPL-MCNC: 96 MG/DL (ref 74–99)
HCT VFR BLD AUTO: 34.4 % (ref 36–48)
HGB BLD-MCNC: 10.7 G/DL (ref 13–16)
IMM GRANULOCYTES # BLD AUTO: 0 K/UL (ref 0–0.04)
IMM GRANULOCYTES NFR BLD AUTO: 0 % (ref 0–0.5)
LACTATE SERPL-SCNC: 1.4 MMOL/L (ref 0.4–2)
LYMPHOCYTES # BLD: 1.9 K/UL (ref 0.9–3.6)
LYMPHOCYTES NFR BLD: 31 % (ref 21–52)
MCH RBC QN AUTO: 29.7 PG (ref 24–34)
MCHC RBC AUTO-ENTMCNC: 31.1 G/DL (ref 31–37)
MCV RBC AUTO: 95.6 FL (ref 78–100)
MONOCYTES # BLD: 0.5 K/UL (ref 0.05–1.2)
MONOCYTES NFR BLD: 8 % (ref 3–10)
NEUTS SEG # BLD: 3.4 K/UL (ref 1.8–8)
NEUTS SEG NFR BLD: 56 % (ref 40–73)
NRBC # BLD: 0 K/UL (ref 0–0.01)
NRBC BLD-RTO: 0 PER 100 WBC
PLATELET # BLD AUTO: 183 K/UL (ref 135–420)
PMV BLD AUTO: 11.8 FL (ref 9.2–11.8)
POTASSIUM SERPL-SCNC: 4.1 MMOL/L (ref 3.5–5.5)
RBC # BLD AUTO: 3.6 M/UL (ref 4.35–5.65)
SODIUM SERPL-SCNC: 143 MMOL/L (ref 136–145)
WBC # BLD AUTO: 6.1 K/UL (ref 4.6–13.2)

## 2023-04-11 PROCEDURE — 96360 HYDRATION IV INFUSION INIT: CPT

## 2023-04-11 PROCEDURE — G0378 HOSPITAL OBSERVATION PER HR: HCPCS

## 2023-04-11 PROCEDURE — 83605 ASSAY OF LACTIC ACID: CPT

## 2023-04-11 PROCEDURE — 99285 EMERGENCY DEPT VISIT HI MDM: CPT

## 2023-04-11 PROCEDURE — 96361 HYDRATE IV INFUSION ADD-ON: CPT

## 2023-04-11 PROCEDURE — 36415 COLL VENOUS BLD VENIPUNCTURE: CPT

## 2023-04-11 PROCEDURE — 71045 X-RAY EXAM CHEST 1 VIEW: CPT

## 2023-04-11 PROCEDURE — 85025 COMPLETE CBC W/AUTO DIFF WBC: CPT

## 2023-04-11 PROCEDURE — 2580000003 HC RX 258: Performed by: EMERGENCY MEDICINE

## 2023-04-11 PROCEDURE — 70450 CT HEAD/BRAIN W/O DYE: CPT

## 2023-04-11 PROCEDURE — 80048 BASIC METABOLIC PNL TOTAL CA: CPT

## 2023-04-11 PROCEDURE — 2580000003 HC RX 258: Performed by: NURSE PRACTITIONER

## 2023-04-11 RX ORDER — ONDANSETRON 2 MG/ML
4 INJECTION INTRAMUSCULAR; INTRAVENOUS EVERY 6 HOURS PRN
Status: DISCONTINUED | OUTPATIENT
Start: 2023-04-11 | End: 2023-04-12 | Stop reason: HOSPADM

## 2023-04-11 RX ORDER — 0.9 % SODIUM CHLORIDE 0.9 %
1000 INTRAVENOUS SOLUTION INTRAVENOUS ONCE
Status: COMPLETED | OUTPATIENT
Start: 2023-04-11 | End: 2023-04-12

## 2023-04-11 RX ORDER — ACETAMINOPHEN 325 MG/1
650 TABLET ORAL EVERY 6 HOURS PRN
Status: DISCONTINUED | OUTPATIENT
Start: 2023-04-11 | End: 2023-04-12 | Stop reason: HOSPADM

## 2023-04-11 RX ORDER — ACETAMINOPHEN 650 MG/1
650 SUPPOSITORY RECTAL EVERY 6 HOURS PRN
Status: DISCONTINUED | OUTPATIENT
Start: 2023-04-11 | End: 2023-04-12 | Stop reason: HOSPADM

## 2023-04-11 RX ORDER — SODIUM CHLORIDE 0.9 % (FLUSH) 0.9 %
5-40 SYRINGE (ML) INJECTION EVERY 12 HOURS SCHEDULED
Status: DISCONTINUED | OUTPATIENT
Start: 2023-04-11 | End: 2023-04-12

## 2023-04-11 RX ORDER — INSULIN LISPRO 100 [IU]/ML
0-16 INJECTION, SOLUTION INTRAVENOUS; SUBCUTANEOUS
Status: DISCONTINUED | OUTPATIENT
Start: 2023-04-12 | End: 2023-04-12 | Stop reason: HOSPADM

## 2023-04-11 RX ORDER — INSULIN LISPRO 100 [IU]/ML
0-4 INJECTION, SOLUTION INTRAVENOUS; SUBCUTANEOUS NIGHTLY
Status: DISCONTINUED | OUTPATIENT
Start: 2023-04-11 | End: 2023-04-12 | Stop reason: HOSPADM

## 2023-04-11 RX ORDER — SODIUM CHLORIDE 9 MG/ML
INJECTION, SOLUTION INTRAVENOUS CONTINUOUS
Status: DISCONTINUED | OUTPATIENT
Start: 2023-04-11 | End: 2023-04-12

## 2023-04-11 RX ORDER — POLYETHYLENE GLYCOL 3350 17 G/17G
17 POWDER, FOR SOLUTION ORAL DAILY PRN
Status: DISCONTINUED | OUTPATIENT
Start: 2023-04-11 | End: 2023-04-12 | Stop reason: HOSPADM

## 2023-04-11 RX ORDER — ENOXAPARIN SODIUM 100 MG/ML
40 INJECTION SUBCUTANEOUS DAILY
Status: DISCONTINUED | OUTPATIENT
Start: 2023-04-12 | End: 2023-04-12 | Stop reason: HOSPADM

## 2023-04-11 RX ORDER — ONDANSETRON 4 MG/1
4 TABLET, ORALLY DISINTEGRATING ORAL EVERY 8 HOURS PRN
Status: DISCONTINUED | OUTPATIENT
Start: 2023-04-11 | End: 2023-04-12 | Stop reason: HOSPADM

## 2023-04-11 RX ORDER — SODIUM CHLORIDE 0.9 % (FLUSH) 0.9 %
5-40 SYRINGE (ML) INJECTION PRN
Status: DISCONTINUED | OUTPATIENT
Start: 2023-04-11 | End: 2023-04-12

## 2023-04-11 RX ORDER — SODIUM CHLORIDE 9 MG/ML
INJECTION, SOLUTION INTRAVENOUS PRN
Status: DISCONTINUED | OUTPATIENT
Start: 2023-04-11 | End: 2023-04-12

## 2023-04-11 RX ADMIN — Medication 10 ML: at 23:46

## 2023-04-11 RX ADMIN — SODIUM CHLORIDE 1000 ML: 9 INJECTION, SOLUTION INTRAVENOUS at 20:35

## 2023-04-11 ASSESSMENT — PAIN - FUNCTIONAL ASSESSMENT: PAIN_FUNCTIONAL_ASSESSMENT: NONE - DENIES PAIN

## 2023-04-11 NOTE — ED TRIAGE NOTES
Patient arrived from [address] [place] [address]. Per staff the patient had woken up and tried to get up to the bathroom and fell and hit his head on the door. When they checked his blood pressure at the facility it was low. Patient A/O x4, denies any pain or LOC.

## 2023-04-12 VITALS
TEMPERATURE: 96.9 F | DIASTOLIC BLOOD PRESSURE: 66 MMHG | WEIGHT: 115 LBS | HEART RATE: 75 BPM | RESPIRATION RATE: 16 BRPM | OXYGEN SATURATION: 99 % | BODY MASS INDEX: 16.1 KG/M2 | SYSTOLIC BLOOD PRESSURE: 111 MMHG | HEIGHT: 71 IN

## 2023-04-12 LAB
ANION GAP SERPL CALC-SCNC: 8 MMOL/L (ref 3–18)
BASOPHILS # BLD: 0.1 K/UL (ref 0–0.1)
BASOPHILS NFR BLD: 1 % (ref 0–2)
BUN SERPL-MCNC: 36 MG/DL (ref 7–18)
BUN/CREAT SERPL: 18 (ref 12–20)
CA-I BLD-MCNC: 8.4 MG/DL (ref 8.5–10.1)
CHLORIDE SERPL-SCNC: 108 MMOL/L (ref 100–111)
CO2 SERPL-SCNC: 27 MMOL/L (ref 21–32)
CREAT SERPL-MCNC: 1.96 MG/DL (ref 0.6–1.3)
DIFFERENTIAL METHOD BLD: ABNORMAL
EOSINOPHIL # BLD: 0.2 K/UL (ref 0–0.4)
EOSINOPHIL NFR BLD: 3 % (ref 0–5)
ERYTHROCYTE [DISTWIDTH] IN BLOOD BY AUTOMATED COUNT: 13.2 % (ref 11.6–14.5)
GLUCOSE SERPL-MCNC: 169 MG/DL (ref 74–99)
HCT VFR BLD AUTO: 35.6 % (ref 36–48)
HGB BLD-MCNC: 11.2 G/DL (ref 13–16)
IMM GRANULOCYTES # BLD AUTO: 0 K/UL (ref 0–0.04)
IMM GRANULOCYTES NFR BLD AUTO: 1 % (ref 0–0.5)
LYMPHOCYTES # BLD: 1.6 K/UL (ref 0.9–3.6)
LYMPHOCYTES NFR BLD: 19 % (ref 21–52)
MCH RBC QN AUTO: 29.9 PG (ref 24–34)
MCHC RBC AUTO-ENTMCNC: 31.5 G/DL (ref 31–37)
MCV RBC AUTO: 95.2 FL (ref 78–100)
MONOCYTES # BLD: 0.6 K/UL (ref 0.05–1.2)
MONOCYTES NFR BLD: 7 % (ref 3–10)
NEUTS SEG # BLD: 6 K/UL (ref 1.8–8)
NEUTS SEG NFR BLD: 69 % (ref 40–73)
NRBC # BLD: 0 K/UL (ref 0–0.01)
NRBC BLD-RTO: 0 PER 100 WBC
PLATELET # BLD AUTO: 172 K/UL (ref 135–420)
PMV BLD AUTO: 12.3 FL (ref 9.2–11.8)
POTASSIUM SERPL-SCNC: 4.3 MMOL/L (ref 3.5–5.5)
RBC # BLD AUTO: 3.74 M/UL (ref 4.35–5.65)
SODIUM SERPL-SCNC: 143 MMOL/L (ref 136–145)
WBC # BLD AUTO: 8.4 K/UL (ref 4.6–13.2)

## 2023-04-12 PROCEDURE — 36415 COLL VENOUS BLD VENIPUNCTURE: CPT

## 2023-04-12 PROCEDURE — 80048 BASIC METABOLIC PNL TOTAL CA: CPT

## 2023-04-12 PROCEDURE — G0378 HOSPITAL OBSERVATION PER HR: HCPCS

## 2023-04-12 PROCEDURE — 2580000003 HC RX 258: Performed by: NURSE PRACTITIONER

## 2023-04-12 PROCEDURE — 6370000000 HC RX 637 (ALT 250 FOR IP): Performed by: NURSE PRACTITIONER

## 2023-04-12 PROCEDURE — 85025 COMPLETE CBC W/AUTO DIFF WBC: CPT

## 2023-04-12 RX ORDER — ASPIRIN 81 MG/1
81 TABLET ORAL DAILY
Status: DISCONTINUED | OUTPATIENT
Start: 2023-04-12 | End: 2023-04-12 | Stop reason: HOSPADM

## 2023-04-12 RX ORDER — TRAZODONE HYDROCHLORIDE 50 MG/1
25 TABLET ORAL NIGHTLY PRN
Status: DISCONTINUED | OUTPATIENT
Start: 2023-04-12 | End: 2023-04-12

## 2023-04-12 RX ORDER — GABAPENTIN 100 MG/1
100 CAPSULE ORAL DAILY
COMMUNITY
End: 2023-04-13

## 2023-04-12 RX ORDER — ROSUVASTATIN CALCIUM 10 MG/1
10 TABLET, COATED ORAL NIGHTLY
Status: DISCONTINUED | OUTPATIENT
Start: 2023-04-12 | End: 2023-04-12 | Stop reason: HOSPADM

## 2023-04-12 RX ORDER — GABAPENTIN 300 MG/1
300 CAPSULE ORAL 3 TIMES DAILY
Status: DISCONTINUED | OUTPATIENT
Start: 2023-04-12 | End: 2023-04-12 | Stop reason: HOSPADM

## 2023-04-12 RX ORDER — FERROUS SULFATE 325(65) MG
325 TABLET ORAL
COMMUNITY

## 2023-04-12 RX ORDER — METOPROLOL SUCCINATE 25 MG/1
12.5 TABLET, EXTENDED RELEASE ORAL DAILY
Status: DISCONTINUED | OUTPATIENT
Start: 2023-04-12 | End: 2023-04-12

## 2023-04-12 RX ORDER — TRAZODONE HYDROCHLORIDE 50 MG/1
25 TABLET ORAL NIGHTLY PRN
Status: DISCONTINUED | OUTPATIENT
Start: 2023-04-12 | End: 2023-04-12 | Stop reason: HOSPADM

## 2023-04-12 RX ORDER — FLUOXETINE 10 MG/1
10 CAPSULE ORAL DAILY
COMMUNITY

## 2023-04-12 RX ADMIN — SODIUM CHLORIDE: 9 INJECTION, SOLUTION INTRAVENOUS at 01:05

## 2023-04-12 RX ADMIN — TRAZODONE HYDROCHLORIDE 25 MG: 50 TABLET ORAL at 01:15

## 2023-04-12 RX ADMIN — ROSUVASTATIN 10 MG: 10 TABLET, FILM COATED ORAL at 01:15

## 2023-04-12 RX ADMIN — GABAPENTIN 300 MG: 300 CAPSULE ORAL at 09:46

## 2023-04-12 RX ADMIN — ASPIRIN 81 MG: 81 TABLET, COATED ORAL at 09:46

## 2023-04-12 RX ADMIN — METOPROLOL SUCCINATE 12.5 MG: 25 TABLET, EXTENDED RELEASE ORAL at 09:46

## 2023-04-12 ASSESSMENT — ENCOUNTER SYMPTOMS
SHORTNESS OF BREATH: 0
TROUBLE SWALLOWING: 0
ABDOMINAL PAIN: 0
VOMITING: 0
NAUSEA: 0
CHEST TIGHTNESS: 0
COLOR CHANGE: 0

## 2023-04-12 ASSESSMENT — PAIN SCALES - GENERAL: PAINLEVEL_OUTOF10: 0

## 2023-04-12 NOTE — ED NOTES
TRANSFER - OUT REPORT:    Verbal report given to [place]  on [employer]  being transferred to [address] [place] [address]  for routine progression of patient care       Report consisted of patient's Situation, Background, Assessment and   Recommendations(SBAR). Information from the following report(s) Nurse Handoff Report, ED Encounter Summary, MAR, and Recent Results was reviewed with the receiving nurse. [place] Assessment: No data recorded  Lines:   Peripheral IV 04/11/23 Right;Dorsal Forearm (Active)   Site Assessment Clean, dry & intact 04/11/23 2028   Line Status Blood return noted 04/11/23 2028   Phlebitis Assessment No symptoms 04/11/23 2028   Infiltration Assessment 0 04/11/23 2028   Dressing Status New dressing applied 04/11/23 2028   Dressing Type Transparent 04/11/23 2028   Dressing Intervention New 04/11/23 2028        Opportunity for questions and clarification was provided.       Patient transported with:  Monitor and [address] [place], RN  04/11/23 4550

## 2023-04-12 NOTE — ASSESSMENT & PLAN NOTE
This is a chronic problem  No residual weakness from prior stroke  Patient does have a garbled speech this is a deficits since his stroke his speech is understandable  Assist patient with activities given ambulation difficulties

## 2023-04-12 NOTE — PLAN OF CARE
Problem: Discharge Planning  Goal: Discharge to home or other facility with appropriate resources  Outcome: Progressing  Flowsheets (Taken 4/11/2023 [id number])  Discharge to home or other facility with appropriate resources: Identify barriers to discharge with patient and caregiver     Problem: Safety - Adult  Goal: Free from fall injury  Outcome: Progressing

## 2023-04-12 NOTE — ASSESSMENT & PLAN NOTE
This is a new problem patient's creatinine is elevated at 2.37 normally around 1-1.9  IV bolus given in the ED  Continue IV fluid at 100/h  Repeat labs in the morning

## 2023-04-12 NOTE — DISCHARGE SUMMARY
given patient's hypotension  Cardiology consult     Falls  Patient has had increased falls at   Call for assist and bed alarm  No obvious injury from falls today  Patient states he lost his balance  States balance off from his previous stroke     History of CVA (cerebrovascular accident)  This is a chronic problem  No residual weakness from prior stroke  Patient does have a garbled speech this is a deficits since his stroke his speech is understandable  Assist patient with activities given ambulation difficulties     Type 1 diabetes mellitus ([facility] [address]  This is a chronic problem  Continue insulin  Continue lispro  Diabetic diet  Patient has used an insulin pump all his life he has type I he has requested to keep it on is reasonable to allow patient to manage his pump while in hospital  Monitor blood sugars ACHS    Day of dc  Received ivf overnight, renal function has imrpoved  Ok for dc back to , cont therapy as prior  No changes to any meds were made. DISCHARGE MEDICATIONS:  Current Discharge Medication List        CONTINUE these medications which have NOT CHANGED    Details   acetaminophen (TYLENOL) 325 MG tablet Take 650 mg by mouth every 4 hours as needed      aspirin 81 MG EC tablet Take 81 mg by mouth daily      cetirizine (ZYRTEC) 10 MG tablet TAKE 1 TABLET EVERY DAY BY ORAL ROUTE.      gabapentin (NEURONTIN) 300 MG capsule Take 300 mg by mouth 3 times daily.       glucose-vitamin C 4-6 GM-MG CHEW chewable tablet Take 4 tablets by mouth as needed      insulin aspart (NOVOLOG) 100 UNIT/ML injection pen Inject into the skin as needed      insulin lispro (HUMALOG) 100 UNIT/ML injection cartridge Inject into the skin      loperamide (IMODIUM) 2 MG capsule Take 4 mg by mouth every 4 hours as needed      metoprolol succinate (TOPROL XL) 25 MG extended release tablet Take 12.5 mg by mouth daily      rosuvastatin (CRESTOR) 10 MG tablet Take 10 mg by mouth               NOTIFY [address] [facility]

## 2023-04-12 NOTE — ASSESSMENT & PLAN NOTE
This is a chronic problem  Continue insulin  Continue lispro  Diabetic diet  Patient has used an insulin pump all his life he has type I he has requested to keep it on is reasonable to allow patient to manage his pump while in hospital  Monitor blood sugars ACHS no known allergies

## 2023-04-12 NOTE — ASSESSMENT & PLAN NOTE
This is a chronic problem  Hold hypertension medicines given patient's hypotension  Cardiology consult

## 2023-04-12 NOTE — ASSESSMENT & PLAN NOTE
[facility]      OUTPATIENT PHYSICAL THERAPY EVALUATION  PLAN OF TREATMENT FOR OUTPATIENT REHABILITATION  (COMPLETE FOR INITIAL CLAIMS ONLY)  Patient's Last Name, First Name, M.I.  YOB: 1976  Katherine Villalpando                        Provider's Name  [facility] Medical Record No.  [id number]                               Onset Date:  04/18/04/19/19, Certification date to: 04/19/19    Referring Physician: Dr. Chung MD            Initial Assessment        See Physical Therapy evaluation dated 04/19/19 in Epic electronic health record.   Patient has had increased falls at EP  Call for assist and bed alarm  No obvious injury from falls today  Patient states he lost his balance  States balance off from his previous stroke

## 2023-04-12 NOTE — ED PROVIDER NOTES
[facility] EMERGENCY DEPT  EMERGENCY DEPARTMENT ENCOUNTER      Pt Name: Nate Andres  MRN: [id number]  [place] 1965  Date of evaluation: 4/11/2023  Provider: Alma Delia Bolton MD    CHIEF COMPLAINT       Chief Complaint   Patient presents with    Fall       HPI:  Nate Andres is a 56 y.o. male who presents to the emergency department pt c.o fall, brought from [facility]. Admits to sleeping all day today, says that isn't too unusual, then stood up and lost his balance. Uses and cane and usually has to hold the wall, wasn't holding on this time and fell over . Says scraped rt 3rd digit, mild, no sig bleeding. No finger pain. Denies other injury. No confusion. Bp was low for nurses when checked after. Pt denies n/v/pain/weakness or complaints now. HPI  Per ep nursing and pt  Nursing Notes were reviewed. REVIEW OF SYSTEMS    (2-9 systems for level 4, 10 or more for level 5)     Review of Systems    Except as noted above the remainder of the review of systems was reviewed and negative. PAST MEDICAL HISTORY     Past Medical History:   Diagnosis Date    Depression     Diabetes ([facility] [address]     GERD (gastroesophageal reflux disease)     Hypercholesterolemia     Hypertension     Seizures ([facility] [address]     Stroke [facility])          SURGICAL HISTORY       Past Surgical History:   Procedure Laterality Date    ORTHOPEDIC SURGERY Left 08/30/2018    hip joint    ORTHOPEDIC SURGERY Right     broken knee         CURRENT MEDICATIONS       Previous Medications    ACETAMINOPHEN (TYLENOL) 325 MG TABLET    Take 650 mg by mouth every 4 hours as needed    ASPIRIN 81 MG EC TABLET    Take 81 mg by mouth daily    CETIRIZINE (ZYRTEC) 10 MG TABLET    TAKE 1 TABLET EVERY DAY BY ORAL ROUTE. GABAPENTIN (NEURONTIN) 100 MG CAPSULE    Take 2 capsules by mouth 3 times daily. GABAPENTIN (NEURONTIN) 300 MG CAPSULE    Take 300 mg by mouth 3 times daily.     GLUCOSE-VITAMIN C 4-6 GM-MG CHEW CHEWABLE TABLET    Take 4 tablets by mouth as

## 2023-04-12 NOTE — PROGRESS NOTES
0700- Assumed care of patient from off going nurse. 0930- Report called to nurse at EP.     1000- iv and tele removed for discharge. 1030- Patient discharged back to  via [facility] all belongings with patient.
[facility] care of pt from ED nurse Vikas Stover RN. Pt is A&Ox4 with no c/o at this time. Initial assessment performed. Pt currently wearing insulin pump, required pump management document signed. 0330 Pt up to restroom. States he cannot urinate with the primofit. Voided 900 mL.    0425 [id number]. Pt given ice cream per request.    Bedside and Verbal shift change report given to WILMA Bajwa LPN (oncoming nurse) by Shay Hector RN  (offgoing nurse). Report included the following information Nurse Handoff Report, Intake/Output, MAR, and Recent Results.
Automatic Reconciliation   rosuvastatin (CRESTOR) 10 MG tablet Take 1 tablet by mouth 1/3/22   Ar Automatic Reconciliation   gabapentin (NEURONTIN) 100 MG capsule Take 2 capsules by mouth 3 times daily.  2/7/22 4/12/23  Ar Automatic Reconciliation   glucose-vitamin C 4-6 GM-MG CHEW chewable tablet Take 4 tablets by mouth as needed  4/12/23  Ar Automatic Reconciliation   insulin aspart (NOVOLOG) 100 UNIT/ML injection pen Inject into the skin as needed Run at a basal rate of 0.55 units / hour with insulin pump  4/12/23  Ar Automatic Reconciliation   metoprolol succinate (TOPROL XL) 25 MG extended release tablet Take 12.5 mg by mouth daily 1/18/22 4/12/23  Ar Automatic Reconciliation       Pamela Espana [facility]

## 2023-04-12 NOTE — H&P
History and Physical      Subjective:     Rissa Almendarez is a 56 y.o. male  has a past medical history of Depression, Diabetes ([address] GERD (gastroesophageal reflux disease), Hypercholesterolemia, Hypertension, Seizures ([address] and Stroke ([address] Patient seen at bedside. Patient comes from [facility] for a complaint of fall with hypotension patient has a history of diabetes patient was found to have acute kidney injury and is being asked to be admitted by the hospitalist group for observation for monitoring after his fall and for his hypotension. Patient states that he walks with a walker due to being off balance since his prior stroke and he was walking and got off balance and hit his finger on the doorway. Patient also has a garbled speech and he states that he has this since his stroke also this is not new, his speech is understandable. I will place a cardiology consult secondary to his hypotension and continue IV fluid from bolus he had in the ED at 100 mL/h. Per records from EP patient has had DNR. Patient reportedly had a fall and hit his right hand also given IV fluids before coming to the floor I would like a head CT and a chest x-ray I will order them stat.       Past Medical History:   Diagnosis Date    Depression     Diabetes ([address]     GERD (gastroesophageal reflux disease)     Hypercholesterolemia     Hypertension     Seizures ([address]     Stroke [facility])       Past Surgical History:   Procedure Laterality Date    ORTHOPEDIC SURGERY Left 2018    hip joint    ORTHOPEDIC SURGERY Right     broken knee     Family History   Problem Relation Age of Onset    Cancer Father         prostate    Cancer Maternal Uncle         prostate    Cancer Paternal Uncle         prostate      Social History     Tobacco Use    Smoking status: Never    Smokeless tobacco: Current    Tobacco comments:     Quit smokin+/day   Substance Use Topics    Alcohol use: Not Currently       Prior to Admission

## 2023-04-12 NOTE — CARE COORDINATION
Case Management Assessment  Initial Evaluation    Date/Time of Evaluation: 4/12/2023 3:24 PM  Assessment Completed by: Elver Helms RN    If patient is discharged prior to next notation, then this note serves as note for discharge by case management. Patient Name: Vince Garrido                   YOB: 1965  Diagnosis: Acute kidney insufficiency [N28.9]  JIGNESH (acute kidney injury) ([facility] [address] [N17.9]  Acute kidney injury ([facility] [address] [N17.9]  Fall from bed, initial encounter [W06. XXXA]                   Date / Time: 4/11/2023  7:52 PM    Patient Admission Status: Observation   Readmission Risk (Low < 19, Mod (19-27), High > 27): No data recorded  Current PCP: PROVIDER UNKNOWN, AGPCNP  PCP verified by CM? Yes    Chart Reviewed: Yes      History Provided by: Patient  Patient Orientation: Alert and Oriented    Patient Cognition: Alert    Hospitalization in the last 30 days (Readmission):  No    If yes, Readmission Assessment in CM Navigator will be completed. Advance Directives:      Code Status: Prior   Patient's Primary Decision Maker is: Legal Next of Kin    Primary Decision Maker: Gilles Rose  [place] - [phone]    Discharge Planning:    Patient lives with: (P) Other (Comment) ([address] [place] [address]) Type of Home: (P) Long-Term Acute Care  Primary Care Giver: Other (Comment) ([address] [place] [address])  Patient Support Systems include: Parent   Current Financial resources: Medicare  Current community resources: None  Current services prior to admission: (P) None            Current DME:              Type of Home Care services:  (P) None    ADLS  Prior functional level: (P) Independent in ADLs/IADLs  Current functional level: (P) Independent in ADLs/IADLs    PT AM-PAC:   /24  OT AM-PAC:   /24    Family can provide assistance at DC: No  Would you like Case Management to discuss the discharge plan with any other family members/significant others, and if so, who?  No  Plans to Return to Present Housing: Yes  Other

## 2023-04-14 PROBLEM — J18.9 PNA (PNEUMONIA): Status: RESOLVED | Noted: 2021-12-24 | Resolved: 2023-04-14

## 2023-04-14 PROBLEM — I95.9 HYPOTENSION: Status: RESOLVED | Noted: 2023-01-01 | Resolved: 2023-01-01

## 2023-04-14 PROBLEM — N17.9 ACUTE KIDNEY FAILURE (HCC): Status: RESOLVED | Noted: 2022-01-29 | Resolved: 2023-04-14

## 2023-04-15 NOTE — PROGRESS NOTES
Problem: Pressure Injury - Risk of  Goal: *Prevention of pressure injury  Description: Document Giuliano Scale and appropriate interventions in the flowsheet. 1/3/2022 0949 by Ameya Stafford  Outcome: Resolved/Met  1/3/2022 0713 by Ameya Stafford  Outcome: Progressing Towards Goal  Note: Pressure Injury Interventions:  Sensory Interventions: Assess changes in LOC,Minimize linen layers    Moisture Interventions: Absorbent underpads,Minimize layers    Activity Interventions: Increase time out of bed    Mobility Interventions: Float heels,HOB 30 degrees or less    Nutrition Interventions: Document food/fluid/supplement intake,Offer support with meals,snacks and hydration    Friction and Shear Interventions: Minimize layers,Transferring/repositioning devices                Problem: Patient Education: Go to Patient Education Activity  Goal: Patient/Family Education  1/3/2022 0949 by Ameya Stafford  Outcome: Resolved/Met  1/3/2022 0713 by Ameya Stafford  Outcome: Progressing Towards Goal     Problem: DKA: Day 1  Goal: Discharge Planning  1/3/2022 0949 by Ameya Stafford  Outcome: Resolved/Met  1/3/2022 0713 by Ameya Stafford  Outcome: Progressing Towards Goal     Problem: Falls - Risk of  Goal: *Absence of Falls  Description: Document Smitarusty Cockayne Fall Risk and appropriate interventions in the flowsheet.   1/3/2022 0949 by Ameya Stafford  Outcome: Resolved/Met  1/3/2022 0713 by Ameya Stafford  Outcome: Progressing Towards Goal  Note: Fall Risk Interventions:  Mobility Interventions: Bed/chair exit alarm,Patient to call before getting OOB,Utilize walker, cane, or other assistive device    Mentation Interventions: Bed/chair exit alarm    Medication Interventions: Bed/chair exit alarm,Patient to call before getting OOB    Elimination Interventions: Call light in reach,Toileting schedule/hourly rounds    History of Falls Interventions: Room close to nurse's station Problem: Patient Education: Go to Patient Education Activity  Goal: Patient/Family Education  1/3/2022 0949 by Titi Fraser  Outcome: Resolved/Met  1/3/2022 0713 by Titi Fraser  Outcome: Progressing Towards Goal     Problem:  Activity Intolerance  Goal: *Oxygen saturation during activity within specified parameters  1/3/2022 0949 by Titi Fraser  Outcome: Resolved/Met  1/3/2022 0713 by Titi Fraser  Outcome: Progressing Towards Goal  Goal: *Able to remain out of bed as prescribed  1/3/2022 0949 by Titi Fraser  Outcome: Resolved/Met  1/3/2022 0713 by Titi Fraser  Outcome: Progressing Towards Goal     Problem: Patient Education: Go to Patient Education Activity  Goal: Patient/Family Education  1/3/2022 0949 by Titi Fraser  Outcome: Resolved/Met  1/3/2022 0713 by Titi Fraser  Outcome: Progressing Towards Goal     Problem: Nutrition Deficit  Goal: *Optimize nutritional status  1/3/2022 0949 by Titi Fraser  Outcome: Resolved/Met  1/3/2022 0713 by Titi Fraser  Outcome: Progressing Towards Goal     Problem: General Medical Care Plan  Goal: *Vital signs within specified parameters  1/3/2022 0949 by Titi Fraser  Outcome: Resolved/Met  1/3/2022 0713 by Titi Fraser  Outcome: Progressing Towards Goal  Goal: *Labs within defined limits  1/3/2022 0949 by Titi Fraser  Outcome: Resolved/Met  1/3/2022 0713 by Titi Fraser  Outcome: Progressing Towards Goal  Goal: *Absence of infection signs and symptoms  1/3/2022 0949 by Titi Fraser  Outcome: Resolved/Met  1/3/2022 0713 by Titi Fraser  Outcome: Progressing Towards Goal  Goal: *Optimal pain control at patient's stated goal  1/3/2022 0949 by Titi Fraser  Outcome: Resolved/Met  1/3/2022 0713 by Titi Fraser  Outcome: Progressing Towards Goal  Goal: *Skin integrity maintained  1/3/2022 0949 by Ligia Almonte Herminia  Outcome: Resolved/Met  1/3/2022 0713 by Lyndsey Host  Outcome: Progressing Towards Goal  Goal: *Fluid volume balance  1/3/2022 0949 by Lyndsey Host  Outcome: Resolved/Met  1/3/2022 0713 by Lyndsey Host  Outcome: Progressing Towards Goal  Goal: *Optimize nutritional status  1/3/2022 0949 by Lyndsey Host  Outcome: Resolved/Met  1/3/2022 0713 by Lyndsey Host  Outcome: Progressing Towards Goal  Goal: *Anxiety reduced or absent  1/3/2022 0949 by Lyndsey Host  Outcome: Resolved/Met  1/3/2022 0713 by Lyndsey Host  Outcome: Progressing Towards Goal  Goal: *Progressive mobility and function (eg: ADL's)  1/3/2022 0949 by Lyndsey Host  Outcome: Resolved/Met  1/3/2022 0713 by Lyndsey Host  Outcome: Progressing Towards Goal     Problem: Patient Education: Go to Patient Education Activity  Goal: Patient/Family Education  1/3/2022 0949 by Lyndsey Host  Outcome: Resolved/Met  1/3/2022 0713 by Lyndsey Host  Outcome: Progressing Towards Goal     Problem: Patient Education: Go to Patient Education Activity  Goal: Patient/Family Education  1/3/2022 0949 by Lyndsey Host  Outcome: Resolved/Met  1/3/2022 0713 by Lyndsey Host  Outcome: Progressing Towards Goal     Problem: Risk for Spread of Infection  Goal: Prevent transmission of infectious organism to others  Description: Prevent the transmission of infectious organisms to other patients, staff members, and visitors.   1/3/2022 0949 by Lyndsey Host  Outcome: Resolved/Met  1/3/2022 0713 by Lyndsey Host  Outcome: Progressing Towards Goal     Problem: Patient Education:  Go to Education Activity  Goal: Patient/Family Education  1/3/2022 0949 by Lyndsey Host  Outcome: Resolved/Met  1/3/2022 0713 by Lyndsey Host  Outcome: Progressing Towards Goal no

## 2023-04-20 NOTE — PROGRESS NOTES
Jb Silva (: 1965) is a 56 y.o. male seen at [facility] on 22 for the following chief concern(s):  Chronic condition management    This is an electronic transcription of a paper note. ASSESSMENT/PLAN:  1. Type 1 diabetes mellitus with hyperglycemia (HCC)  2. Impaired mobility and ADLs      Based on review of chart, interview/exam, pt appears medically stable at this time. Continue current care plan for management of chronic medical conditions, including shared care w/ Endocrinology for management of diabetes mellitus. Also, we reviewed Gabapentin dosing in the setting of hx encephalopathy, fall risk and renal function; pt agrees w/ gentle down-titration of his Gabapentin which is currently written for 800mg TID. SUBJECTIVE/OBJECTIVE:  Karis Butterfield is a 56year old gentleman with past medical history significant for uncontrolled DMII complicated by polyneuropathy, hypertension, syncope w/ collapse, anxiety, encephalopathy, impaired mobility, malnutrition, frailty. Per nursing, there are no acute concerns, pt at baseline- sleeps a lot during the day. Per pt, he was resting but awoke to voice; he has no health questions/concerns. Insulin pump has been beeping at times- per pt needs to be calibrated. BG reads range 200-300's, x170's.     Past Medical History:   Diagnosis Date    Depression     Diabetes ([address]     GERD (gastroesophageal reflux disease)     Hypercholesterolemia     Hypertension     Seizures [address]     Stroke [facility])      Past Surgical History:   Procedure Laterality Date    HX ORTHOPAEDIC Left 2018    hip joint    HX ORTHOPAEDIC Right     broken knee     Family History   Problem Relation Age of Onset    Cancer Father         prostate    Cancer Maternal Uncle         prostate    Cancer Paternal Uncle         prostate       Social History     Socioeconomic History    Marital status:      Spouse name: Not on file    Number of children: Not on file    Years of education: Not on file    Highest education level: Not on file   Occupational History    Not on file   Tobacco Use    Smoking status: Never    Smokeless tobacco: Current    Tobacco comments:     5+/day   Vaping Use    Vaping Use: Never used   Substance and Sexual Activity    Alcohol use: Not Currently    Drug use: Never    Sexual activity: Not Currently     Partners: Female   Other Topics Concern    Not on file   Social History Narrative    Not on file     Social Determinants of Health     Financial Resource Strain: Not on file   Food Insecurity: Not on file   Transportation Needs: Not on file   Physical Activity: Not on file   Stress: Not on file   Social Connections: Not on file   Intimate Partner Violence: Not on file   Housing Stability: Not on file     Social History     Tobacco Use   Smoking Status Never   Smokeless Tobacco Current   Tobacco Comments    5+/day       Current Outpatient Medications   Medication Sig Dispense Refill    insulin lispro (HumaLOG U-100 Insulin) 100 unit/mL cartridge by SubCUTAneous route. Use via insulin pump at basal rate of 0.550 units per hour      glucose (TRUEplus Glucose) 4 gram chewable tablet Take 4 Tablets by mouth as needed. For blood sugar less than 55      gabapentin (NEURONTIN) 100 mg capsule Take 2 Capsules by mouth three (3) times daily. acetaminophen (TylenoL) 325 mg tablet Take 650 mg by mouth every four (4) hours as needed for Pain or Fever. loperamide (IMODIUM) 2 mg capsule Take 4 mg by mouth every four (4) hours as needed for Diarrhea.      metoprolol succinate (TOPROL-XL) 25 mg XL tablet Take 0.5 Tablets by mouth daily. 60 Tablet 0    insulin aspart U-100 (NovoLOG Flexpen U-100 Insulin) 100 unit/mL (3 mL) inpn by SubCUTAneous route as needed.  SLIDING SCALE INSTRUCTIONS FROM [facility]  250-310= 2 units  311-370= 4 units  371-430= 6 units  431-490= 8 units  491-550= 10 units  551-610= 12 units      rosuvastatin (CRESTOR) 10 mg tablet Take 1 Tablet by mouth nightly. 30 Tablet 5    aspirin delayed-release 81 mg tablet Take 1 Tablet by mouth daily. 30 Tablet 5    gabapentin (NEURONTIN) 300 mg capsule Take 1 Capsule by mouth three (3) times daily. Max Daily Amount: 900 mg. (Patient taking differently: Take 600 mg by mouth three (3) times daily.) 90 Capsule 1    cetirizine (ZYRTEC) 10 mg tablet TAKE 1 TABLET EVERY DAY BY ORAL ROUTE.  90 Tab 0     No Known Allergies    11/9/22 Vitals: T: 97.4, HR: 55, BP: 136/76, RR: 16, O2: 98% on RA    Physical Exam  Constitutional:       General: He is not in acute distress. Appearance: Normal appearance. He is not ill-appearing (chronically) or toxic-appearing. Comments: Frail, cachectic appearing. Seated in his bed. Cardiovascular:      Rate and Rhythm: Normal rate and regular rhythm. Heart sounds: Normal heart sounds. Pulmonary:      Effort: No respiratory distress. Breath sounds: Normal breath sounds. Abdominal:      General: Bowel sounds are normal.      Palpations: Abdomen is soft. Tenderness: There is no abdominal tenderness. Skin:     General: Skin is warm and dry. Neurological:      Mental Status: He is alert. Mental status is at baseline. Psychiatric:         Mood and Affect: Mood normal.      Comments: Calm, pleasant, cooperative.      Lab Results   Component Value Date/Time    WBC 13.5 (H) 03/09/2022 08:47 PM    HGB 9.9 (L) 03/09/2022 08:47 PM    HCT 32.3 (L) 03/09/2022 08:47 PM    PLATELET 617 71/88/4338 08:47 PM    MCV 96.1 03/09/2022 08:47 PM     Lab Results   Component Value Date/Time    Sodium 139 04/22/2022 07:25 AM    Potassium 5.1 04/22/2022 07:25 AM    Chloride 103 04/22/2022 07:25 AM    CO2 25 04/22/2022 07:25 AM    Anion gap 11 04/22/2022 07:25 AM    Glucose 369 (H) 04/22/2022 07:25 AM    BUN 36 (H) 04/22/2022 07:25 AM    Creatinine 1.70 (H) 04/22/2022 07:25 AM    BUN/Creatinine ratio 21 04/22/2022 07:25 AM    GFR est AA 51 04/22/2022 07:25 AM    GFR est non-AA 42 04/22/2022 07:25 AM    Calcium 9.0 04/22/2022 07:25 AM    Bilirubin, total 0.6 03/09/2022 08:47 PM    Alk. phosphatase 81 03/09/2022 08:47 PM    Protein, total 6.5 03/09/2022 08:47 PM    Albumin 3.5 03/09/2022 08:47 PM    Globulin 3.0 03/09/2022 08:47 PM    A-G Ratio 1.2 03/09/2022 08:47 PM    ALT (SGPT) 29 03/09/2022 08:47 PM    AST (SGOT) 24 03/09/2022 08:47 PM     Lab Results   Component Value Date/Time    Cholesterol, total 95 12/27/2021 03:46 AM    HDL Cholesterol 47 12/27/2021 03:46 AM    LDL-C, External 74 02/26/2021 12:00 AM    LDL, calculated 38.2 12/27/2021 03:46 AM    VLDL, calculated 9.8 12/27/2021 03:46 AM    Triglyceride 49 12/27/2021 03:46 AM    CHOL/HDL Ratio 2.0 12/27/2021 03:46 AM       On 11/09/22 rounds performed w/ charge nurse. I spent >25 minutes reviewing previous notes, test results and face to face with the patient for interview/exam, discussing working diagnosis and treatment plan as well as care coordination with nursing supervisor, staff. Medical decision making complexity: moderate-high.     Elana Rowell MD   Family & Geriatric Medicine

## 2023-05-02 LAB — HBA1C MFR BLD HPLC: 8.9 %

## 2023-06-15 ENCOUNTER — OFFICE VISIT (OUTPATIENT)
Dept: FAMILY MEDICINE CLINIC | Facility: CLINIC | Age: 58
End: 2023-06-15
Payer: MEDICARE

## 2023-06-15 VITALS
OXYGEN SATURATION: 99 % | SYSTOLIC BLOOD PRESSURE: 90 MMHG | RESPIRATION RATE: 18 BRPM | HEART RATE: 72 BPM | DIASTOLIC BLOOD PRESSURE: 56 MMHG | TEMPERATURE: 97.7 F

## 2023-06-15 DIAGNOSIS — Z96.41 DIABETES MELLITUS TYPE 1, WITH COMPLICATION, ON LONG TERM INSULIN PUMP (HCC): ICD-10-CM

## 2023-06-15 DIAGNOSIS — E46 MALNUTRITION, UNSPECIFIED TYPE (HCC): ICD-10-CM

## 2023-06-15 DIAGNOSIS — G62.9 PERIPHERAL POLYNEUROPATHY: ICD-10-CM

## 2023-06-15 DIAGNOSIS — E43 UNSPECIFIED SEVERE PROTEIN-CALORIE MALNUTRITION (HCC): ICD-10-CM

## 2023-06-15 DIAGNOSIS — I10 ESSENTIAL HYPERTENSION: Primary | ICD-10-CM

## 2023-06-15 DIAGNOSIS — F41.8 MIXED ANXIETY AND DEPRESSIVE DISORDER: ICD-10-CM

## 2023-06-15 DIAGNOSIS — E10.8 DIABETES MELLITUS TYPE 1, WITH COMPLICATION, ON LONG TERM INSULIN PUMP (HCC): ICD-10-CM

## 2023-06-15 PROBLEM — E11.10 DKA (DIABETIC KETOACIDOSIS) (HCC): Status: ACTIVE | Noted: 2021-12-24

## 2023-06-15 PROBLEM — J18.9 HCAP (HEALTHCARE-ASSOCIATED PNEUMONIA): Status: ACTIVE | Noted: 2018-01-10

## 2023-06-15 PROBLEM — E10.649 UNCONTROLLED TYPE 1 DIABETES MELLITUS WITH HYPOGLYCEMIA WITHOUT COMA (HCC): Status: ACTIVE | Noted: 2018-01-11

## 2023-06-15 PROBLEM — E44.0 MALNUTRITION OF MODERATE DEGREE (HCC): Status: ACTIVE | Noted: 2018-01-13

## 2023-06-15 PROCEDURE — 99309 SBSQ NF CARE MODERATE MDM 30: CPT | Performed by: NURSE PRACTITIONER

## 2023-06-15 PROCEDURE — 3046F HEMOGLOBIN A1C LEVEL >9.0%: CPT | Performed by: NURSE PRACTITIONER

## 2023-06-15 RX ORDER — ERGOCALCIFEROL 1.25 MG/1
1 CAPSULE ORAL WEEKLY
COMMUNITY
Start: 2023-04-23

## 2023-06-15 RX ORDER — INSULIN ASPART 100 [IU]/ML
INJECTION, SOLUTION INTRAVENOUS; SUBCUTANEOUS
COMMUNITY

## 2023-06-19 ENCOUNTER — TELEPHONE (OUTPATIENT)
Dept: FAMILY MEDICINE CLINIC | Facility: CLINIC | Age: 58
End: 2023-06-19

## 2023-06-19 NOTE — PROGRESS NOTES
Gurinder Rivero (: 1965) is a 56 y.o. male was seen at [facility] for the federally mandated 30  day visit. Diagnosis   1. Essential hypertension    Given patient's continued decline in weight he  is no longer requiring any medications for his essential hypertension blood pressure 90/56      2 Mixed hyperlipidemia  He continues to tolerate his rosuvastatin 10 mg once daily      3. Malnutrition, unspecified type ([address]    -BMI down to 16%. according to nursing staff he has had decreased p.o. intake. They relay that  he does have a better appetite for lunch and dinner   -he is followed by [facility] for diabetes management currently on an insulin pump     4. Diabetes mellitus type 1, with complication, on long term insulin pump ([address]    Patient is on an insulin pump managed by [facility] endocrinology. The patient's father transports him from [facility] to his doctors appointments with Edgar Scott. There is no recent A1c documented in patient's chart        5. Mixed anxiety and depression  Per nursing staff patient has not had any recent behavioral outburst and seems to be doing relatively well emotionally on the fluoxetine 10 mg once daily. He has been participating in the stronger memory program which consist of practice writing easy math problems and reading. 6. Peripheral neuropathy  Stable on gabapentin 300 mg 3 times daily with an additional gabapentin 100 mg at 1800       SUBJECTIVE/OBJECTIVE:  Overall, pt is feeling well. Acute concerns: No acute concerns per patient. No acute concerns per nursing. No falls. No wounds or skin breakdown. Eating well for lunch and dinner; per staff, pt continues to partake of sugary foods, sweets, and sodas.       Denies fever/chills, chest pain, SOB, abdominal pain, leg swelling     Vitals:    06/15/23 0924   BP: (!) 90/56   Pulse: 72   Resp: 18   Temp: 97.7 °F (36.5 °C)   SpO2: 99%      There is no height or weight on file to

## 2023-08-01 ENCOUNTER — CLINICAL DOCUMENTATION (OUTPATIENT)
Facility: CLINIC | Age: 58
End: 2023-08-01

## 2023-08-01 ENCOUNTER — HOSPITAL ENCOUNTER (EMERGENCY)
Age: 58
Discharge: HOME | End: 2023-08-01
Attending: EMERGENCY MEDICINE
Payer: MEDICARE

## 2023-08-01 ENCOUNTER — TELEPHONE (OUTPATIENT)
Facility: CLINIC | Age: 58
End: 2023-08-01

## 2023-08-01 ENCOUNTER — APPOINTMENT (OUTPATIENT)
Age: 58
End: 2023-08-01
Payer: MEDICARE

## 2023-08-01 VITALS
OXYGEN SATURATION: 100 % | HEART RATE: 75 BPM | BODY MASS INDEX: 14.7 KG/M2 | SYSTOLIC BLOOD PRESSURE: 149 MMHG | TEMPERATURE: 97.5 F | DIASTOLIC BLOOD PRESSURE: 89 MMHG | WEIGHT: 105 LBS | RESPIRATION RATE: 16 BRPM | HEIGHT: 71 IN

## 2023-08-01 DIAGNOSIS — N18.9 CHRONIC RENAL FAILURE, UNSPECIFIED CKD STAGE: ICD-10-CM

## 2023-08-01 DIAGNOSIS — R56.9 SEIZURE-LIKE ACTIVITY (HCC): ICD-10-CM

## 2023-08-01 DIAGNOSIS — U07.1 COVID-19: Primary | ICD-10-CM

## 2023-08-01 LAB
ALBUMIN SERPL-MCNC: 3.9 G/DL (ref 3.4–5)
ALBUMIN/GLOB SERPL: 1.1 (ref 0.8–1.7)
ALP SERPL-CCNC: 123 U/L (ref 45–117)
ALT SERPL-CCNC: 24 U/L (ref 16–61)
ANION GAP SERPL CALC-SCNC: 8 MMOL/L (ref 3–18)
AST SERPL W P-5'-P-CCNC: 24 U/L (ref 10–38)
BASOPHILS # BLD: 0.1 K/UL (ref 0–0.1)
BASOPHILS NFR BLD: 1 % (ref 0–2)
BILIRUB SERPL-MCNC: 0.4 MG/DL (ref 0.2–1)
BUN SERPL-MCNC: 27 MG/DL (ref 7–18)
BUN/CREAT SERPL: 13 (ref 12–20)
CA-I BLD-MCNC: 9.2 MG/DL (ref 8.5–10.1)
CHLORIDE SERPL-SCNC: 103 MMOL/L (ref 100–111)
CO2 SERPL-SCNC: 29 MMOL/L (ref 21–32)
CREAT SERPL-MCNC: 2.02 MG/DL (ref 0.6–1.3)
DIFFERENTIAL METHOD BLD: ABNORMAL
EOSINOPHIL # BLD: 0.2 K/UL (ref 0–0.4)
EOSINOPHIL NFR BLD: 1 % (ref 0–5)
ERYTHROCYTE [DISTWIDTH] IN BLOOD BY AUTOMATED COUNT: 13.4 % (ref 11.6–14.5)
GLOBULIN SER CALC-MCNC: 3.7 G/DL (ref 2–4)
GLUCOSE BLD STRIP.AUTO-MCNC: 198 MG/DL (ref 70–110)
GLUCOSE SERPL-MCNC: 198 MG/DL (ref 74–99)
HCT VFR BLD AUTO: 41.1 % (ref 36–48)
HGB BLD-MCNC: 12.6 G/DL (ref 13–16)
IMM GRANULOCYTES # BLD AUTO: 0 K/UL (ref 0–0.04)
IMM GRANULOCYTES NFR BLD AUTO: 0 % (ref 0–0.5)
LACTATE SERPL-SCNC: 1.3 MMOL/L (ref 0.4–2)
LYMPHOCYTES # BLD: 1.7 K/UL (ref 0.9–3.6)
LYMPHOCYTES NFR BLD: 17 % (ref 21–52)
MAGNESIUM SERPL-MCNC: 2.1 MG/DL (ref 1.6–2.6)
MCH RBC QN AUTO: 28.6 PG (ref 24–34)
MCHC RBC AUTO-ENTMCNC: 30.7 G/DL (ref 31–37)
MCV RBC AUTO: 93.4 FL (ref 78–100)
MONOCYTES # BLD: 0.6 K/UL (ref 0.05–1.2)
MONOCYTES NFR BLD: 6 % (ref 3–10)
NEUTS SEG # BLD: 7.9 K/UL (ref 1.8–8)
NEUTS SEG NFR BLD: 75 % (ref 40–73)
NRBC # BLD: 0 K/UL (ref 0–0.01)
NRBC BLD-RTO: 0 PER 100 WBC
PERFORMED BY:: ABNORMAL
PLATELET # BLD AUTO: 193 K/UL (ref 135–420)
PMV BLD AUTO: 11.8 FL (ref 9.2–11.8)
POTASSIUM SERPL-SCNC: 4 MMOL/L (ref 3.5–5.5)
PROT SERPL-MCNC: 7.6 G/DL (ref 6.4–8.2)
RBC # BLD AUTO: 4.4 M/UL (ref 4.35–5.65)
SODIUM SERPL-SCNC: 140 MMOL/L (ref 136–145)
TROPONIN I SERPL HS-MCNC: 10 NG/L (ref 0–78)
WBC # BLD AUTO: 10.4 K/UL (ref 4.6–13.2)

## 2023-08-01 PROCEDURE — 84484 ASSAY OF TROPONIN QUANT: CPT

## 2023-08-01 PROCEDURE — 85025 COMPLETE CBC W/AUTO DIFF WBC: CPT

## 2023-08-01 PROCEDURE — 99284 EMERGENCY DEPT VISIT MOD MDM: CPT

## 2023-08-01 PROCEDURE — 80053 COMPREHEN METABOLIC PANEL: CPT

## 2023-08-01 PROCEDURE — 70450 CT HEAD/BRAIN W/O DYE: CPT

## 2023-08-01 PROCEDURE — 36415 COLL VENOUS BLD VENIPUNCTURE: CPT

## 2023-08-01 PROCEDURE — 83735 ASSAY OF MAGNESIUM: CPT

## 2023-08-01 PROCEDURE — 71045 X-RAY EXAM CHEST 1 VIEW: CPT

## 2023-08-01 PROCEDURE — 82962 GLUCOSE BLOOD TEST: CPT

## 2023-08-01 PROCEDURE — 83605 ASSAY OF LACTIC ACID: CPT

## 2023-08-01 ASSESSMENT — PAIN SCALES - GENERAL
PAINLEVEL_OUTOF10: 0

## 2023-08-01 ASSESSMENT — LIFESTYLE VARIABLES
HOW OFTEN DO YOU HAVE A DRINK CONTAINING ALCOHOL: NEVER
HOW MANY STANDARD DRINKS CONTAINING ALCOHOL DO YOU HAVE ON A TYPICAL DAY: PATIENT DOES NOT DRINK

## 2023-08-01 ASSESSMENT — PAIN - FUNCTIONAL ASSESSMENT
PAIN_FUNCTIONAL_ASSESSMENT: 0-10

## 2023-08-01 NOTE — TELEPHONE ENCOUNTER
Merary White at [facility] called and states that they have patient on the skilled unit temporarily due to outbreak. He was negative for Covid. States they had him standing on the scale getting his weight and he just got really stiff and was staring into one spot and after the 7 second episode he held left side of his head and was somewhat unresponsive and then came around and said he was fine, assisted to a chair. Did not fall. Patient had no recollection of the episode. Nurse states it was like a seizure episode. Vitals checked and noted 141/80, 97.6, 71, resp. 18 and 99% on room air and blood sugar 124. She states they are just housing him until the Covid outbreak is over. Not being followed by hospitalist. Alert and oriented x 4. Talking and watching [phone].

## 2023-08-01 NOTE — ED NOTES
Patient has insulin pump in place and blood sugar reader. Blood sugar 110.      Justa Dorsey RN  08/01/23 5731

## 2023-08-02 ENCOUNTER — CARE COORDINATION (OUTPATIENT)
Facility: CLINIC | Age: 58
End: 2023-08-02

## 2023-08-02 ENCOUNTER — HOSPITAL ENCOUNTER (EMERGENCY)
Age: 58
Discharge: HOME | End: 2023-08-02
Attending: INTERNAL MEDICINE
Payer: MEDICARE

## 2023-08-02 VITALS
SYSTOLIC BLOOD PRESSURE: 115 MMHG | RESPIRATION RATE: 18 BRPM | HEIGHT: 71 IN | WEIGHT: 104 LBS | DIASTOLIC BLOOD PRESSURE: 87 MMHG | BODY MASS INDEX: 14.56 KG/M2 | TEMPERATURE: 98.2 F | HEART RATE: 88 BPM | OXYGEN SATURATION: 100 %

## 2023-08-02 DIAGNOSIS — E16.2 HYPOGLYCEMIA: Primary | ICD-10-CM

## 2023-08-02 DIAGNOSIS — U07.1 COVID-19: ICD-10-CM

## 2023-08-02 LAB
ALBUMIN SERPL-MCNC: 3.5 G/DL (ref 3.4–5)
ALBUMIN/GLOB SERPL: 1 (ref 0.8–1.7)
ALP SERPL-CCNC: 107 U/L (ref 45–117)
ALT SERPL-CCNC: 19 U/L (ref 16–61)
ANION GAP SERPL CALC-SCNC: 9 MMOL/L (ref 3–18)
AST SERPL W P-5'-P-CCNC: 20 U/L (ref 10–38)
BASOPHILS # BLD: 0.1 K/UL (ref 0–0.1)
BASOPHILS NFR BLD: 1 % (ref 0–2)
BILIRUB SERPL-MCNC: 0.5 MG/DL (ref 0.2–1)
BUN SERPL-MCNC: 27 MG/DL (ref 7–18)
BUN/CREAT SERPL: 13 (ref 12–20)
CA-I BLD-MCNC: 8.9 MG/DL (ref 8.5–10.1)
CHLORIDE SERPL-SCNC: 108 MMOL/L (ref 100–111)
CO2 SERPL-SCNC: 27 MMOL/L (ref 21–32)
CREAT SERPL-MCNC: 2.16 MG/DL (ref 0.6–1.3)
DIFFERENTIAL METHOD BLD: ABNORMAL
EOSINOPHIL # BLD: 0.2 K/UL (ref 0–0.4)
EOSINOPHIL NFR BLD: 2 % (ref 0–5)
ERYTHROCYTE [DISTWIDTH] IN BLOOD BY AUTOMATED COUNT: 13.5 % (ref 11.6–14.5)
GLOBULIN SER CALC-MCNC: 3.4 G/DL (ref 2–4)
GLUCOSE BLD STRIP.AUTO-MCNC: 132 MG/DL (ref 70–110)
GLUCOSE BLD STRIP.AUTO-MCNC: 213 MG/DL (ref 70–110)
GLUCOSE BLD STRIP.AUTO-MCNC: 66 MG/DL (ref 70–110)
GLUCOSE SERPL-MCNC: 53 MG/DL (ref 74–99)
HCT VFR BLD AUTO: 37.9 % (ref 36–48)
HGB BLD-MCNC: 11.8 G/DL (ref 13–16)
IMM GRANULOCYTES # BLD AUTO: 0 K/UL (ref 0–0.04)
IMM GRANULOCYTES NFR BLD AUTO: 0 % (ref 0–0.5)
LACTATE SERPL-SCNC: 1.5 MMOL/L (ref 0.4–2)
LYMPHOCYTES # BLD: 1.7 K/UL (ref 0.9–3.6)
LYMPHOCYTES NFR BLD: 22 % (ref 21–52)
MCH RBC QN AUTO: 29 PG (ref 24–34)
MCHC RBC AUTO-ENTMCNC: 31.1 G/DL (ref 31–37)
MCV RBC AUTO: 93.1 FL (ref 78–100)
MONOCYTES # BLD: 0.7 K/UL (ref 0.05–1.2)
MONOCYTES NFR BLD: 9 % (ref 3–10)
NEUTS SEG # BLD: 5.2 K/UL (ref 1.8–8)
NEUTS SEG NFR BLD: 66 % (ref 40–73)
NRBC # BLD: 0 K/UL (ref 0–0.01)
NRBC BLD-RTO: 0 PER 100 WBC
PERFORMED BY:: ABNORMAL
PLATELET # BLD AUTO: 170 K/UL (ref 135–420)
PMV BLD AUTO: 12.1 FL (ref 9.2–11.8)
POTASSIUM SERPL-SCNC: 3.7 MMOL/L (ref 3.5–5.5)
PROT SERPL-MCNC: 6.9 G/DL (ref 6.4–8.2)
RBC # BLD AUTO: 4.07 M/UL (ref 4.35–5.65)
SODIUM SERPL-SCNC: 144 MMOL/L (ref 136–145)
WBC # BLD AUTO: 7.8 K/UL (ref 4.6–13.2)

## 2023-08-02 PROCEDURE — 99283 EMERGENCY DEPT VISIT LOW MDM: CPT

## 2023-08-02 PROCEDURE — 82962 GLUCOSE BLOOD TEST: CPT

## 2023-08-02 PROCEDURE — 83605 ASSAY OF LACTIC ACID: CPT

## 2023-08-02 PROCEDURE — 85025 COMPLETE CBC W/AUTO DIFF WBC: CPT

## 2023-08-02 PROCEDURE — 80053 COMPREHEN METABOLIC PANEL: CPT

## 2023-08-02 ASSESSMENT — ENCOUNTER SYMPTOMS
ABDOMINAL PAIN: 0
NAUSEA: 0
COUGH: 0
SHORTNESS OF BREATH: 0
VOMITING: 0
TROUBLE SWALLOWING: 0

## 2023-08-02 ASSESSMENT — PAIN SCALES - GENERAL
PAINLEVEL_OUTOF10: 0
PAINLEVEL_OUTOF10: 0

## 2023-08-02 ASSESSMENT — LIFESTYLE VARIABLES
HOW MANY STANDARD DRINKS CONTAINING ALCOHOL DO YOU HAVE ON A TYPICAL DAY: PATIENT DOES NOT DRINK
HOW OFTEN DO YOU HAVE A DRINK CONTAINING ALCOHOL: NEVER

## 2023-08-02 ASSESSMENT — PAIN - FUNCTIONAL ASSESSMENT
PAIN_FUNCTIONAL_ASSESSMENT: 0-10
PAIN_FUNCTIONAL_ASSESSMENT: 0-10

## 2023-08-02 NOTE — ED NOTES
Patient POC glucose checked with result of 198. Glucometer not uploading results. Lab called and made aware.      [address] [facility] [address] [address], [address] [place] [address]  08/01/23 0002

## 2023-08-02 NOTE — ED PROVIDER NOTES
[facility] EMERGENCY DEPT  EMERGENCY DEPARTMENT ENCOUNTER       Pt Name: Nina Mcfadden  MRN: [id number]  [address] [facility] 1965  Date of evaluation: 8/1/2023  Provider: Merlin Manual, MD   PCP: PROVIDER UNKNOWN  Note Started: 6:36 PM 8/1/23     CHIEF COMPLAINT       Chief Complaint   Patient presents with    Other     Reported seizure like activity per staff. Patient arrived AAOx4, has no complaints. Talkative and oriented. HISTORY OF PRESENT ILLNESS: 1 or more elements      History From: Patient and Caregiver  History limited by: Nothing     Nina Mcfadden is a 56 y.o. male who presents to the ED escorted from EP by caregivers. I was called by patient's provider Dr. Vilma Campos who advised caregivers at EP noted seizure-like activity on patient. Patient also tested positive for COVID today. She advised for patient to be evaluated for possible seizure or episode of unresponsiveness and if medically cleared, recommended patient be discharged to EP \"hot unit\". At ED arrival, patient reports these episodes where he is unaware of what is going on, sometimes falls, he reports he has been evaluated in the past and advised episodes may be related to his diabetes. Patient denies history of seizures. He states he takes gabapentin for his neuropathy which she reports is fairly bad. He also reports he is in EP because of his blood sugar, is on insulin pump which she states is working well and he looked at his blood sugar which was 122. He is aware he tested positive for COVID today, states he just feels weak but denies any headache, fever, chills, chest pain or shortness of breath. Nursing Notes were all reviewed and agreed with or any disagreements were addressed in the HPI. REVIEW OF SYSTEMS      Review of Systems     Positives and Pertinent negatives as per HPI.     PAST HISTORY     Past Medical History:  Past Medical History:   Diagnosis Date    Depression     Diabetes ([address])     GERD

## 2023-08-02 NOTE — ED NOTES
I have reviewed discharge instructions with the patient and caregiver. The patient and caregiver verbalized understanding. Patient escorted back to [facility] via stretcher and no distress noted.      [id number] Ro[address] King  08/01/23 5482

## 2023-08-02 NOTE — ED NOTES
Patient assisted back to bed from bedside commode. Bowel movement noted.       Prakash Reyes RN  08/02/23 1633       801 Sugar Vanegas RN  08/02/23 1890

## 2023-08-02 NOTE — ED NOTES
MD made aware of blood glucose level of 66. Patient provided with cola per MD willett.       Bird Reyes RN  08/02/23 1008

## 2023-08-02 NOTE — ED NOTES
Report called to [facility] and bedside report given. Yahaira received report via phone call. Patient's blood sugar rechecked prior to departure. Patient consumed 75% of meal.  Discharge instructions provided to nursing staff and patient. Patient in stable condition at time of departure.       Sherita Reyes RN  08/02/23 7472

## 2023-08-02 NOTE — ED TRIAGE NOTES
Patient arrives via stretcher escorted by EP nurses to ER for c/o witnessed fall without head injury. EP nurse states that patient had BG of 45 and received 4 glucose tabs prior to arrival to ER. Patient alert and oriented on arrival.  Denies pain. Denies prior hx of seizures. Patient states that he is a diabetic and is on insulin pump. EP nurses report that patient tested positive for covid last night.

## 2023-08-03 ENCOUNTER — CARE COORDINATION (OUTPATIENT)
Facility: CLINIC | Age: 58
End: 2023-08-03

## 2023-08-06 ENCOUNTER — APPOINTMENT (OUTPATIENT)
Age: 58
End: 2023-08-06
Payer: MEDICARE

## 2023-08-06 ENCOUNTER — HOSPITAL ENCOUNTER (INPATIENT)
Age: 58
LOS: 6 days | Discharge: NURSING, REHAB OR LONG-TERM CARE | End: 2023-08-12
Attending: EMERGENCY MEDICINE | Admitting: INTERNAL MEDICINE
Payer: MEDICARE

## 2023-08-06 DIAGNOSIS — U07.1 COVID-19: ICD-10-CM

## 2023-08-06 DIAGNOSIS — R65.10 SIRS (SYSTEMIC INFLAMMATORY RESPONSE SYNDROME) (HCC): ICD-10-CM

## 2023-08-06 DIAGNOSIS — E10.10 DIABETIC KETOACIDOSIS WITHOUT COMA ASSOCIATED WITH TYPE 1 DIABETES MELLITUS (HCC): Primary | ICD-10-CM

## 2023-08-06 DIAGNOSIS — A04.72 CLOSTRIDIUM DIFFICILE COLITIS: ICD-10-CM

## 2023-08-06 DIAGNOSIS — Z79.4 TYPE 2 DIABETES MELLITUS WITH DIABETIC POLYNEUROPATHY, WITH LONG-TERM CURRENT USE OF INSULIN (HCC): ICD-10-CM

## 2023-08-06 DIAGNOSIS — E11.42 TYPE 2 DIABETES MELLITUS WITH DIABETIC POLYNEUROPATHY, WITH LONG-TERM CURRENT USE OF INSULIN (HCC): ICD-10-CM

## 2023-08-06 DIAGNOSIS — A41.9 SEPTICEMIA (HCC): ICD-10-CM

## 2023-08-06 DIAGNOSIS — N18.4 CKD (CHRONIC KIDNEY DISEASE) STAGE 4, GFR 15-29 ML/MIN (HCC): ICD-10-CM

## 2023-08-06 LAB
ALBUMIN SERPL-MCNC: 4.2 G/DL (ref 3.4–5)
ALBUMIN/GLOB SERPL: 1.2 (ref 0.8–1.7)
ALP SERPL-CCNC: 148 U/L (ref 45–117)
ALT SERPL-CCNC: 22 U/L (ref 16–61)
ANION GAP SERPL CALC-SCNC: 11 MMOL/L (ref 3–18)
ANION GAP SERPL CALC-SCNC: 26 MMOL/L (ref 3–18)
ANION GAP SERPL CALC-SCNC: 9 MMOL/L (ref 3–18)
APPEARANCE UR: CLEAR
ARTERIAL PATENCY WRIST A: YES
AST SERPL W P-5'-P-CCNC: 20 U/L (ref 10–38)
BACTERIA URNS QL MICRO: ABNORMAL /HPF
BASE DEFICIT BLDA-SCNC: 13.2 MMOL/L
BASOPHILS # BLD: 0.1 K/UL (ref 0–0.1)
BASOPHILS NFR BLD: 0 % (ref 0–2)
BDY SITE: ABNORMAL
BILIRUB SERPL-MCNC: 0.7 MG/DL (ref 0.2–1)
BILIRUB UR QL: NEGATIVE
BUN SERPL-MCNC: 37 MG/DL (ref 7–18)
BUN SERPL-MCNC: 42 MG/DL (ref 7–18)
BUN SERPL-MCNC: 45 MG/DL (ref 7–18)
BUN/CREAT SERPL: 13 (ref 12–20)
BUN/CREAT SERPL: 14 (ref 12–20)
BUN/CREAT SERPL: 15 (ref 12–20)
C DIFF GDH STL QL: POSITIVE
C DIFF TOX A+B STL QL IA: POSITIVE
C DIFF TOXIN INTERPRETATION: ABNORMAL
CA-I BLD-MCNC: 7.8 MG/DL (ref 8.5–10.1)
CA-I BLD-MCNC: 7.8 MG/DL (ref 8.5–10.1)
CA-I BLD-MCNC: 9.3 MG/DL (ref 8.5–10.1)
CHLORIDE SERPL-SCNC: 108 MMOL/L (ref 100–111)
CHLORIDE SERPL-SCNC: 108 MMOL/L (ref 100–111)
CHLORIDE SERPL-SCNC: 95 MMOL/L (ref 100–111)
CO2 SERPL-SCNC: 16 MMOL/L (ref 21–32)
CO2 SERPL-SCNC: 22 MMOL/L (ref 21–32)
CO2 SERPL-SCNC: 23 MMOL/L (ref 21–32)
COHGB MFR BLD: 0.3 % (ref 1–2)
COLOR UR: YELLOW
CREAT SERPL-MCNC: 2.71 MG/DL (ref 0.6–1.3)
CREAT SERPL-MCNC: 2.81 MG/DL (ref 0.6–1.3)
CREAT SERPL-MCNC: 3.43 MG/DL (ref 0.6–1.3)
DIFFERENTIAL METHOD BLD: ABNORMAL
EOSINOPHIL # BLD: 0 K/UL (ref 0–0.4)
EOSINOPHIL NFR BLD: 0 % (ref 0–5)
EPITH CASTS URNS QL MICRO: ABNORMAL /LPF (ref 0–20)
ERYTHROCYTE [DISTWIDTH] IN BLOOD BY AUTOMATED COUNT: 13.7 % (ref 11.6–14.5)
FIO2 ON VENT: 21 %
GLOBULIN SER CALC-MCNC: 3.4 G/DL (ref 2–4)
GLUCOSE BLD STRIP.AUTO-MCNC: 159 MG/DL (ref 70–110)
GLUCOSE BLD STRIP.AUTO-MCNC: 163 MG/DL (ref 70–110)
GLUCOSE BLD STRIP.AUTO-MCNC: 166 MG/DL (ref 70–110)
GLUCOSE BLD STRIP.AUTO-MCNC: 179 MG/DL (ref 70–110)
GLUCOSE BLD STRIP.AUTO-MCNC: 184 MG/DL (ref 70–110)
GLUCOSE BLD STRIP.AUTO-MCNC: 207 MG/DL (ref 70–110)
GLUCOSE BLD STRIP.AUTO-MCNC: 267 MG/DL (ref 70–110)
GLUCOSE BLD STRIP.AUTO-MCNC: 351 MG/DL (ref 70–110)
GLUCOSE BLD STRIP.AUTO-MCNC: 410 MG/DL (ref 70–110)
GLUCOSE BLD STRIP.AUTO-MCNC: 473 MG/DL (ref 70–110)
GLUCOSE BLD STRIP.AUTO-MCNC: 512 MG/DL (ref 70–110)
GLUCOSE SERPL-MCNC: 169 MG/DL (ref 74–99)
GLUCOSE SERPL-MCNC: 204 MG/DL (ref 74–99)
GLUCOSE SERPL-MCNC: 552 MG/DL (ref 74–99)
GLUCOSE UR STRIP.AUTO-MCNC: >1000 MG/DL
HCO3 BLDA-SCNC: 12 MMOL/L (ref 22–26)
HCT VFR BLD AUTO: 38 % (ref 36–48)
HGB BLD-MCNC: 11.7 G/DL (ref 13–16)
HGB UR QL STRIP: ABNORMAL
HYALINE CASTS URNS QL MICRO: ABNORMAL /LPF
IMM GRANULOCYTES # BLD AUTO: 0.1 K/UL (ref 0–0.04)
IMM GRANULOCYTES NFR BLD AUTO: 1 % (ref 0–0.5)
KETONES UR QL STRIP.AUTO: 40 MG/DL
LACTATE SERPL-SCNC: 1.5 MMOL/L (ref 0.4–2)
LACTATE SERPL-SCNC: 5 MMOL/L (ref 0.4–2)
LACTATE SERPL-SCNC: 6.2 MMOL/L (ref 0.4–2)
LEUKOCYTE ESTERASE UR QL STRIP.AUTO: ABNORMAL
LYMPHOCYTES # BLD: 1.3 K/UL (ref 0.9–3.6)
LYMPHOCYTES NFR BLD: 9 % (ref 21–52)
MAGNESIUM SERPL-MCNC: 2.9 MG/DL (ref 1.6–2.6)
MCH RBC QN AUTO: 29 PG (ref 24–34)
MCHC RBC AUTO-ENTMCNC: 30.8 G/DL (ref 31–37)
MCV RBC AUTO: 94.3 FL (ref 78–100)
METHGB MFR BLD: 0.1 % (ref 0–1.4)
MONOCYTES # BLD: 0.5 K/UL (ref 0.05–1.2)
MONOCYTES NFR BLD: 4 % (ref 3–10)
NEUTS SEG # BLD: 12.3 K/UL (ref 1.8–8)
NEUTS SEG NFR BLD: 86 % (ref 40–73)
NITRITE UR QL STRIP.AUTO: NEGATIVE
NRBC # BLD: 0 K/UL (ref 0–0.01)
NRBC BLD-RTO: 0 PER 100 WBC
OXYHGB MFR BLD: 96.3 % (ref 95–99)
PCO2 BLDA: 27 MMHG (ref 35–45)
PERFORMED BY:: ABNORMAL
PH BLDA: 7.27 (ref 7.35–7.45)
PH UR STRIP: 5.5 (ref 5–8)
PLATELET # BLD AUTO: 222 K/UL (ref 135–420)
PMV BLD AUTO: 13 FL (ref 9.2–11.8)
PO2 BLDA: 100 MMHG (ref 80–100)
POTASSIUM SERPL-SCNC: 3.6 MMOL/L (ref 3.5–5.5)
POTASSIUM SERPL-SCNC: 3.8 MMOL/L (ref 3.5–5.5)
POTASSIUM SERPL-SCNC: 4.5 MMOL/L (ref 3.5–5.5)
PROCALCITONIN SERPL-MCNC: 0.29 NG/ML
PROT SERPL-MCNC: 7.6 G/DL (ref 6.4–8.2)
PROT UR STRIP-MCNC: 30 MG/DL
RBC # BLD AUTO: 4.03 M/UL (ref 4.35–5.65)
RBC #/AREA URNS HPF: ABNORMAL /HPF (ref 0–2)
SAO2 % BLD: 97 % (ref 95–99)
SAO2% DEVICE SAO2% SENSOR NAME: ABNORMAL
SERVICE CMNT-IMP: ABNORMAL
SODIUM SERPL-SCNC: 137 MMOL/L (ref 136–145)
SODIUM SERPL-SCNC: 140 MMOL/L (ref 136–145)
SODIUM SERPL-SCNC: 141 MMOL/L (ref 136–145)
SP GR UR REFRACTOMETRY: 1.01 (ref 1–1.03)
SPECIMEN SITE: ABNORMAL
TSH SERPL DL<=0.05 MIU/L-ACNC: 1.12 UIU/ML (ref 0.36–3.74)
UROBILINOGEN UR QL STRIP.AUTO: 0.2 EU/DL (ref 0.2–1)
WBC # BLD AUTO: 14.2 K/UL (ref 4.6–13.2)
WBC URNS QL MICRO: ABNORMAL /HPF (ref 0–4)
YEAST URNS QL MICRO: PRESENT

## 2023-08-06 PROCEDURE — 6370000000 HC RX 637 (ALT 250 FOR IP): Performed by: EMERGENCY MEDICINE

## 2023-08-06 PROCEDURE — 82962 GLUCOSE BLOOD TEST: CPT

## 2023-08-06 PROCEDURE — 93005 ELECTROCARDIOGRAM TRACING: CPT | Performed by: EMERGENCY MEDICINE

## 2023-08-06 PROCEDURE — 82803 BLOOD GASES ANY COMBINATION: CPT

## 2023-08-06 PROCEDURE — 80053 COMPREHEN METABOLIC PANEL: CPT

## 2023-08-06 PROCEDURE — 80048 BASIC METABOLIC PNL TOTAL CA: CPT

## 2023-08-06 PROCEDURE — 6360000002 HC RX W HCPCS: Performed by: INTERNAL MEDICINE

## 2023-08-06 PROCEDURE — 84145 PROCALCITONIN (PCT): CPT

## 2023-08-06 PROCEDURE — 89055 LEUKOCYTE ASSESSMENT FECAL: CPT

## 2023-08-06 PROCEDURE — 2000000000 HC ICU R&B

## 2023-08-06 PROCEDURE — 85025 COMPLETE CBC W/AUTO DIFF WBC: CPT

## 2023-08-06 PROCEDURE — 6360000002 HC RX W HCPCS: Performed by: EMERGENCY MEDICINE

## 2023-08-06 PROCEDURE — 83735 ASSAY OF MAGNESIUM: CPT

## 2023-08-06 PROCEDURE — 2580000003 HC RX 258: Performed by: EMERGENCY MEDICINE

## 2023-08-06 PROCEDURE — 87449 NOS EACH ORGANISM AG IA: CPT

## 2023-08-06 PROCEDURE — 87324 CLOSTRIDIUM AG IA: CPT

## 2023-08-06 PROCEDURE — 6360000002 HC RX W HCPCS: Performed by: NURSE PRACTITIONER

## 2023-08-06 PROCEDURE — 2580000003 HC RX 258: Performed by: NURSE PRACTITIONER

## 2023-08-06 PROCEDURE — 87040 BLOOD CULTURE FOR BACTERIA: CPT

## 2023-08-06 PROCEDURE — 1100000000 HC RM PRIVATE

## 2023-08-06 PROCEDURE — 87506 IADNA-DNA/RNA PROBE TQ 6-11: CPT

## 2023-08-06 PROCEDURE — 83605 ASSAY OF LACTIC ACID: CPT

## 2023-08-06 PROCEDURE — 74176 CT ABD & PELVIS W/O CONTRAST: CPT

## 2023-08-06 PROCEDURE — 96374 THER/PROPH/DIAG INJ IV PUSH: CPT

## 2023-08-06 PROCEDURE — 71045 X-RAY EXAM CHEST 1 VIEW: CPT

## 2023-08-06 PROCEDURE — 6370000000 HC RX 637 (ALT 250 FOR IP): Performed by: NURSE PRACTITIONER

## 2023-08-06 PROCEDURE — 81001 URINALYSIS AUTO W/SCOPE: CPT

## 2023-08-06 PROCEDURE — 99285 EMERGENCY DEPT VISIT HI MDM: CPT

## 2023-08-06 PROCEDURE — 36600 WITHDRAWAL OF ARTERIAL BLOOD: CPT

## 2023-08-06 PROCEDURE — 2500000003 HC RX 250 WO HCPCS: Performed by: NURSE PRACTITIONER

## 2023-08-06 PROCEDURE — 84443 ASSAY THYROID STIM HORMONE: CPT

## 2023-08-06 RX ORDER — ROSUVASTATIN CALCIUM 10 MG/1
10 TABLET, COATED ORAL EVERY EVENING
Status: DISCONTINUED | OUTPATIENT
Start: 2023-08-06 | End: 2023-08-12 | Stop reason: HOSPADM

## 2023-08-06 RX ORDER — POTASSIUM CHLORIDE 7.45 MG/ML
10 INJECTION INTRAVENOUS PRN
Status: DISCONTINUED | OUTPATIENT
Start: 2023-08-06 | End: 2023-08-06

## 2023-08-06 RX ORDER — ACETAMINOPHEN 325 MG/1
650 TABLET ORAL EVERY 4 HOURS PRN
Status: DISCONTINUED | OUTPATIENT
Start: 2023-08-06 | End: 2023-08-12 | Stop reason: HOSPADM

## 2023-08-06 RX ORDER — DEXTROSE MONOHYDRATE, SODIUM CHLORIDE, AND POTASSIUM CHLORIDE 50; 1.49; 9 G/1000ML; G/1000ML; G/1000ML
INJECTION, SOLUTION INTRAVENOUS CONTINUOUS
Status: DISCONTINUED | OUTPATIENT
Start: 2023-08-06 | End: 2023-08-06

## 2023-08-06 RX ORDER — HEPARIN SODIUM 5000 [USP'U]/ML
5000 INJECTION, SOLUTION INTRAVENOUS; SUBCUTANEOUS 2 TIMES DAILY
Status: DISCONTINUED | OUTPATIENT
Start: 2023-08-06 | End: 2023-08-12 | Stop reason: HOSPADM

## 2023-08-06 RX ORDER — INSULIN LISPRO 100 [IU]/ML
0-8 INJECTION, SOLUTION INTRAVENOUS; SUBCUTANEOUS
Status: DISCONTINUED | OUTPATIENT
Start: 2023-08-07 | End: 2023-08-07

## 2023-08-06 RX ORDER — MAGNESIUM SULFATE IN WATER 40 MG/ML
2000 INJECTION, SOLUTION INTRAVENOUS PRN
Status: DISCONTINUED | OUTPATIENT
Start: 2023-08-06 | End: 2023-08-06

## 2023-08-06 RX ORDER — GABAPENTIN 100 MG/1
200 CAPSULE ORAL 3 TIMES DAILY
Status: DISCONTINUED | OUTPATIENT
Start: 2023-08-06 | End: 2023-08-07

## 2023-08-06 RX ORDER — METRONIDAZOLE 500 MG/100ML
500 INJECTION, SOLUTION INTRAVENOUS
Status: COMPLETED | OUTPATIENT
Start: 2023-08-06 | End: 2023-08-06

## 2023-08-06 RX ORDER — ASPIRIN 81 MG/1
81 TABLET ORAL DAILY
Status: DISCONTINUED | OUTPATIENT
Start: 2023-08-07 | End: 2023-08-12 | Stop reason: HOSPADM

## 2023-08-06 RX ORDER — 0.9 % SODIUM CHLORIDE 0.9 %
1000 INTRAVENOUS SOLUTION INTRAVENOUS ONCE
Status: COMPLETED | OUTPATIENT
Start: 2023-08-06 | End: 2023-08-06

## 2023-08-06 RX ORDER — 0.9 % SODIUM CHLORIDE 0.9 %
20 INTRAVENOUS SOLUTION INTRAVENOUS ONCE
Status: COMPLETED | OUTPATIENT
Start: 2023-08-06 | End: 2023-08-06

## 2023-08-06 RX ORDER — METRONIDAZOLE 250 MG/1
500 TABLET ORAL EVERY 8 HOURS SCHEDULED
Status: DISCONTINUED | OUTPATIENT
Start: 2023-08-06 | End: 2023-08-09

## 2023-08-06 RX ORDER — SODIUM CHLORIDE 450 MG/100ML
INJECTION, SOLUTION INTRAVENOUS CONTINUOUS
Status: DISPENSED | OUTPATIENT
Start: 2023-08-06 | End: 2023-08-07

## 2023-08-06 RX ORDER — ONDANSETRON 2 MG/ML
4 INJECTION INTRAMUSCULAR; INTRAVENOUS
Status: COMPLETED | OUTPATIENT
Start: 2023-08-06 | End: 2023-08-06

## 2023-08-06 RX ORDER — DEXTROSE MONOHYDRATE 100 MG/ML
INJECTION, SOLUTION INTRAVENOUS CONTINUOUS PRN
Status: DISCONTINUED | OUTPATIENT
Start: 2023-08-06 | End: 2023-08-12 | Stop reason: HOSPADM

## 2023-08-06 RX ORDER — DEXTROSE MONOHYDRATE, SODIUM CHLORIDE, AND POTASSIUM CHLORIDE 50; 1.49; 4.5 G/1000ML; G/1000ML; G/1000ML
INJECTION, SOLUTION INTRAVENOUS CONTINUOUS PRN
Status: DISCONTINUED | OUTPATIENT
Start: 2023-08-06 | End: 2023-08-06

## 2023-08-06 RX ORDER — POLYETHYLENE GLYCOL 3350 17 G/17G
17 POWDER, FOR SOLUTION ORAL DAILY PRN
Status: DISCONTINUED | OUTPATIENT
Start: 2023-08-06 | End: 2023-08-12 | Stop reason: HOSPADM

## 2023-08-06 RX ORDER — INSULIN LISPRO 100 [IU]/ML
0-4 INJECTION, SOLUTION INTRAVENOUS; SUBCUTANEOUS NIGHTLY
Status: DISCONTINUED | OUTPATIENT
Start: 2023-08-06 | End: 2023-08-07

## 2023-08-06 RX ORDER — VANCOMYCIN HYDROCHLORIDE 125 MG/1
125 CAPSULE ORAL EVERY 6 HOURS
Status: DISCONTINUED | OUTPATIENT
Start: 2023-08-06 | End: 2023-08-09

## 2023-08-06 RX ORDER — SODIUM CHLORIDE 9 MG/ML
INJECTION, SOLUTION INTRAVENOUS CONTINUOUS
Status: DISCONTINUED | OUTPATIENT
Start: 2023-08-06 | End: 2023-08-06

## 2023-08-06 RX ORDER — FLUOXETINE 10 MG/1
10 CAPSULE ORAL NIGHTLY
Status: DISCONTINUED | OUTPATIENT
Start: 2023-08-06 | End: 2023-08-12 | Stop reason: HOSPADM

## 2023-08-06 RX ORDER — CETIRIZINE HYDROCHLORIDE 10 MG/1
10 TABLET ORAL NIGHTLY
Status: DISCONTINUED | OUTPATIENT
Start: 2023-08-06 | End: 2023-08-12 | Stop reason: HOSPADM

## 2023-08-06 RX ADMIN — SODIUM CHLORIDE: 4.5 INJECTION, SOLUTION INTRAVENOUS at 21:30

## 2023-08-06 RX ADMIN — SODIUM CHLORIDE 1000 ML: 9 INJECTION, SOLUTION INTRAVENOUS at 14:39

## 2023-08-06 RX ADMIN — HEPARIN SODIUM 5000 UNITS: 5000 INJECTION INTRAVENOUS; SUBCUTANEOUS at 23:19

## 2023-08-06 RX ADMIN — GABAPENTIN 200 MG: 100 CAPSULE ORAL at 20:38

## 2023-08-06 RX ADMIN — INSULIN HUMAN 8 UNITS: 100 INJECTION, SOLUTION PARENTERAL at 10:36

## 2023-08-06 RX ADMIN — CETIRIZINE HYDROCHLORIDE 10 MG: 10 TABLET, FILM COATED ORAL at 20:38

## 2023-08-06 RX ADMIN — METRONIDAZOLE 500 MG: 500 INJECTION, SOLUTION INTRAVENOUS at 13:13

## 2023-08-06 RX ADMIN — VANCOMYCIN HYDROCHLORIDE 125 MG: 125 CAPSULE ORAL at 18:22

## 2023-08-06 RX ADMIN — SODIUM CHLORIDE 6 UNITS/HR: 900 INJECTION INTRAVENOUS at 12:11

## 2023-08-06 RX ADMIN — ROSUVASTATIN CALCIUM 10 MG: 10 TABLET, FILM COATED ORAL at 18:22

## 2023-08-06 RX ADMIN — POTASSIUM CHLORIDE, DEXTROSE MONOHYDRATE AND SODIUM CHLORIDE: 150; 5; 450 INJECTION, SOLUTION INTRAVENOUS at 17:31

## 2023-08-06 RX ADMIN — ONDANSETRON 4 MG: 2 INJECTION INTRAMUSCULAR; INTRAVENOUS at 13:11

## 2023-08-06 RX ADMIN — SODIUM CHLORIDE 2.91 UNITS/HR: 9 INJECTION, SOLUTION INTRAVENOUS at 14:31

## 2023-08-06 RX ADMIN — METRONIDAZOLE 500 MG: 250 TABLET ORAL at 19:33

## 2023-08-06 RX ADMIN — SODIUM CHLORIDE 952 ML: 9 INJECTION, SOLUTION INTRAVENOUS at 10:00

## 2023-08-06 RX ADMIN — HEPARIN SODIUM 5000 UNITS: 5000 INJECTION INTRAVENOUS; SUBCUTANEOUS at 14:41

## 2023-08-06 RX ADMIN — VANCOMYCIN HYDROCHLORIDE 125 MG: 125 CAPSULE ORAL at 14:41

## 2023-08-06 RX ADMIN — GABAPENTIN 200 MG: 100 CAPSULE ORAL at 16:27

## 2023-08-06 RX ADMIN — FLUOXETINE 10 MG: 10 CAPSULE ORAL at 20:38

## 2023-08-06 ASSESSMENT — PAIN - FUNCTIONAL ASSESSMENT: PAIN_FUNCTIONAL_ASSESSMENT: NONE - DENIES PAIN

## 2023-08-06 ASSESSMENT — ENCOUNTER SYMPTOMS
GASTROINTESTINAL NEGATIVE: 1
RESPIRATORY NEGATIVE: 1

## 2023-08-06 ASSESSMENT — PAIN SCALES - GENERAL
PAINLEVEL_OUTOF10: 0
PAINLEVEL_OUTOF10: 0

## 2023-08-06 NOTE — ED PROVIDER NOTES
[facility] EMERGENCY DEPT  EMERGENCY DEPARTMENT HISTORY AND PHYSICAL EXAM        Date: 2023  Patient Name: Heather Strauss  MRN: [id number]  [address] [facility] [place] 1965  Date of evaluation: 2023  Provider: Micaela Juarez MD   Note Started: 12:40 PM 23    HISTORY OF PRESENT ILLNESS     Chief Complaint   Patient presents with    Positive For Covid-19       History Provided By: Patient and Caregiver    HPI: Heather Strauss, 56 y.o. male Patient with PMHx DM, CKD, Depression, HLD, HTN, CVA, SZ? Sent to ED from EP due to perceived shock with low BP, tachycardia and hyperglycemia. Patient stated to me he feels alright but nurses on floor thought he looked lethargic and was hypotensive. He was recently diagnosed with CoVID and has had periods of unresponsiveness initially deemed to be seizures but also thought to be associated with hypoglycemic spells. Patient has not had any syncope today. He uses an Insulin pump and was found to have BS > 400. His symptoms are acute and moderate.   HPI      PAST MEDICAL HISTORY   Past Medical History:  Past Medical History:   Diagnosis Date    Depression     Diabetes ([address])     GERD (gastroesophageal reflux disease)     Hypercholesterolemia     Hypertension     Seizures ([address])     Stroke [facility])        Past Surgical History:  Past Surgical History:   Procedure Laterality Date    ORTHOPEDIC SURGERY Left 2018    hip joint    ORTHOPEDIC SURGERY Right     broken knee       Family History:  Family History   Problem Relation Age of Onset    Cancer Father         prostate    Cancer Maternal Uncle         prostate    Cancer Paternal Uncle         prostate       Social History:  Social History     Tobacco Use    Smoking status: Never    Smokeless tobacco: Current    Tobacco comments:     Quit smokin+/day   Vaping Use    Vaping Use: Never used   Substance Use Topics    Alcohol use: Not Currently    Drug use: Never       Allergies:  No Known Allergies    PCP: PROVIDER UNKNOWN    Current Meds:

## 2023-08-06 NOTE — ASSESSMENT & PLAN NOTE
-UA: ordered  -ABG: pH 7.27-PCO2 35- PO2 63- Bicarb 15  -NPO util anion gap <18  -Insulin drip initiated with maintenance fluid at 250 ml/hr (NS or 1/2NS) change to D5 when glucose less than 250  -hourly accuchecks  -BMP every 4 hours, closely monitor K+  -Hgb A1C

## 2023-08-06 NOTE — PROGRESS NOTES
1430- care of the patient assumed to bed 1 in icu from ER, insulin drip infusing, verified at bedside by liz bond rn, insulin pump in belonging bag at bedside, patient alert and oriented, transferred to hospital bed independently. Di Melchor NP at bedside evaluating patient.

## 2023-08-06 NOTE — H&P
ACC: Su Mckeon RN  CM Risk Score: 5  Yamilet Mortality Risk Score:      Care Coordination Interventions    Program Enrollment:  Complex Care  Referral from Primary Care Provider:  No  Suggested Interventions and Community Resources  Behavorial Health: In Process  Physical Therapy: In Process        and Ambulatory Care Coordination Note  4/24/2019  CM Risk Score: 5  Yamilet Mortality Risk Score:      ACC: Su Mckeon RN    Summary Note: patient talkative, pain is better since PT and pain management. The patient is still having a hard time with his family. He feels that he needs to do the right thing but his family thinks he should just be on disability. Patient is currently in [facility] therapy and he said that it does help We agreed that I  Would call on  The 29th and F/U on his PT/BH and pain management. Plan  Discuss  dietary consult  [facility] and plans with Family  Pain intervention working  Call 4/29 in the afternoon         Care Coordination Interventions    Program Enrollment:  Complex Care  Referral from Primary Care Provider:  No  Suggested Interventions and [address]: In Process  Physical Therapy: In Process         Goals Addressed                 This Visit's Progress     [employer] Goal   On track     I will complete referral to community agency [facility] at [facility], [facility], or [facility] for assistance. Barriers: lack of support and stress  Plan for overcoming my barriers: PCPLEANNE in clinic, [facility]  Confidence: 8/10  Anticipated Goal Completion Date: 02/28/19       Conditions and Symptoms   Improving     I will schedule office visits, as directed by my provider. I will keep my appointment or reschedule if I have to cancel. I will notify my provider of any barriers to my plan of care. I will notify my provider of any symptoms that indicate a worsening of my condition.     Barriers: none  Plan for overcoming my barriers: N/A  Confidence: 7/10  Anticipated Goal Completion Date: 12/31/2018              Prior to Admission medications    Medication Sig Start Date End Date Taking?  Authorizing Provider   ibuprofen (ADVIL;MOTRIN) 800 MG tablet Take 1 tablet by mouth every 8 hours as needed for Pain 4/15/19  Yes Nasir Thompson MD   meloxicam (MOBIC) 15 MG tablet take 1 tablet by mouth once daily 4/12/19  Yes Patrick Isak Cristobal,    lisinopril (PRINIVIL;ZESTRIL) 20 MG tablet Take 1 tablet by mouth daily 1/16/19  Yes Nasir Thompson MD   metoprolol tartrate (LOPRESSOR) 25 MG tablet take 1 tablet by mouth twice a day 1/16/19  Yes Nasir Thompson MD   fluticasone [facility]) 50 MCG/ACT nasal spray instill 1 spray into each nostril once daily 1/10/19  Yes Nasir Thompson MD   hydrochlorothiazide (HYDRODIURIL) 25 MG tablet Take 1 tablet by mouth daily 1/10/19  Yes Nasir Thompson MD   VENTOLIN  (90 Base) MCG/ACT inhaler inhale 2 puffs by mouth every 6 hours if needed for wheezing or shortness of breath (SPACE OUT TO EVERY 6 HOURS AS SYMPTOMS IMPROVE.) 11/13/18  Yes Nasir Thompson MD   RA ACETAMINOPHEN EX  MG tablet take 1 tablet by mouth twice a day if needed for pain 4/15/19   Nasir Thompson MD       Future Appointments   Date Time Provider Kathi Jackson   4/26/2019 10:45 AM Jc Ingram PT [facility] Sherrilee Meeter   4/29/2019 11:30 AM Jc Ingram PT [facility] Sherrilee Meeter   5/1/2019 11:15 AM Jc Ingram PT [facility] Sherrilee Meeter   5/7/2019  8:00 AM Ricki Solo PSYD Psych [address]   7/17/2019  1:00 PM Nasir Thompson MD [facility] Jenny broken knee     Family History   Problem Relation Age of Onset    Cancer Father         prostate    Cancer Maternal Uncle         prostate    Cancer Paternal Uncle         prostate      Social History     Tobacco Use    Smoking status: Never    Smokeless tobacco: Current    Tobacco comments:     Quit smokin+/day   Substance Use Topics    Alcohol use: Not Currently       Prior to Admission medications    Medication Sig Start Date End Date Taking? Authorizing Provider   vitamin D (ERGOCALCIFEROL) 1.25 MG (90415 UT) CAPS capsule Take 1 capsule by mouth once a week 23   Historical Provider, MD   insulin aspart (NOVOLOG) 100 UNIT/ML injection vial Inject into the skin 3 times daily (before meals) 250-310=2 units   311-370=4 units  371-430=6 units  431-490=8 units   491-550=10 units  551-610=12 units   DO NOT USE AT BEDTIME!!!!    Historical Provider, MD   glucose (TRUEPLUS GLUCOSE) 4 g chewable tablet Take 4 tablets by mouth as needed for Low blood sugar    Historical Provider, MD   gabapentin (NEURONTIN) 300 MG capsule Take 1 capsule by mouth 3 times daily for 90 days.  Max Daily Amount: 900 mg 23  Adelaide Brandt MD   gabapentin (NEURONTIN) 100 MG capsule 1 cap po daily at 18:00 23  Jefferson Chappell APRN - CNP   FLUoxetine (PROZAC) 10 MG capsule Take 1 capsule by mouth daily    Historical Provider, MD   METAMUCIL FIBER PO Take by mouth 2 TEASPOONSFUL 4295  [facility]    Historical Provider, MD   acetaminophen (TYLENOL) 325 MG tablet Take 2 tablets by mouth every 4 hours as needed for Pain or Fever    Ar Automatic Reconciliation   aspirin 81 MG EC tablet Take 1 tablet by mouth daily 1/3/22   Ar Automatic Reconciliation   cetirizine (ZYRTEC) 10 MG tablet TAKE 1 TABLET EVERY DAY BY ORAL ROUTE. 21   Ar Automatic Reconciliation   insulin lispro (HUMALOG) 100 UNIT/ML injection cartridge Inject into the skin Run at basal rate of 0.55 units / hr with insulin pump    Ar Automatic month CR 2.16  -strict I/O  -renal panel daily, monitor K+ and Phos levels closely  -Avoid nephrotoxic agents. No NSAIDS, No ACEI/ARBs. No Diuretics  -avoid lowering BP drastically    Hypertension  -currently soft BP  -currently not on any antihypertensive at NH  -monitor BP closely    Hypercholesteremia  -chronic, continue Crestor    TOTAL TIME:  45 Minutes    Code Status: Full    Prophylaxis: Heparin     Electronically Signed : Diana Deleon, [facility] [address]    Please note that this dictation was completed with [employer], the [employer] voice recognition software. Quite often unanticipated grammatical, syntax, homophones, and other interpretive errors are inadvertently transcribed by the computer software. Please disregard these errors. Please excuse any errors that have escaped final proofreading. Thank you.

## 2023-08-06 NOTE — ASSESSMENT & PLAN NOTE
-likely secondary to C. Difficile  -Lactic: 6.9  -pracalcitonin: 0.29  -PRN O2 to keep saturation greater than 92%  -1 IV fluids in the ED  -will start oral Vancomycin 125 mg 4 times a day and IV Flagyl   -keep MAP > than 65  -blood cultures pending  -CXR: No acute intrathoracic process is identified. -CT of ABD: Pancolonic wall thickening may represent infectious versus inflammatory colitis.   -CBC in am

## 2023-08-06 NOTE — ED NOTES
Pt refused straight cath and after bladder scanned pt was retaining 35ml or urine. Pt aware urine collection needs and declined.  MD Malou Alan, RN  08/06/23 0034

## 2023-08-06 NOTE — PROGRESS NOTES
Bedside shift change report given to DUNIA Meyer RN (oncoming nurse) by Mick Hung RN (offgoing nurse). Report included the following information SBAR, Intake/Output, MAR, Recent Results, Med Rec Status, and Quality Measures. 1930  Shift assessment completed. Pt A&Ox3 and denies pain or needs. Insulin gtt and D5 0.45% NS with 20K+ infusing in a patent #20 gauge PIV in the R FA. FSBG 159. Lungs clear in all lobes, BS present. Scabs to BUE from a recent fall. 2045  Lab in to draw blood for scheduled BMP. Blood glucose is 163. Insulin gtt infusing at 1.55 units/hr. 2110  Spoke with provider and advised Anion gap is closed and discussed labs. New order to stop insulin gtt, start 0.45% NS at 100ml/hr, initiate the medium SS for insulin coverage, pt is no longer NPO, and next BMP in AM.     2340  Heparin administered per order. VSS. SR on telemetry. Pt OOB to use the urinal. Urine is very light yellow and clear. 0130  Pt lying in bed with eyes closed. No AD noted. VSS. SR on telemetry. CBWR.     0300  0400 VSS. Pt OOB to urinate. Pt has urinated 2100 ml so far this shift.     0500  Called DONIS Botello ([id number]) to try to obtain pt's disposable infusion tubing for his insulin pump. The person who answers told me his nurse was currently at the ED for a medical emergency and the unit should be called back later this morning. 0645  Bedside shift change report given to RAMIREZ Dupont RN (oncoming nurse) by DUNIA Meyer RN (offgoing nurse). Report included the following information SBAR, Intake/Output, MAR, Recent Results, Med Rec Status, and Quality Measures.

## 2023-08-07 PROBLEM — E43 SEVERE PROTEIN-CALORIE MALNUTRITION (HCC): Status: ACTIVE | Noted: 2018-01-13

## 2023-08-07 LAB
ANION GAP SERPL CALC-SCNC: 15 MMOL/L (ref 3–18)
BUN SERPL-MCNC: 35 MG/DL (ref 7–18)
BUN/CREAT SERPL: 14 (ref 12–20)
C COLI+JEJUNI TUF STL QL NAA+PROBE: NEGATIVE
CA-I BLD-MCNC: 8.1 MG/DL (ref 8.5–10.1)
CHLORIDE SERPL-SCNC: 102 MMOL/L (ref 100–111)
CO2 SERPL-SCNC: 20 MMOL/L (ref 21–32)
CREAT SERPL-MCNC: 2.5 MG/DL (ref 0.6–1.3)
EC STX1+STX2 GENES STL QL NAA+PROBE: NEGATIVE
EKG ATRIAL RATE: 106 BPM
EKG DIAGNOSIS: NORMAL
EKG P AXIS: 78 DEGREES
EKG P-R INTERVAL: 174 MS
EKG Q-T INTERVAL: 368 MS
EKG QRS DURATION: 76 MS
EKG QTC CALCULATION (BAZETT): 488 MS
EKG R AXIS: 83 DEGREES
EKG T AXIS: 78 DEGREES
EKG VENTRICULAR RATE: 106 BPM
ERYTHROCYTE [DISTWIDTH] IN BLOOD BY AUTOMATED COUNT: 13.6 % (ref 11.6–14.5)
EST. AVERAGE GLUCOSE BLD GHB EST-MCNC: 189 MG/DL
ETEC ELTA+ESTB GENES STL QL NAA+PROBE: NEGATIVE
GLUCOSE BLD STRIP.AUTO-MCNC: 213 MG/DL (ref 70–110)
GLUCOSE BLD STRIP.AUTO-MCNC: 341 MG/DL (ref 70–110)
GLUCOSE BLD STRIP.AUTO-MCNC: 364 MG/DL (ref 70–110)
GLUCOSE BLD STRIP.AUTO-MCNC: 427 MG/DL (ref 70–110)
GLUCOSE BLD STRIP.AUTO-MCNC: 445 MG/DL (ref 70–110)
GLUCOSE SERPL-MCNC: 361 MG/DL (ref 74–99)
HBA1C MFR BLD: 8.2 % (ref 4.2–5.6)
HCT VFR BLD AUTO: 33.3 % (ref 36–48)
HGB BLD-MCNC: 10.4 G/DL (ref 13–16)
MCH RBC QN AUTO: 29 PG (ref 24–34)
MCHC RBC AUTO-ENTMCNC: 31.2 G/DL (ref 31–37)
MCV RBC AUTO: 92.8 FL (ref 78–100)
NRBC # BLD: 0 K/UL (ref 0–0.01)
NRBC BLD-RTO: 0 PER 100 WBC
P SHIGELLOIDES DNA STL QL NAA+PROBE: NEGATIVE
PERFORMED BY:: ABNORMAL
PLATELET # BLD AUTO: 173 K/UL (ref 135–420)
PMV BLD AUTO: 12.4 FL (ref 9.2–11.8)
POTASSIUM SERPL-SCNC: 3.9 MMOL/L (ref 3.5–5.5)
RBC # BLD AUTO: 3.59 M/UL (ref 4.35–5.65)
SALMONELLA SP SPAO STL QL NAA+PROBE: NEGATIVE
SHIGELLA SP+EIEC IPAH STL QL NAA+PROBE: NEGATIVE
SODIUM SERPL-SCNC: 137 MMOL/L (ref 136–145)
V CHOL+PARA+VUL DNA STL QL NAA+NON-PROBE: NEGATIVE
WBC # BLD AUTO: 20.1 K/UL (ref 4.6–13.2)
Y ENTEROCOL DNA STL QL NAA+NON-PROBE: NEGATIVE

## 2023-08-07 PROCEDURE — 82962 GLUCOSE BLOOD TEST: CPT

## 2023-08-07 PROCEDURE — 6370000000 HC RX 637 (ALT 250 FOR IP): Performed by: INTERNAL MEDICINE

## 2023-08-07 PROCEDURE — 85027 COMPLETE CBC AUTOMATED: CPT

## 2023-08-07 PROCEDURE — 80048 BASIC METABOLIC PNL TOTAL CA: CPT

## 2023-08-07 PROCEDURE — 2580000003 HC RX 258: Performed by: NURSE PRACTITIONER

## 2023-08-07 PROCEDURE — 36415 COLL VENOUS BLD VENIPUNCTURE: CPT

## 2023-08-07 PROCEDURE — 6370000000 HC RX 637 (ALT 250 FOR IP): Performed by: NURSE PRACTITIONER

## 2023-08-07 PROCEDURE — 83036 HEMOGLOBIN GLYCOSYLATED A1C: CPT

## 2023-08-07 PROCEDURE — 2580000003 HC RX 258: Performed by: INTERNAL MEDICINE

## 2023-08-07 PROCEDURE — 6360000002 HC RX W HCPCS: Performed by: NURSE PRACTITIONER

## 2023-08-07 PROCEDURE — 1100000000 HC RM PRIVATE

## 2023-08-07 RX ORDER — INSULIN LISPRO 100 [IU]/ML
12 INJECTION, SOLUTION INTRAVENOUS; SUBCUTANEOUS
Status: DISCONTINUED | OUTPATIENT
Start: 2023-08-07 | End: 2023-08-07

## 2023-08-07 RX ORDER — ONDANSETRON 2 MG/ML
4 INJECTION INTRAMUSCULAR; INTRAVENOUS EVERY 6 HOURS PRN
Status: DISCONTINUED | OUTPATIENT
Start: 2023-08-07 | End: 2023-08-12 | Stop reason: HOSPADM

## 2023-08-07 RX ORDER — INSULIN LISPRO 100 [IU]/ML
0-8 INJECTION, SOLUTION INTRAVENOUS; SUBCUTANEOUS
Status: DISCONTINUED | OUTPATIENT
Start: 2023-08-08 | End: 2023-08-09

## 2023-08-07 RX ORDER — INSULIN LISPRO 100 [IU]/ML
0-4 INJECTION, SOLUTION INTRAVENOUS; SUBCUTANEOUS NIGHTLY
Status: DISCONTINUED | OUTPATIENT
Start: 2023-08-07 | End: 2023-08-09

## 2023-08-07 RX ORDER — GABAPENTIN 100 MG/1
200 CAPSULE ORAL 2 TIMES DAILY
Status: DISCONTINUED | OUTPATIENT
Start: 2023-08-07 | End: 2023-08-12 | Stop reason: HOSPADM

## 2023-08-07 RX ORDER — INSULIN LISPRO 100 [IU]/ML
12 INJECTION, SOLUTION INTRAVENOUS; SUBCUTANEOUS ONCE
Status: DISCONTINUED | OUTPATIENT
Start: 2023-08-07 | End: 2023-08-07

## 2023-08-07 RX ORDER — SODIUM CHLORIDE 450 MG/100ML
INJECTION, SOLUTION INTRAVENOUS CONTINUOUS
Status: DISCONTINUED | OUTPATIENT
Start: 2023-08-07 | End: 2023-08-09

## 2023-08-07 RX ORDER — INSULIN LISPRO 100 [IU]/ML
6 INJECTION, SOLUTION INTRAVENOUS; SUBCUTANEOUS ONCE
Status: COMPLETED | OUTPATIENT
Start: 2023-08-07 | End: 2023-08-07

## 2023-08-07 RX ADMIN — HEPARIN SODIUM 5000 UNITS: 5000 INJECTION INTRAVENOUS; SUBCUTANEOUS at 11:15

## 2023-08-07 RX ADMIN — METRONIDAZOLE 500 MG: 250 TABLET ORAL at 16:15

## 2023-08-07 RX ADMIN — SODIUM CHLORIDE: 4.5 INJECTION, SOLUTION INTRAVENOUS at 17:52

## 2023-08-07 RX ADMIN — INSULIN LISPRO 4 UNITS: 100 INJECTION, SOLUTION INTRAVENOUS; SUBCUTANEOUS at 21:17

## 2023-08-07 RX ADMIN — CETIRIZINE HYDROCHLORIDE 10 MG: 10 TABLET, FILM COATED ORAL at 20:26

## 2023-08-07 RX ADMIN — ONDANSETRON 4 MG: 2 INJECTION INTRAMUSCULAR; INTRAVENOUS at 21:17

## 2023-08-07 RX ADMIN — INSULIN LISPRO 12 UNITS: 100 INJECTION, SOLUTION INTRAVENOUS; SUBCUTANEOUS at 07:59

## 2023-08-07 RX ADMIN — VANCOMYCIN HYDROCHLORIDE 125 MG: 125 CAPSULE ORAL at 19:40

## 2023-08-07 RX ADMIN — VANCOMYCIN HYDROCHLORIDE 125 MG: 125 CAPSULE ORAL at 01:07

## 2023-08-07 RX ADMIN — FLUOXETINE 10 MG: 10 CAPSULE ORAL at 20:26

## 2023-08-07 RX ADMIN — METRONIDAZOLE 500 MG: 250 TABLET ORAL at 05:37

## 2023-08-07 RX ADMIN — GABAPENTIN 200 MG: 100 CAPSULE ORAL at 20:26

## 2023-08-07 RX ADMIN — VANCOMYCIN HYDROCHLORIDE 125 MG: 125 CAPSULE ORAL at 07:53

## 2023-08-07 RX ADMIN — GABAPENTIN 200 MG: 100 CAPSULE ORAL at 09:02

## 2023-08-07 RX ADMIN — INSULIN LISPRO 6 UNITS: 100 INJECTION, SOLUTION INTRAVENOUS; SUBCUTANEOUS at 11:15

## 2023-08-07 RX ADMIN — ASPIRIN 81 MG: 81 TABLET, COATED ORAL at 09:02

## 2023-08-07 RX ADMIN — VANCOMYCIN HYDROCHLORIDE 125 MG: 125 CAPSULE ORAL at 16:15

## 2023-08-07 RX ADMIN — SODIUM CHLORIDE: 4.5 INJECTION, SOLUTION INTRAVENOUS at 07:43

## 2023-08-07 RX ADMIN — METRONIDAZOLE 500 MG: 250 TABLET ORAL at 20:25

## 2023-08-07 RX ADMIN — INSULIN LISPRO 6 UNITS: 100 INJECTION, SOLUTION INTRAVENOUS; SUBCUTANEOUS at 21:17

## 2023-08-07 ASSESSMENT — PAIN SCALES - GENERAL: PAINLEVEL_OUTOF10: 0

## 2023-08-07 NOTE — PROGRESS NOTES
RENAL DOSE ADJUSTMENT MADE PER P/T PROTOCOL    PREVIOUS ORDER:  Gabapentin 200mg by mouth three times dailys    Estimated Creatinine Clearance: 22 mL/min (A) (based on SCr of 2.5 mg/dL (H)).     Recent Labs     08/06/23  1650 08/06/23 2047 08/07/23  0430   BUN 42* 37* 35*   CREATININE 2.81* 2.71* 2.50*   PLT  --   --  173     NEW RENALLY ADJUSTED ORDER: Gabapentin 200mg by mouth twice daily    [id number] [facility]  8/7/2023 8:46 AM

## 2023-08-07 NOTE — PLAN OF CARE
Comprehensive Nutrition Assessment    Type and Reason for Visit:  Initial, RD Nutrition Re-Screen/LOS    Nutrition Recommendations/Plan:   Liberalize diet to regular no sugar packet, fruit for most desserts. Monitor glucose and work with pt to adjust insulin pump keeping in mind he consumes breakfast 1-2 times weekly otherwise refuses. Check FE, Vitamin D - but pt has refused oral supplementation in the past. IF level depleted, consider high FE cereal (cheerios, special K, cream of wheat in the afternoon or bedtime). Consider eggs, tuna for supper to help with vitamin D or sitting in the sun after discharge  Check phos - if WNL offer nuts  between meals or nut butters     Malnutrition Assessment:  Malnutrition Status:  Severe malnutrition (08/07/23 1810)    Context:  Chronic Illness     Findings of the 6 clinical characteristics of malnutrition:  Energy Intake:  75% or less estimated energy requirements for 1 month or longer  Weight Loss:  Greater than 5% over 1 month (5.4% in 1 month ( pounds))     Body Fat Loss:  Severe body fat loss Orbital, Triceps, Fat Overlying Ribs, Buccal region   Muscle Mass Loss:  Severe muscle mass loss Temples (temporalis), Clavicles (pectoralis & deltoids), Thigh (quadraceps), Calf (gastrocnemius), Hand (interosseous), Scapula (trapezius)  Fluid Accumulation:  No significant fluid accumulation     Strength:  Not Performed    Nutrition Assessment:    Patient is a resident of [address] [place] [address] who stopped eating at least 2 meals daily since marycarmen COVID 19 on 8/1/2023. Resident had constipation on 8/6/2023 (no BM for 3 days) and was given laxative. Pt was checked for C-Diff on admission to ED and tested +, other dx included DKA. Nephrology notes JIGNESH on CKD stg IIIb with anemia, FE, vitamin D, iPTH pending. Calcium 8.1- low as albumin is 4.2- WNL. Pt refuses to take all protein/nutritional supplements, and he  food if he does not want to eat.  He frequently refuses

## 2023-08-07 NOTE — CONSULTS
Provider   vitamin D (ERGOCALCIFEROL) 1.25 MG (39120 UT) CAPS capsule Take 1 capsule by mouth once a week mondays 4/23/23   Historical Provider, MD   insulin aspart (NOVOLOG) 100 UNIT/ML injection vial Inject into the skin 3 times daily (before meals) 250-310=2 units   311-370=4 units  371-430=6 units  431-490=8 units   491-550=10 units  551-610=12 units   DO NOT USE AT BEDTIME!!!!    Historical Provider, MD   glucose 4 g chewable tablet Take 4 tablets by mouth as needed for Low blood sugar (less than 55)    Historical Provider, MD   gabapentin (NEURONTIN) 300 MG capsule Take 1 capsule by mouth 3 times daily for 90 days. Max Daily Amount: 900 mg 4/13/23 7/12/23  Rashida Quijano MD   gabapentin (NEURONTIN) 100 MG capsule 1 cap po daily at 18:00 4/13/23 9/13/23  PATRICK Hinson - CNP   FLUoxetine (PROZAC) 10 MG capsule Take 1 capsule by mouth every evening    Historical Provider, MD   acetaminophen (TYLENOL) 325 MG tablet Take 2 tablets by mouth every 4 hours as needed for Pain or Fever    Ar Automatic Reconciliation   aspirin 81 MG EC tablet Take 1 tablet by mouth every evening 1/3/22   Ar Automatic Reconciliation   cetirizine (ZYRTEC) 10 MG tablet Take 1 tablet by mouth every evening 4/22/21   Ar Automatic Reconciliation   insulin lispro (HUMALOG) 100 UNIT/ML injection cartridge Inject into the skin Run at basal rate of 0.55 units / hr with insulin pump    Ar Automatic Reconciliation   loperamide (IMODIUM) 2 MG capsule Take 2 capsules by mouth every 4 hours as needed for Diarrhea    Ar Automatic Reconciliation   rosuvastatin (CRESTOR) 10 MG tablet Take 1 tablet by mouth every evening 1 tablet by mouth daily at 1800 1/3/22   Ar Automatic Reconciliation       REVIEW OF SYSTEMS:       Constitutional: Negative for chills and fever. HENT: Negative. Eyes: Negative. Respiratory: Negative. Cardiovascular: Negative. Gastrointestinal: diarrhea  Skin: Negative. Neurological: Negative.       Objective: Improved to 20 today.  -2/2 DKA and diarrhea  -not on bicarb    3. Type 1 diabetes with DKA  -presented with blood sugar 600+. Was initially on insulin drip now discontinued. Gap is closed. Blood sugars are still elevated. Plan to restart insulin pump later today    4. Diarrhea  -C diff is positive.  -Continue oral vancomycin and IV Flagyl. IV fluids    5. Hypertension  -Pressure is well controlled without any meds. Continue to monitor    6. Renal osteodystrophy  -Calcium is okay we will check phosphorus intact parathyroid hormone and 25-hydroxy vitamin D level    7. Anemia   -hemoglobin 10.4. No need for Procrit.   Likely anemia of CKD  -Check iron studies    Thank you For the consultation  ______________________________________________________________________  Signed: Tanya Morgan MD

## 2023-08-07 NOTE — PROGRESS NOTES
mg/dL    Creatinine 2.81 (H) 0.60 - 1.30 mg/dL    Bun/Cre Ratio 15 12 - 20      Est, Glom Filt Rate 26 (L) >60 ml/min/1.73m2    Calcium 7.8 (L) 8.5 - 10.1 mg/dL   Lactic Acid    Collection Time: 08/06/23  4:50 PM   Result Value Ref Range    Lactic Acid, Plasma 1.5 0.4 - 2.0 mmol/L   POCT Glucose    Collection Time: 08/06/23  5:26 PM   Result Value Ref Range    POC Glucose 184 (H) 70 - 110 mg/dL    Performed by: Chirag Yousif    POCT Glucose    Collection Time: 08/06/23  6:25 PM   Result Value Ref Range    POC Glucose 179 (H) 70 - 110 mg/dL    Performed by: Chirag Yousif    POCT Glucose    Collection Time: 08/06/23  7:39 PM   Result Value Ref Range    POC Glucose 159 (H) 70 - 110 mg/dL    Performed by: Betsy Morales    POCT Glucose    Collection Time: 08/06/23  8:46 PM   Result Value Ref Range    POC Glucose 163 (H) 70 - 110 mg/dL    Performed by: Elizabet Fenton    Basic Metabolic Panel    Collection Time: 08/06/23  8:47 PM   Result Value Ref Range    Sodium 140 136 - 145 mmol/L    Potassium 3.8 3.5 - 5.5 mmol/L    Chloride 108 100 - 111 mmol/L    CO2 23 21 - 32 mmol/L    Anion Gap 9 3.0 - 18.0 mmol/L    Glucose 169 (H) 74 - 99 mg/dL    BUN 37 (H) 7 - 18 mg/dL    Creatinine 2.71 (H) 0.60 - 1.30 mg/dL    Bun/Cre Ratio 14 12 - 20      Est, Glom Filt Rate 27 (L) >60 ml/min/1.73m2    Calcium 7.8 (L) 8.5 - 10.1 mg/dL   POCT Glucose    Collection Time: 08/06/23  9:40 PM   Result Value Ref Range    POC Glucose 166 (H) 70 - 110 mg/dL    Performed by:  Betsy Sommer    CBC    Collection Time: 08/07/23  4:30 AM   Result Value Ref Range    WBC 20.1 (H) 4.6 - 13.2 K/uL    RBC 3.59 (L) 4.35 - 5.65 M/uL    Hemoglobin 10.4 (L) 13.0 - 16.0 g/dL    Hematocrit 33.3 (L) 36.0 - 48.0 %    MCV 92.8 78.0 - 100.0 FL    MCH 29.0 24.0 - 34.0 PG    MCHC 31.2 31.0 - 37.0 g/dL    RDW 13.6 11.6 - 14.5 %    Platelets 447 993 - 763 K/uL    MPV 12.4 (H) 9.2 - 11.8 FL    Nucleated RBCs 0.0 0.0  WBC    nRBC 0.00 0.00 - 0.01 K/uL   Basic Metabolic Panel    Collection Time: 08/07/23  4:30 AM   Result Value Ref Range    Sodium 137 136 - 145 mmol/L    Potassium 3.9 3.5 - 5.5 mmol/L    Chloride 102 100 - 111 mmol/L    CO2 20 (L) 21 - 32 mmol/L    Anion Gap 15 3.0 - 18.0 mmol/L    Glucose 361 (H) 74 - 99 mg/dL    BUN 35 (H) 7 - 18 mg/dL    Creatinine 2.50 (H) 0.60 - 1.30 mg/dL    Bun/Cre Ratio 14 12 - 20      Est, Glom Filt Rate 30 (L) >60 ml/min/1.73m2    Calcium 8.1 (L) 8.5 - 10.1 mg/dL       CT ABDOMEN PELVIS WO CONTRAST Additional Contrast? None   Final Result   1. Pancolonic wall thickening may represent infectious versus inflammatory   colitis. 2.  Other findings as above. XR CHEST PORTABLE   Final Result   No acute intrathoracic process is identified.                  Recent Results (from the past 336 hour(s))   CBC with Auto Differential    Collection Time: 08/01/23  8:15 PM   Result Value Ref Range    WBC 10.4 4.6 - 13.2 K/uL    RBC 4.40 4.35 - 5.65 M/uL    Hemoglobin 12.6 (L) 13.0 - 16.0 g/dL    Hematocrit 41.1 36.0 - 48.0 %    MCV 93.4 78.0 - 100.0 FL    MCH 28.6 24.0 - 34.0 PG    MCHC 30.7 (L) 31.0 - 37.0 g/dL    RDW 13.4 11.6 - 14.5 %    Platelets 618 551 - 276 K/uL    MPV 11.8 9.2 - 11.8 FL    Nucleated RBCs 0.0 0.0  WBC    nRBC 0.00 0.00 - 0.01 K/uL    Neutrophils % 75 (H) 40 - 73 %    Lymphocytes % 17 (L) 21 - 52 %    Monocytes % 6 3 - 10 %    Eosinophils % 1 0 - 5 %    Basophils % 1 0 - 2 %    Immature Granulocytes 0 0 - 0.5 %    Neutrophils Absolute 7.9 1.8 - 8.0 K/UL    Lymphocytes Absolute 1.7 0.9 - 3.6 K/UL    Monocytes Absolute 0.6 0.05 - 1.2 K/UL    Eosinophils Absolute 0.2 0.0 - 0.4 K/UL    Basophils Absolute 0.1 0.0 - 0.1 K/UL    Absolute Immature Granulocyte 0.0 0.00 - 0.04 K/UL    Differential Type AUTOMATED     CMP    Collection Time: 08/01/23  8:15 PM   Result Value Ref Range    Sodium 140 136 - 145 mmol/L    Potassium 4.0 3.5 - 5.5 mmol/L    Chloride 103 100 - 111 mmol/L    CO2 29 21 - 32 mmol/L    Anion Gap 8 3.0 - 18.0

## 2023-08-07 NOTE — PROGRESS NOTES
0800-Blood glucose 445 Dr. Randolph Swan notified. Orders received. Will continue to monitor. Awaiting for home insulin pump tubing to be replaced. Refused breakfast, only wanted milk. Sol Dafjustin 0900-Patient assisted to [facility] to void, small flecks of stool. 1 person assist.     0902-Scheduled AM meds administered. Patient tolerated well. 1100-Went to EP to get patients supplies for insulin pump. Supplies obtained did not include everything he needed to get the pump on. Patient called his father to bring the materials needed for insulin pump to function properly. Unable to reach his father. VM left. 1439-Patient refused scheduled PO antibiotic, vancomycin and flagyl. Patient persistently stated \"I just got comfortable, I do not want it. \"  Education provided. Patient continues to refused, will re attempt. 1530-Obtained all equipment needed to initiate patients home set up for insulin pump. Patient argumentative and unwilling to work with staff. Patient states \" I don't know you guys, I've never met you before\". Continues to encourage patients participation. 1 hour spent at patients bedside. Education and rapport provided. Pump functioning properly with patients participation. New sensor applied to RLQ, new batteries inserted in insulin pump. NP notified.

## 2023-08-07 NOTE — PROGRESS NOTES
16:40- NP notified pt home insulin pump hooked up. Pt current , programmed into pt pump. Pt on phone with father.

## 2023-08-07 NOTE — PROGRESS NOTES
Admission Medication Reconciliation:    Information obtained from:  [facility]    Comments/Recommendations: Reviewed PTA medications and patient's allergies. Verified PTA medications using MAR from EP. Allergies:  Patient has no known allergies. Significant PMH/Disease States:   Past Medical History:   Diagnosis Date    Depression     Diabetes ([address])     GERD (gastroesophageal reflux disease)     Hypercholesterolemia     Hypertension     Seizures ([address])     Stroke [facility])      Chief Complaint for this Admission:    Chief Complaint   Patient presents with    Positive For Covid-19     Prior to Admission Medications:   Prior to Admission medications    Medication Sig Start Date End Date Taking? Authorizing Provider   vitamin D (ERGOCALCIFEROL) 1.25 MG (40245 UT) CAPS capsule Take 1 capsule by mouth once a week mondays 4/23/23   Historical Provider, MD   insulin aspart (NOVOLOG) 100 UNIT/ML injection vial Inject into the skin 3 times daily (before meals) 250-310=2 units   311-370=4 units  371-430=6 units  431-490=8 units   491-550=10 units  551-610=12 units   DO NOT USE AT BEDTIME!!!!    Historical Provider, MD   glucose 4 g chewable tablet Take 4 tablets by mouth as needed for Low blood sugar (less than 55)    Historical Provider, MD   gabapentin (NEURONTIN) 300 MG capsule Take 1 capsule by mouth 3 times daily for 90 days.  Max Daily Amount: 900 mg 4/13/23 7/12/23  Maren Mccoy MD   gabapentin (NEURONTIN) 100 MG capsule 1 cap po daily at 18:00 4/13/23 9/13/23  Chiqui Mclaughlin APRN - CNP   FLUoxetine (PROZAC) 10 MG capsule Take 1 capsule by mouth every evening    Historical Provider, MD   METAMUCIL FIBER PO Take by mouth 2 TEASPOONSFUL Tai Gutiérrez DAILY  8/7/23  Historical Provider, MD   acetaminophen (TYLENOL) 325 MG tablet Take 2 tablets by mouth every 4 hours as needed for Pain or Fever    Ar Automatic Reconciliation   aspirin 81 MG EC tablet Take 1 tablet by mouth every evening 1/3/22   Ar Automatic Reconciliation   cetirizine (ZYRTEC) 10 MG tablet Take 1 tablet by mouth every evening 4/22/21   Ar Automatic Reconciliation   insulin lispro (HUMALOG) 100 UNIT/ML injection cartridge Inject into the skin Run at basal rate of 0.55 units / hr with insulin pump    Ar Automatic Reconciliation   loperamide (IMODIUM) 2 MG capsule Take 2 capsules by mouth every 4 hours as needed for Diarrhea    Ar Automatic Reconciliation   rosuvastatin (CRESTOR) 10 MG tablet Take 1 tablet by mouth every evening 1 tablet by mouth daily at 1800 1/3/22   Ar Automatic [address] [facility]

## 2023-08-07 NOTE — CARE COORDINATION
Case Management Assessment  Initial Evaluation    Date/Time of Evaluation: 8/7/2023 11:17 AM  Assessment Completed by: Alexus Diaz    If patient is discharged prior to next notation, then this note serves as note for discharge by case management. Patient Name: Armand Alonzo                   YOB: 1965  Diagnosis: Septicemia ([address] ) [A41.9]  Clostridium difficile colitis [A04.72]  CKD (chronic kidney disease) stage 4, GFR 15-29 ml/min (HCC) [N18.4]  SIRS (systemic inflammatory response syndrome) ([address] ) [R65.10]  DKA, type 1, not at goal [facility]) [E10.10]  Diabetic ketoacidosis without coma associated with type 1 diabetes mellitus ([facility]) [E10.10]  COVID-19 [U07.1]                   Date / Time: 8/6/2023  9:23 AM    Patient Admission Status: Inpatient   Readmission Risk (Low < 19, Mod (19-27), High > 27): Readmission Risk Score: 17.7    Current PCP: PROVIDER UNKNOWN  PCP verified by CM? Chart Reviewed: Yes      History Provided by:    Patient Orientation:      Patient Cognition:      Hospitalization in the last 30 days (Readmission):  No    If yes, Readmission Assessment in CM Navigator will be completed. Advance Directives:      Code Status: DNR   Patient's Primary Decision Maker is:      Primary Decision Maker: [facility] - [phone]    Discharge Planning:    Patient lives with: Other (Comment) Type of Home: Long-Term Care  Primary Care Giver:    Patient Support Systems include: Therapist, Other (Comment) (EP Staff)   Current Financial resources:    Current community resources:    Current services prior to admission: Durable Medical Equipment            Current DME: Hospital Bed            Type of Home Care services:  Aide Services    ADLS  Prior functional level:    Current functional level:      PT AM-PAC:   /24  OT AM-PAC:   /24    Family can provide assistance at DC:     Would you like Case Management to discuss the discharge plan with any other family members/significant

## 2023-08-08 LAB
25(OH)D3 SERPL-MCNC: >150 NG/ML (ref 30–100)
ALBUMIN SERPL-MCNC: 3.5 G/DL (ref 3.4–5)
ANION GAP SERPL CALC-SCNC: 25 MMOL/L (ref 3–18)
ANION GAP SERPL CALC-SCNC: 26 MMOL/L (ref 3–18)
APPEARANCE UR: CLEAR
BACTERIA URNS QL MICRO: NEGATIVE /HPF
BILIRUB UR QL: NEGATIVE
BUN SERPL-MCNC: 38 MG/DL (ref 7–18)
BUN SERPL-MCNC: 39 MG/DL (ref 7–18)
BUN/CREAT SERPL: 14 (ref 12–20)
BUN/CREAT SERPL: 15 (ref 12–20)
CA-I BLD-MCNC: 7.9 MG/DL (ref 8.5–10.1)
CA-I BLD-MCNC: 8 MG/DL (ref 8.5–10.1)
CA-I BLD-MCNC: 8.2 MG/DL (ref 8.5–10.1)
CHLORIDE SERPL-SCNC: 94 MMOL/L (ref 100–111)
CHLORIDE SERPL-SCNC: 94 MMOL/L (ref 100–111)
CO2 SERPL-SCNC: 15 MMOL/L (ref 21–32)
CO2 SERPL-SCNC: 15 MMOL/L (ref 21–32)
COLOR UR: YELLOW
CREAT SERPL-MCNC: 2.65 MG/DL (ref 0.6–1.3)
CREAT SERPL-MCNC: 2.68 MG/DL (ref 0.6–1.3)
CREAT UR-MCNC: 14 MG/DL (ref 30–125)
EPITH CASTS URNS QL MICRO: ABNORMAL /LPF (ref 0–20)
ERYTHROCYTE [DISTWIDTH] IN BLOOD BY AUTOMATED COUNT: 13.6 % (ref 11.6–14.5)
FECAL LEUKOCYTES: NORMAL /HPF (ref 0–4)
FERRITIN SERPL-MCNC: 167 NG/ML (ref 8–388)
GLUCOSE BLD STRIP.AUTO-MCNC: 184 MG/DL (ref 70–110)
GLUCOSE BLD STRIP.AUTO-MCNC: 285 MG/DL (ref 70–110)
GLUCOSE BLD STRIP.AUTO-MCNC: 352 MG/DL (ref 70–110)
GLUCOSE BLD STRIP.AUTO-MCNC: 94 MG/DL (ref 70–110)
GLUCOSE BLD STRIP.AUTO-MCNC: 99 MG/DL (ref 70–110)
GLUCOSE SERPL-MCNC: 372 MG/DL (ref 74–99)
GLUCOSE SERPL-MCNC: 376 MG/DL (ref 74–99)
GLUCOSE UR STRIP.AUTO-MCNC: 500 MG/DL
HCT VFR BLD AUTO: 32 % (ref 36–48)
HGB BLD-MCNC: 10.2 G/DL (ref 13–16)
HGB UR QL STRIP: ABNORMAL
IRON SATN MFR SERPL: 50 % (ref 20–50)
IRON SERPL-MCNC: 107 UG/DL (ref 50–175)
KETONES UR QL STRIP.AUTO: 15 MG/DL
LEUKOCYTE ESTERASE UR QL STRIP.AUTO: NEGATIVE
MCH RBC QN AUTO: 29.5 PG (ref 24–34)
MCHC RBC AUTO-ENTMCNC: 31.9 G/DL (ref 31–37)
MCV RBC AUTO: 92.5 FL (ref 78–100)
NITRITE UR QL STRIP.AUTO: NEGATIVE
NRBC # BLD: 0 K/UL (ref 0–0.01)
NRBC BLD-RTO: 0 PER 100 WBC
PERFORMED BY:: ABNORMAL
PERFORMED BY:: NORMAL
PERFORMED BY:: NORMAL
PH UR STRIP: 5.5 (ref 5–8)
PHOSPHATE SERPL-MCNC: 3.6 MG/DL (ref 2.5–4.9)
PLATELET # BLD AUTO: 191 K/UL (ref 135–420)
PMV BLD AUTO: 13.3 FL (ref 9.2–11.8)
POTASSIUM SERPL-SCNC: 3.9 MMOL/L (ref 3.5–5.5)
POTASSIUM SERPL-SCNC: 4 MMOL/L (ref 3.5–5.5)
PROT UR STRIP-MCNC: NEGATIVE MG/DL
PROT UR-MCNC: 25 MG/DL
PROT/CREAT UR-RTO: 1.8
PTH-INTACT SERPL-MCNC: 50 PG/ML (ref 18.4–88)
RBC # BLD AUTO: 3.46 M/UL (ref 4.35–5.65)
RBC #/AREA URNS HPF: ABNORMAL /HPF (ref 0–2)
SODIUM SERPL-SCNC: 134 MMOL/L (ref 136–145)
SODIUM SERPL-SCNC: 135 MMOL/L (ref 136–145)
SP GR UR REFRACTOMETRY: 1.01 (ref 1–1.03)
TIBC SERPL-MCNC: 212 UG/DL (ref 250–450)
UROBILINOGEN UR QL STRIP.AUTO: 0.2 EU/DL (ref 0.2–1)
WBC # BLD AUTO: 19.1 K/UL (ref 4.6–13.2)
WBC URNS QL MICRO: ABNORMAL /HPF (ref 0–4)

## 2023-08-08 PROCEDURE — 2580000003 HC RX 258: Performed by: INTERNAL MEDICINE

## 2023-08-08 PROCEDURE — 83540 ASSAY OF IRON: CPT

## 2023-08-08 PROCEDURE — 6370000000 HC RX 637 (ALT 250 FOR IP): Performed by: INTERNAL MEDICINE

## 2023-08-08 PROCEDURE — 81001 URINALYSIS AUTO W/SCOPE: CPT

## 2023-08-08 PROCEDURE — 85027 COMPLETE CBC AUTOMATED: CPT

## 2023-08-08 PROCEDURE — 6360000002 HC RX W HCPCS: Performed by: NURSE PRACTITIONER

## 2023-08-08 PROCEDURE — 82962 GLUCOSE BLOOD TEST: CPT

## 2023-08-08 PROCEDURE — 82570 ASSAY OF URINE CREATININE: CPT

## 2023-08-08 PROCEDURE — 6370000000 HC RX 637 (ALT 250 FOR IP): Performed by: NURSE PRACTITIONER

## 2023-08-08 PROCEDURE — 1100000000 HC RM PRIVATE

## 2023-08-08 PROCEDURE — 82306 VITAMIN D 25 HYDROXY: CPT

## 2023-08-08 PROCEDURE — 82728 ASSAY OF FERRITIN: CPT

## 2023-08-08 PROCEDURE — 84156 ASSAY OF PROTEIN URINE: CPT

## 2023-08-08 PROCEDURE — 80069 RENAL FUNCTION PANEL: CPT

## 2023-08-08 PROCEDURE — 83970 ASSAY OF PARATHORMONE: CPT

## 2023-08-08 PROCEDURE — 80048 BASIC METABOLIC PNL TOTAL CA: CPT

## 2023-08-08 RX ORDER — SODIUM BICARBONATE 650 MG/1
650 TABLET ORAL 2 TIMES DAILY
Status: DISCONTINUED | OUTPATIENT
Start: 2023-08-08 | End: 2023-08-12 | Stop reason: HOSPADM

## 2023-08-08 RX ORDER — INSULIN LISPRO 100 [IU]/ML
2 INJECTION, SOLUTION INTRAVENOUS; SUBCUTANEOUS ONCE
Status: DISCONTINUED | OUTPATIENT
Start: 2023-08-08 | End: 2023-08-08

## 2023-08-08 RX ADMIN — SODIUM CHLORIDE: 4.5 INJECTION, SOLUTION INTRAVENOUS at 11:41

## 2023-08-08 RX ADMIN — GABAPENTIN 200 MG: 100 CAPSULE ORAL at 21:47

## 2023-08-08 RX ADMIN — HEPARIN SODIUM 5000 UNITS: 5000 INJECTION INTRAVENOUS; SUBCUTANEOUS at 00:04

## 2023-08-08 RX ADMIN — ONDANSETRON 4 MG: 2 INJECTION INTRAMUSCULAR; INTRAVENOUS at 22:57

## 2023-08-08 RX ADMIN — HEPARIN SODIUM 5000 UNITS: 5000 INJECTION INTRAVENOUS; SUBCUTANEOUS at 23:53

## 2023-08-08 RX ADMIN — INSULIN LISPRO 10 UNITS: 100 INJECTION, SOLUTION INTRAVENOUS; SUBCUTANEOUS at 07:50

## 2023-08-08 RX ADMIN — GABAPENTIN 200 MG: 100 CAPSULE ORAL at 08:11

## 2023-08-08 RX ADMIN — VANCOMYCIN HYDROCHLORIDE 125 MG: 125 CAPSULE ORAL at 19:28

## 2023-08-08 RX ADMIN — VANCOMYCIN HYDROCHLORIDE 125 MG: 125 CAPSULE ORAL at 06:23

## 2023-08-08 RX ADMIN — VANCOMYCIN HYDROCHLORIDE 125 MG: 125 CAPSULE ORAL at 00:05

## 2023-08-08 RX ADMIN — SODIUM CHLORIDE: 4.5 INJECTION, SOLUTION INTRAVENOUS at 18:19

## 2023-08-08 RX ADMIN — ASPIRIN 81 MG: 81 TABLET, COATED ORAL at 07:50

## 2023-08-08 RX ADMIN — CETIRIZINE HYDROCHLORIDE 10 MG: 10 TABLET, FILM COATED ORAL at 21:46

## 2023-08-08 RX ADMIN — FLUOXETINE 10 MG: 10 CAPSULE ORAL at 21:47

## 2023-08-08 RX ADMIN — SODIUM BICARBONATE 650 MG: 650 TABLET ORAL at 21:48

## 2023-08-08 RX ADMIN — METRONIDAZOLE 500 MG: 250 TABLET ORAL at 21:47

## 2023-08-08 RX ADMIN — METRONIDAZOLE 500 MG: 250 TABLET ORAL at 13:49

## 2023-08-08 RX ADMIN — SODIUM BICARBONATE 650 MG: 650 TABLET ORAL at 13:49

## 2023-08-08 RX ADMIN — ROSUVASTATIN CALCIUM 10 MG: 10 TABLET, FILM COATED ORAL at 17:09

## 2023-08-08 RX ADMIN — HEPARIN SODIUM 5000 UNITS: 5000 INJECTION INTRAVENOUS; SUBCUTANEOUS at 10:53

## 2023-08-08 RX ADMIN — VANCOMYCIN HYDROCHLORIDE 125 MG: 125 CAPSULE ORAL at 13:49

## 2023-08-08 RX ADMIN — METRONIDAZOLE 500 MG: 250 TABLET ORAL at 06:23

## 2023-08-08 RX ADMIN — INSULIN LISPRO 4 UNITS: 100 INJECTION, SOLUTION INTRAVENOUS; SUBCUTANEOUS at 10:53

## 2023-08-08 ASSESSMENT — PAIN SCALES - GENERAL
PAINLEVEL_OUTOF10: 0

## 2023-08-08 NOTE — PROGRESS NOTES
Assumed care of pt 0700. Pt alert and oriented x3. Disoriented to time. Pt VALDES. Pt on room air O2sat>92%. Pt voids on own using BSC. Bmx1. Pt needs encouragement to eat.   0800 . Provider notified. Total of 10 units given Per MD verbal order. VSS. Report given to Tim Celaya RN.

## 2023-08-08 NOTE — PROGRESS NOTES
Hospitalist Progress Note               Daily Progress Note: 8/8/2023      Chief complaint:   Chief Complaint   Patient presents with    Positive For Covid-19        Subjective:     8/6/23 admission course  Jose Mckinley is a 56 y.o.  male with a past medical history for diabetes mellitus type 1 insulin pump dependent, depression, GERD, hypercholesteremia, seizures, CVA, patient is a resident of [address] [place] [address], patient presented due to patient being hypotensive, tachycardic, hypoglycemic at the facility. Patient reports that he was recently diagnosed with COVID 19 infection, asymptomatic, patient reports over the last 2 days he has been feeling weak and nauseated, and ongoing diarrhea, patient denies chest pain, palpitations, headaches, dizziness, shortness of breath, fevers, chills, and abdominal pain. Patient presented to the ED with his insulin pump and tach, but patient states he is not sure if it is functioning properly, spoke with family over the phone to ensure that all supplies is needed to place insulin pump back on prior to discharge. In the ED patient was found tachycardic 106, blood pressure 96/54, WBC 14.2, 86 neutrophils, glucose 552,, anion gap 20, lactic acid 6.2, per chest x-ray no acute findings, EKG sinus tachycardia 106, and CT abdomen showed pancolonic wall thickening which may represent infectious versus inflammatory colitis. Discussed case with ED provider, hospital medicine will admit the patient for further evaluation and treatment. Patient assessed at the bedside, patient is alert and oriented, there is no acute distress noted. Patient agrees to admission for a diagnosis of the secondary to C. difficile and DKA, treatment to include IV insulin drip, IV/oral antibiotics, and IV hydration. Admit to ICU.    8/6/23 CT abdo pelvis IMPRESSION:  1. Pancolonic wall thickening may represent infectious versus inflammatory  colitis. 2.  Other findings as above.     8/7/23 no new imaging studies, and vital signs to date as well as treatment rendered and patient's response to those treatments. In addition, prior medical, surgical and relevant social and family histories were reviewed. I have discussed management plan with patient/family and with nursing staff. I personally reviewed labs: all labs past 24 hours  I personally reviewed imaging/EKG:  Toxic drug monitoring:   Discussed case with:   re discharge planning      Assessment:  Plan:    8/6/23 admission course  Maureen Luz is a 56 y.o.  male with a past medical history for diabetes mellitus type 1 insulin pump dependent, depression, GERD, hypercholesteremia, seizures, CVA, patient is a resident of [address] [place] [address], patient presented due to patient being hypotensive, tachycardic, hypoglycemic at the facility. Patient reports that he was recently diagnosed with COVID 19 infection, asymptomatic, patient reports over the last 2 days he has been feeling weak and nauseated, and ongoing diarrhea, patient denies chest pain, palpitations, headaches, dizziness, shortness of breath, fevers, chills, and abdominal pain. Patient presented to the ED with his insulin pump and tach, but patient states he is not sure if it is functioning properly, spoke with family over the phone to ensure that all supplies is needed to place insulin pump back on prior to discharge. In the ED patient was found tachycardic 106, blood pressure 96/54, WBC 14.2, 86 neutrophils, glucose 552,, anion gap 20, lactic acid 6.2, per chest x-ray no acute findings, EKG sinus tachycardia 106, and CT abdomen showed pancolonic wall thickening which may represent infectious versus inflammatory colitis. Discussed case with ED provider, hospital medicine will admit the patient for further evaluation and treatment. Patient assessed at the bedside, patient is alert and oriented, there is no acute distress noted.  Patient agrees to admission for a diagnosis of

## 2023-08-08 NOTE — CARE COORDINATION
Discharge planning/Transition of Care RUR 18 %  Called ASAD Reyes, RN, BSN after the MDT team to clarify who is going to talk to Mr. Mcgrath's father regarding his insulin pump. To her knowledge his father comes in prior to discharge and provides information on prog it. CM will call EP sup to consult with them. Unable to reach at this time. 13:40 hrs Attempted to reach EP Sups at this time, left VM. 13:45  Called ICU and spoke to [facility], Tracie, she said that Mega Maria, Nurse Educator, RN ,from  was in the unit, checking on his pump at this time.       Per MDT, Dr. Ayana Marie, pt can be discharged back to EP once pump is functioning, EP can accept him, pharmacy is able to transition pt, and Mr Miranda Long has current Covid test.

## 2023-08-08 NOTE — PROGRESS NOTES
1900 Assumed care of pt from off going nurse Alyson Bentley RN. Pt is A&Ox 2-3 with no c/o at this time. 2026 Pt given HS medications, 2 cups of ice and diet ginger ale, per pt request. BG checked with glucometer. When asked to put the glucometer reading into his pump, pt replied that the pump will not work because the sensor is not working. This nurse then explained that if his pump is not working, then he will need insulin from the facility's [employer], and if we provide the insulin, he needs to remove his pump. The pt replied that he will not remove the pump because it will \"throw his whole body off\". Provider WILMA Nixon NP notified and @ bedside. Pump removed and placed in pt belonging bag. New orders for hypoglycemia protocol and 10 units of humalog to be given ONCE.    2116 Pt c/o nausea and hovering over the trash can dry heaving. Keon Johnson NP notified. New orders for Zofran given. During insulin administration, pt states \" If you make it hurt, I'm going to have to kill somebody\". 0130 VSS. Heparin and ABX given. 2 warm blankets applied. 0335  in to draw AM labs. Lab draw refused by pt. Provider HAL Hernandez NP notified of refusal.    2273 ABX given. Urine sent to lab. Tolerated well. Bedside and Verbal shift change report given to IRENE Bella RN (oncoming nurse) by Joel Hedrick RN  (offgoing nurse). Report included the following information Nurse Handoff Report, Intake/Output, MAR, and Recent Results.

## 2023-08-09 LAB
ANION GAP SERPL CALC-SCNC: 15 MMOL/L (ref 3–18)
ANION GAP SERPL CALC-SCNC: 31 MMOL/L (ref 3–18)
BUN SERPL-MCNC: 41 MG/DL (ref 7–18)
BUN SERPL-MCNC: 44 MG/DL (ref 7–18)
BUN/CREAT SERPL: 15 (ref 12–20)
BUN/CREAT SERPL: 16 (ref 12–20)
CA-I BLD-MCNC: 7.6 MG/DL (ref 8.5–10.1)
CA-I BLD-MCNC: 8 MG/DL (ref 8.5–10.1)
CHLORIDE SERPL-SCNC: 101 MMOL/L (ref 100–111)
CHLORIDE SERPL-SCNC: 90 MMOL/L (ref 100–111)
CO2 SERPL-SCNC: 10 MMOL/L (ref 21–32)
CO2 SERPL-SCNC: 21 MMOL/L (ref 21–32)
CREAT SERPL-MCNC: 2.64 MG/DL (ref 0.6–1.3)
CREAT SERPL-MCNC: 2.94 MG/DL (ref 0.6–1.3)
ERYTHROCYTE [DISTWIDTH] IN BLOOD BY AUTOMATED COUNT: 13.8 % (ref 11.6–14.5)
GLUCOSE BLD STRIP.AUTO-MCNC: 122 MG/DL (ref 70–110)
GLUCOSE BLD STRIP.AUTO-MCNC: 157 MG/DL (ref 70–110)
GLUCOSE BLD STRIP.AUTO-MCNC: 172 MG/DL (ref 70–110)
GLUCOSE BLD STRIP.AUTO-MCNC: 176 MG/DL (ref 70–110)
GLUCOSE BLD STRIP.AUTO-MCNC: 234 MG/DL (ref 70–110)
GLUCOSE BLD STRIP.AUTO-MCNC: 385 MG/DL (ref 70–110)
GLUCOSE BLD STRIP.AUTO-MCNC: 398 MG/DL (ref 70–110)
GLUCOSE BLD STRIP.AUTO-MCNC: 412 MG/DL (ref 70–110)
GLUCOSE BLD STRIP.AUTO-MCNC: 424 MG/DL (ref 70–110)
GLUCOSE BLD STRIP.AUTO-MCNC: 83 MG/DL (ref 70–110)
GLUCOSE SERPL-MCNC: 110 MG/DL (ref 74–99)
GLUCOSE SERPL-MCNC: 468 MG/DL (ref 74–99)
HCT VFR BLD AUTO: 32.7 % (ref 36–48)
HGB BLD-MCNC: 10.2 G/DL (ref 13–16)
MCH RBC QN AUTO: 29.2 PG (ref 24–34)
MCHC RBC AUTO-ENTMCNC: 31.2 G/DL (ref 31–37)
MCV RBC AUTO: 93.7 FL (ref 78–100)
NRBC # BLD: 0 K/UL (ref 0–0.01)
NRBC BLD-RTO: 0 PER 100 WBC
PERFORMED BY:: ABNORMAL
PERFORMED BY:: NORMAL
PLATELET # BLD AUTO: 188 K/UL (ref 135–420)
PMV BLD AUTO: 12.3 FL (ref 9.2–11.8)
POTASSIUM SERPL-SCNC: 3 MMOL/L (ref 3.5–5.5)
POTASSIUM SERPL-SCNC: 3.3 MMOL/L (ref 3.5–5.5)
RBC # BLD AUTO: 3.49 M/UL (ref 4.35–5.65)
SODIUM SERPL-SCNC: 131 MMOL/L (ref 136–145)
SODIUM SERPL-SCNC: 137 MMOL/L (ref 136–145)
WBC # BLD AUTO: 18.3 K/UL (ref 4.6–13.2)

## 2023-08-09 PROCEDURE — 6370000000 HC RX 637 (ALT 250 FOR IP): Performed by: INTERNAL MEDICINE

## 2023-08-09 PROCEDURE — 85027 COMPLETE CBC AUTOMATED: CPT

## 2023-08-09 PROCEDURE — 1100000000 HC RM PRIVATE

## 2023-08-09 PROCEDURE — 36415 COLL VENOUS BLD VENIPUNCTURE: CPT

## 2023-08-09 PROCEDURE — 6360000002 HC RX W HCPCS: Performed by: NURSE PRACTITIONER

## 2023-08-09 PROCEDURE — 82962 GLUCOSE BLOOD TEST: CPT

## 2023-08-09 PROCEDURE — 80048 BASIC METABOLIC PNL TOTAL CA: CPT

## 2023-08-09 PROCEDURE — 97166 OT EVAL MOD COMPLEX 45 MIN: CPT

## 2023-08-09 PROCEDURE — 6370000000 HC RX 637 (ALT 250 FOR IP): Performed by: NURSE PRACTITIONER

## 2023-08-09 PROCEDURE — 2580000003 HC RX 258: Performed by: INTERNAL MEDICINE

## 2023-08-09 RX ORDER — SODIUM CHLORIDE 9 MG/ML
INJECTION, SOLUTION INTRAVENOUS CONTINUOUS
Status: DISCONTINUED | OUTPATIENT
Start: 2023-08-09 | End: 2023-08-10

## 2023-08-09 RX ORDER — VANCOMYCIN HYDROCHLORIDE 250 MG/1
250 CAPSULE ORAL EVERY 6 HOURS
Status: DISCONTINUED | OUTPATIENT
Start: 2023-08-09 | End: 2023-08-12 | Stop reason: HOSPADM

## 2023-08-09 RX ORDER — 0.9 % SODIUM CHLORIDE 0.9 %
1000 INTRAVENOUS SOLUTION INTRAVENOUS ONCE
Status: COMPLETED | OUTPATIENT
Start: 2023-08-09 | End: 2023-08-09

## 2023-08-09 RX ORDER — DEXTROSE MONOHYDRATE 100 MG/ML
INJECTION, SOLUTION INTRAVENOUS CONTINUOUS PRN
Status: DISCONTINUED | OUTPATIENT
Start: 2023-08-09 | End: 2023-08-12 | Stop reason: HOSPADM

## 2023-08-09 RX ORDER — POTASSIUM CHLORIDE 750 MG/1
40 TABLET, EXTENDED RELEASE ORAL ONCE
Status: COMPLETED | OUTPATIENT
Start: 2023-08-09 | End: 2023-08-09

## 2023-08-09 RX ADMIN — VANCOMYCIN HYDROCHLORIDE 250 MG: 250 CAPSULE ORAL at 18:45

## 2023-08-09 RX ADMIN — METRONIDAZOLE 500 MG: 250 TABLET ORAL at 05:44

## 2023-08-09 RX ADMIN — SODIUM BICARBONATE 650 MG: 650 TABLET ORAL at 21:26

## 2023-08-09 RX ADMIN — VANCOMYCIN HYDROCHLORIDE 125 MG: 125 CAPSULE ORAL at 07:12

## 2023-08-09 RX ADMIN — SODIUM CHLORIDE 1000 ML: 9 INJECTION, SOLUTION INTRAVENOUS at 08:33

## 2023-08-09 RX ADMIN — SODIUM BICARBONATE 650 MG: 650 TABLET ORAL at 07:12

## 2023-08-09 RX ADMIN — FLUOXETINE 10 MG: 10 CAPSULE ORAL at 21:26

## 2023-08-09 RX ADMIN — METRONIDAZOLE 500 MG: 250 TABLET ORAL at 13:17

## 2023-08-09 RX ADMIN — INSULIN HUMAN 10 UNITS: 100 INJECTION, SOLUTION PARENTERAL at 08:44

## 2023-08-09 RX ADMIN — GABAPENTIN 200 MG: 100 CAPSULE ORAL at 21:26

## 2023-08-09 RX ADMIN — SODIUM CHLORIDE: 9 INJECTION, SOLUTION INTRAVENOUS at 13:34

## 2023-08-09 RX ADMIN — HEPARIN SODIUM 5000 UNITS: 5000 INJECTION INTRAVENOUS; SUBCUTANEOUS at 23:40

## 2023-08-09 RX ADMIN — HEPARIN SODIUM 5000 UNITS: 5000 INJECTION INTRAVENOUS; SUBCUTANEOUS at 10:06

## 2023-08-09 RX ADMIN — SODIUM CHLORIDE 100 ML/HR: 4.5 INJECTION, SOLUTION INTRAVENOUS at 03:48

## 2023-08-09 RX ADMIN — ASPIRIN 81 MG: 81 TABLET, COATED ORAL at 07:13

## 2023-08-09 RX ADMIN — GABAPENTIN 200 MG: 100 CAPSULE ORAL at 08:48

## 2023-08-09 RX ADMIN — ROSUVASTATIN CALCIUM 10 MG: 10 TABLET, FILM COATED ORAL at 17:02

## 2023-08-09 RX ADMIN — VANCOMYCIN HYDROCHLORIDE 125 MG: 125 CAPSULE ORAL at 01:11

## 2023-08-09 RX ADMIN — INSULIN LISPRO 8 UNITS: 100 INJECTION, SOLUTION INTRAVENOUS; SUBCUTANEOUS at 06:00

## 2023-08-09 RX ADMIN — CETIRIZINE HYDROCHLORIDE 10 MG: 10 TABLET, FILM COATED ORAL at 21:26

## 2023-08-09 RX ADMIN — SODIUM CHLORIDE 6.7 UNITS/HR: 9 INJECTION, SOLUTION INTRAVENOUS at 08:33

## 2023-08-09 RX ADMIN — VANCOMYCIN HYDROCHLORIDE 125 MG: 125 CAPSULE ORAL at 13:17

## 2023-08-09 RX ADMIN — POTASSIUM CHLORIDE 40 MEQ: 750 TABLET, EXTENDED RELEASE ORAL at 08:33

## 2023-08-09 ASSESSMENT — PAIN SCALES - GENERAL
PAINLEVEL_OUTOF10: 0

## 2023-08-09 NOTE — PLAN OF CARE
Problem: Chronic Conditions and Co-morbidities  Goal: Patient's chronic conditions and co-morbidity symptoms are monitored and maintained or improved  Outcome: Not Progressing  Flowsheets (Taken 8/8/2023 1928)  Care Plan - Patient's Chronic Conditions and Co-Morbidity Symptoms are Monitored and Maintained or Improved: Monitor and assess patient's chronic conditions and comorbid symptoms for stability, deterioration, or improvement     Problem: Nutrition Deficit:  Goal: Optimize nutritional status  Outcome: Not Progressing     Problem: Skin/Tissue Integrity  Goal: Absence of new skin breakdown  Description: 1. Monitor for areas of redness and/or skin breakdown  2. Assess vascular access sites hourly  3. Every 4-6 hours minimum:  Change oxygen saturation probe site  4. Every 4-6 hours:  If on nasal continuous positive airway pressure, respiratory therapy assess nares and determine need for appliance change or resting period.   Outcome: Progressing     Problem: Safety - Adult  Goal: Free from fall injury  Outcome: Progressing     Problem: Pain  Goal: Verbalizes/displays adequate comfort level or baseline comfort level  Outcome: Progressing     Problem: Chronic Conditions and Co-morbidities  Goal: Patient's chronic conditions and co-morbidity symptoms are monitored and maintained or improved  Outcome: Not Progressing  Flowsheets (Taken 8/8/2023 1928)  Care Plan - Patient's Chronic Conditions and Co-Morbidity Symptoms are Monitored and Maintained or Improved: Monitor and assess patient's chronic conditions and comorbid symptoms for stability, deterioration, or improvement     Problem: Nutrition Deficit:  Goal: Optimize nutritional status  Outcome: Not Progressing

## 2023-08-09 NOTE — PROGRESS NOTES
Hospitalist Progress Note             Date of Service:  2023  NAME:  Maura Phoenix  :  1965  MRN:  [id number]    Assessment;Plan:    1) Sepsis at admission in the setting of pancolitis due to toxigenic Clostridium difficile                 Supportive care              Intravenous fluids              cont po vancomycin, increase to 250mg, dc po flagyl   Stools starting to form, but still jelly consistency, improved from water       2) Diabetic ketoacidosis with elevated anion gap at admission which has since closed                 dka this am, gap elevated >30, insulin drip started, 1 liter bolus given, hr improvned, gap closed, insulin pump repeaired, and resumed, stopped drip     3) Acute kidney injury on chronic renal disease in the setting of dehydration and above issues                 Management of above issues     4) Cardiovascular issues including hypertension and dyslipidemia                 Telemetry monitoring              Medical management                          Aspirin                          Rosuvastatin     5) DVT prophylaxis with heparin    Covid 19 positive, not hypoxic, droplet isolation        Review of Systems:   Pertinent items are noted in HPI. Vital Signs:    Last 24hrs VS reviewed since prior progress note. Most recent are:  Vitals:    23 1150   BP:    Pulse:    Resp:    Temp:    SpO2: 100%         Intake/Output Summary (Last 24 hours) at 2023 1338  Last data filed at 2023 1155  Gross per 24 hour   Intake 2501 ml   Output 2800 ml   Net -299 ml        Physical Examination:             General:          Alert, cooperative, no distress, appears stated age.    thin frail white male  HEENT:           Atraumatic, anicteric sclerae, pink conjunctivae                          No oral ulcers, mucosa moist, throat clear, dentition fair  Neck:               Supple, symmetrical  Lungs:             Clear to auscultation bilaterally. No Wheezing or Rhonchi. No rales. Chest wall:      No tenderness  No Accessory muscle use. Heart:              Regular  rhythm,  No  murmur   No edema  Abdomen:        Soft, non-tender. Not distended. Bowel sounds normal  Extremities:     No cyanosis. No clubbing,                            Skin turgor normal, Capillary refill normal  Skin:                Not pale. Not Jaundiced  No rashes   Psych:             Not anxious or agitated. Neurologic:      Alert, moves all extremities,       Data Review:    Review and/or order of clinical lab test  Review and/or order of tests in the radiology section of CPT  Review and/or order of tests in the medicine section of CPT      Labs:     Recent Labs     08/08/23  0721 08/09/23  0515   WBC 19.1* 18.3*   HGB 10.2* 10.2*   HCT 32.0* 32.7*    188     Recent Labs     08/08/23  0733 08/09/23  0515 08/09/23  1225   * 131* 137   K 4.0 3.3* 3.0*   CL 94* 90* 101   CO2 15* 10* 21   BUN 38* 44* 41*   PHOS 3.6  --   --      No results for input(s): ALT, TP, ALB, GLOB, GGT, AML in the last 72 hours. Invalid input(s): SGOT, GPT, AP, TBIL, TBILI, AMYP, LPSE, HLPSE  No results for input(s): INR, APTT in the last 72 hours. Invalid input(s): PTP   Recent Labs     08/08/23  0733   TIBC 212*      No results found for: FOL, RBCF   No results for input(s): PH, PCO2, PO2 in the last 72 hours. No results for input(s): CPK in the last 72 hours.     Invalid input(s): CPKMB, CKNDX, TROIQ  Lab Results   Component Value Date/Time    CHOL 107 02/17/2023 08:10 AM    HDL 54 02/17/2023 08:10 AM     No results found for: GLUCPOC  [unfilled]      Medications Reviewed:     Current Facility-Administered Medications   Medication Dose Route Frequency    glucose chewable tablet 16 g  4 tablet Oral PRN    dextrose bolus 10% 125 mL  125 mL IntraVENous PRN    Or    dextrose bolus 10% 250 mL  250 mL IntraVENous PRN    glucagon

## 2023-08-09 NOTE — PROGRESS NOTES
-1850 Received care of pt from off going nurse.     -1928 PM Assessment completed. A&OX3. Lungs clear, SATS 99% on RA. Skin warm and dry. INT patent, IVF infusing without any difficulty. -1955 Pt up to [facility] with assist.  Pt voided large amount of clear colored urine. Pt back to bed. CBWR, bed in lowest position. -2146 , no coverage required. Pt took po pm meds without any difficulty. -2257 Pt dry heaving. Zofran 4 mg IV given.    -0007 Pt resting quietly in bed with eyes closed. NAD noted. -2440 Pt awake and confused at present. Pt stated, \"I'm going back to my room down the wong. \"  This nurse reorienting pt. CBWR, bed in lowest position. NAD noted.    -0400 Pt incontinent of stool. Pt up to [facility] with assist. Pt gown and bed linens changed. Pt back to bed. CBWR, bed in lowest position. NAD noted.    -0515  into draw am labs. -0600  per lab result. Dr. Nelson Carter, notified. New orders were received. 8 units given per sliding scale.

## 2023-08-09 NOTE — PROGRESS NOTES
Goals-   1- complete UE dressing with supervision   2- complete LE dressing with CGA   3- Toileting with CGA   4- complete 10 min unsupported sitting B hand integration tasks for supervision     OCCUPATIONAL THERAPY EVALUATION    Patient: Mario Marcos (57 y.o. male)  Date: 8/9/2023  Primary Diagnosis: Septicemia ([address] [facility]) [A41.9]  Clostridium difficile colitis [A04.72]  CKD (chronic kidney disease) stage 4, GFR 15-29 ml/min ([facility]) [N18.4]  SIRS (systemic inflammatory response syndrome) ([facility]) [R65.10]  DKA, type 1, not at goal [facility]) [E10.10]  Diabetic ketoacidosis without coma associated with type 1 diabetes mellitus ([address] [facility]) [E10.10]  COVID-19 [U07.1]       Precautions: fall, IV, contact- C Diff and +COVID      PLOF: LTC resident in . Requires limited assist with basic ADLs, mobility ad fredy     ASSESSMENT :  Based on the objective data described below, the patient presents with declines in basic ADLs and mobility secondary to hospital stay. Patient will benefit from skilled intervention to address the above impairments.   Patient's rehabilitation potential is considered to be good  Factors which may influence rehabilitation potential include:   []             None noted  []             Mental ability/status  [x]             Medical condition  []             Home/family situation and support systems  [x]             Safety awareness  []             Pain tolerance/management  []             Other:      PLAN :  Recommendations and Planned Interventions:   [x]               Self Care Training                  [x]      Therapeutic Activities  [x]               Functional Mobility Training   []      Cognitive Retraining  [x]               Therapeutic Exercises           []      Endurance Activities  []               Balance Training                    []      Neuromuscular Re-Education  []               Visual/Perceptual Training     [x]      Home Safety Training  [x]               Patient Education                   [x]

## 2023-08-09 NOTE — FLOWSHEET NOTE
08/09/23 0710   Handoff   Communication Given Shift Handoff   Handoff Given To AILYN Mishra, RN   Handoff Communication Face to Face   Time Handoff Given [phone]

## 2023-08-09 NOTE — PROGRESS NOTES
Tk Funk, Clinical Educator, RN in to see patient and start insulin pump. Settings at 0.5 u/hr per Hunter Prudent. Glucose sensor attached.

## 2023-08-09 NOTE — PROGRESS NOTES
Orders received for P.T., therapist will attempt evaluation when PAPR equipment is available due to pt having covid.   Bishnu Clayton, PT, DPT

## 2023-08-09 NOTE — PROGRESS NOTES
Renal Consultation Note    NAME:  Emmanuel Johnston   :   1965   MRN:   [id number]     ATTENDING: Abhijeet Coyne MD  PCP:  PROVIDER UNKNOWN    Date/Time:  2023 1:25 PM      Subjective:   REQUESTING PHYSICIAN:  REASON FOR CONSULT:    kelly on ckd  Patient seen in the ICU. Diarrhea is improving. Blood sugars are still markedly elevated. On oral vancomycin and IV Flagyl for C. difficile diarrhea which is improving    Past Medical History:   Diagnosis Date    Depression     Diabetes ([address])     GERD (gastroesophageal reflux disease)     Hypercholesterolemia     Hypertension     Seizures ([address])     Stroke [facility])       Past Surgical History:   Procedure Laterality Date    ORTHOPEDIC SURGERY Left 2018    hip joint    ORTHOPEDIC SURGERY Right     broken knee     Social History     Tobacco Use    Smoking status: Never    Smokeless tobacco: Current    Tobacco comments:     Quit smokin+/day   Substance Use Topics    Alcohol use: Not Currently      Family History   Problem Relation Age of Onset    Cancer Father         prostate    Cancer Maternal Uncle         prostate    Cancer Paternal Uncle         prostate       No Known Allergies   Prior to Admission medications    Medication Sig Start Date End Date Taking? Authorizing Provider   vitamin D (ERGOCALCIFEROL) 1.25 MG (26238 UT) CAPS capsule Take 1 capsule by mouth once a week 23   Historical Provider, MD   insulin aspart (NOVOLOG) 100 UNIT/ML injection vial Inject into the skin 3 times daily (before meals) 250-310=2 units   311-370=4 units  371-430=6 units  431-490=8 units   491-550=10 units  551-610=12 units   DO NOT USE AT BEDTIME!!!!    Historical Provider, MD   glucose 4 g chewable tablet Take 4 tablets by mouth as needed for Low blood sugar (less than 55)    Historical Provider, MD   gabapentin (NEURONTIN) 300 MG capsule Take 1 capsule by mouth 3 times daily for 90 days.  Max Daily Amount: 900 mg 23  Reji RODRIGUEZ

## 2023-08-09 NOTE — PROGRESS NOTES
Assumed care of pt 0700. Pt alert and oriented. VALDES. Pt on room air O2sat>92%. Pt voids on own OOB to [facility]. 1L NS bolus given d/t HR 120s. 0830 Pt restarted on Insulin gtt. Per protocol Insulin bolus given prior to drip infusion. Titrating gtt based on Q1hr accu checks and gtt protocol. 1230 STAT BMP per Dr. Hillary Loya. 1327 Insulin gtt stopped Per Dr. Hillary Loya. Restarting patients insulin pump. 1400 Patients insulin pump working. VSS. Father came to bedside to visit. Report given to ORLY Bolaños.

## 2023-08-09 NOTE — FLOWSHEET NOTE
08/09/23 0550   Treatment Team Notification   Reason for Communication Critical results   Type of Critical Result Laboratory   Critical Lab Information Glucose   Person Result Received From [facility]   Critical Lab Result Type Glucose  (468)   Name of Team Member Notified Dr. Cyrus Masters Team Role Attending Provider   Method of Communication Call   Response See orders   Notification Time 8562

## 2023-08-10 LAB
ANION GAP SERPL CALC-SCNC: 24 MMOL/L (ref 3–18)
BASOPHILS # BLD: 0 K/UL (ref 0–0.1)
BASOPHILS NFR BLD: 0 % (ref 0–2)
BUN SERPL-MCNC: 37 MG/DL (ref 7–18)
BUN/CREAT SERPL: 14 (ref 12–20)
CA-I BLD-MCNC: 8.2 MG/DL (ref 8.5–10.1)
CHLORIDE SERPL-SCNC: 99 MMOL/L (ref 100–111)
CO2 SERPL-SCNC: 15 MMOL/L (ref 21–32)
CREAT SERPL-MCNC: 2.57 MG/DL (ref 0.6–1.3)
DIFFERENTIAL METHOD BLD: ABNORMAL
EOSINOPHIL # BLD: 0 K/UL (ref 0–0.4)
EOSINOPHIL NFR BLD: 0 % (ref 0–5)
ERYTHROCYTE [DISTWIDTH] IN BLOOD BY AUTOMATED COUNT: 13.8 % (ref 11.6–14.5)
EST. AVERAGE GLUCOSE BLD GHB EST-MCNC: 183 MG/DL
GLUCOSE BLD STRIP.AUTO-MCNC: 178 MG/DL (ref 70–110)
GLUCOSE BLD STRIP.AUTO-MCNC: 184 MG/DL (ref 70–110)
GLUCOSE BLD STRIP.AUTO-MCNC: 189 MG/DL (ref 70–110)
GLUCOSE BLD STRIP.AUTO-MCNC: 238 MG/DL (ref 70–110)
GLUCOSE SERPL-MCNC: 208 MG/DL (ref 74–99)
HBA1C MFR BLD: 8 % (ref 4.2–5.6)
HCT VFR BLD AUTO: 32.3 % (ref 36–48)
HGB BLD-MCNC: 10.5 G/DL (ref 13–16)
IMM GRANULOCYTES # BLD AUTO: 0.1 K/UL (ref 0–0.04)
IMM GRANULOCYTES NFR BLD AUTO: 1 % (ref 0–0.5)
LYMPHOCYTES # BLD: 1.4 K/UL (ref 0.9–3.6)
LYMPHOCYTES NFR BLD: 9 % (ref 21–52)
MAGNESIUM SERPL-MCNC: 2 MG/DL (ref 1.6–2.6)
MCH RBC QN AUTO: 29.4 PG (ref 24–34)
MCHC RBC AUTO-ENTMCNC: 32.5 G/DL (ref 31–37)
MCV RBC AUTO: 90.5 FL (ref 78–100)
MONOCYTES # BLD: 0.9 K/UL (ref 0.05–1.2)
MONOCYTES NFR BLD: 5 % (ref 3–10)
NEUTS SEG # BLD: 14.3 K/UL (ref 1.8–8)
NEUTS SEG NFR BLD: 85 % (ref 40–73)
NRBC # BLD: 0 K/UL (ref 0–0.01)
NRBC BLD-RTO: 0 PER 100 WBC
PERFORMED BY:: ABNORMAL
PHOSPHATE SERPL-MCNC: 2.3 MG/DL (ref 2.5–4.9)
PLATELET # BLD AUTO: 183 K/UL (ref 135–420)
PMV BLD AUTO: 12.7 FL (ref 9.2–11.8)
POTASSIUM SERPL-SCNC: 2.6 MMOL/L (ref 3.5–5.5)
POTASSIUM SERPL-SCNC: 3 MMOL/L (ref 3.5–5.5)
RBC # BLD AUTO: 3.57 M/UL (ref 4.35–5.65)
SODIUM SERPL-SCNC: 138 MMOL/L (ref 136–145)
WBC # BLD AUTO: 16.8 K/UL (ref 4.6–13.2)

## 2023-08-10 PROCEDURE — 1100000000 HC RM PRIVATE

## 2023-08-10 PROCEDURE — 6370000000 HC RX 637 (ALT 250 FOR IP): Performed by: INTERNAL MEDICINE

## 2023-08-10 PROCEDURE — 6370000000 HC RX 637 (ALT 250 FOR IP): Performed by: NURSE PRACTITIONER

## 2023-08-10 PROCEDURE — 6360000002 HC RX W HCPCS: Performed by: NURSE PRACTITIONER

## 2023-08-10 PROCEDURE — 6360000002 HC RX W HCPCS: Performed by: INTERNAL MEDICINE

## 2023-08-10 PROCEDURE — 36415 COLL VENOUS BLD VENIPUNCTURE: CPT

## 2023-08-10 PROCEDURE — 82962 GLUCOSE BLOOD TEST: CPT

## 2023-08-10 PROCEDURE — 85025 COMPLETE CBC W/AUTO DIFF WBC: CPT

## 2023-08-10 PROCEDURE — 84132 ASSAY OF SERUM POTASSIUM: CPT

## 2023-08-10 PROCEDURE — 84100 ASSAY OF PHOSPHORUS: CPT

## 2023-08-10 PROCEDURE — 83735 ASSAY OF MAGNESIUM: CPT

## 2023-08-10 PROCEDURE — 83036 HEMOGLOBIN GLYCOSYLATED A1C: CPT

## 2023-08-10 PROCEDURE — 2500000003 HC RX 250 WO HCPCS: Performed by: INTERNAL MEDICINE

## 2023-08-10 PROCEDURE — 2580000003 HC RX 258: Performed by: INTERNAL MEDICINE

## 2023-08-10 PROCEDURE — 80048 BASIC METABOLIC PNL TOTAL CA: CPT

## 2023-08-10 RX ORDER — DEXTROSE MONOHYDRATE 100 MG/ML
INJECTION, SOLUTION INTRAVENOUS CONTINUOUS PRN
Status: DISCONTINUED | OUTPATIENT
Start: 2023-08-10 | End: 2023-08-12 | Stop reason: HOSPADM

## 2023-08-10 RX ORDER — INSULIN LISPRO 100 [IU]/ML
0-8 INJECTION, SOLUTION INTRAVENOUS; SUBCUTANEOUS
Status: DISCONTINUED | OUTPATIENT
Start: 2023-08-10 | End: 2023-08-12 | Stop reason: HOSPADM

## 2023-08-10 RX ORDER — POTASSIUM CHLORIDE 7.45 MG/ML
10 INJECTION INTRAVENOUS
Status: COMPLETED | OUTPATIENT
Start: 2023-08-10 | End: 2023-08-10

## 2023-08-10 RX ORDER — POTASSIUM CHLORIDE 750 MG/1
40 TABLET, EXTENDED RELEASE ORAL ONCE
Status: COMPLETED | OUTPATIENT
Start: 2023-08-10 | End: 2023-08-10

## 2023-08-10 RX ORDER — INSULIN LISPRO 100 [IU]/ML
0-4 INJECTION, SOLUTION INTRAVENOUS; SUBCUTANEOUS NIGHTLY
Status: DISCONTINUED | OUTPATIENT
Start: 2023-08-10 | End: 2023-08-12 | Stop reason: HOSPADM

## 2023-08-10 RX ORDER — POTASSIUM CHLORIDE 750 MG/1
40 TABLET, EXTENDED RELEASE ORAL ONCE
Status: DISCONTINUED | OUTPATIENT
Start: 2023-08-10 | End: 2023-08-12 | Stop reason: HOSPADM

## 2023-08-10 RX ADMIN — POTASSIUM CHLORIDE 10 MEQ: 7.46 INJECTION, SOLUTION INTRAVENOUS at 09:29

## 2023-08-10 RX ADMIN — ROSUVASTATIN CALCIUM 10 MG: 10 TABLET, FILM COATED ORAL at 16:56

## 2023-08-10 RX ADMIN — VANCOMYCIN HYDROCHLORIDE 250 MG: 250 CAPSULE ORAL at 18:48

## 2023-08-10 RX ADMIN — VANCOMYCIN HYDROCHLORIDE 250 MG: 250 CAPSULE ORAL at 00:13

## 2023-08-10 RX ADMIN — SODIUM BICARBONATE: 84 INJECTION, SOLUTION INTRAVENOUS at 11:44

## 2023-08-10 RX ADMIN — SODIUM CHLORIDE: 9 INJECTION, SOLUTION INTRAVENOUS at 07:32

## 2023-08-10 RX ADMIN — GABAPENTIN 200 MG: 100 CAPSULE ORAL at 08:09

## 2023-08-10 RX ADMIN — POTASSIUM CHLORIDE 10 MEQ: 7.46 INJECTION, SOLUTION INTRAVENOUS at 11:45

## 2023-08-10 RX ADMIN — HEPARIN SODIUM 5000 UNITS: 5000 INJECTION INTRAVENOUS; SUBCUTANEOUS at 10:22

## 2023-08-10 RX ADMIN — POTASSIUM CHLORIDE 10 MEQ: 7.46 INJECTION, SOLUTION INTRAVENOUS at 10:22

## 2023-08-10 RX ADMIN — VANCOMYCIN HYDROCHLORIDE 250 MG: 250 CAPSULE ORAL at 13:48

## 2023-08-10 RX ADMIN — SODIUM BICARBONATE 650 MG: 650 TABLET ORAL at 21:00

## 2023-08-10 RX ADMIN — POTASSIUM CHLORIDE 40 MEQ: 750 TABLET, EXTENDED RELEASE ORAL at 14:11

## 2023-08-10 RX ADMIN — VANCOMYCIN HYDROCHLORIDE 250 MG: 250 CAPSULE ORAL at 07:58

## 2023-08-10 RX ADMIN — POTASSIUM CHLORIDE 10 MEQ: 7.45 INJECTION INTRAVENOUS at 16:56

## 2023-08-10 RX ADMIN — POTASSIUM CHLORIDE 10 MEQ: 7.45 INJECTION INTRAVENOUS at 18:29

## 2023-08-10 RX ADMIN — FLUOXETINE 10 MG: 10 CAPSULE ORAL at 21:00

## 2023-08-10 RX ADMIN — GABAPENTIN 200 MG: 100 CAPSULE ORAL at 21:00

## 2023-08-10 RX ADMIN — ONDANSETRON 4 MG: 2 INJECTION INTRAMUSCULAR; INTRAVENOUS at 00:12

## 2023-08-10 RX ADMIN — POTASSIUM CHLORIDE 10 MEQ: 7.45 INJECTION INTRAVENOUS at 15:34

## 2023-08-10 RX ADMIN — POTASSIUM CHLORIDE 10 MEQ: 7.46 INJECTION, SOLUTION INTRAVENOUS at 08:25

## 2023-08-10 RX ADMIN — ASPIRIN 81 MG: 81 TABLET, COATED ORAL at 08:09

## 2023-08-10 RX ADMIN — CETIRIZINE HYDROCHLORIDE 10 MG: 10 TABLET, FILM COATED ORAL at 21:00

## 2023-08-10 RX ADMIN — ONDANSETRON 4 MG: 2 INJECTION INTRAMUSCULAR; INTRAVENOUS at 10:22

## 2023-08-10 RX ADMIN — POTASSIUM CHLORIDE 40 MEQ: 750 TABLET, EXTENDED RELEASE ORAL at 08:25

## 2023-08-10 RX ADMIN — POTASSIUM CHLORIDE 10 MEQ: 7.45 INJECTION INTRAVENOUS at 14:11

## 2023-08-10 RX ADMIN — SODIUM BICARBONATE 650 MG: 650 TABLET ORAL at 08:09

## 2023-08-10 ASSESSMENT — PAIN SCALES - GENERAL
PAINLEVEL_OUTOF10: 0

## 2023-08-10 NOTE — PROGRESS NOTES
Attempt to evaluate pt this morning from 1574-3004, pt adamantly refuses stating he does not feel well today and that he had a rough night. Therapist encouraged him to try and at least sit up but he continues to refuse.  Therapist will return to attempt evaluation tomorrow per his request.  Ale Clark, PT, DPT

## 2023-08-10 NOTE — PROGRESS NOTES
Renal Consultation Note    NAME:  Mario Marcos   :   1965   MRN:   [id number]     ATTENDING: Nohemy Sands MD  PCP:  PROVIDER UNKNOWN    Date/Time:  8/10/2023 11:02 AM      Subjective:   REQUESTING PHYSICIAN:  REASON FOR CONSULT:    kelly on ckd  Patient seen in the ICU. Diarrhea is improving. Blood sugars are still active but still elevated. His renal function is stable. Bicarbonate has today decreased to 15. His potassium is very low at 2.7. Receiving runs of IV KCl and 40 mEq of KCl has been ordered    Past Medical History:   Diagnosis Date    Depression     Diabetes ([address])     GERD (gastroesophageal reflux disease)     Hypercholesterolemia     Hypertension     Seizures ([facility])     Stroke [facility])       Past Surgical History:   Procedure Laterality Date    ORTHOPEDIC SURGERY Left 2018    hip joint    ORTHOPEDIC SURGERY Right     broken knee     Social History     Tobacco Use    Smoking status: Never    Smokeless tobacco: Current    Tobacco comments:     Quit smokin+/day   Substance Use Topics    Alcohol use: Not Currently      Family History   Problem Relation Age of Onset    Cancer Father         prostate    Cancer Maternal Uncle         prostate    Cancer Paternal Uncle         prostate       No Known Allergies   Prior to Admission medications    Medication Sig Start Date End Date Taking?  Authorizing Provider   vitamin D (ERGOCALCIFEROL) 1.25 MG (85485 UT) CAPS capsule Take 1 capsule by mouth once a week 23   Historical Provider, MD   insulin aspart (NOVOLOG) 100 UNIT/ML injection vial Inject into the skin 3 times daily (before meals) 250-310=2 units   311-370=4 units  371-430=6 units  431-490=8 units   491-550=10 units  551-610=12 units   DO NOT USE AT BEDTIME!!!!    Historical Provider, MD   glucose 4 g chewable tablet Take 4 tablets by mouth as needed for Low blood sugar (less than 55)    Historical Provider, MD   gabapentin (NEURONTIN) 300 MG capsule Take 1

## 2023-08-10 NOTE — PROGRESS NOTES
1900 Assumed care of pt from off going nurse AILYN Loco RN. Pt is A&Ox4 with no c/o at this time. 2126 HS medications given. Tolerated well. Pt offered snack and refused. 0100 Heparin and ABX given. Pt up to bsc. Zofran. Voided 400. given for pt hovering over trash can dry heaving.    0400 Pt up to bsc to void.     1586 Critical K+ of 2.6. Provider ELIZABETH Benitez NP notified. See orders. Bedside and Verbal shift change report given to HAL Jo (oncoming nurse) by Savannah Cunningham RN  (offgoing nurse). Report included the following information .

## 2023-08-10 NOTE — PROGRESS NOTES
Occupational Therapy    Patient declined. Upon opening door, patient yells out \"no\" before this writer can introduce herself. Patient then states, \"I know what you are here for, I already told the other lalitha no today too. I just can't do it today, I don't feel good\". Encouragement and education provided on importance of participation in therapy services; however, patient continues to decline.  This writer will attempt to return tomorrow for OT services per patient request.    NATY Jones/JENNIFER

## 2023-08-10 NOTE — PLAN OF CARE
Problem: Nutrition Deficit:  Goal: Optimize nutritional status  Outcome: Not Progressing  Flowsheets (Taken 8/10/2023 1728)  Nutrient intake appropriate for improving, restoring, or maintaining nutritional needs:   Assess nutritional status and recommend course of action   Monitor oral intake, labs, and treatment plans   Comprehensive Nutrition Assessment    Type and Reason for Visit:  Initial, RD Nutrition Re-Screen/LOS    Nutrition Recommendations/Plan:   Consider Regular diet with no sugar or salt packet and fruit for most desserts. Phos Nak three times daily if patient will accept  Consider psychiatric evaluation (refusal of foods, fluids)      Malnutrition Assessment:  Malnutrition Status:  Severe malnutrition (08/07/23 1810)    Context:  Chronic Illness     Findings of the 6 clinical characteristics of malnutrition:  Energy Intake:  75% or less estimated energy requirements for 1 month or longer  Weight Loss:  Greater than 5% over 1 month (5.4% in 1 month ( pounds))     Body Fat Loss:  Severe body fat loss Orbital, Triceps, Fat Overlying Ribs, Buccal region   Muscle Mass Loss:  Severe muscle mass loss Temples (temporalis), Clavicles (pectoralis & deltoids), Thigh (quadraceps), Calf (gastrocnemius), Hand (interosseous), Scapula (trapezius)  Fluid Accumulation:  No significant fluid accumulation     Strength:  Not Performed    Nutrition Assessment:    Intake remains variable usually 1 meal per day. Intake at supper - banana. Weight down 5 additional pounds; he continues to refuse EN, supplements and states \"I know what and when I need to eat\". Anion Gap 24 , K 2.6 this am (supplemented and increased to 3.0. Phosphorus 2.3- low -recommend phosnak TID if he will accept. Nutrition Related Findings:    No loose stools or BM. Wound Type: None       Current Nutrition Intake & Therapies:    Average Meal Intake: 1-25%  Average Supplements Intake: Refusing to take  ADULT DIET;  Regular; 4 carb

## 2023-08-11 ENCOUNTER — APPOINTMENT (OUTPATIENT)
Age: 58
End: 2023-08-11
Payer: MEDICARE

## 2023-08-11 LAB
ANION GAP SERPL CALC-SCNC: 17 MMOL/L (ref 3–18)
BASOPHILS # BLD: 0 K/UL (ref 0–0.1)
BASOPHILS NFR BLD: 0 % (ref 0–2)
BUN SERPL-MCNC: 24 MG/DL (ref 7–18)
BUN/CREAT SERPL: 12 (ref 12–20)
CA-I BLD-MCNC: 7.8 MG/DL (ref 8.5–10.1)
CHLORIDE SERPL-SCNC: 90 MMOL/L (ref 100–111)
CO2 SERPL-SCNC: 29 MMOL/L (ref 21–32)
CREAT SERPL-MCNC: 2.06 MG/DL (ref 0.6–1.3)
DIFFERENTIAL METHOD BLD: ABNORMAL
EOSINOPHIL # BLD: 0 K/UL (ref 0–0.4)
EOSINOPHIL NFR BLD: 0 % (ref 0–5)
ERYTHROCYTE [DISTWIDTH] IN BLOOD BY AUTOMATED COUNT: 13.5 % (ref 11.6–14.5)
GLUCOSE BLD STRIP.AUTO-MCNC: 129 MG/DL (ref 70–110)
GLUCOSE BLD STRIP.AUTO-MCNC: 157 MG/DL (ref 70–110)
GLUCOSE BLD STRIP.AUTO-MCNC: 168 MG/DL (ref 70–110)
GLUCOSE BLD STRIP.AUTO-MCNC: 234 MG/DL (ref 70–110)
GLUCOSE SERPL-MCNC: 153 MG/DL (ref 74–99)
HCT VFR BLD AUTO: 34.7 % (ref 36–48)
HGB BLD-MCNC: 11.1 G/DL (ref 13–16)
IMM GRANULOCYTES # BLD AUTO: 0 K/UL (ref 0–0.04)
IMM GRANULOCYTES NFR BLD AUTO: 0 % (ref 0–0.5)
LYMPHOCYTES # BLD: 1.4 K/UL (ref 0.9–3.6)
LYMPHOCYTES NFR BLD: 14 % (ref 21–52)
MAGNESIUM SERPL-MCNC: 1.8 MG/DL (ref 1.6–2.6)
MCH RBC QN AUTO: 28.6 PG (ref 24–34)
MCHC RBC AUTO-ENTMCNC: 32 G/DL (ref 31–37)
MCV RBC AUTO: 89.4 FL (ref 78–100)
MONOCYTES # BLD: 0.9 K/UL (ref 0.05–1.2)
MONOCYTES NFR BLD: 9 % (ref 3–10)
NEUTS SEG # BLD: 7.4 K/UL (ref 1.8–8)
NEUTS SEG NFR BLD: 77 % (ref 40–73)
NRBC # BLD: 0 K/UL (ref 0–0.01)
NRBC BLD-RTO: 0 PER 100 WBC
PERFORMED BY:: ABNORMAL
PHOSPHATE SERPL-MCNC: 1.4 MG/DL (ref 2.5–4.9)
PLATELET # BLD AUTO: 154 K/UL (ref 135–420)
PMV BLD AUTO: 12.6 FL (ref 9.2–11.8)
POTASSIUM SERPL-SCNC: 3.2 MMOL/L (ref 3.5–5.5)
RBC # BLD AUTO: 3.88 M/UL (ref 4.35–5.65)
SODIUM SERPL-SCNC: 136 MMOL/L (ref 136–145)
WBC # BLD AUTO: 9.7 K/UL (ref 4.6–13.2)

## 2023-08-11 PROCEDURE — 83735 ASSAY OF MAGNESIUM: CPT

## 2023-08-11 PROCEDURE — 84100 ASSAY OF PHOSPHORUS: CPT

## 2023-08-11 PROCEDURE — 6370000000 HC RX 637 (ALT 250 FOR IP): Performed by: INTERNAL MEDICINE

## 2023-08-11 PROCEDURE — 2500000003 HC RX 250 WO HCPCS: Performed by: INTERNAL MEDICINE

## 2023-08-11 PROCEDURE — 71100 X-RAY EXAM RIBS UNI 2 VIEWS: CPT

## 2023-08-11 PROCEDURE — 2580000003 HC RX 258: Performed by: INTERNAL MEDICINE

## 2023-08-11 PROCEDURE — 6360000002 HC RX W HCPCS: Performed by: INTERNAL MEDICINE

## 2023-08-11 PROCEDURE — 80048 BASIC METABOLIC PNL TOTAL CA: CPT

## 2023-08-11 PROCEDURE — 82962 GLUCOSE BLOOD TEST: CPT

## 2023-08-11 PROCEDURE — 6370000000 HC RX 637 (ALT 250 FOR IP): Performed by: NURSE PRACTITIONER

## 2023-08-11 PROCEDURE — 1100000000 HC RM PRIVATE

## 2023-08-11 PROCEDURE — 85025 COMPLETE CBC W/AUTO DIFF WBC: CPT

## 2023-08-11 PROCEDURE — 36415 COLL VENOUS BLD VENIPUNCTURE: CPT

## 2023-08-11 PROCEDURE — 6360000002 HC RX W HCPCS: Performed by: NURSE PRACTITIONER

## 2023-08-11 PROCEDURE — 73501 X-RAY EXAM HIP UNI 1 VIEW: CPT

## 2023-08-11 RX ORDER — VANCOMYCIN HYDROCHLORIDE 125 MG/1
125 CAPSULE ORAL EVERY 6 HOURS
Qty: 24 CAPSULE | Refills: 0
Start: 2023-08-11 | End: 2023-08-17

## 2023-08-11 RX ORDER — GABAPENTIN 300 MG/1
300 CAPSULE ORAL 3 TIMES DAILY
Qty: 90 CAPSULE | Refills: 5 | Status: SHIPPED | OUTPATIENT
Start: 2023-08-11 | End: 2023-08-16

## 2023-08-11 RX ORDER — POTASSIUM CHLORIDE 750 MG/1
40 TABLET, EXTENDED RELEASE ORAL ONCE
Status: COMPLETED | OUTPATIENT
Start: 2023-08-11 | End: 2023-08-11

## 2023-08-11 RX ORDER — OXYCODONE HYDROCHLORIDE AND ACETAMINOPHEN 5; 325 MG/1; MG/1
1 TABLET ORAL EVERY 6 HOURS PRN
Status: DISCONTINUED | OUTPATIENT
Start: 2023-08-11 | End: 2023-08-12 | Stop reason: HOSPADM

## 2023-08-11 RX ORDER — POTASSIUM CHLORIDE 20 MEQ/1
20 TABLET, EXTENDED RELEASE ORAL 2 TIMES DAILY
Qty: 60 TABLET | Refills: 0 | Status: ON HOLD
Start: 2023-08-11

## 2023-08-11 RX ORDER — POTASSIUM CHLORIDE 7.45 MG/ML
10 INJECTION INTRAVENOUS
Status: COMPLETED | OUTPATIENT
Start: 2023-08-11 | End: 2023-08-11

## 2023-08-11 RX ORDER — SODIUM BICARBONATE 650 MG/1
650 TABLET ORAL 2 TIMES DAILY
Qty: 60 TABLET | Refills: 0 | Status: ON HOLD
Start: 2023-08-11

## 2023-08-11 RX ADMIN — SODIUM BICARBONATE 650 MG: 650 TABLET ORAL at 20:44

## 2023-08-11 RX ADMIN — SODIUM BICARBONATE: 84 INJECTION, SOLUTION INTRAVENOUS at 08:48

## 2023-08-11 RX ADMIN — GABAPENTIN 200 MG: 100 CAPSULE ORAL at 08:03

## 2023-08-11 RX ADMIN — HEPARIN SODIUM 5000 UNITS: 5000 INJECTION INTRAVENOUS; SUBCUTANEOUS at 00:02

## 2023-08-11 RX ADMIN — POTASSIUM & SODIUM PHOSPHATES POWDER PACK 280-160-250 MG 250 MG: 280-160-250 PACK at 08:03

## 2023-08-11 RX ADMIN — VANCOMYCIN HYDROCHLORIDE 250 MG: 250 CAPSULE ORAL at 13:02

## 2023-08-11 RX ADMIN — OXYCODONE AND ACETAMINOPHEN 1 TABLET: 5; 325 TABLET ORAL at 13:02

## 2023-08-11 RX ADMIN — VANCOMYCIN HYDROCHLORIDE 250 MG: 250 CAPSULE ORAL at 06:25

## 2023-08-11 RX ADMIN — POTASSIUM & SODIUM PHOSPHATES POWDER PACK 280-160-250 MG 250 MG: 280-160-250 PACK at 20:44

## 2023-08-11 RX ADMIN — VANCOMYCIN HYDROCHLORIDE 250 MG: 250 CAPSULE ORAL at 00:02

## 2023-08-11 RX ADMIN — SODIUM BICARBONATE: 84 INJECTION, SOLUTION INTRAVENOUS at 01:21

## 2023-08-11 RX ADMIN — CETIRIZINE HYDROCHLORIDE 10 MG: 10 TABLET, FILM COATED ORAL at 20:43

## 2023-08-11 RX ADMIN — FLUOXETINE 10 MG: 10 CAPSULE ORAL at 20:43

## 2023-08-11 RX ADMIN — ASPIRIN 81 MG: 81 TABLET, COATED ORAL at 08:03

## 2023-08-11 RX ADMIN — HEPARIN SODIUM 5000 UNITS: 5000 INJECTION INTRAVENOUS; SUBCUTANEOUS at 12:00

## 2023-08-11 RX ADMIN — POTASSIUM PHOSPHATE, MONOBASIC AND POTASSIUM PHOSPHATE, DIBASIC 30 MMOL: 224; 236 INJECTION, SOLUTION, CONCENTRATE INTRAVENOUS at 08:48

## 2023-08-11 RX ADMIN — ROSUVASTATIN CALCIUM 10 MG: 10 TABLET, FILM COATED ORAL at 17:16

## 2023-08-11 RX ADMIN — GABAPENTIN 200 MG: 100 CAPSULE ORAL at 20:43

## 2023-08-11 RX ADMIN — SODIUM BICARBONATE 650 MG: 650 TABLET ORAL at 08:04

## 2023-08-11 RX ADMIN — POTASSIUM & SODIUM PHOSPHATES POWDER PACK 280-160-250 MG 250 MG: 280-160-250 PACK at 12:01

## 2023-08-11 RX ADMIN — POTASSIUM CHLORIDE 40 MEQ: 750 TABLET, EXTENDED RELEASE ORAL at 08:04

## 2023-08-11 RX ADMIN — ACETAMINOPHEN 650 MG: 325 TABLET ORAL at 10:32

## 2023-08-11 RX ADMIN — POTASSIUM CHLORIDE 10 MEQ: 7.46 INJECTION, SOLUTION INTRAVENOUS at 10:03

## 2023-08-11 RX ADMIN — POTASSIUM CHLORIDE 10 MEQ: 7.46 INJECTION, SOLUTION INTRAVENOUS at 09:05

## 2023-08-11 RX ADMIN — POTASSIUM & SODIUM PHOSPHATES POWDER PACK 280-160-250 MG 250 MG: 280-160-250 PACK at 16:13

## 2023-08-11 RX ADMIN — POTASSIUM CHLORIDE 10 MEQ: 7.46 INJECTION, SOLUTION INTRAVENOUS at 08:03

## 2023-08-11 RX ADMIN — VANCOMYCIN HYDROCHLORIDE 250 MG: 250 CAPSULE ORAL at 18:31

## 2023-08-11 RX ADMIN — POTASSIUM CHLORIDE 10 MEQ: 7.46 INJECTION, SOLUTION INTRAVENOUS at 11:02

## 2023-08-11 RX ADMIN — OXYCODONE AND ACETAMINOPHEN 1 TABLET: 5; 325 TABLET ORAL at 20:44

## 2023-08-11 RX ADMIN — HEPARIN SODIUM 5000 UNITS: 5000 INJECTION INTRAVENOUS; SUBCUTANEOUS at 23:45

## 2023-08-11 ASSESSMENT — PAIN DESCRIPTION - LOCATION
LOCATION: RIB CAGE
LOCATION: CHEST
LOCATION: CHEST;OTHER (COMMENT)
LOCATION: CHEST

## 2023-08-11 ASSESSMENT — PAIN DESCRIPTION - FREQUENCY
FREQUENCY: INTERMITTENT
FREQUENCY: CONTINUOUS
FREQUENCY: CONTINUOUS
FREQUENCY: INTERMITTENT
FREQUENCY: CONTINUOUS

## 2023-08-11 ASSESSMENT — PAIN SCALES - GENERAL
PAINLEVEL_OUTOF10: 0
PAINLEVEL_OUTOF10: 4
PAINLEVEL_OUTOF10: 2
PAINLEVEL_OUTOF10: 8
PAINLEVEL_OUTOF10: 2
PAINLEVEL_OUTOF10: 0
PAINLEVEL_OUTOF10: 0

## 2023-08-11 ASSESSMENT — PAIN DESCRIPTION - DESCRIPTORS
DESCRIPTORS: ACHING
DESCRIPTORS: POUNDING;ACHING
DESCRIPTORS: ACHING
DESCRIPTORS: ACHING

## 2023-08-11 ASSESSMENT — PAIN DESCRIPTION - ORIENTATION
ORIENTATION: LEFT
ORIENTATION: LEFT
ORIENTATION: ANTERIOR
ORIENTATION: LEFT
ORIENTATION: LOWER;LEFT;POSTERIOR
ORIENTATION: LEFT

## 2023-08-11 ASSESSMENT — PAIN - FUNCTIONAL ASSESSMENT
PAIN_FUNCTIONAL_ASSESSMENT: PREVENTS OR INTERFERES SOME ACTIVE ACTIVITIES AND ADLS
PAIN_FUNCTIONAL_ASSESSMENT: PREVENTS OR INTERFERES SOME ACTIVE ACTIVITIES AND ADLS
PAIN_FUNCTIONAL_ASSESSMENT: PREVENTS OR INTERFERES WITH MANY ACTIVE NOT PASSIVE ACTIVITIES
PAIN_FUNCTIONAL_ASSESSMENT: PREVENTS OR INTERFERES WITH MANY ACTIVE NOT PASSIVE ACTIVITIES

## 2023-08-11 ASSESSMENT — PAIN DESCRIPTION - ONSET
ONSET: SUDDEN

## 2023-08-11 ASSESSMENT — PAIN DESCRIPTION - PAIN TYPE
TYPE: ACUTE PAIN

## 2023-08-11 NOTE — PROGRESS NOTES
0700- Bedside shift report received: Patient alert and oriented x 2 , iv in place, telemetry on running - SR, no acute distress, call bell in reach. Bed in lowest position. Droplet precaution in place. 0- Fall to ground to side of bed witnessed by nurse (staff was outside glass door) - client attempted to get out of bed without assistance - left side laying position - decorticate position (arms stiff and positioned to cor, legs straight and stiff) - not responding to voice at the time, eyes fixed, disconnected from telemetry - wires came off during fall - reconnected to cardiac monitor,  , R 10, 02 Sat 98%, /83, alert after a view seconds, but disoriented to time, returned to bed with staff and lift sheet, bruising and skin scratch noted left upper leg/hip, no other visible injuries noted, client has some old scratches on legs, PERLLA intact, kept in side laying position in bed, new brief applied - voided, BS taken 168 - insulin pump on client - abdominal area reading BS of 144. Side rails x3 were up at time of fall, yellow socks were on client, and call light was in reach, bed was in lowest position. SV (ORLY Conti) and hospitalist (Dr. Martin Tabor) present. Patient assessment performed. Patient alert after a view seconds, but poor safety awareness, talks, and non-compliant, oriented to person and place. Client does not complain of pain at this time, denies any discomfort. Bed alarm on, call bell in reach, side rail up x 3. Nurse outside room for observation. 0725- reassessment done - no distress. Non-complaint with nursing care - does not tolerate. Left tele on client for better observation and potassium replacement. FALL care plan and education done, updated. Client shows his normal baseline behavior, not compliant with treatment plan, poor safety awareness.      0730- breakfast given, ate 1/2 banana, no distress, no acute changes, alert, call bell in reach, bed in lowest position, and bed alarm

## 2023-08-11 NOTE — PROGRESS NOTES
Attempt to evaluate pt again this afternoon and he refuses again and requests for therapist to return tomorrow. Will return on Monday to attempt again if he remains at facility.   Alice Snow, PT, DPT

## 2023-08-11 NOTE — PLAN OF CARE
Problem: Discharge Planning  Goal: Discharge to home or other facility with appropriate resources  Outcome: Progressing  Flowsheets  Taken 8/11/2023 0720 by Cody Hodges RN  Discharge to home or other facility with appropriate resources:   Identify barriers to discharge with patient and caregiver   Arrange for needed discharge resources and transportation as appropriate   Identify discharge learning needs (meds, wound care, etc)   Arrange for interpreters to assist at discharge as needed   Refer to discharge planning if patient needs post-hospital services based on physician order or complex needs related to functional status, cognitive ability or social support system  Taken 8/10/2023 1940 by Rashaad Sanchez RN  Discharge to home or other facility with appropriate resources: Identify barriers to discharge with patient and caregiver     Problem: Skin/Tissue Integrity  Goal: Absence of new skin breakdown  Description: 1. Monitor for areas of redness and/or skin breakdown  2. Assess vascular access sites hourly  3. Every 4-6 hours minimum:  Change oxygen saturation probe site  4. Every 4-6 hours:  If on nasal continuous positive airway pressure, respiratory therapy assess nares and determine need for appliance change or resting period.   Outcome: Progressing     Problem: Safety - Adult  Goal: Free from fall injury  Outcome: Progressing    Suffered fall 08/11/23 at 0 - reviewed Care plan     Problem: Chronic Conditions and Co-morbidities  Goal: Patient's chronic conditions and co-morbidity symptoms are monitored and maintained or improved  Outcome: Progressing  Flowsheets  Taken 8/11/2023 0720 by Cody Hodges RN  Care Plan - Patient's Chronic Conditions and Co-Morbidity Symptoms are Monitored and Maintained or Improved: Monitor and assess patient's chronic conditions and comorbid symptoms for stability, deterioration, or improvement  Taken 8/10/2023 1940 by Rashaad Sanchez [facility] - Patient's

## 2023-08-11 NOTE — PROGRESS NOTES
-1850 Received care of pt from off going nurse. Pt lying in bed with eyes closed. -1910 PM Assessment completed. A&OX3. Lung sounds clear in upper lobes, diminished to left bases. SATS 99% on RA. Skin warm and dry. Pt assisted with repositioning in bed. Bed alarm on. Bed rails up X3. CBWR, bed in lowest position.      -2044 , pt has own insulin pump on. This nurse encouraging pt to eat remainder of sandwich. Pt c/o \"pounding pain to left rib cage\". Pt rated pain @ \"8\". Percocet 5-325 mg po given. Pt turning self. -2144 Pt resting quietly in bed with eyes closed. NAD noted. 0001 Pt incontinent of urine. Pt diaper saturated. Pt refusing to have diaper changed. Pt cussing at this nurse. This nurse reassuring pt. Pt diaper changed. Pt continuing to cuss at this nurse. CBWR, bed in lowest position. NAD noted.     -0230 Pt resting quietly in bed with eyes closed. Resp even and non-labored. HR 80-90, NSR on cardiac monitor.    -0420  into draw am labs. -0639 K+ 3.1. Eliza Carrion NP. New orders were received. K+ riders IV infusion started.

## 2023-08-11 NOTE — PROGRESS NOTES
Patient is not available to be assessed at this time.       [address] [facility]   [phone]

## 2023-08-11 NOTE — PROGRESS NOTES
1900 Assumed care of pt from off going nurse AILYN Chavez RN. Pt is A&Ox4 with no c/o at this time. 2100 HS medications passed. Tolerated well. Pt up to bsc. Voided. 400 with small dark stool. 0000 VSS. Pt in bed, resting quietly. Heparin and VANCOCIN given. Tolerated well.    0400 In bed, resting quietly. Bedside and Verbal shift change report given to HAL Hall (oncoming nurse) by Ayush Plunkett RN  (offgoing nurse). Report included the following information Nurse Handoff Report, Intake/Output, MAR, and Recent Results.

## 2023-08-11 NOTE — PROGRESS NOTES
Occupational Therapy    Attempted to see patient at 65; however, patient adamantly declines. Patient states, \"I fell and broke my rib this morning. I don't want to do anything right now\". Patient request for this writer to return at another time. Will attempt to return on Monday.     KANNAN Graham Cap

## 2023-08-11 NOTE — PLAN OF CARE
Problem: Safety - Adult  Goal: Free from fall injury  8/11/2023 0917 by Josefina Gilmore RN  Outcome: Progressing  8/11/2023 0915 by Josefina Gilmore RN  Outcome: Progressing   Reviewed -  Yellow socks, bed alarm on, railing up x3, bed in lowest position, close to nursing station, call bell in reach.

## 2023-08-11 NOTE — CARE COORDINATION
Lina from [facility] states patient can come back to [facility] tomorrow 8/12. Rapid Covid will need to be done prior to patient returning back to EP.

## 2023-08-12 VITALS
SYSTOLIC BLOOD PRESSURE: 150 MMHG | DIASTOLIC BLOOD PRESSURE: 77 MMHG | WEIGHT: 99.2 LBS | TEMPERATURE: 98.2 F | RESPIRATION RATE: 20 BRPM | OXYGEN SATURATION: 99 % | BODY MASS INDEX: 13.89 KG/M2 | HEIGHT: 71 IN | HEART RATE: 88 BPM

## 2023-08-12 LAB
ANION GAP SERPL CALC-SCNC: 13 MMOL/L (ref 3–18)
BACTERIA SPEC CULT: NORMAL
BACTERIA SPEC CULT: NORMAL
BASOPHILS # BLD: 0 K/UL (ref 0–0.1)
BASOPHILS NFR BLD: 0 % (ref 0–2)
BUN SERPL-MCNC: 20 MG/DL (ref 7–18)
BUN/CREAT SERPL: 11 (ref 12–20)
CA-I BLD-MCNC: 7.7 MG/DL (ref 8.5–10.1)
CHLORIDE SERPL-SCNC: 88 MMOL/L (ref 100–111)
CO2 SERPL-SCNC: 34 MMOL/L (ref 21–32)
CREAT SERPL-MCNC: 1.84 MG/DL (ref 0.6–1.3)
DIFFERENTIAL METHOD BLD: ABNORMAL
EOSINOPHIL # BLD: 0 K/UL (ref 0–0.4)
EOSINOPHIL NFR BLD: 0 % (ref 0–5)
ERYTHROCYTE [DISTWIDTH] IN BLOOD BY AUTOMATED COUNT: 13.5 % (ref 11.6–14.5)
FLUAV RNA SPEC QL NAA+PROBE: NOT DETECTED
FLUBV RNA SPEC QL NAA+PROBE: NOT DETECTED
GLUCOSE BLD STRIP.AUTO-MCNC: 119 MG/DL (ref 70–110)
GLUCOSE BLD STRIP.AUTO-MCNC: 127 MG/DL (ref 70–110)
GLUCOSE SERPL-MCNC: 129 MG/DL (ref 74–99)
HCT VFR BLD AUTO: 35.7 % (ref 36–48)
HGB BLD-MCNC: 11.3 G/DL (ref 13–16)
IMM GRANULOCYTES # BLD AUTO: 0 K/UL (ref 0–0.04)
IMM GRANULOCYTES NFR BLD AUTO: 0 % (ref 0–0.5)
LYMPHOCYTES # BLD: 1.4 K/UL (ref 0.9–3.6)
LYMPHOCYTES NFR BLD: 14 % (ref 21–52)
Lab: NORMAL
Lab: NORMAL
MAGNESIUM SERPL-MCNC: 1.9 MG/DL (ref 1.6–2.6)
MCH RBC QN AUTO: 28.5 PG (ref 24–34)
MCHC RBC AUTO-ENTMCNC: 31.7 G/DL (ref 31–37)
MCV RBC AUTO: 89.9 FL (ref 78–100)
MONOCYTES # BLD: 0.8 K/UL (ref 0.05–1.2)
MONOCYTES NFR BLD: 8 % (ref 3–10)
NEUTS SEG # BLD: 7.7 K/UL (ref 1.8–8)
NEUTS SEG NFR BLD: 78 % (ref 40–73)
NRBC # BLD: 0 K/UL (ref 0–0.01)
NRBC BLD-RTO: 0 PER 100 WBC
PERFORMED BY:: ABNORMAL
PERFORMED BY:: ABNORMAL
PHOSPHATE SERPL-MCNC: 3.5 MG/DL (ref 2.5–4.9)
PLATELET # BLD AUTO: 138 K/UL (ref 135–420)
PMV BLD AUTO: 12.7 FL (ref 9.2–11.8)
POTASSIUM SERPL-SCNC: 3.1 MMOL/L (ref 3.5–5.5)
RBC # BLD AUTO: 3.97 M/UL (ref 4.35–5.65)
SARS-COV-2 RNA RESP QL NAA+PROBE: NOT DETECTED
SODIUM SERPL-SCNC: 135 MMOL/L (ref 136–145)
WBC # BLD AUTO: 10 K/UL (ref 4.6–13.2)

## 2023-08-12 PROCEDURE — 82962 GLUCOSE BLOOD TEST: CPT

## 2023-08-12 PROCEDURE — 84100 ASSAY OF PHOSPHORUS: CPT

## 2023-08-12 PROCEDURE — 6370000000 HC RX 637 (ALT 250 FOR IP): Performed by: INTERNAL MEDICINE

## 2023-08-12 PROCEDURE — 6360000002 HC RX W HCPCS: Performed by: NURSE PRACTITIONER

## 2023-08-12 PROCEDURE — 83735 ASSAY OF MAGNESIUM: CPT

## 2023-08-12 PROCEDURE — 85025 COMPLETE CBC W/AUTO DIFF WBC: CPT

## 2023-08-12 PROCEDURE — 80048 BASIC METABOLIC PNL TOTAL CA: CPT

## 2023-08-12 PROCEDURE — 87636 SARSCOV2 & INF A&B AMP PRB: CPT

## 2023-08-12 PROCEDURE — 2580000003 HC RX 258: Performed by: NURSE PRACTITIONER

## 2023-08-12 PROCEDURE — 36415 COLL VENOUS BLD VENIPUNCTURE: CPT

## 2023-08-12 PROCEDURE — 6370000000 HC RX 637 (ALT 250 FOR IP): Performed by: NURSE PRACTITIONER

## 2023-08-12 RX ORDER — POTASSIUM CHLORIDE 7.45 MG/ML
10 INJECTION INTRAVENOUS
Status: COMPLETED | OUTPATIENT
Start: 2023-08-12 | End: 2023-08-12

## 2023-08-12 RX ORDER — SODIUM CHLORIDE 9 MG/ML
INJECTION, SOLUTION INTRAVENOUS CONTINUOUS
Status: DISPENSED | OUTPATIENT
Start: 2023-08-12 | End: 2023-08-12

## 2023-08-12 RX ADMIN — ASPIRIN 81 MG: 81 TABLET, COATED ORAL at 08:06

## 2023-08-12 RX ADMIN — SODIUM BICARBONATE 650 MG: 650 TABLET ORAL at 08:06

## 2023-08-12 RX ADMIN — VANCOMYCIN HYDROCHLORIDE 250 MG: 250 CAPSULE ORAL at 01:15

## 2023-08-12 RX ADMIN — SODIUM CHLORIDE 50 ML/HR: 9 INJECTION, SOLUTION INTRAVENOUS at 06:38

## 2023-08-12 RX ADMIN — POTASSIUM CHLORIDE 10 MEQ: 7.46 INJECTION, SOLUTION INTRAVENOUS at 07:39

## 2023-08-12 RX ADMIN — VANCOMYCIN HYDROCHLORIDE 250 MG: 250 CAPSULE ORAL at 08:06

## 2023-08-12 RX ADMIN — GABAPENTIN 200 MG: 100 CAPSULE ORAL at 08:05

## 2023-08-12 RX ADMIN — POTASSIUM & SODIUM PHOSPHATES POWDER PACK 280-160-250 MG 250 MG: 280-160-250 PACK at 08:06

## 2023-08-12 RX ADMIN — POTASSIUM CHLORIDE 10 MEQ: 7.46 INJECTION, SOLUTION INTRAVENOUS at 06:39

## 2023-08-12 ASSESSMENT — PAIN SCALES - GENERAL
PAINLEVEL_OUTOF10: 0

## 2023-08-12 NOTE — PROGRESS NOTES
0703-bedside shift report received; patient resting with eyes closed, no distress noted. 0732-patient assessment performed; patient a/o x 4, verbal, denies  any type of pain, patient clear/ dim on room air, bowel sounds active, old healing skin abrasions noted on upper an lower extremities, brief dry and intact,  patient remains on home insulin pump, IV patent and infusing; bed alarm on, call light in reach    Remains on contact & droplet isolation    0806-AM meds given; patient turned and repositioned, refused breakfast tray; consumed 120 cc po fluids    0900-MD rounds-order for repeat covid test    0932-covid test collected and sented to lab    Covid result negative    Patient MAR and AVS faxed to [address]; report given to [address]    1220-Patient transferred to [facility] to assigned room.

## 2023-08-12 NOTE — FLOWSHEET NOTE
08/12/23 0703   Handoff   Communication Given Shift Handoff   Handoff Given To WILMA Blanchard, RN   Handoff Communication Face to Face   Time Handoff Given 0703   End of Shift Check Performed Yes

## 2023-08-12 NOTE — PLAN OF CARE
Problem: Chronic Conditions and Co-morbidities  Goal: Patient's chronic conditions and co-morbidity symptoms are monitored and maintained or improved  Outcome: Not Progressing  Flowsheets (Taken 8/11/2023 1910)  Care Plan - Patient's Chronic Conditions and Co-Morbidity Symptoms are Monitored and Maintained or Improved: Monitor and assess patient's chronic conditions and comorbid symptoms for stability, deterioration, or improvement     Problem: Nutrition Deficit:  Goal: Optimize nutritional status  Outcome: Not Progressing     Problem: Skin/Tissue Integrity  Goal: Absence of new skin breakdown  Description: 1. Monitor for areas of redness and/or skin breakdown  2. Assess vascular access sites hourly  3. Every 4-6 hours minimum:  Change oxygen saturation probe site  4. Every 4-6 hours:  If on nasal continuous positive airway pressure, respiratory therapy assess nares and determine need for appliance change or resting period.   Outcome: Progressing     Problem: Safety - Adult  Goal: Free from fall injury  Outcome: Progressing     Problem: Pain  Goal: Verbalizes/displays adequate comfort level or baseline comfort level  Outcome: Progressing     Problem: Chronic Conditions and Co-morbidities  Goal: Patient's chronic conditions and co-morbidity symptoms are monitored and maintained or improved  Outcome: Not Progressing  Flowsheets (Taken 8/11/2023 1910)  Care Plan - Patient's Chronic Conditions and Co-Morbidity Symptoms are Monitored and Maintained or Improved: Monitor and assess patient's chronic conditions and comorbid symptoms for stability, deterioration, or improvement     Problem: Nutrition Deficit:  Goal: Optimize nutritional status  Outcome: Not Progressing

## 2023-08-14 ENCOUNTER — CARE COORDINATION (OUTPATIENT)
Facility: CLINIC | Age: 58
End: 2023-08-14

## 2023-08-14 NOTE — CARE COORDINATION
No transition of care outreach indicated due to patient discharge to a [address] [place] [address].

## 2023-08-16 ENCOUNTER — APPOINTMENT (OUTPATIENT)
Age: 58
End: 2023-08-16
Payer: MEDICARE

## 2023-08-16 ENCOUNTER — OFFICE VISIT (OUTPATIENT)
Facility: CLINIC | Age: 58
End: 2023-08-16
Payer: MEDICARE

## 2023-08-16 ENCOUNTER — HOSPITAL ENCOUNTER (INPATIENT)
Age: 58
LOS: 1 days | Discharge: NURSING, REHAB OR LONG-TERM CARE | End: 2023-08-17
Attending: FAMILY MEDICINE | Admitting: INTERNAL MEDICINE
Payer: MEDICARE

## 2023-08-16 DIAGNOSIS — D72.829 LEUKOCYTOSIS, UNSPECIFIED TYPE: ICD-10-CM

## 2023-08-16 DIAGNOSIS — E87.20 ACIDOSIS: ICD-10-CM

## 2023-08-16 DIAGNOSIS — I95.9 HYPOTENSION, UNSPECIFIED HYPOTENSION TYPE: Primary | ICD-10-CM

## 2023-08-16 DIAGNOSIS — E87.6 HYPOKALEMIA: ICD-10-CM

## 2023-08-16 DIAGNOSIS — E10.10 DKA, TYPE 1, NOT AT GOAL (HCC): Primary | ICD-10-CM

## 2023-08-16 DIAGNOSIS — E87.5 HYPERKALEMIA: ICD-10-CM

## 2023-08-16 DIAGNOSIS — Z86.73 HISTORY OF CVA (CEREBROVASCULAR ACCIDENT): ICD-10-CM
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
## 2023-08-16 DIAGNOSIS — R41.82 ALTERED MENTAL STATUS, UNSPECIFIED ALTERED MENTAL STATUS TYPE: ICD-10-CM

## 2023-08-16 DIAGNOSIS — E10.10 TYPE 1 DIABETES MELLITUS WITH KETOACIDOSIS WITHOUT COMA (HCC): ICD-10-CM

## 2023-08-16 DIAGNOSIS — E86.0 DEHYDRATION: ICD-10-CM

## 2023-08-16 LAB
ALBUMIN SERPL-MCNC: 3 G/DL (ref 3.4–5)
ALBUMIN/GLOB SERPL: 0.9 (ref 0.8–1.7)
ALP SERPL-CCNC: 102 U/L (ref 45–117)
ALT SERPL-CCNC: 32 U/L (ref 16–61)
ANION GAP SERPL CALC-SCNC: 10 MMOL/L (ref 3–18)
ANION GAP SERPL CALC-SCNC: 20 MMOL/L (ref 3–18)
ANION GAP SERPL CALC-SCNC: 21 MMOL/L (ref 3–18)
AST SERPL W P-5'-P-CCNC: 27 U/L (ref 10–38)
BASOPHILS # BLD: 0 K/UL (ref 0–0.1)
BASOPHILS NFR BLD: 0 % (ref 0–2)
BILIRUB SERPL-MCNC: 0.6 MG/DL (ref 0.2–1)
BUN SERPL-MCNC: 34 MG/DL (ref 7–18)
BUN SERPL-MCNC: 40 MG/DL (ref 7–18)
BUN SERPL-MCNC: 41 MG/DL (ref 7–18)
BUN/CREAT SERPL: 14 (ref 12–20)
BUN/CREAT SERPL: 15 (ref 12–20)
BUN/CREAT SERPL: 15 (ref 12–20)
CA-I BLD-MCNC: 8.1 MG/DL (ref 8.5–10.1)
CA-I BLD-MCNC: 8.3 MG/DL (ref 8.5–10.1)
CA-I BLD-MCNC: 8.3 MG/DL (ref 8.5–10.1)
CHLORIDE SERPL-SCNC: 86 MMOL/L (ref 100–111)
CHLORIDE SERPL-SCNC: 90 MMOL/L (ref 100–111)
CHLORIDE SERPL-SCNC: 96 MMOL/L (ref 100–111)
CO2 SERPL-SCNC: 20 MMOL/L (ref 21–32)
CO2 SERPL-SCNC: 22 MMOL/L (ref 21–32)
CO2 SERPL-SCNC: 30 MMOL/L (ref 21–32)
CREAT SERPL-MCNC: 2.37 MG/DL (ref 0.6–1.3)
CREAT SERPL-MCNC: 2.66 MG/DL (ref 0.6–1.3)
CREAT SERPL-MCNC: 2.77 MG/DL (ref 0.6–1.3)
DIFFERENTIAL METHOD BLD: ABNORMAL
EKG ATRIAL RATE: 91 BPM
EKG DIAGNOSIS: NORMAL
EKG P AXIS: 78 DEGREES
EKG P-R INTERVAL: 148 MS
EKG Q-T INTERVAL: 400 MS
EKG QRS DURATION: 78 MS
EKG QTC CALCULATION (BAZETT): 492 MS
EKG R AXIS: 53 DEGREES
EKG T AXIS: 79 DEGREES
EKG VENTRICULAR RATE: 91 BPM
EOSINOPHIL # BLD: 0 K/UL (ref 0–0.4)
EOSINOPHIL NFR BLD: 0 % (ref 0–5)
ERYTHROCYTE [DISTWIDTH] IN BLOOD BY AUTOMATED COUNT: 13.7 % (ref 11.6–14.5)
GLOBULIN SER CALC-MCNC: 3.4 G/DL (ref 2–4)
GLUCOSE BLD STRIP.AUTO-MCNC: 161 MG/DL (ref 70–110)
GLUCOSE BLD STRIP.AUTO-MCNC: 280 MG/DL (ref 70–110)
GLUCOSE BLD STRIP.AUTO-MCNC: 310 MG/DL (ref 70–110)
GLUCOSE BLD STRIP.AUTO-MCNC: 437 MG/DL (ref 70–110)
GLUCOSE BLD STRIP.AUTO-MCNC: 444 MG/DL (ref 70–110)
GLUCOSE BLD STRIP.AUTO-MCNC: 470 MG/DL (ref 70–110)
GLUCOSE BLD STRIP.AUTO-MCNC: 484 MG/DL (ref 70–110)
GLUCOSE BLD STRIP.AUTO-MCNC: 98 MG/DL (ref 70–110)
GLUCOSE SERPL-MCNC: 389 MG/DL (ref 74–99)
GLUCOSE SERPL-MCNC: 451 MG/DL (ref 74–99)
GLUCOSE SERPL-MCNC: 86 MG/DL (ref 74–99)
HCT VFR BLD AUTO: 34.5 % (ref 36–48)
HGB BLD-MCNC: 10.9 G/DL (ref 13–16)
IMM GRANULOCYTES # BLD AUTO: 0.2 K/UL (ref 0–0.04)
IMM GRANULOCYTES NFR BLD AUTO: 1 % (ref 0–0.5)
LACTATE SERPL-SCNC: 2.2 MMOL/L (ref 0.4–2)
LACTATE SERPL-SCNC: 2.4 MMOL/L (ref 0.4–2)
LIPASE SERPL-CCNC: 52 U/L (ref 73–393)
LYMPHOCYTES # BLD: 1.6 K/UL (ref 0.9–3.6)
LYMPHOCYTES NFR BLD: 9 % (ref 21–52)
MAGNESIUM SERPL-MCNC: 2 MG/DL (ref 1.6–2.6)
MAGNESIUM SERPL-MCNC: 2.2 MG/DL (ref 1.6–2.6)
MCH RBC QN AUTO: 29.3 PG (ref 24–34)
MCHC RBC AUTO-ENTMCNC: 31.6 G/DL (ref 31–37)
MCV RBC AUTO: 92.7 FL (ref 78–100)
MONOCYTES # BLD: 1.4 K/UL (ref 0.05–1.2)
MONOCYTES NFR BLD: 8 % (ref 3–10)
NEUTS SEG # BLD: 13.6 K/UL (ref 1.8–8)
NEUTS SEG NFR BLD: 82 % (ref 40–73)
NRBC # BLD: 0 K/UL (ref 0–0.01)
NRBC BLD-RTO: 0 PER 100 WBC
PERFORMED BY:: ABNORMAL
PERFORMED BY:: NORMAL
PERFORMED BY:: NORMAL
PH BLDV: 7.4 (ref 7.32–7.42)
PHOSPHATE SERPL-MCNC: 2.7 MG/DL (ref 2.5–4.9)
PLATELET # BLD AUTO: 239 K/UL (ref 135–420)
PMV BLD AUTO: 12.8 FL (ref 9.2–11.8)
POTASSIUM SERPL-SCNC: 4 MMOL/L (ref 3.5–5.5)
POTASSIUM SERPL-SCNC: 4.6 MMOL/L (ref 3.5–5.5)
POTASSIUM SERPL-SCNC: 5.4 MMOL/L (ref 3.5–5.5)
PROT SERPL-MCNC: 6.4 G/DL (ref 6.4–8.2)
RBC # BLD AUTO: 3.72 M/UL (ref 4.35–5.65)
SODIUM SERPL-SCNC: 129 MMOL/L (ref 136–145)
SODIUM SERPL-SCNC: 130 MMOL/L (ref 136–145)
SODIUM SERPL-SCNC: 136 MMOL/L (ref 136–145)
TROPONIN I SERPL HS-MCNC: 26 NG/L (ref 0–78)
WBC # BLD AUTO: 16.8 K/UL (ref 4.6–13.2)

## 2023-08-16 PROCEDURE — 3052F HG A1C>EQUAL 8.0%<EQUAL 9.0%: CPT | Performed by: FAMILY MEDICINE

## 2023-08-16 PROCEDURE — 6370000000 HC RX 637 (ALT 250 FOR IP): Performed by: FAMILY MEDICINE

## 2023-08-16 PROCEDURE — 71045 X-RAY EXAM CHEST 1 VIEW: CPT

## 2023-08-16 PROCEDURE — 83735 ASSAY OF MAGNESIUM: CPT

## 2023-08-16 PROCEDURE — 2580000003 HC RX 258: Performed by: FAMILY MEDICINE

## 2023-08-16 PROCEDURE — 2580000003 HC RX 258: Performed by: NURSE PRACTITIONER

## 2023-08-16 PROCEDURE — 99306 1ST NF CARE HIGH MDM 50: CPT | Performed by: FAMILY MEDICINE

## 2023-08-16 PROCEDURE — 83690 ASSAY OF LIPASE: CPT

## 2023-08-16 PROCEDURE — 82800 BLOOD PH: CPT

## 2023-08-16 PROCEDURE — 83605 ASSAY OF LACTIC ACID: CPT

## 2023-08-16 PROCEDURE — 80053 COMPREHEN METABOLIC PANEL: CPT

## 2023-08-16 PROCEDURE — 2000000000 HC ICU R&B

## 2023-08-16 PROCEDURE — 85025 COMPLETE CBC W/AUTO DIFF WBC: CPT

## 2023-08-16 PROCEDURE — 83036 HEMOGLOBIN GLYCOSYLATED A1C: CPT

## 2023-08-16 PROCEDURE — 84100 ASSAY OF PHOSPHORUS: CPT

## 2023-08-16 PROCEDURE — 99285 EMERGENCY DEPT VISIT HI MDM: CPT

## 2023-08-16 PROCEDURE — 82962 GLUCOSE BLOOD TEST: CPT

## 2023-08-16 PROCEDURE — 93005 ELECTROCARDIOGRAM TRACING: CPT

## 2023-08-16 PROCEDURE — 36415 COLL VENOUS BLD VENIPUNCTURE: CPT

## 2023-08-16 PROCEDURE — 80048 BASIC METABOLIC PNL TOTAL CA: CPT

## 2023-08-16 PROCEDURE — 84484 ASSAY OF TROPONIN QUANT: CPT

## 2023-08-16 RX ORDER — ASPIRIN 81 MG/1
81 TABLET ORAL EVERY EVENING
Status: DISCONTINUED | OUTPATIENT
Start: 2023-08-16 | End: 2023-08-17 | Stop reason: HOSPADM

## 2023-08-16 RX ORDER — 0.9 % SODIUM CHLORIDE 0.9 %
1000 INTRAVENOUS SOLUTION INTRAVENOUS ONCE
Status: COMPLETED | OUTPATIENT
Start: 2023-08-16 | End: 2023-08-16

## 2023-08-16 RX ORDER — GABAPENTIN 100 MG/1
200 CAPSULE ORAL 2 TIMES DAILY
Status: DISCONTINUED | OUTPATIENT
Start: 2023-08-16 | End: 2023-08-17 | Stop reason: HOSPADM

## 2023-08-16 RX ORDER — SODIUM CHLORIDE 450 MG/100ML
INJECTION, SOLUTION INTRAVENOUS CONTINUOUS
Status: DISCONTINUED | OUTPATIENT
Start: 2023-08-16 | End: 2023-08-17

## 2023-08-16 RX ORDER — MAGNESIUM SULFATE IN WATER 40 MG/ML
2000 INJECTION, SOLUTION INTRAVENOUS PRN
Status: DISCONTINUED | OUTPATIENT
Start: 2023-08-16 | End: 2023-08-17 | Stop reason: HOSPADM

## 2023-08-16 RX ORDER — SODIUM CHLORIDE 9 MG/ML
INJECTION, SOLUTION INTRAVENOUS CONTINUOUS
Status: DISCONTINUED | OUTPATIENT
Start: 2023-08-16 | End: 2023-08-17 | Stop reason: HOSPADM

## 2023-08-16 RX ORDER — DEXTROSE MONOHYDRATE 100 MG/ML
INJECTION, SOLUTION INTRAVENOUS CONTINUOUS PRN
Status: DISCONTINUED | OUTPATIENT
Start: 2023-08-16 | End: 2023-08-17 | Stop reason: HOSPADM

## 2023-08-16 RX ORDER — ENOXAPARIN SODIUM 100 MG/ML
30 INJECTION SUBCUTANEOUS DAILY
Status: DISCONTINUED | OUTPATIENT
Start: 2023-08-17 | End: 2023-08-17 | Stop reason: HOSPADM

## 2023-08-16 RX ORDER — FLUOXETINE 10 MG/1
10 CAPSULE ORAL EVERY EVENING
Status: DISCONTINUED | OUTPATIENT
Start: 2023-08-16 | End: 2023-08-17 | Stop reason: HOSPADM

## 2023-08-16 RX ORDER — TRAMADOL HYDROCHLORIDE 50 MG/1
50 TABLET ORAL EVERY 6 HOURS PRN
Status: DISCONTINUED | OUTPATIENT
Start: 2023-08-16 | End: 2023-08-17 | Stop reason: HOSPADM

## 2023-08-16 RX ORDER — TRAMADOL HYDROCHLORIDE 50 MG/1
50 TABLET ORAL EVERY 4 HOURS PRN
Status: ON HOLD | COMMUNITY
End: 2023-08-17 | Stop reason: SDUPTHER

## 2023-08-16 RX ORDER — VANCOMYCIN HYDROCHLORIDE 125 MG/1
125 CAPSULE ORAL EVERY 6 HOURS
Status: DISCONTINUED | OUTPATIENT
Start: 2023-08-16 | End: 2023-08-17 | Stop reason: HOSPADM

## 2023-08-16 RX ORDER — CETIRIZINE HYDROCHLORIDE 10 MG/1
10 TABLET ORAL EVERY EVENING
Status: DISCONTINUED | OUTPATIENT
Start: 2023-08-16 | End: 2023-08-17 | Stop reason: HOSPADM

## 2023-08-16 RX ORDER — SODIUM BICARBONATE 650 MG/1
650 TABLET ORAL 2 TIMES DAILY
Status: DISCONTINUED | OUTPATIENT
Start: 2023-08-16 | End: 2023-08-17 | Stop reason: HOSPADM

## 2023-08-16 RX ORDER — POTASSIUM CHLORIDE 7.45 MG/ML
10 INJECTION INTRAVENOUS PRN
Status: DISCONTINUED | OUTPATIENT
Start: 2023-08-16 | End: 2023-08-17 | Stop reason: HOSPADM

## 2023-08-16 RX ORDER — DEXTROSE AND SODIUM CHLORIDE 5; .45 G/100ML; G/100ML
INJECTION, SOLUTION INTRAVENOUS CONTINUOUS PRN
Status: DISCONTINUED | OUTPATIENT
Start: 2023-08-16 | End: 2023-08-17 | Stop reason: HOSPADM

## 2023-08-16 RX ORDER — ROSUVASTATIN CALCIUM 10 MG/1
10 TABLET, COATED ORAL EVERY EVENING
Status: DISCONTINUED | OUTPATIENT
Start: 2023-08-16 | End: 2023-08-17 | Stop reason: HOSPADM

## 2023-08-16 RX ORDER — ERGOCALCIFEROL 1.25 MG/1
50000 CAPSULE ORAL WEEKLY
Status: DISCONTINUED | OUTPATIENT
Start: 2023-08-16 | End: 2023-08-17 | Stop reason: HOSPADM

## 2023-08-16 RX ORDER — GABAPENTIN 100 MG/1
200 CAPSULE ORAL 2 TIMES DAILY
Status: ON HOLD | COMMUNITY
End: 2023-08-17 | Stop reason: SDUPTHER

## 2023-08-16 RX ADMIN — SODIUM CHLORIDE: 9 INJECTION, SOLUTION INTRAVENOUS at 16:25

## 2023-08-16 RX ADMIN — DEXTROSE AND SODIUM CHLORIDE: 5; 450 INJECTION, SOLUTION INTRAVENOUS at 22:27

## 2023-08-16 RX ADMIN — SODIUM CHLORIDE 5 UNITS/HR: 9 INJECTION, SOLUTION INTRAVENOUS at 16:25

## 2023-08-16 RX ADMIN — SODIUM CHLORIDE 1000 ML: 9 INJECTION, SOLUTION INTRAVENOUS at 15:12

## 2023-08-16 ASSESSMENT — PAIN SCALES - GENERAL
PAINLEVEL_OUTOF10: 0
PAINLEVEL_OUTOF10: 0
PAINLEVEL_OUTOF10: 5

## 2023-08-16 ASSESSMENT — ENCOUNTER SYMPTOMS
SHORTNESS OF BREATH: 0
ABDOMINAL PAIN: 1

## 2023-08-16 ASSESSMENT — PAIN DESCRIPTION - DESCRIPTORS: DESCRIPTORS: ACHING

## 2023-08-16 ASSESSMENT — PAIN - FUNCTIONAL ASSESSMENT: PAIN_FUNCTIONAL_ASSESSMENT: 0-10

## 2023-08-16 ASSESSMENT — PAIN DESCRIPTION - LOCATION: LOCATION: RIB CAGE

## 2023-08-16 ASSESSMENT — PAIN DESCRIPTION - ORIENTATION: ORIENTATION: LEFT

## 2023-08-16 NOTE — ED NOTES
TRANSFER - OUT REPORT:    Verbal report given to Gaurav Doyle on Heather [place]  being transferred to ICU for routine progression of patient care       Report consisted of patient's Situation, Background, Assessment and   Recommendations(SBAR). Information from the following report(s) ED Encounter Summary, ED SBAR, [facility], and Recent Results was reviewed with the receiving nurse. [place] Fall Assessment:                           Lines:   Peripheral IV 08/16/23 Proximal;Right; Anterior Forearm (Active)        Opportunity for questions and clarification was provided.       Patient transported with:  Monitor and Registered Nurse          Killian Arteaga RN  08/16/23 9909

## 2023-08-16 NOTE — ED TRIAGE NOTES
Pt. Presents from [facility] after a recent hospitalization and discharge three days ago. Pt. Has not had much of an appetite and has not consumed much fluid per nursing home staff. Pts. Insulin pump also stopped working today. Pts. Blood sugars have been elevated the last three days. Pt. Did have low BP per staff at EP.

## 2023-08-16 NOTE — ED PROVIDER NOTES
[facility] EMERGENCY DEPT  EMERGENCY DEPARTMENT ENCOUNTER      Pt Name: Milagros Alvarado  MRN: [id number]  [address] [facility] [place] 1965  Date of evaluation: 8/16/2023  Provider: Elizabeth Israel DO    CHIEF COMPLAINT       Chief Complaint   Patient presents with    Blood Sugar Problem    Dehydration         HISTORY OF PRESENT ILLNESS   (Location/Symptom, Timing/Onset, Context/Setting, Quality, Duration, Modifying Factors, Severity)  Note limiting factors. Milagros Alvarado is a 56 y.o. male who presents to the emergency department patient comes in from [facility]. Where they state his blood pressure is somewhat low. They did not give him any fluid boluses. I did speak with his primary care doctor earlier this morning regarding this patient. Who tested positive for COVID as well as some C. difficile. I felt that the patient needed to be evaluated here in the emergency department. Additionally nursing states that they have a smell of ketones on the patient's breath and worried that he is in DKA. Although the patient is alert and orientated to time he is not alert to what I think is situation or place for that matter. Making HPI unreliable from the patient. Initially patient refused to come to the emergency department but the physician was able to convince the patient to be seen. Patient does state that he has been in and out of ICU several times today. And that he fell in the ICU on Monday and is having some left-sided chest pain. HPI    Nursing Notes were reviewed. REVIEW OF SYSTEMS    (2-9 systems for level 4, 10 or more for level 5)     Review of Systems   Unable to perform ROS: Mental status change   Constitutional:  Negative for fever. Respiratory:  Negative for shortness of breath. Cardiovascular:  Positive for chest pain. Gastrointestinal:  Positive for abdominal pain. Psychiatric/Behavioral:  Positive for confusion. All other systems reviewed and are negative.     Except as noted above the ordered. ECG/medicine tests: ordered. Risk  OTC drugs. Prescription drug management. Decision regarding hospitalization. REASSESSMENT          CRITICAL CARE TIME   Total Critical Care time was 40 minutes, excluding separately reportable procedures. There was a high probability of clinically significant/life threatening deterioration in the patient's condition which required my urgent intervention. DKA    CONSULT      Dr Naveed Mccarty to admit for DKA     PROCEDURES:  Unless otherwise noted below, none     Procedures        FINAL IMPRESSION      1. DKA, type 1, not at goal [facility])    2. Leukocytosis, unspecified type    3. Hyperkalemia          DISPOSITION/PLAN   DISPOSITION Admitted 08/16/2023 03:40:52 PM      PATIENT REFERRED TO:  No follow-up provider specified. DISCHARGE MEDICATIONS:  New Prescriptions    No medications on file     Controlled Substances Monitoring:     No flowsheet data found.     (Please note that portions of this note were completed with a voice recognition program.  Efforts were made to edit the dictations but occasionally words are mis-transcribed.)    Marek Bear DO (electronically signed)  Attending Emergency Physician           Marek Bear DO  08/16/23 0516 Post-Care Instructions: I reviewed with the patient in detail post-care instructions. Patient is not to engage in any heavy lifting, exercise, or swimming for the next 14 days. Should the patient develop any fevers, chills, bleeding, severe pain patient will contact the office immediately.

## 2023-08-16 NOTE — PROGRESS NOTES
Admission Medication Reconciliation:    Information obtained from:   [facility]    Comments/Recommendations: Reviewed PTA medications and patient's allergies. Reviewed and updated        Allergies:  Patient has no known allergies. Significant PMH/Disease States:   Past Medical History:   Diagnosis Date    Depression     Diabetes ([address])     GERD (gastroesophageal reflux disease)     Hypercholesterolemia     Hypertension     Seizures ([address])     Stroke [facility])      Chief Complaint for this Admission:    Chief Complaint   Patient presents with    Blood Sugar Problem    Dehydration     Prior to Admission Medications:   Prior to Admission medications    Medication Sig Start Date End Date Taking? Authorizing Provider   gabapentin (NEURONTIN) 100 MG capsule Take 2 capsules by mouth in the morning and at bedtime. Max Daily Amount: 400 mg   Yes Historical Provider, MD   traMADol (ULTRAM) 50 MG tablet Take 1 tablet by mouth every 4 hours as needed for Pain (moderate to severe pain (hold for sedation)). Max Daily Amount: 300 mg   Yes Historical Provider, MD   sodium bicarbonate 650 MG tablet Take 1 tablet by mouth 2 times daily 8/11/23   Javier Burton MD   vancomycin (VANCOCIN) 125 MG capsule Take 1 capsule by mouth in the morning and 1 capsule at noon and 1 capsule in the evening and 1 capsule before bedtime. Do all this for 6 days. 8/11/23 8/17/23  Javier Burton MD   potassium chloride (KLOR-CON M) 20 MEQ extended release tablet Take 1 tablet by mouth 2 times daily 8/11/23   Javier Burton MD   gabapentin (NEURONTIN) 300 MG capsule Take 1 capsule by mouth 3 times daily for 180 days.  Max Daily Amount: 900 mg 8/11/23 8/16/23  Javier Burton MD   vitamin D (ERGOCALCIFEROL) 1.25 MG (75097 UT) CAPS capsule Take 1 capsule by mouth once a week mondays 4/23/23   Historical Provider, MD   insulin aspart (NOVOLOG) 100 UNIT/ML injection vial Inject into the skin 3 times daily (before meals) 250-310=2 units   311-370=4 units  371-430=6

## 2023-08-16 NOTE — PROGRESS NOTES
18:45- Received report. Ins gtt adjusted per protocol. Bed alarm on.     20:50- Pt BGM Q1H. Pt requests for staff \"not to take blood sugar at all. \" Explained insulin gtt . Pt states understanding. 22:20- Pt assisted OOB to BSC to void.     23;30- Pt refuses all PO medications even after multiple attempts. Pt vocabulary full of profanity. 01:00- BGM 65. Insulin gtt continues to be paused per protocol. NP notified. Offered pt PO intake; pt states, \"Only thing Ill drink is coke. \" Pt consumes 1/2 coke. Will continue to monitor. 02:00- Pt refusing lab work. Pt states, \"You aren't getting any blood from me right now. Come back in 30 min. \" Attempted to convince pt of POC. Will reattempt.     03:00- Pt reluctant to allow staff to get BG. Pt states, \"Leave me alone, I'm trying to sleep. \"    04:50- Pt states he has to \"go pee\". Pt standby assist to [facility]. New linens placed on bed. Pt grabs head and leans to left, caught by staff and repositioned, appearing to have  a seizure. Pt regains consciousness. Pt states he is ready to get back to bed and he notes brief is wet. While assisting pt to change brief, pt leans back on commode and goes stuff, pt not responding verbally. 's, sats 82%. O2 2L NC applied. Additional staff present, paged NP. Pt has hx of seizures. 3 min later pt regains consciousness, and assisted back to bed. Pt back to baseline, speaking with profanity. 05:10- While performing BGM pt states, \"Get out! You guys worry me too much! \". Pt ripped off NC.     06:00- Insulin gtt remains off.  Labs reviewed with NP.

## 2023-08-16 NOTE — PROGRESS NOTES
Venous blood obtained by RN and resulted by RRT for [facility]     Latest Reference Range & Units 08/16/23 15:21   pH, [place] 7.32 - 7.42   7. [address]

## 2023-08-16 NOTE — H&P
History and Physical    Subjective:     Familia Wooten is a 56 y.o. male resident of [address] [place] [address] with a past medical history significant for DM-Type 1, HLP, GERD, and a recent C. difficile colitis (on oral Vanco) and recent covid 19 +ve (asymptomatic), who presents to the ED today with complaints of lethargy, high blood glucose readings in the nursing home. At the time of my evaluation in the ED, patient is angry, and frustrated at getting admitted. States he wants to go back home, states he is aware of his broken and malfunctioning insulin pump which has been a problem lately, \"and is supposed to be getting a new one\", but he is not sure what the plan is on securing a new one. He denies nausea, vomiting, abdominal pain or discomfort. He denies any chest pain or sob. No fever or chills. No other complaints. Per ED provider, nursing home staff had reported less appetite since discharged on 8/11/2023 and that his blood glucose have been elevated in the last 3 days. They reported his insulin pump finally broke and stopped working today. In the ED, his glucose was 451, with an anion gap of 21, sodium level 129, CO2 is normal at 22, leukocytosis of 16.8. BUN 41, Cr 2.77. Hospitalist was asked to admit. We agreed patient meets observation criteria for hyperglycemia and mild DKA with an estimated length of stay, of 1 midnight. Case management consult for assistance with securing a new insulin pump.           Past Medical History:   Diagnosis Date    Depression     Diabetes ([address] )     GERD (gastroesophageal reflux disease)     Hypercholesterolemia     Hypertension     Seizures ([address])     Stroke [facility])       Past Surgical History:   Procedure Laterality Date    ORTHOPEDIC SURGERY Left 08/30/2018    hip joint    ORTHOPEDIC SURGERY Right     broken knee     Family History   Problem Relation Age of Onset    Cancer Father         prostate    Cancer Maternal Uncle         prostate    Cancer Paternal Value Ref Range    POC Glucose 470 (HH) 70 - 110 mg/dL    Performed by: Melvi Mercado    POCT Glucose    Collection Time: 08/16/23  2:42 PM   Result Value Ref Range    POC Glucose 484 (HH) 70 - 110 mg/dL    Performed by: Melvi Mercado    pH, venous    Collection Time: 08/16/23  3:21 PM   Result Value Ref Range    pH, [place] 7.396 7.32 - 7.42      Performed by: Kitty Womack    EKG 12 Lead    Collection Time: 08/16/23  3:26 PM   Result Value Ref Range    Ventricular Rate 91 BPM    Atrial Rate 91 BPM    P-R Interval 148 ms    QRS Duration 78 ms    Q-T Interval 400 ms    QTc Calculation (Bazett) 492 ms    P Axis 78 degrees    R Axis 53 degrees    T Axis 79 degrees    Diagnosis       Normal sinus rhythm  Prolonged QT  Abnormal ECG  When compared with ECG of 06-AUG-2023 12:06,  Criteria for Anteroseptal infarct are no longer Present  T wave inversion no longer evident in Anterior leads         Imaging:  [unfilled]     Assessment & Plan:      #1: Uncontrolled DM-1:   -in mild DKA, with pseudohyponatremia and JIGNESH on CKD III-IV:  -admit to observation, telemetry.  -It appears his problems with poorly controlled glucose is related to his insulin pump malfunction, and apparently he does not have a replacement plan at the moment.  -presents with serum glucose 478, anion gap 21, CO2 normal--22, BUN 41,Cr 2.77--last Cr is 1.84. Lactic 2.4. Na 124.  -s/p 1L NS bolus in the ED and started on insulin drip.  -cont insulin drip, BMP every 4 hours, [email or web address] mL/hr.  -may consider starting lantus pending a new pump availability.   -Last A1c is 8 on 8/10/23  -case management to assist with securing new insulin pump, and with strict f/up with his endocrinology--\"Veronica Lozoya\". #2: Leukocytosis:  -wbc--16.8--this is likely reactive, CBC daily. #3: recent Hx of c-diff +ve,:  -on oral vanc already--complete regimen.  -States diarrhea is stopped. #4: Hx of Covid 19 +ve: Asymptomatic:  -stable, No hypoxia.      #5:

## 2023-08-16 NOTE — PROGRESS NOTES
1715-Patient arrived to unit via stretcher accompanied by ER nurse. Assumed care of patient. Patient AxOx2. No c/o pain at this time. Patient did not allow this nurse to do a full head to toe assessment. He stated he was not ready for that right now, and to leave him alone. Education provided. VSS. Bed alarm on. Bed in  lowest position. CBWR.     1900-Bedside shift report given to RAMIREZ Ross RN.

## 2023-08-16 NOTE — PROGRESS NOTES
Insecurity: Not on file   Transportation Needs: Not on file   Physical Activity: Not on file   Stress: Not on file   Social Connections: Not on file   Intimate Partner Violence: Not on file   Housing Stability: Not on file       Social History     Tobacco Use   Smoking Status Never   Smokeless Tobacco Current   Tobacco Comments    Quit smokin+/day         Current Outpatient Medications:     sodium bicarbonate 650 MG tablet, Take 1 tablet by mouth 2 times daily, Disp: 60 tablet, Rfl: 0    gabapentin (NEURONTIN) 300 MG capsule, Take 1 capsule by mouth 3 times daily for 180 days. Max Daily Amount: 900 mg, Disp: 90 capsule, Rfl: 5    vancomycin (VANCOCIN) 125 MG capsule, Take 1 capsule by mouth in the morning and 1 capsule at noon and 1 capsule in the evening and 1 capsule before bedtime. Do all this for 6 days. , Disp: 24 capsule, Rfl: 0    potassium chloride (KLOR-CON M) 20 MEQ extended release tablet, Take 1 tablet by mouth 2 times daily, Disp: 60 tablet, Rfl: 0    vitamin D (ERGOCALCIFEROL) 1.25 MG (13144 UT) CAPS capsule, Take 1 capsule by mouth once a week , Disp: , Rfl:     insulin aspart (NOVOLOG) 100 UNIT/ML injection vial, Inject into the skin 3 times daily (before meals) 250-310=2 units  311-370=4 units 371-430=6 units 431-490=8 units  491-550=10 units 551-610=12 units  DO NOT USE AT BEDTIME!!!!, Disp: , Rfl:     glucose 4 g chewable tablet, Take 4 tablets by mouth as needed for Low blood sugar (less than 55), Disp: , Rfl:     FLUoxetine (PROZAC) 10 MG capsule, Take 1 capsule by mouth every evening, Disp: , Rfl:     acetaminophen (TYLENOL) 325 MG tablet, Take 2 tablets by mouth every 4 hours as needed for Pain or Fever, Disp: , Rfl:     aspirin 81 MG EC tablet, Take 1 tablet by mouth every evening, Disp: , Rfl:     cetirizine (ZYRTEC) 10 MG tablet, Take 1 tablet by mouth every evening, Disp: , Rfl:     insulin lispro (HUMALOG) 100 UNIT/ML injection cartridge, Inject into the skin Run at basal rate

## 2023-08-16 NOTE — PLAN OF CARE
Problem: Discharge Planning  Goal: Discharge to home or other facility with appropriate resources  Outcome: Not Progressing     Problem: Pain  Goal: Verbalizes/displays adequate comfort level or baseline comfort level  8/16/2023 1740 by Aroldo Adan RN  Outcome: Not Progressing  8/16/2023 1740 by Aroldo Adan RN  Outcome: Not Progressing     Problem: Skin/Tissue Integrity  Goal: Absence of new skin breakdown  Description: 1. Monitor for areas of redness and/or skin breakdown  2. Assess vascular access sites hourly  3. Every 4-6 hours minimum:  Change oxygen saturation probe site  4. Every 4-6 hours:  If on nasal continuous positive airway pressure, respiratory therapy assess nares and determine need for appliance change or resting period. 8/16/2023 1740 by Aroldo Adan RN  Outcome: Not Progressing  8/16/2023 1740 by Aroldo Adan RN  Outcome: Not Progressing     Problem: Chronic Conditions and Co-morbidities  Goal: Patient's chronic conditions and co-morbidity symptoms are monitored and maintained or improved  Outcome: Not Progressing     Problem: Discharge Planning  Goal: Discharge to home or other facility with appropriate resources  Outcome: Not Progressing     Problem: Pain  Goal: Verbalizes/displays adequate comfort level or baseline comfort level  8/16/2023 1740 by Aroldo Adan RN  Outcome: Not Progressing  8/16/2023 1740 by Aroldo Adan RN  Outcome: Not Progressing     Problem: Skin/Tissue Integrity  Goal: Absence of new skin breakdown  Description: 1. Monitor for areas of redness and/or skin breakdown  2. Assess vascular access sites hourly  3. Every 4-6 hours minimum:  Change oxygen saturation probe site  4. Every 4-6 hours:  If on nasal continuous positive airway pressure, respiratory therapy assess nares and determine need for appliance change or resting period.   8/16/2023 1740 by Aroldo Adan RN  Outcome: Not Progressing  8/16/2023 1740 by Jabier Gallagher RN  Outcome: Not Progressing     Problem: Chronic Conditions and Co-morbidities  Goal: Patient's chronic conditions and co-morbidity symptoms are monitored and maintained or improved  Outcome: Not Progressing

## 2023-08-16 NOTE — CONSULTS
Comprehensive Nutrition Assessment    Type and Reason for Visit:   Initial, consult      Nutrition Recommendations/Plan:   Begin EN as below  check phosphorus, BMP, magnesium daily and replete as pt is HIGH risk for refeeding syndrome. Should patient and family refuse EN as in past would begin Regular diet and allow food and fluid as pt will accept    Consider psychiatric consult. Consider gummy multivitamin after PO accepted     Malnutrition Assessment:  Malnutrition Status:  Severe malnutrition (08/16/23 1730)    Context:  Chronic Illness     Findings of the 6 clinical characteristics of malnutrition:  Energy Intake:  75% or less estimated energy requirements for 1 month or longer  Weight Loss:  Greater than 7.5% over 3 months     Body Fat Loss:  Severe body fat loss Orbital, Triceps, Fat Overlying Ribs, Buccal region   Muscle Mass Loss:  Severe muscle mass loss Clavicles (pectoralis & deltoids), Thigh (quadraceps), Temples (temporalis), Calf (gastrocnemius), Hand (interosseous), Scapula (trapezius)  Fluid Accumulation:  No significant fluid accumulation     Strength:  Not Performed    Nutrition Assessment:    Pt admitted  with DKA, recent COVID and C-Diff infection. Discharged back to LTC on 8/11/2023. Since discharge patient has refused all food and fluid accepting only coke after hypoglycemia. Multiple attempts were made to get resident to eat any food or fluid. Regular diet , Supplements, EN all refused adamantly. While a resident the entire multidiscipline team, including  and family encouraged resident to try somethng to eat (he would order a menu item just to get staff to stop asking what he would eat, then refuse to eat when food received). Pt states \"I know what I need to do and what I need to eat, but I do not feel good and I will be ok\". Nutrition Related Findings:    Last BM unknown - minimal intake for over 2 weeks (the occasional banana or coke/diet coke).    Glucose

## 2023-08-17 VITALS
RESPIRATION RATE: 14 BRPM | SYSTOLIC BLOOD PRESSURE: 134 MMHG | BODY MASS INDEX: 14.07 KG/M2 | HEIGHT: 71 IN | OXYGEN SATURATION: 98 % | HEART RATE: 98 BPM | WEIGHT: 100.5 LBS | TEMPERATURE: 98.3 F | DIASTOLIC BLOOD PRESSURE: 86 MMHG

## 2023-08-17 LAB
ANION GAP SERPL CALC-SCNC: 10 MMOL/L (ref 3–18)
BASOPHILS # BLD: 0 K/UL (ref 0–0.1)
BASOPHILS NFR BLD: 0 % (ref 0–2)
BUN SERPL-MCNC: 31 MG/DL (ref 7–18)
BUN/CREAT SERPL: 15 (ref 12–20)
CA-I BLD-MCNC: 8.6 MG/DL (ref 8.5–10.1)
CHLORIDE SERPL-SCNC: 95 MMOL/L (ref 100–111)
CO2 SERPL-SCNC: 31 MMOL/L (ref 21–32)
CREAT SERPL-MCNC: 2.11 MG/DL (ref 0.6–1.3)
DIFFERENTIAL METHOD BLD: ABNORMAL
EOSINOPHIL # BLD: 0 K/UL (ref 0–0.4)
EOSINOPHIL NFR BLD: 0 % (ref 0–5)
ERYTHROCYTE [DISTWIDTH] IN BLOOD BY AUTOMATED COUNT: 13.9 % (ref 11.6–14.5)
EST. AVERAGE GLUCOSE BLD GHB EST-MCNC: 197 MG/DL
GLUCOSE BLD STRIP.AUTO-MCNC: 109 MG/DL (ref 70–110)
GLUCOSE BLD STRIP.AUTO-MCNC: 175 MG/DL (ref 70–110)
GLUCOSE BLD STRIP.AUTO-MCNC: 191 MG/DL (ref 70–110)
GLUCOSE BLD STRIP.AUTO-MCNC: 193 MG/DL (ref 70–110)
GLUCOSE BLD STRIP.AUTO-MCNC: 313 MG/DL (ref 70–110)
GLUCOSE BLD STRIP.AUTO-MCNC: 65 MG/DL (ref 70–110)
GLUCOSE BLD STRIP.AUTO-MCNC: 75 MG/DL (ref 70–110)
GLUCOSE BLD STRIP.AUTO-MCNC: 84 MG/DL (ref 70–110)
GLUCOSE SERPL-MCNC: 110 MG/DL (ref 74–99)
HBA1C MFR BLD: 8.5 % (ref 4.2–5.6)
HCT VFR BLD AUTO: 36.8 % (ref 36–48)
HGB BLD-MCNC: 11.4 G/DL (ref 13–16)
IMM GRANULOCYTES # BLD AUTO: 0 K/UL
IMM GRANULOCYTES NFR BLD AUTO: 0 %
LYMPHOCYTES # BLD: 3.1 K/UL (ref 0.9–3.6)
LYMPHOCYTES NFR BLD: 19 % (ref 21–52)
MAGNESIUM SERPL-MCNC: 2 MG/DL (ref 1.6–2.6)
MCH RBC QN AUTO: 28.6 PG (ref 24–34)
MCHC RBC AUTO-ENTMCNC: 31 G/DL (ref 31–37)
MCV RBC AUTO: 92.5 FL (ref 78–100)
MONOCYTES # BLD: 1.5 K/UL (ref 0.05–1.2)
MONOCYTES NFR BLD: 9 % (ref 3–10)
NEUTS BAND NFR BLD MANUAL: 1 % (ref 0–5)
NEUTS SEG # BLD: 11.9 K/UL (ref 1.8–8)
NEUTS SEG NFR BLD: 71 % (ref 40–73)
NRBC # BLD: 0 K/UL (ref 0–0.01)
NRBC BLD-RTO: 0 PER 100 WBC
PERFORMED BY:: ABNORMAL
PERFORMED BY:: NORMAL
PHOSPHATE SERPL-MCNC: 2.4 MG/DL (ref 2.5–4.9)
PLATELET # BLD AUTO: 251 K/UL (ref 135–420)
PMV BLD AUTO: 12.6 FL (ref 9.2–11.8)
POTASSIUM SERPL-SCNC: 3.6 MMOL/L (ref 3.5–5.5)
RBC # BLD AUTO: 3.98 M/UL (ref 4.35–5.65)
RBC MORPH BLD: ABNORMAL
SODIUM SERPL-SCNC: 136 MMOL/L (ref 136–145)
WBC # BLD AUTO: 16.5 K/UL (ref 4.6–13.2)

## 2023-08-17 PROCEDURE — 83735 ASSAY OF MAGNESIUM: CPT

## 2023-08-17 PROCEDURE — 2580000003 HC RX 258: Performed by: INTERNAL MEDICINE

## 2023-08-17 PROCEDURE — 84100 ASSAY OF PHOSPHORUS: CPT

## 2023-08-17 PROCEDURE — 36415 COLL VENOUS BLD VENIPUNCTURE: CPT

## 2023-08-17 PROCEDURE — 82962 GLUCOSE BLOOD TEST: CPT

## 2023-08-17 PROCEDURE — 6360000002 HC RX W HCPCS: Performed by: NURSE PRACTITIONER

## 2023-08-17 PROCEDURE — 6370000000 HC RX 637 (ALT 250 FOR IP): Performed by: INTERNAL MEDICINE

## 2023-08-17 PROCEDURE — 6370000000 HC RX 637 (ALT 250 FOR IP): Performed by: NURSE PRACTITIONER

## 2023-08-17 PROCEDURE — 85025 COMPLETE CBC W/AUTO DIFF WBC: CPT

## 2023-08-17 PROCEDURE — 80048 BASIC METABOLIC PNL TOTAL CA: CPT

## 2023-08-17 RX ORDER — GABAPENTIN 100 MG/1
200 CAPSULE ORAL 2 TIMES DAILY
Qty: 60 CAPSULE | Refills: 0 | Status: ON HOLD | OUTPATIENT
Start: 2023-08-17 | End: 2023-11-15

## 2023-08-17 RX ORDER — TRAMADOL HYDROCHLORIDE 50 MG/1
50 TABLET ORAL EVERY 4 HOURS PRN
Qty: 20 TABLET | Refills: 0 | Status: ON HOLD | OUTPATIENT
Start: 2023-08-17 | End: 2023-08-21 | Stop reason: HOSPADM

## 2023-08-17 RX ORDER — INSULIN GLARGINE 100 [IU]/ML
15 INJECTION, SOLUTION SUBCUTANEOUS DAILY
Qty: 10 ML | Refills: 3 | Status: ON HOLD
Start: 2023-08-18

## 2023-08-17 RX ORDER — INSULIN LISPRO 100 [IU]/ML
0.08 INJECTION, SOLUTION INTRAVENOUS; SUBCUTANEOUS
Status: DISCONTINUED | OUTPATIENT
Start: 2023-08-17 | End: 2023-08-17 | Stop reason: HOSPADM

## 2023-08-17 RX ORDER — INSULIN LISPRO 100 [IU]/ML
0-4 INJECTION, SOLUTION INTRAVENOUS; SUBCUTANEOUS
Status: DISCONTINUED | OUTPATIENT
Start: 2023-08-17 | End: 2023-08-17 | Stop reason: HOSPADM

## 2023-08-17 RX ORDER — SODIUM CHLORIDE, SODIUM LACTATE, POTASSIUM CHLORIDE, AND CALCIUM CHLORIDE .6; .31; .03; .02 G/100ML; G/100ML; G/100ML; G/100ML
1000 INJECTION, SOLUTION INTRAVENOUS ONCE
Status: COMPLETED | OUTPATIENT
Start: 2023-08-17 | End: 2023-08-17

## 2023-08-17 RX ORDER — INSULIN LISPRO 100 [IU]/ML
0-4 INJECTION, SOLUTION INTRAVENOUS; SUBCUTANEOUS NIGHTLY
Status: DISCONTINUED | OUTPATIENT
Start: 2023-08-17 | End: 2023-08-17 | Stop reason: HOSPADM

## 2023-08-17 RX ORDER — DEXTROSE MONOHYDRATE 100 MG/ML
INJECTION, SOLUTION INTRAVENOUS CONTINUOUS PRN
Status: DISCONTINUED | OUTPATIENT
Start: 2023-08-17 | End: 2023-08-17 | Stop reason: HOSPADM

## 2023-08-17 RX ORDER — INSULIN GLARGINE 100 [IU]/ML
15 INJECTION, SOLUTION SUBCUTANEOUS DAILY
Status: DISCONTINUED | OUTPATIENT
Start: 2023-08-17 | End: 2023-08-17 | Stop reason: HOSPADM

## 2023-08-17 RX ADMIN — SODIUM CHLORIDE, POTASSIUM CHLORIDE, SODIUM LACTATE AND CALCIUM CHLORIDE 1000 ML: 600; 310; 30; 20 INJECTION, SOLUTION INTRAVENOUS at 09:30

## 2023-08-17 RX ADMIN — INSULIN LISPRO 3 UNITS: 100 INJECTION, SOLUTION INTRAVENOUS; SUBCUTANEOUS at 11:17

## 2023-08-17 RX ADMIN — INSULIN GLARGINE 15 UNITS: 100 INJECTION, SOLUTION SUBCUTANEOUS at 09:29

## 2023-08-17 RX ADMIN — VANCOMYCIN HYDROCHLORIDE 125 MG: 125 CAPSULE ORAL at 11:00

## 2023-08-17 ASSESSMENT — PAIN SCALES - GENERAL
PAINLEVEL_OUTOF10: 0
PAINLEVEL_OUTOF10: 0

## 2023-08-17 NOTE — CARE COORDINATION
Case Management Assessment  Initial Evaluation    Date/Time of Evaluation: 8/17/2023 10:26 AM  Assessment Completed by: Cayetano Saldivar    If patient is discharged prior to next notation, then this note serves as note for discharge by case management. Patient Name: Jose Casey                   YOB: 1965  Diagnosis: Hyperkalemia [E87.5]  DKA, type 1, not at goal [facility]) [E10.10]  Leukocytosis, unspecified type [D72.829]                   Date / Time: 8/16/2023  2:25 PM    Patient Admission Status: Inpatient   Readmission Risk (Low < 19, Mod (19-27), High > 27): Readmission Risk Score: 23.1    Current PCP: PROVIDER UNKNOWN  PCP verified by CM? Chart Reviewed: Yes      History Provided by:    Patient Orientation:      Patient Cognition:      Hospitalization in the last 30 days (Readmission):  No    If yes, Readmission Assessment in CM Navigator will be completed. Advance Directives:      Code Status: DNR   Patient's Primary Decision Maker is:      Primary Decision Maker: Gilles [facility] - [phone]    Discharge Planning:    Patient lives with: Other (Comment) (staff) [facility] [address]  Primary Care Giver:    Patient Support Systems include: Other (Comment) (EP)   Current Financial resources:    Current community resources:    Current services prior to admission: [address]            Current DME:              Type of Home Care services:  [employer], Skilled Therapy    ADLS  Prior functional level:    Current functional level:      PT AM-PAC:   /24  OT AM-PAC:   /24    Family can provide assistance at DC: Would you like Case Management to discuss the discharge plan with any other family members/significant others, and if so, who?     Plans to Return to Present Housing:    Other Identified Issues/Barriers to RETURNING to current housing: yes  Potential Assistance needed at discharge: [address]            Potential DME:    Patient

## 2023-08-17 NOTE — DISCHARGE SUMMARY
Discharge Summary       PATIENT ID: Jesica Harris  MRN: [id number]   YOB: 1965    DATE OF ADMISSION: 8/16/2023  2:25 PM    DATE OF DISCHARGE: 08/17/23    PRIMARY CARE PROVIDER: PROVIDER UNKNOWN     ATTENDING PHYSICIAN: Jacky Sánchez MD  DISCHARGING PROVIDER: Jacky Sánchez MD        CONSULTATIONS: IP CONSULT TO DIETITIAN  IP CONSULT TO DIABETES EDUCATOR    PROCEDURES/SURGERIES: * No surgery found *    [address] COURSE:   Jesica Harris is a 56 y.o. male resident of [address] [place] [address] with a past medical history significant for DM-Type 1, HLP, GERD, and a recent C. difficile colitis (on oral Vanco) and recent covid 19 +ve (asymptomatic), who presents to the ED today with complaints of lethargy, high blood glucose readings in the nursing home. At the time of my evaluation in the ED, patient is angry, and frustrated at getting admitted. States he wants to go back home, states he is aware of his broken and malfunctioning insulin pump which has been a problem lately, \"and is supposed to be getting a new one\", but he is not sure what the plan is on securing a new one. He denies nausea, vomiting, abdominal pain or discomfort. He denies any chest pain or sob. No fever or chills. No other complaints. Per ED provider, nursing home staff had reported less appetite since discharged on 8/11/2023 and that his blood glucose have been elevated in the last 3 days. They reported his insulin pump finally broke and stopped working today. In the ED, his glucose was 451, with an anion gap of 21, sodium level 129, CO2 is normal at 22, leukocytosis of 16.8. BUN 41, Cr 2.77. Hospitalist was asked to admit. We agreed patient meets observation criteria for hyperglycemia and mild DKA with an estimated length of stay, of 1 midnight. Case management consult for assistance with securing a new insulin pump.         DISCHARGE DIAGNOSES / PLAN:      Assessment & Plan:       #1:

## 2023-08-17 NOTE — PLAN OF CARE
Problem: Discharge Planning  Goal: Discharge to home or other facility with appropriate resources  Outcome: Adequate for Discharge     Problem: Pain  Goal: Verbalizes/displays adequate comfort level or baseline comfort level  Outcome: Adequate for Discharge     Problem: Skin/Tissue Integrity  Goal: Absence of new skin breakdown  Description: 1. Monitor for areas of redness and/or skin breakdown  2. Assess vascular access sites hourly  3. Every 4-6 hours minimum:  Change oxygen saturation probe site  4. Every 4-6 hours:  If on nasal continuous positive airway pressure, respiratory therapy assess nares and determine need for appliance change or resting period.   Outcome: Adequate for Discharge     Problem: Safety - Adult  Goal: Free from fall injury  Outcome: Adequate for Discharge     Problem: ABCDS Injury Assessment  Goal: Absence of physical injury  Outcome: Adequate for Discharge     Problem: Chronic Conditions and Co-morbidities  Goal: Patient's chronic conditions and co-morbidity symptoms are monitored and maintained or improved  Outcome: Adequate for Discharge     Problem: Nutrition Deficit:  Goal: Optimize nutritional status  8/17/2023 1254 by Dalton Garcia RN  Outcome: Adequate for Discharge  8/17/2023 1228 by Lilli Mojica  Outcome: Not Progressing     Problem: Nutrition Deficit:  Goal: Optimize nutritional status  8/17/2023 1254 by Dalton Garcia RN  Outcome: Adequate for Discharge  8/17/2023 1228 by Lilli Mojica  Outcome: Not Progressing

## 2023-08-17 NOTE — PROGRESS NOTES
0700-Beside shift report received from B. Sabas Claude, RN. Assumed care of patient. Patient resting in bed with eyes closed. Resp even and unlabored. Insulin gtt paused per protocol. Bed in lowest position. Bed alarm on. CBWR.     0715-Patient assessment performed    0801-Attempted to replace patients electrolytes. Patient stated he did not want it, asked patient multiple times. Education provided. Primary nurse aware. MD aware. 3747-AZ medications refused by patient-attempted multiple times. Education Provided. Primary nurse aware. MD aware. 0487-Doctor Rounding at this time    1030-Changed brief of urine. Complete linen changed. Repositioned patient. Bed alarm on. Bed in lowest position. CBWR.     1320-Report given to EP nurse.

## 2023-08-17 NOTE — PLAN OF CARE
Problem: Nutrition Deficit:  Goal: Optimize nutritional status  Outcome: Not Progressing   Comprehensive Nutrition Assessment    Type and Reason for Visit:  Positive Nutrition Screen, Consult, Reassess    Nutrition Recommendations/Plan:   Regular diet  no sugar packet - allow food and fluid as patient will accept and cover glucose with SSI after consumption to avoid hypoglycemia. Psychiatric consult due to patient's refusal of all solid foods/supplements/EN. Malnutrition Assessment:  Malnutrition Status:  Severe malnutrition (08/16/23 1730)    Context:  Chronic Illness     Findings of the 6 clinical characteristics of malnutrition:  Energy Intake:  75% or less estimated energy requirements for 1 month or longer  Weight Loss:  Greater than 7.5% over 3 months     Body Fat Loss:  Severe body fat loss Orbital, Triceps, Fat Overlying Ribs, Buccal region   Muscle Mass Loss:  Severe muscle mass loss Clavicles (pectoralis & deltoids), Thigh (quadraceps), Temples (temporalis), Calf (gastrocnemius), Hand (interosseous), Scapula (trapezius)  Fluid Accumulation:  No significant fluid accumulation     Strength:  Not Performed    Nutrition Assessment:    Pt refuses EN. Intake since admission yesterday - sips of coke. Na, Cl, BUN, creatinine improved with IVF. Nutrition Rx: 3 carb choices, regular consistency. Resident beligerant to nursing during care yesterday refused all PO foods. Weight 100 pounds, 8 oz due to IVF. Pt may be discharged today if glucose/BP stable per provider. Currently glucose 313, /67 at 1025. Pt refuses all food and fluid except sips of coke. Provider discussed hospice and pt refuses. Nutrition Related Findings:    Last BM unknown - minimal intake for over 2 weeks (the occasional banana or coke/diet coke). Glucose 437- pt's insulin pump discontinued and insulin drip started. Na 129- Cl 86 Low, K 5.4- high - expect to drop with administration of IVF and may need replacement.

## 2023-08-18 ENCOUNTER — CARE COORDINATION (OUTPATIENT)
Facility: CLINIC | Age: 58
End: 2023-08-18

## 2023-08-18 NOTE — CARE COORDINATION
Per Note, Patient is LTC at [facility]. No transition of care outreach indicated due to patient discharge to [facility], [address] [place] [address].

## 2023-08-20 ENCOUNTER — APPOINTMENT (OUTPATIENT)
Age: 58
DRG: 637 | End: 2023-08-20
Payer: MEDICARE

## 2023-08-20 ENCOUNTER — HOSPITAL ENCOUNTER (OUTPATIENT)
Age: 58
Discharge: HOME | DRG: 637 | End: 2023-08-23
Payer: MEDICARE

## 2023-08-20 ENCOUNTER — HOSPITAL ENCOUNTER (INPATIENT)
Age: 58
LOS: 1 days | Discharge: NURSING, REHAB OR LONG-TERM CARE | DRG: 637 | End: 2023-08-21
Attending: EMERGENCY MEDICINE | Admitting: INTERNAL MEDICINE
Payer: MEDICARE

## 2023-08-20 DIAGNOSIS — E10.10 DKA, TYPE 1, NOT AT GOAL (HCC): Primary | ICD-10-CM

## 2023-08-20 DIAGNOSIS — N18.32 ACUTE RENAL FAILURE SUPERIMPOSED ON STAGE 3B CHRONIC KIDNEY DISEASE, UNSPECIFIED ACUTE RENAL FAILURE TYPE (HCC): ICD-10-CM

## 2023-08-20 DIAGNOSIS — Z91.148 NON COMPLIANCE W MEDICATION REGIMEN: ICD-10-CM
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
## 2023-08-20 DIAGNOSIS — N17.9 ACUTE RENAL FAILURE SUPERIMPOSED ON STAGE 3B CHRONIC KIDNEY DISEASE, UNSPECIFIED ACUTE RENAL FAILURE TYPE (HCC): ICD-10-CM

## 2023-08-20 DIAGNOSIS — E87.29 HIGH ANION GAP METABOLIC ACIDOSIS: ICD-10-CM

## 2023-08-20 DIAGNOSIS — E87.1 ACUTE HYPONATREMIA: ICD-10-CM

## 2023-08-20 LAB
ALBUMIN SERPL-MCNC: 3.3 G/DL (ref 3.4–5)
ALBUMIN/GLOB SERPL: 1 (ref 0.8–1.7)
ALP SERPL-CCNC: 129 U/L (ref 45–117)
ALT SERPL-CCNC: 29 U/L (ref 16–61)
ANION GAP SERPL CALC-SCNC: 20 MMOL/L (ref 3–18)
ANION GAP SERPL CALC-SCNC: 33 MMOL/L (ref 3–18)
ANION GAP SERPL CALC-SCNC: 40 MMOL/L (ref 3–18)
AST SERPL W P-5'-P-CCNC: 23 U/L (ref 10–38)
BASOPHILS # BLD: 0 K/UL (ref 0–0.1)
BASOPHILS NFR BLD: 0 % (ref 0–2)
BILIRUB SERPL-MCNC: 0.6 MG/DL (ref 0.2–1)
BUN SERPL-MCNC: 53 MG/DL (ref 7–18)
BUN SERPL-MCNC: 56 MG/DL (ref 7–18)
BUN SERPL-MCNC: 61 MG/DL (ref 7–18)
BUN/CREAT SERPL: 14 (ref 12–20)
BUN/CREAT SERPL: 16 (ref 12–20)
BUN/CREAT SERPL: 16 (ref 12–20)
CA-I BLD-MCNC: 7.5 MG/DL (ref 8.5–10.1)
CA-I BLD-MCNC: 7.8 MG/DL (ref 8.5–10.1)
CA-I BLD-MCNC: 8.1 MG/DL (ref 8.5–10.1)
CHLORIDE SERPL-SCNC: 78 MMOL/L (ref 100–111)
CHLORIDE SERPL-SCNC: 90 MMOL/L (ref 100–111)
CHLORIDE SERPL-SCNC: 96 MMOL/L (ref 100–111)
CO2 SERPL-SCNC: 11 MMOL/L (ref 21–32)
CO2 SERPL-SCNC: 19 MMOL/L (ref 21–32)
CO2 SERPL-SCNC: 5 MMOL/L (ref 21–32)
CREAT SERPL-MCNC: 3.4 MG/DL (ref 0.6–1.3)
CREAT SERPL-MCNC: 3.8 MG/DL (ref 0.6–1.3)
CREAT SERPL-MCNC: 3.91 MG/DL (ref 0.6–1.3)
DIFFERENTIAL METHOD BLD: ABNORMAL
EKG ATRIAL RATE: 114 BPM
EKG DIAGNOSIS: NORMAL
EKG P AXIS: 78 DEGREES
EKG P-R INTERVAL: 174 MS
EKG Q-T INTERVAL: 378 MS
EKG QRS DURATION: 110 MS
EKG QTC CALCULATION (BAZETT): 521 MS
EKG R AXIS: 90 DEGREES
EKG T AXIS: 78 DEGREES
EKG VENTRICULAR RATE: 114 BPM
EOSINOPHIL # BLD: 0 K/UL (ref 0–0.4)
EOSINOPHIL NFR BLD: 0 % (ref 0–5)
ERYTHROCYTE [DISTWIDTH] IN BLOOD BY AUTOMATED COUNT: 14.4 % (ref 11.6–14.5)
GLOBULIN SER CALC-MCNC: 3.2 G/DL (ref 2–4)
GLUCOSE BLD STRIP.AUTO-MCNC: 159 MG/DL (ref 70–110)
GLUCOSE BLD STRIP.AUTO-MCNC: 194 MG/DL (ref 70–110)
GLUCOSE BLD STRIP.AUTO-MCNC: 195 MG/DL (ref 70–110)
GLUCOSE BLD STRIP.AUTO-MCNC: 262 MG/DL (ref 70–110)
GLUCOSE BLD STRIP.AUTO-MCNC: 303 MG/DL (ref 70–110)
GLUCOSE BLD STRIP.AUTO-MCNC: 326 MG/DL (ref 70–110)
GLUCOSE BLD STRIP.AUTO-MCNC: 380 MG/DL (ref 70–110)
GLUCOSE BLD STRIP.AUTO-MCNC: 426 MG/DL (ref 70–110)
GLUCOSE BLD STRIP.AUTO-MCNC: 468 MG/DL (ref 70–110)
GLUCOSE BLD STRIP.AUTO-MCNC: 542 MG/DL (ref 70–110)
GLUCOSE BLD STRIP.AUTO-MCNC: >600 MG/DL (ref 70–110)
GLUCOSE SERPL-MCNC: 300 MG/DL (ref 74–99)
GLUCOSE SERPL-MCNC: 530 MG/DL (ref 74–99)
GLUCOSE SERPL-MCNC: 769 MG/DL (ref 74–99)
GLUCOSE SERPL-MCNC: 785 MG/DL (ref 74–99)
HCT VFR BLD AUTO: 37 % (ref 36–48)
HGB BLD-MCNC: 10.4 G/DL (ref 13–16)
IMM GRANULOCYTES # BLD AUTO: 0 K/UL
IMM GRANULOCYTES NFR BLD AUTO: 0 %
LACTATE SERPL-SCNC: 2.4 MMOL/L (ref 0.4–2)
LACTATE SERPL-SCNC: 5.2 MMOL/L (ref 0.4–2)
LACTATE SERPL-SCNC: 5.9 MMOL/L (ref 0.4–2)
LYMPHOCYTES # BLD: 0.8 K/UL (ref 0.9–3.6)
LYMPHOCYTES NFR BLD: 4 % (ref 21–52)
MCH RBC QN AUTO: 28.5 PG (ref 24–34)
MCHC RBC AUTO-ENTMCNC: 28.1 G/DL (ref 31–37)
MCV RBC AUTO: 101.4 FL (ref 78–100)
MONOCYTES # BLD: 0.4 K/UL (ref 0.05–1.2)
MONOCYTES NFR BLD: 2 % (ref 3–10)
NEUTS BAND NFR BLD MANUAL: 2 % (ref 0–5)
NEUTS SEG # BLD: 19.3 K/UL (ref 1.8–8)
NEUTS SEG NFR BLD: 92 % (ref 40–73)
NRBC # BLD: 0 K/UL (ref 0–0.01)
NRBC BLD-RTO: 0 PER 100 WBC
PERFORMED BY:: ABNORMAL
PLATELET # BLD AUTO: 361 K/UL (ref 135–420)
PLATELET COMMENT: ABNORMAL
PMV BLD AUTO: 12 FL (ref 9.2–11.8)
POTASSIUM SERPL-SCNC: 3.6 MMOL/L (ref 3.5–5.5)
POTASSIUM SERPL-SCNC: 4.4 MMOL/L (ref 3.5–5.5)
POTASSIUM SERPL-SCNC: 6.8 MMOL/L (ref 3.5–5.5)
PROCALCITONIN SERPL-MCNC: 1.83 NG/ML
PROT SERPL-MCNC: 6.5 G/DL (ref 6.4–8.2)
RBC # BLD AUTO: 3.65 M/UL (ref 4.35–5.65)
RBC MORPH BLD: ABNORMAL
SODIUM SERPL-SCNC: 123 MMOL/L (ref 136–145)
SODIUM SERPL-SCNC: 134 MMOL/L (ref 136–145)
SODIUM SERPL-SCNC: 135 MMOL/L (ref 136–145)
TROPONIN I SERPL HS-MCNC: 46 NG/L (ref 0–78)
WBC # BLD AUTO: 20.5 K/UL (ref 4.6–13.2)

## 2023-08-20 PROCEDURE — 2500000003 HC RX 250 WO HCPCS: Performed by: NURSE PRACTITIONER

## 2023-08-20 PROCEDURE — 87077 CULTURE AEROBIC IDENTIFY: CPT

## 2023-08-20 PROCEDURE — 99285 EMERGENCY DEPT VISIT HI MDM: CPT

## 2023-08-20 PROCEDURE — 80053 COMPREHEN METABOLIC PANEL: CPT

## 2023-08-20 PROCEDURE — 83605 ASSAY OF LACTIC ACID: CPT

## 2023-08-20 PROCEDURE — 87150 DNA/RNA AMPLIFIED PROBE: CPT

## 2023-08-20 PROCEDURE — 82947 ASSAY GLUCOSE BLOOD QUANT: CPT

## 2023-08-20 PROCEDURE — 87040 BLOOD CULTURE FOR BACTERIA: CPT

## 2023-08-20 PROCEDURE — 70450 CT HEAD/BRAIN W/O DYE: CPT

## 2023-08-20 PROCEDURE — 36415 COLL VENOUS BLD VENIPUNCTURE: CPT

## 2023-08-20 PROCEDURE — 71045 X-RAY EXAM CHEST 1 VIEW: CPT

## 2023-08-20 PROCEDURE — 96374 THER/PROPH/DIAG INJ IV PUSH: CPT

## 2023-08-20 PROCEDURE — 2000000000 HC ICU R&B

## 2023-08-20 PROCEDURE — 93005 ELECTROCARDIOGRAM TRACING: CPT | Performed by: EMERGENCY MEDICINE

## 2023-08-20 PROCEDURE — 82962 GLUCOSE BLOOD TEST: CPT

## 2023-08-20 PROCEDURE — 80048 BASIC METABOLIC PNL TOTAL CA: CPT

## 2023-08-20 PROCEDURE — 6370000000 HC RX 637 (ALT 250 FOR IP): Performed by: NURSE PRACTITIONER

## 2023-08-20 PROCEDURE — 85025 COMPLETE CBC W/AUTO DIFF WBC: CPT

## 2023-08-20 PROCEDURE — 6360000002 HC RX W HCPCS: Performed by: EMERGENCY MEDICINE

## 2023-08-20 PROCEDURE — 6370000000 HC RX 637 (ALT 250 FOR IP): Performed by: EMERGENCY MEDICINE

## 2023-08-20 PROCEDURE — 84484 ASSAY OF TROPONIN QUANT: CPT

## 2023-08-20 PROCEDURE — 87186 SC STD MICRODIL/AGAR DIL: CPT

## 2023-08-20 PROCEDURE — 84145 PROCALCITONIN (PCT): CPT

## 2023-08-20 PROCEDURE — 6360000002 HC RX W HCPCS: Performed by: NURSE PRACTITIONER

## 2023-08-20 PROCEDURE — 2580000003 HC RX 258: Performed by: NURSE PRACTITIONER

## 2023-08-20 PROCEDURE — 2580000003 HC RX 258: Performed by: EMERGENCY MEDICINE

## 2023-08-20 RX ORDER — CETIRIZINE HYDROCHLORIDE 10 MG/1
10 TABLET ORAL EVERY EVENING
Status: DISCONTINUED | OUTPATIENT
Start: 2023-08-20 | End: 2023-08-21 | Stop reason: HOSPADM

## 2023-08-20 RX ORDER — SODIUM POLYSTYRENE SULFONATE 15 G/60ML
15 SUSPENSION ORAL; RECTAL
Status: ACTIVE | OUTPATIENT
Start: 2023-08-20 | End: 2023-08-20

## 2023-08-20 RX ORDER — 0.9 % SODIUM CHLORIDE 0.9 %
15 INTRAVENOUS SOLUTION INTRAVENOUS ONCE
Status: DISCONTINUED | OUTPATIENT
Start: 2023-08-20 | End: 2023-08-20

## 2023-08-20 RX ORDER — DIPHENHYDRAMINE HYDROCHLORIDE 50 MG/ML
12.5 INJECTION INTRAMUSCULAR; INTRAVENOUS
Status: DISCONTINUED | OUTPATIENT
Start: 2023-08-20 | End: 2023-08-20

## 2023-08-20 RX ORDER — SODIUM CHLORIDE, SODIUM LACTATE, POTASSIUM CHLORIDE, AND CALCIUM CHLORIDE .6; .31; .03; .02 G/100ML; G/100ML; G/100ML; G/100ML
2000 INJECTION, SOLUTION INTRAVENOUS ONCE
Status: COMPLETED | OUTPATIENT
Start: 2023-08-20 | End: 2023-08-20

## 2023-08-20 RX ORDER — CALCIUM GLUCONATE 94 MG/ML
2000 INJECTION, SOLUTION INTRAVENOUS
Status: COMPLETED | OUTPATIENT
Start: 2023-08-20 | End: 2023-08-20

## 2023-08-20 RX ORDER — 0.9 % SODIUM CHLORIDE 0.9 %
500 INTRAVENOUS SOLUTION INTRAVENOUS ONCE
Status: COMPLETED | OUTPATIENT
Start: 2023-08-20 | End: 2023-08-20

## 2023-08-20 RX ORDER — FLUOXETINE 10 MG/1
10 CAPSULE ORAL EVERY EVENING
Status: DISCONTINUED | OUTPATIENT
Start: 2023-08-20 | End: 2023-08-21 | Stop reason: HOSPADM

## 2023-08-20 RX ORDER — HEPARIN SODIUM 5000 [USP'U]/ML
5000 INJECTION, SOLUTION INTRAVENOUS; SUBCUTANEOUS EVERY 8 HOURS SCHEDULED
Status: DISCONTINUED | OUTPATIENT
Start: 2023-08-20 | End: 2023-08-21 | Stop reason: HOSPADM

## 2023-08-20 RX ORDER — SODIUM BICARBONATE 650 MG/1
650 TABLET ORAL 2 TIMES DAILY
Status: DISCONTINUED | OUTPATIENT
Start: 2023-08-20 | End: 2023-08-21 | Stop reason: HOSPADM

## 2023-08-20 RX ORDER — SODIUM CHLORIDE AND POTASSIUM CHLORIDE 150; 900 MG/100ML; MG/100ML
INJECTION, SOLUTION INTRAVENOUS CONTINUOUS
Status: DISCONTINUED | OUTPATIENT
Start: 2023-08-20 | End: 2023-08-21 | Stop reason: HOSPADM

## 2023-08-20 RX ORDER — DEXTROSE MONOHYDRATE, SODIUM CHLORIDE, AND POTASSIUM CHLORIDE 50; 1.49; 4.5 G/1000ML; G/1000ML; G/1000ML
INJECTION, SOLUTION INTRAVENOUS CONTINUOUS PRN
Status: DISCONTINUED | OUTPATIENT
Start: 2023-08-20 | End: 2023-08-21 | Stop reason: HOSPADM

## 2023-08-20 RX ORDER — SODIUM CHLORIDE 9 MG/ML
INJECTION, SOLUTION INTRAVENOUS CONTINUOUS
Status: DISCONTINUED | OUTPATIENT
Start: 2023-08-20 | End: 2023-08-20

## 2023-08-20 RX ORDER — HALOPERIDOL 5 MG/ML
1 INJECTION INTRAMUSCULAR
Status: DISCONTINUED | OUTPATIENT
Start: 2023-08-20 | End: 2023-08-20

## 2023-08-20 RX ORDER — ASPIRIN 81 MG/1
81 TABLET ORAL EVERY EVENING
Status: DISCONTINUED | OUTPATIENT
Start: 2023-08-20 | End: 2023-08-21 | Stop reason: HOSPADM

## 2023-08-20 RX ORDER — ROSUVASTATIN CALCIUM 10 MG/1
10 TABLET, COATED ORAL EVERY EVENING
Status: DISCONTINUED | OUTPATIENT
Start: 2023-08-20 | End: 2023-08-21 | Stop reason: HOSPADM

## 2023-08-20 RX ORDER — DEXTROSE MONOHYDRATE 100 MG/ML
INJECTION, SOLUTION INTRAVENOUS CONTINUOUS PRN
Status: DISCONTINUED | OUTPATIENT
Start: 2023-08-20 | End: 2023-08-21 | Stop reason: HOSPADM

## 2023-08-20 RX ORDER — DEXTROSE AND SODIUM CHLORIDE 5; .45 G/100ML; G/100ML
INJECTION, SOLUTION INTRAVENOUS CONTINUOUS PRN
Status: DISCONTINUED | OUTPATIENT
Start: 2023-08-20 | End: 2023-08-20

## 2023-08-20 RX ORDER — GABAPENTIN 100 MG/1
100 CAPSULE ORAL 2 TIMES DAILY
Status: DISCONTINUED | OUTPATIENT
Start: 2023-08-20 | End: 2023-08-21 | Stop reason: HOSPADM

## 2023-08-20 RX ADMIN — SODIUM CHLORIDE 500 ML: 9 INJECTION, SOLUTION INTRAVENOUS at 16:00

## 2023-08-20 RX ADMIN — SODIUM CHLORIDE: 9 INJECTION, SOLUTION INTRAVENOUS at 16:35

## 2023-08-20 RX ADMIN — POTASSIUM CHLORIDE, DEXTROSE MONOHYDRATE AND SODIUM CHLORIDE: 150; 5; 450 INJECTION, SOLUTION INTRAVENOUS at 21:30

## 2023-08-20 RX ADMIN — INSULIN HUMAN 10 UNITS: 100 INJECTION, SOLUTION PARENTERAL at 10:38

## 2023-08-20 RX ADMIN — SODIUM CHLORIDE 22.22 UNITS/HR: 9 INJECTION, SOLUTION INTRAVENOUS at 20:19

## 2023-08-20 RX ADMIN — SODIUM CHLORIDE 25.62 UNITS/HR: 9 INJECTION, SOLUTION INTRAVENOUS at 16:40

## 2023-08-20 RX ADMIN — CALCIUM GLUCONATE 2000 MG: 98 INJECTION, SOLUTION INTRAVENOUS at 10:55

## 2023-08-20 RX ADMIN — SODIUM CHLORIDE, POTASSIUM CHLORIDE, SODIUM LACTATE AND CALCIUM CHLORIDE 2000 ML: 600; 310; 30; 20 INJECTION, SOLUTION INTRAVENOUS at 09:57

## 2023-08-20 RX ADMIN — SODIUM BICARBONATE 50 MEQ: 84 INJECTION, SOLUTION INTRAVENOUS at 14:33

## 2023-08-20 RX ADMIN — SODIUM CHLORIDE 10.8 UNITS/HR: 9 INJECTION, SOLUTION INTRAVENOUS at 11:03

## 2023-08-20 RX ADMIN — CEFTRIAXONE SODIUM 1000 MG: 1 INJECTION, POWDER, FOR SOLUTION INTRAMUSCULAR; INTRAVENOUS at 10:26

## 2023-08-20 RX ADMIN — SODIUM CHLORIDE: 9 INJECTION, SOLUTION INTRAVENOUS at 12:10

## 2023-08-20 RX ADMIN — POTASSIUM CHLORIDE AND SODIUM CHLORIDE: 900; 150 INJECTION, SOLUTION INTRAVENOUS at 20:40

## 2023-08-20 ASSESSMENT — PAIN SCALES - GENERAL
PAINLEVEL_OUTOF10: 7
PAINLEVEL_OUTOF10: 7
PAINLEVEL_OUTOF10: 0

## 2023-08-20 NOTE — PLAN OF CARE
Problem: Discharge Planning  Goal: Discharge to home or other facility with appropriate resources  Outcome: [address] [address] Progressing     Problem: Skin/Tissue Integrity  Goal: Absence of new skin breakdown  Description: 1. Monitor for areas of redness and/or skin breakdown  2. Assess vascular access sites hourly  3. Every 4-6 hours minimum:  Change oxygen saturation probe site  4. Every 4-6 hours:  If on nasal continuous positive airway pressure, respiratory therapy assess nares and determine need for appliance change or resting period. Outcome: [address] [address] Progressing     Problem: Chronic Conditions and Co-morbidities  Goal: Patient's chronic conditions and co-morbidity symptoms are monitored and maintained or improved  Outcome: [address] [address] Progressing     Problem: Pain  Goal: Verbalizes/displays adequate comfort level or baseline comfort level  [facility]     Problem: Safety - Adult  Goal: Free from fall injury  Outcome: [address] [address] Progressing     Problem: Discharge Planning  Goal: Discharge to home or other facility with appropriate resources  Outcome: [address] [address] Progressing     Problem: Skin/Tissue Integrity  Goal: Absence of new skin breakdown  Description: 1. Monitor for areas of redness and/or skin breakdown  2. Assess vascular access sites hourly  3. Every 4-6 hours minimum:  Change oxygen saturation probe site  4. Every 4-6 hours:  If on nasal continuous positive airway pressure, respiratory therapy assess nares and determine need for appliance change or resting period.   Outcome: [address] [address] Progressing     Problem: Chronic Conditions and Co-morbidities  Goal: Patient's chronic conditions and co-morbidity symptoms are monitored and maintained or improved  Outcome: [address] [address] Progressing     Problem: Pain  Goal: Verbalizes/displays adequate comfort level or baseline comfort level  Outcome: HH/[facility] Progressing     Problem: Safety - Adult  Goal: Free from fall injury  Outcome: [address] [address] Progressing

## 2023-08-20 NOTE — ED NOTES
TRANSFER - OUT REPORT:    Verbal report given to Atiya Canchola RN on Ariella Soriano  being transferred to ICU 1 for routine progression of patient care       Report consisted of patient's Situation, Background, Assessment and   Recommendations(SBAR). Information from the following report(s) ED Encounter Summary, ED SBAR, [facility], and Recent Results was reviewed with the receiving nurse. Hayes Fall Assessment:    Presents to emergency department  because of falls (Syncope, seizure, or loss of consciousness): No  Age > 70: No  Altered Mental Status, Intoxication with alcohol or substance confusion (Disorientation, impaired judgment, poor safety awaremess, or inability to follow instructions): No  Impaired Mobility: Ambulates or transfers with assistive devices or assistance; Unable to ambulate or transer.: Yes             Lines:   Peripheral IV 08/20/23 Proximal;Right Forearm (Active)        Opportunity for questions and clarification was provided.       Patient transported with:  Monitor and Registered Nurse           Miki Baumgarten, RN  08/20/23 7478

## 2023-08-20 NOTE — PROGRESS NOTES
1845- Bedside shift change report given to [facility], RN (oncoming nurse) by Cecilio Chan RN (offgoing nurse). Report included the following information Nurse Handoff Report, Index, ED Encounter Summary, ED SBAR, Intake/Output, MAR, Recent Results, and Cardiac Rhythm SR-ST . Received patient resting in bed, eyes closed. 3037- Call received from patient father requesting update on patient status, provided. Opportunity given at this time for questions to which this RN answers appropriately. 1- Primary RN to bedside for shift assessment. Patient agitated, refuses to follow commands and states he wants to be left alone. Phlebo at bedside for Q4 BMP collection. VSS.    2215- Patient refuses all PO medications. Provider made aware. 0000- Reassessment completed at this time. No changes noted at this time, VSS. Patient continues to be agitated and states he does not want to be bothered. Provider aware. 9912- Critical results reported provider at this time for 1 of 3 positive blood cultures, anaerobic bottle gram variable rods. No new orders received at this time. 0330- Phlebo at bedside for morning labs. Patient remains agitated with nursing staff stating he does not want to be bothered. Labs drawn. 0430- Reassessment completed at this time, no changes noted. Patient refuses to have BP taken at this time, all other VSS. Patient refusing perineal care or hygiene of any kind at this time. Primary nurse attempts to provide education on personal hygiene and infection risk. Patient verbalizes understanding. 3214- Patient refusing to receive scheduled sq heparin. BP obtained at this time. 9316- Bedside shift change report given to [facility], [address] [place] [address]. Pedro De Guzman (oncoming nurse) by [facility], RN (offgoing nurse).  Report included the following information Nurse Handoff Report, Index, ED Encounter Summary, ED SBAR, Intake/Output, MAR, Recent Results, and Cardiac Rhythm SR .

## 2023-08-20 NOTE — ED PROVIDER NOTES
[facility] EMERGENCY DEPT  EMERGENCY DEPARTMENT ENCOUNTER      Pt Name: Maureen Luz  MRN: [id number]  [address] 1965  Date of evaluation: 8/20/2023  Provider: Cindy Tinajero MD    [address]       Chief Complaint   Patient presents with    Hyperglycemia         HISTORY OF PRESENT ILLNESS   (Location/Symptom, Timing/Onset, Context/Setting, Quality, Duration, Modifying Factors, Severity)  Note limiting factors. Maureen Luz is an unpleasant and medication noncompliant 56 y.o. male with past medical history significant for diabetes, high cholesterol, hypertension, cerebral palsy who presents to the emergency department for evaluation of high blood sugar. Claims that he feels well. Denies any subjective physical complaints. No alleviating factors attempted. No clear aggravating factors. No change to the character or severity. The history is provided by the patient, medical records and the nursing home. The history is limited by the condition of the patient. Nursing Notes were reviewed. REVIEW OF SYSTEMS    (2-9 systems for level 4, 10 or more for level 5)     Review of Systems   Unable to perform ROS: Mental status change   All other systems reviewed and are negative. Except as noted above the remainder of the review of systems was reviewed and negative.        PAST MEDICAL HISTORY     Past Medical History:   Diagnosis Date    Depression     Diabetes ([address])     GERD (gastroesophageal reflux disease)     Hypercholesterolemia     Hypertension     Seizures ([address])     Stroke [facility])          SURGICAL HISTORY       Past Surgical History:   Procedure Laterality Date    ORTHOPEDIC SURGERY Left 08/30/2018    hip joint    ORTHOPEDIC SURGERY Right     broken knee         CURRENT MEDICATIONS       Previous Medications    ACETAMINOPHEN (TYLENOL) 325 MG TABLET    Take 2 tablets by mouth every 4 hours as needed for Pain or Fever    ASPIRIN 81 MG EC TABLET    Take 1 tablet by mouth every evening    CETIRIZINE

## 2023-08-20 NOTE — ED TRIAGE NOTES
Hyperglycemia 768 at [facility], was told to bring patient to ED by EP provider        Fsbs reads high x 2 in ED

## 2023-08-20 NOTE — FLOWSHEET NOTE
08/20/23 1557   Vital Signs   BP (!) 89/57   MAP (Calculated) 68     On unit making rounds and was notified by primary nurse that patient is hypotensive, at this time patient denies any dizziness, headaches, and chest pain. Ordered a one time IVF bolus of 500 ml for the patient.

## 2023-08-20 NOTE — PROGRESS NOTES
1130:  Verbal transfer in report given to ELIZABETH Carrillo (oncoming nurse) by Sharmin Naranjo. Calli Nava RN (offgoing nurse). Report included the following information Nurse Handoff Report. Patient is A&OX4, appears anxious and is uncooperative with staff upon admission to ICU. Patient incontinent of both preexisting stool and urine upon arrival, cleaned and provided a new brief after CHG bath. Temp 93 rectal, blanket warmer applied. BG reads \"HI\" on glucometer. Hema Colonel noted not to be administered prior to transfer, most recent potassium 6.8.    1330: Insulin gtt rate adjusted. Patient taken down to CT.    1347:  Patient back in ICU from [address] [address].    1409: [facility] called to have medications verified that were not yet seen. 1430: Insulin gtt rate adjusted. 1545:  Insulin gtt rate adjusted. BP 89/57, provider aware and at bedside, ordered 500 mL NS bolus. 1600:  BP 94/55.    1640: Bolus complete, /50. Insulin gtt rate adjusted, see MAR.    1735: Insulin gtt rate adjusted, see MAR.    1831: Insulin gtt rate adjusted, see MAR.    1900:  Bedside shift change report given to IRENE Tineo RN (oncoming nurse) by Latrice Valenzuela RN (offgoing nurse). Report included the following information Nurse Handoff Report, Intake/Output, MAR, and Recent Results.

## 2023-08-20 NOTE — H&P
Please disregard these errors. Please excuse any errors that have escaped final proofreading. Thank you.

## 2023-08-20 NOTE — ED NOTES
Attempted to put purewick on patient, patient refused, states he will call the nurse and use a urinal       Darren Najera RN  08/20/23 7204

## 2023-08-21 VITALS
OXYGEN SATURATION: 100 % | DIASTOLIC BLOOD PRESSURE: 75 MMHG | TEMPERATURE: 98.1 F | RESPIRATION RATE: 12 BRPM | BODY MASS INDEX: 15.25 KG/M2 | HEIGHT: 71 IN | WEIGHT: 108.9 LBS | HEART RATE: 99 BPM | SYSTOLIC BLOOD PRESSURE: 138 MMHG

## 2023-08-21 LAB
ANION GAP SERPL CALC-SCNC: 11 MMOL/L (ref 3–18)
ANION GAP SERPL CALC-SCNC: 7 MMOL/L (ref 3–18)
BUN SERPL-MCNC: 45 MG/DL (ref 7–18)
BUN SERPL-MCNC: 50 MG/DL (ref 7–18)
BUN/CREAT SERPL: 15 (ref 12–20)
BUN/CREAT SERPL: 15 (ref 12–20)
CA-I BLD-MCNC: 7.3 MG/DL (ref 8.5–10.1)
CA-I BLD-MCNC: 7.5 MG/DL (ref 8.5–10.1)
CHLORIDE SERPL-SCNC: 102 MMOL/L (ref 100–111)
CHLORIDE SERPL-SCNC: 99 MMOL/L (ref 100–111)
CO2 SERPL-SCNC: 28 MMOL/L (ref 21–32)
CO2 SERPL-SCNC: 30 MMOL/L (ref 21–32)
CREAT SERPL-MCNC: 3.08 MG/DL (ref 0.6–1.3)
CREAT SERPL-MCNC: 3.39 MG/DL (ref 0.6–1.3)
ERYTHROCYTE [DISTWIDTH] IN BLOOD BY AUTOMATED COUNT: 13.6 % (ref 11.6–14.5)
GLUCOSE BLD STRIP.AUTO-MCNC: 102 MG/DL (ref 70–110)
GLUCOSE BLD STRIP.AUTO-MCNC: 112 MG/DL (ref 70–110)
GLUCOSE BLD STRIP.AUTO-MCNC: 114 MG/DL (ref 70–110)
GLUCOSE BLD STRIP.AUTO-MCNC: 116 MG/DL (ref 70–110)
GLUCOSE BLD STRIP.AUTO-MCNC: 117 MG/DL (ref 70–110)
GLUCOSE BLD STRIP.AUTO-MCNC: 121 MG/DL (ref 70–110)
GLUCOSE BLD STRIP.AUTO-MCNC: 123 MG/DL (ref 70–110)
GLUCOSE BLD STRIP.AUTO-MCNC: 135 MG/DL (ref 70–110)
GLUCOSE BLD STRIP.AUTO-MCNC: 139 MG/DL (ref 70–110)
GLUCOSE BLD STRIP.AUTO-MCNC: 87 MG/DL (ref 70–110)
GLUCOSE BLD STRIP.AUTO-MCNC: 97 MG/DL (ref 70–110)
GLUCOSE BLD STRIP.AUTO-MCNC: >600 MG/DL (ref 70–110)
GLUCOSE SERPL-MCNC: 159 MG/DL (ref 74–99)
GLUCOSE SERPL-MCNC: 89 MG/DL (ref 74–99)
HCT VFR BLD AUTO: 25.2 % (ref 36–48)
HGB BLD-MCNC: 8.4 G/DL (ref 13–16)
LACTATE SERPL-SCNC: 2.3 MMOL/L (ref 0.4–2)
MAGNESIUM SERPL-MCNC: 1.7 MG/DL (ref 1.6–2.6)
MCH RBC QN AUTO: 29.4 PG (ref 24–34)
MCHC RBC AUTO-ENTMCNC: 33.3 G/DL (ref 31–37)
MCV RBC AUTO: 88.1 FL (ref 78–100)
NRBC # BLD: 0 K/UL (ref 0–0.01)
NRBC BLD-RTO: 0 PER 100 WBC
PERFORMED BY:: ABNORMAL
PERFORMED BY:: NORMAL
PLATELET # BLD AUTO: 219 K/UL (ref 135–420)
PMV BLD AUTO: 10.9 FL (ref 9.2–11.8)
POTASSIUM SERPL-SCNC: 3.3 MMOL/L (ref 3.5–5.5)
POTASSIUM SERPL-SCNC: 3.4 MMOL/L (ref 3.5–5.5)
RBC # BLD AUTO: 2.86 M/UL (ref 4.35–5.65)
SODIUM SERPL-SCNC: 138 MMOL/L (ref 136–145)
SODIUM SERPL-SCNC: 139 MMOL/L (ref 136–145)
WBC # BLD AUTO: 18.7 K/UL (ref 4.6–13.2)

## 2023-08-21 PROCEDURE — 83735 ASSAY OF MAGNESIUM: CPT

## 2023-08-21 PROCEDURE — 6360000002 HC RX W HCPCS: Performed by: NURSE PRACTITIONER

## 2023-08-21 PROCEDURE — 82962 GLUCOSE BLOOD TEST: CPT

## 2023-08-21 PROCEDURE — 2580000003 HC RX 258: Performed by: NURSE PRACTITIONER

## 2023-08-21 PROCEDURE — 6370000000 HC RX 637 (ALT 250 FOR IP): Performed by: NURSE PRACTITIONER

## 2023-08-21 PROCEDURE — 83605 ASSAY OF LACTIC ACID: CPT

## 2023-08-21 PROCEDURE — 2500000003 HC RX 250 WO HCPCS: Performed by: NURSE PRACTITIONER

## 2023-08-21 PROCEDURE — 80048 BASIC METABOLIC PNL TOTAL CA: CPT

## 2023-08-21 PROCEDURE — 36415 COLL VENOUS BLD VENIPUNCTURE: CPT

## 2023-08-21 PROCEDURE — 85027 COMPLETE CBC AUTOMATED: CPT

## 2023-08-21 PROCEDURE — 6370000000 HC RX 637 (ALT 250 FOR IP): Performed by: INTERNAL MEDICINE

## 2023-08-21 RX ORDER — INSULIN GLARGINE 100 [IU]/ML
15 INJECTION, SOLUTION SUBCUTANEOUS
Status: COMPLETED | OUTPATIENT
Start: 2023-08-21 | End: 2023-08-21

## 2023-08-21 RX ADMIN — POTASSIUM CHLORIDE, DEXTROSE MONOHYDRATE AND SODIUM CHLORIDE: 150; 5; 450 INJECTION, SOLUTION INTRAVENOUS at 02:35

## 2023-08-21 RX ADMIN — CEFTRIAXONE SODIUM 1000 MG: 1 INJECTION, POWDER, FOR SOLUTION INTRAMUSCULAR; INTRAVENOUS at 09:37

## 2023-08-21 RX ADMIN — POTASSIUM CHLORIDE AND SODIUM CHLORIDE: 900; 150 INJECTION, SOLUTION INTRAVENOUS at 08:26

## 2023-08-21 RX ADMIN — INSULIN GLARGINE 15 UNITS: 100 INJECTION, SOLUTION SUBCUTANEOUS at 08:08

## 2023-08-21 RX ADMIN — SODIUM BICARBONATE 650 MG: 650 TABLET ORAL at 08:09

## 2023-08-21 RX ADMIN — GABAPENTIN 100 MG: 100 CAPSULE ORAL at 08:08

## 2023-08-21 ASSESSMENT — PAIN SCALES - GENERAL
PAINLEVEL_OUTOF10: 0
PAINLEVEL_OUTOF10: 0

## 2023-08-21 NOTE — PROGRESS NOTES
0700- Bedside shift report received: Patient alert and oriented x2 , iv in place - insulin drip running and verified, telemetry on running - SR with prol QT, no acute distress, call bell in reach, bed alarm on    0704- Patient assessment performed    0749- brief changed - voided and had a BM soft, non-compliant with nursing care. 2403- Morning Medications given at this time time; education provided. Non-compliant with medication regime, refuses to eat. New diet order was placed - lantus subcut. added per order. 2634- provider rounds done. Enteric precautions d/c'd.    1000- d/c'd insulin pump per Dr. Grisel Castorena order and post 2 h after subcut. insulin administration, brief changed, gown changed, voided, incontinence care done, refuses complete bed bath, does not tolerate nursing care. 1054- attempted to call [facility] for report and discharge, per Romi Nunez RN \"Dr. Masha Bahena did not approve admission for client\" - will receive call back. 1100- vitals taken and , non-compliant, sip of milk and diet coke given. 200- report given to [facility] in [address]    1130- returned to  via bed.

## 2023-08-21 NOTE — PROGRESS NOTES
visited with patient today. Discussed meaning and purpose of visit. Discussed belief system and relationship with God. Closed visit with prayer and dispersed literature. Patient expressed gratitude for visit.       [address]  [phone]

## 2023-08-21 NOTE — CONSULTS
now.    Hyperkalemia -secondary to severe acidosis and JIGNESH. Appears to have resolved now. -    Leucocytosis and DKA -leukocytosis likely reactive, currently on Rocephin. DKA resolved. Signed By: Buck Reis MD     August 21, 2023      This note was prepared using voice recognition system and is likely to have sound alike errors that may have been overlooked even during proofreading. Please contact the author for any clarifications.

## 2023-08-21 NOTE — CARE COORDINATION
provided to:     Patient Representative Name:       The Patient and/or Patient Representative Agree with the Discharge Plan?       Alex Crooks  Case Management Department  Ph: [phone] Fax:

## 2023-08-21 NOTE — DISCHARGE SUMMARY
.4* 88.1   MCH 28.5 29.4   MCHC 28.1* 33.3   RDW 14.4 13.6    219   MPV 12.0* 10.9      Last 3 BMP:   Recent Labs     08/20/23 1927 08/20/23 2330 08/21/23  0337   * 138 139   K 3.6 3.3* 3.4*   CL 96* 99* 102   CO2 19* 28 30   BUN 53* 50* 45*   CREATININE 3.40* 3.39* 3.08*   GLUCOSE 300* 159* 89   CALCIUM 7.5* 7.5* 7.3*      Last 3 CMP:   Recent Labs     08/20/23  0940 08/20/23  1530 08/20/23 1927 08/20/23 2330 08/21/23  0337   *   < > 135* 138 139   K 6.8*   < > 3.6 3.3* 3.4*   CL 78*   < > 96* 99* 102   CO2 5*   < > 19* 28 30   BUN 56*   < > 53* 50* 45*   CREATININE 3.91*   < > 3.40* 3.39* 3.08*   GLUCOSE 785*   < > 300* 159* 89   CALCIUM 8.1*   < > 7.5* 7.5* 7.3*   PROT 6.5  --   --   --   --    LABALBU 3.3*  --   --   --   --    ALKPHOS 129*  --   --   --   --    AST 23  --   --   --   --    ALT 29  --   --   --   --     < > = values in this interval not displayed. Imaging results impression only:  CT HEAD WO CONTRAST    Result Date: 8/20/2023  Stable CT imaging appearance. XR CHEST PORTABLE    Result Date: 8/20/2023  Hyperinflated lung fields without acute abnormality. XR CHEST 1 VIEW    Result Date: 8/16/2023  No acute cardiopulmonary process.               Beverly Jara MD   Hospitalist

## 2023-08-21 NOTE — DISCHARGE INSTRUCTIONS
- Encourage the ambulation with fall precautions  - Encourage good hydration  - Compliance with medication , diet and follow up appointments  - Schedule a follow up with PCP in 1 weeks  -Take all medications with you to all your follow up appointments  - New / Changes in home medications:  1- take Lantus 15 unit every morning  2- Continue with sliding scale insulin as instructed - until the insulin pump changed and working

## 2023-08-21 NOTE — PLAN OF CARE
Problem: Discharge Planning  Goal: Discharge to home or other facility with appropriate resources  Outcome: Progressing     Problem: Skin/Tissue Integrity  Goal: Absence of new skin breakdown  Description: 1. Monitor for areas of redness and/or skin breakdown  2. Assess vascular access sites hourly  3. Every 4-6 hours minimum:  Change oxygen saturation probe site  4. Every 4-6 hours:  If on nasal continuous positive airway pressure, respiratory therapy assess nares and determine need for appliance change or resting period.   Outcome: Progressing     Problem: Chronic Conditions and Co-morbidities  Goal: Patient's chronic conditions and co-morbidity symptoms are monitored and maintained or improved  Outcome: Progressing     Problem: Pain  Goal: Verbalizes/displays adequate comfort level or baseline comfort level  Outcome: Progressing     Problem: Safety - Adult  Goal: Free from fall injury  Outcome: Progressing

## 2023-08-22 ENCOUNTER — CARE COORDINATION (OUTPATIENT)
Facility: CLINIC | Age: 58
End: 2023-08-22

## 2023-08-22 LAB
ACCESSION NUMBER, LLC1M: ABNORMAL
ACINETOBACTER CALCOAC BAUMANNII COMPLEX BY PCR: NOT DETECTED
BACTEROIDES FRAGILIS BY PCR: NOT DETECTED
BIOFIRE TEST COMMENT: ABNORMAL
CANDIDA ALBICANS BY PCR: NOT DETECTED
CANDIDA AURIS BY PCR: NOT DETECTED
CANDIDA GLABRATA: NOT DETECTED
CANDIDA KRUSEI BY PCR: NOT DETECTED
CANDIDA PARAPSILOSIS BY PCR: NOT DETECTED
CANDIDA TROPICALIS BY PCR: NOT DETECTED
CRYPTOCOCCUS NEOFORMANS/GATTII BY PCR: NOT DETECTED
ENTEROBACTER CLOACAE COMPLEX BY PCR: NOT DETECTED
ENTEROBACTERALES BY PCR: DETECTED
ENTEROCOCCUS FAECALIS BY PCR: NOT DETECTED
ENTEROCOCCUS FAECIUM BY PCR: NOT DETECTED
ESCHERICHIA COLI: NOT DETECTED
HAEMOPHILUS INFLUENZAE BY PCR: NOT DETECTED
KLEBSIELLA AEROGENES BY PCR: NOT DETECTED
KLEBSIELLA OXYTOCA BY PCR: NOT DETECTED
KLEBSIELLA PNEUMONIAE GROUP BY PCR: NOT DETECTED
KPC (CARBAPENEM RESISTANCE GENE): NOT DETECTED
LISTERIA MONOCYTOGENES BY PCR: NOT DETECTED
NEISSERIA MENINGITIDIS BY PCR: NOT DETECTED
PROTEUS BY PCR: DETECTED
PSEUDOMONAS AERUGINOSA, PSAEP: NOT DETECTED
RESISTANT GENE CTX-M BY PCR: NOT DETECTED
RESISTANT GENE IMP BY PCR: NOT DETECTED
RESISTANT GENE NDM BY PCR: NOT DETECTED
RESISTANT GENE OXA-48-LIKE BY PCR: NOT DETECTED
RESISTANT GENE TARGETS: ABNORMAL
RESISTANT GENE VIM BY PCR: NOT DETECTED
SALMONELLA SPECIES BY PCR: NOT DETECTED
SERRATIA MARCESCENS BY PCR: NOT DETECTED
STAPHYLOCOCCUS AUREUS: NOT DETECTED
STAPHYLOCOCCUS EPIDERMIDIS BY PCR: NOT DETECTED
STAPHYLOCOCCUS LUGDUNENSIS BY PCR: NOT DETECTED
STAPHYLOCOCCUS: NOT DETECTED
STENOTROPHOMONAS MALTOPHILIA BY PCR: NOT DETECTED
STREPTOCOCCUS AGALACTIAE (GROUP B): NOT DETECTED
STREPTOCOCCUS PNEUMONIAE , SPNP: NOT DETECTED
STREPTOCOCCUS PYOGENES (GROUP A), SPYOP: NOT DETECTED
STREPTOCOCCUS: NOT DETECTED

## 2023-08-22 NOTE — CARE COORDINATION
Per Note, Patient is LTC at [facility]. No transition of care outreach indicated due to patient discharge to [facility], [address] [place] [address].

## 2023-08-24 LAB
BACTERIA SPEC CULT: ABNORMAL
GRAM STN SPEC: ABNORMAL
Lab: ABNORMAL

## 2023-08-26 ENCOUNTER — HOSPITAL ENCOUNTER (OUTPATIENT)
Age: 58
Discharge: HOME | DRG: 637 | End: 2023-08-29
Payer: MEDICARE

## 2023-08-26 LAB
ANION GAP SERPL CALC-SCNC: 31 MMOL/L (ref 3–18)
BACTERIA SPEC CULT: NORMAL
BUN SERPL-MCNC: 34 MG/DL (ref 7–18)
BUN/CREAT SERPL: 16 (ref 12–20)
CA-I BLD-MCNC: 8.2 MG/DL (ref 8.5–10.1)
CHLORIDE SERPL-SCNC: 88 MMOL/L (ref 100–111)
CO2 SERPL-SCNC: 11 MMOL/L (ref 21–32)
CREAT SERPL-MCNC: 2.15 MG/DL (ref 0.6–1.3)
GLUCOSE SERPL-MCNC: 615 MG/DL (ref 74–99)
Lab: NORMAL
POTASSIUM SERPL-SCNC: 4.9 MMOL/L (ref 3.5–5.5)
SODIUM SERPL-SCNC: 130 MMOL/L (ref 136–145)

## 2023-08-26 PROCEDURE — 36415 COLL VENOUS BLD VENIPUNCTURE: CPT

## 2023-08-26 PROCEDURE — 80048 BASIC METABOLIC PNL TOTAL CA: CPT

## 2023-08-27 ENCOUNTER — APPOINTMENT (OUTPATIENT)
Age: 58
DRG: 637 | End: 2023-08-27
Payer: MEDICARE

## 2023-08-27 ENCOUNTER — HOSPITAL ENCOUNTER (OUTPATIENT)
Age: 58
Discharge: HOME | DRG: 637 | End: 2023-08-30
Payer: MEDICARE

## 2023-08-27 ENCOUNTER — HOSPITAL ENCOUNTER (INPATIENT)
Age: 58
LOS: 2 days | Discharge: NURSING, REHAB OR LONG-TERM CARE | DRG: 637 | End: 2023-08-29
Attending: EMERGENCY MEDICINE | Admitting: INTERNAL MEDICINE
Payer: MEDICARE

## 2023-08-27 DIAGNOSIS — E10.10 DIABETIC KETOACIDOSIS WITHOUT COMA ASSOCIATED WITH TYPE 1 DIABETES MELLITUS (HCC): Primary | ICD-10-CM

## 2023-08-27 LAB
ANION GAP SERPL CALC-SCNC: 11 MMOL/L (ref 3–18)
ANION GAP SERPL CALC-SCNC: 25 MMOL/L (ref 3–18)
ANION GAP SERPL CALC-SCNC: 40 MMOL/L (ref 3–18)
BASOPHILS # BLD: 0 K/UL (ref 0–0.1)
BASOPHILS NFR BLD: 0 % (ref 0–2)
BUN SERPL-MCNC: 45 MG/DL (ref 7–18)
BUN SERPL-MCNC: 47 MG/DL (ref 7–18)
BUN SERPL-MCNC: 49 MG/DL (ref 7–18)
BUN/CREAT SERPL: 15 (ref 12–20)
BUN/CREAT SERPL: 16 (ref 12–20)
BUN/CREAT SERPL: 16 (ref 12–20)
CA-I BLD-MCNC: 7.5 MG/DL (ref 8.5–10.1)
CA-I BLD-MCNC: 7.8 MG/DL (ref 8.5–10.1)
CA-I BLD-MCNC: 8.2 MG/DL (ref 8.5–10.1)
CHLORIDE SERPL-SCNC: 102 MMOL/L (ref 100–111)
CHLORIDE SERPL-SCNC: 88 MMOL/L (ref 100–111)
CHLORIDE SERPL-SCNC: 97 MMOL/L (ref 100–111)
CO2 SERPL-SCNC: 15 MMOL/L (ref 21–32)
CO2 SERPL-SCNC: 26 MMOL/L (ref 21–32)
CO2 SERPL-SCNC: 3 MMOL/L (ref 21–32)
CREAT SERPL-MCNC: 2.87 MG/DL (ref 0.6–1.3)
CREAT SERPL-MCNC: 3.02 MG/DL (ref 0.6–1.3)
CREAT SERPL-MCNC: 3.13 MG/DL (ref 0.6–1.3)
DIFFERENTIAL METHOD BLD: ABNORMAL
EOSINOPHIL # BLD: 0 K/UL (ref 0–0.4)
EOSINOPHIL NFR BLD: 0 % (ref 0–5)
ERYTHROCYTE [DISTWIDTH] IN BLOOD BY AUTOMATED COUNT: 15 % (ref 11.6–14.5)
GLUCOSE BLD STRIP.AUTO-MCNC: 125 MG/DL (ref 70–110)
GLUCOSE BLD STRIP.AUTO-MCNC: 127 MG/DL (ref 70–110)
GLUCOSE BLD STRIP.AUTO-MCNC: 160 MG/DL (ref 70–110)
GLUCOSE BLD STRIP.AUTO-MCNC: 222 MG/DL (ref 70–110)
GLUCOSE BLD STRIP.AUTO-MCNC: 266 MG/DL (ref 70–110)
GLUCOSE BLD STRIP.AUTO-MCNC: 340 MG/DL (ref 70–110)
GLUCOSE BLD STRIP.AUTO-MCNC: 385 MG/DL (ref 70–110)
GLUCOSE BLD STRIP.AUTO-MCNC: 456 MG/DL (ref 70–110)
GLUCOSE BLD STRIP.AUTO-MCNC: 566 MG/DL (ref 70–110)
GLUCOSE BLD STRIP.AUTO-MCNC: 579 MG/DL (ref 70–110)
GLUCOSE BLD STRIP.AUTO-MCNC: 593 MG/DL (ref 70–110)
GLUCOSE BLD STRIP.AUTO-MCNC: 593 MG/DL (ref 70–110)
GLUCOSE BLD STRIP.AUTO-MCNC: 600 MG/DL (ref 70–110)
GLUCOSE BLD STRIP.AUTO-MCNC: >600 MG/DL (ref 70–110)
GLUCOSE SERPL-MCNC: 137 MG/DL (ref 74–99)
GLUCOSE SERPL-MCNC: 370 MG/DL (ref 74–99)
GLUCOSE SERPL-MCNC: 652 MG/DL (ref 74–99)
GLUCOSE SERPL-MCNC: 787 MG/DL (ref 74–99)
HCT VFR BLD AUTO: 34.6 % (ref 36–48)
HGB BLD-MCNC: 10.2 G/DL (ref 13–16)
IMM GRANULOCYTES # BLD AUTO: 0 K/UL
IMM GRANULOCYTES NFR BLD AUTO: 0 %
LYMPHOCYTES # BLD: 0.8 K/UL (ref 0.9–3.6)
LYMPHOCYTES NFR BLD: 3 % (ref 21–52)
MAGNESIUM SERPL-MCNC: 2.3 MG/DL (ref 1.6–2.6)
MCH RBC QN AUTO: 29.7 PG (ref 24–34)
MCHC RBC AUTO-ENTMCNC: 29.5 G/DL (ref 31–37)
MCV RBC AUTO: 100.9 FL (ref 78–100)
MONOCYTES # BLD: 0.6 K/UL (ref 0.05–1.2)
MONOCYTES NFR BLD: 2 % (ref 3–10)
NEUTS BAND NFR BLD MANUAL: 3 % (ref 0–5)
NEUTS SEG # BLD: 26.4 K/UL (ref 1.8–8)
NEUTS SEG NFR BLD: 92 % (ref 40–73)
NRBC # BLD: 0 K/UL (ref 0–0.01)
NRBC BLD-RTO: 0 PER 100 WBC
PERFORMED BY:: ABNORMAL
PLATELET # BLD AUTO: 287 K/UL (ref 135–420)
PMV BLD AUTO: 12.5 FL (ref 9.2–11.8)
POTASSIUM SERPL-SCNC: 3.1 MMOL/L (ref 3.5–5.5)
POTASSIUM SERPL-SCNC: 3.6 MMOL/L (ref 3.5–5.5)
POTASSIUM SERPL-SCNC: 5.1 MMOL/L (ref 3.5–5.5)
RBC # BLD AUTO: 3.43 M/UL (ref 4.35–5.65)
RBC MORPH BLD: ABNORMAL
SODIUM SERPL-SCNC: 131 MMOL/L (ref 136–145)
SODIUM SERPL-SCNC: 137 MMOL/L (ref 136–145)
SODIUM SERPL-SCNC: 139 MMOL/L (ref 136–145)
TROPONIN I SERPL HS-MCNC: 21 NG/L (ref 0–78)
WBC # BLD AUTO: 27.8 K/UL (ref 4.6–13.2)

## 2023-08-27 PROCEDURE — 84484 ASSAY OF TROPONIN QUANT: CPT

## 2023-08-27 PROCEDURE — 36573 INSJ PICC RS&I 5 YR+: CPT

## 2023-08-27 PROCEDURE — 02HV33Z INSERTION OF INFUSION DEVICE INTO SUPERIOR VENA CAVA, PERCUTANEOUS APPROACH: ICD-10-PCS | Performed by: INTERNAL MEDICINE

## 2023-08-27 PROCEDURE — 2000000000 HC ICU R&B

## 2023-08-27 PROCEDURE — 2580000003 HC RX 258: Performed by: EMERGENCY MEDICINE

## 2023-08-27 PROCEDURE — 6370000000 HC RX 637 (ALT 250 FOR IP): Performed by: EMERGENCY MEDICINE

## 2023-08-27 PROCEDURE — 80048 BASIC METABOLIC PNL TOTAL CA: CPT

## 2023-08-27 PROCEDURE — 6360000002 HC RX W HCPCS: Performed by: INTERNAL MEDICINE

## 2023-08-27 PROCEDURE — 85025 COMPLETE CBC W/AUTO DIFF WBC: CPT

## 2023-08-27 PROCEDURE — 83735 ASSAY OF MAGNESIUM: CPT

## 2023-08-27 PROCEDURE — 71045 X-RAY EXAM CHEST 1 VIEW: CPT

## 2023-08-27 PROCEDURE — 82947 ASSAY GLUCOSE BLOOD QUANT: CPT

## 2023-08-27 PROCEDURE — 2500000003 HC RX 250 WO HCPCS: Performed by: INTERNAL MEDICINE

## 2023-08-27 PROCEDURE — 82962 GLUCOSE BLOOD TEST: CPT

## 2023-08-27 PROCEDURE — 99285 EMERGENCY DEPT VISIT HI MDM: CPT

## 2023-08-27 PROCEDURE — 6370000000 HC RX 637 (ALT 250 FOR IP): Performed by: INTERNAL MEDICINE

## 2023-08-27 PROCEDURE — 51798 US URINE CAPACITY MEASURE: CPT

## 2023-08-27 PROCEDURE — 2580000003 HC RX 258: Performed by: INTERNAL MEDICINE

## 2023-08-27 RX ORDER — ERGOCALCIFEROL 1.25 MG/1
50000 CAPSULE ORAL WEEKLY
Status: DISCONTINUED | OUTPATIENT
Start: 2023-08-28 | End: 2023-08-29 | Stop reason: HOSPADM

## 2023-08-27 RX ORDER — POLYETHYLENE GLYCOL 3350 17 G/17G
17 POWDER, FOR SOLUTION ORAL DAILY PRN
Status: DISCONTINUED | OUTPATIENT
Start: 2023-08-27 | End: 2023-08-29 | Stop reason: HOSPADM

## 2023-08-27 RX ORDER — ONDANSETRON 4 MG/1
4 TABLET, ORALLY DISINTEGRATING ORAL EVERY 8 HOURS PRN
Status: DISCONTINUED | OUTPATIENT
Start: 2023-08-27 | End: 2023-08-29 | Stop reason: HOSPADM

## 2023-08-27 RX ORDER — DEXTROSE MONOHYDRATE 100 MG/ML
INJECTION, SOLUTION INTRAVENOUS CONTINUOUS PRN
Status: DISCONTINUED | OUTPATIENT
Start: 2023-08-27 | End: 2023-08-29 | Stop reason: HOSPADM

## 2023-08-27 RX ORDER — ROSUVASTATIN CALCIUM 10 MG/1
10 TABLET, COATED ORAL EVERY EVENING
Status: DISCONTINUED | OUTPATIENT
Start: 2023-08-27 | End: 2023-08-29 | Stop reason: HOSPADM

## 2023-08-27 RX ORDER — SODIUM CHLORIDE 9 MG/ML
INJECTION, SOLUTION INTRAVENOUS CONTINUOUS
Status: DISCONTINUED | OUTPATIENT
Start: 2023-08-27 | End: 2023-08-27

## 2023-08-27 RX ORDER — 0.9 % SODIUM CHLORIDE 0.9 %
1000 INTRAVENOUS SOLUTION INTRAVENOUS ONCE
Status: COMPLETED | OUTPATIENT
Start: 2023-08-27 | End: 2023-08-27

## 2023-08-27 RX ORDER — FLUOXETINE 10 MG/1
10 CAPSULE ORAL EVERY EVENING
Status: DISCONTINUED | OUTPATIENT
Start: 2023-08-27 | End: 2023-08-29 | Stop reason: HOSPADM

## 2023-08-27 RX ORDER — ACETAMINOPHEN 325 MG/1
650 TABLET ORAL EVERY 6 HOURS PRN
Status: DISCONTINUED | OUTPATIENT
Start: 2023-08-27 | End: 2023-08-29 | Stop reason: HOSPADM

## 2023-08-27 RX ORDER — GABAPENTIN 100 MG/1
200 CAPSULE ORAL 2 TIMES DAILY
Status: DISCONTINUED | OUTPATIENT
Start: 2023-08-27 | End: 2023-08-29 | Stop reason: HOSPADM

## 2023-08-27 RX ORDER — ACETAMINOPHEN 650 MG/1
650 SUPPOSITORY RECTAL EVERY 6 HOURS PRN
Status: DISCONTINUED | OUTPATIENT
Start: 2023-08-27 | End: 2023-08-29 | Stop reason: HOSPADM

## 2023-08-27 RX ORDER — SODIUM BICARBONATE 650 MG/1
650 TABLET ORAL 2 TIMES DAILY
Status: DISCONTINUED | OUTPATIENT
Start: 2023-08-27 | End: 2023-08-29 | Stop reason: HOSPADM

## 2023-08-27 RX ORDER — ENOXAPARIN SODIUM 100 MG/ML
30 INJECTION SUBCUTANEOUS EVERY 24 HOURS
Status: DISCONTINUED | OUTPATIENT
Start: 2023-08-27 | End: 2023-08-29

## 2023-08-27 RX ORDER — ONDANSETRON 2 MG/ML
4 INJECTION INTRAMUSCULAR; INTRAVENOUS EVERY 6 HOURS PRN
Status: DISCONTINUED | OUTPATIENT
Start: 2023-08-27 | End: 2023-08-29 | Stop reason: HOSPADM

## 2023-08-27 RX ORDER — SODIUM CHLORIDE 0.9 % (FLUSH) 0.9 %
5-40 SYRINGE (ML) INJECTION PRN
Status: DISCONTINUED | OUTPATIENT
Start: 2023-08-27 | End: 2023-08-29 | Stop reason: HOSPADM

## 2023-08-27 RX ORDER — CETIRIZINE HYDROCHLORIDE 10 MG/1
10 TABLET ORAL EVERY EVENING
Status: DISCONTINUED | OUTPATIENT
Start: 2023-08-27 | End: 2023-08-29 | Stop reason: HOSPADM

## 2023-08-27 RX ORDER — SODIUM CHLORIDE 0.9 % (FLUSH) 0.9 %
5-40 SYRINGE (ML) INJECTION EVERY 12 HOURS SCHEDULED
Status: DISCONTINUED | OUTPATIENT
Start: 2023-08-27 | End: 2023-08-29 | Stop reason: HOSPADM

## 2023-08-27 RX ORDER — DEXTROSE MONOHYDRATE, SODIUM CHLORIDE, AND POTASSIUM CHLORIDE 50; 1.49; 4.5 G/1000ML; G/1000ML; G/1000ML
INJECTION, SOLUTION INTRAVENOUS CONTINUOUS
Status: DISCONTINUED | OUTPATIENT
Start: 2023-08-27 | End: 2023-08-28

## 2023-08-27 RX ORDER — ACETAMINOPHEN 325 MG/1
650 TABLET ORAL EVERY 4 HOURS PRN
Status: DISCONTINUED | OUTPATIENT
Start: 2023-08-27 | End: 2023-08-27 | Stop reason: ALTCHOICE

## 2023-08-27 RX ORDER — POTASSIUM CHLORIDE 750 MG/1
20 TABLET, EXTENDED RELEASE ORAL 2 TIMES DAILY
Status: DISCONTINUED | OUTPATIENT
Start: 2023-08-27 | End: 2023-08-29 | Stop reason: HOSPADM

## 2023-08-27 RX ORDER — SODIUM CHLORIDE 9 MG/ML
INJECTION, SOLUTION INTRAVENOUS PRN
Status: DISCONTINUED | OUTPATIENT
Start: 2023-08-27 | End: 2023-08-29 | Stop reason: HOSPADM

## 2023-08-27 RX ORDER — SODIUM CHLORIDE AND POTASSIUM CHLORIDE 150; 900 MG/100ML; MG/100ML
INJECTION, SOLUTION INTRAVENOUS CONTINUOUS
Status: DISCONTINUED | OUTPATIENT
Start: 2023-08-27 | End: 2023-08-27 | Stop reason: ALTCHOICE

## 2023-08-27 RX ORDER — ASPIRIN 81 MG/1
81 TABLET ORAL EVERY EVENING
Status: DISCONTINUED | OUTPATIENT
Start: 2023-08-27 | End: 2023-08-29 | Stop reason: HOSPADM

## 2023-08-27 RX ADMIN — ENOXAPARIN SODIUM 30 MG: 30 INJECTION SUBCUTANEOUS at 14:36

## 2023-08-27 RX ADMIN — SODIUM CHLORIDE, PRESERVATIVE FREE 10 ML: 5 INJECTION INTRAVENOUS at 21:48

## 2023-08-27 RX ADMIN — SODIUM CHLORIDE 4.82 UNITS/HR: 9 INJECTION, SOLUTION INTRAVENOUS at 23:29

## 2023-08-27 RX ADMIN — POTASSIUM CHLORIDE, DEXTROSE MONOHYDRATE AND SODIUM CHLORIDE 100 ML/HR: 150; 5; 450 INJECTION, SOLUTION INTRAVENOUS at 20:14

## 2023-08-27 RX ADMIN — SODIUM CHLORIDE 19.6 UNITS/HR: 9 INJECTION, SOLUTION INTRAVENOUS at 18:01

## 2023-08-27 RX ADMIN — SODIUM CHLORIDE 10.8 UNITS/HR: 9 INJECTION, SOLUTION INTRAVENOUS at 13:20

## 2023-08-27 RX ADMIN — POTASSIUM CHLORIDE AND SODIUM CHLORIDE: 900; 150 INJECTION, SOLUTION INTRAVENOUS at 18:18

## 2023-08-27 RX ADMIN — SODIUM CHLORIDE 1000 ML: 9 INJECTION, SOLUTION INTRAVENOUS at 12:50

## 2023-08-27 RX ADMIN — SODIUM BICARBONATE: 84 INJECTION, SOLUTION INTRAVENOUS at 15:22

## 2023-08-27 RX ADMIN — SODIUM BICARBONATE 650 MG: 650 TABLET ORAL at 16:09

## 2023-08-27 ASSESSMENT — PAIN - FUNCTIONAL ASSESSMENT: PAIN_FUNCTIONAL_ASSESSMENT: NONE - DENIES PAIN

## 2023-08-27 ASSESSMENT — PAIN SCALES - GENERAL: PAINLEVEL_OUTOF10: 0

## 2023-08-27 NOTE — ED TRIAGE NOTES
Patient from EP, alert and oriented x 3, patient brought to ED via stretcher due to not eating and abnormal lab work.      Patient refusing to eat at EP

## 2023-08-27 NOTE — PROGRESS NOTES
1308- TRANSFER - IN REPORT:    Verbal report received from [address] [place] [address] on Heather Butte  being received from ER for change in patient condition (high BS levels/DKA) and insulin therapy. Report consisted of patient's Situation, Background, Assessment and   Recommendations(SBAR). Information from the following report(s) Nurse Handoff Report was reviewed with the receiving nurse. Opportunity for questions and clarification was provided. ICU bed 1 SET UP, suctioning preped, fall matts in place and seizure precautions.    Latest Reference Range & Units 08/27/23 10:11   Sodium 136 - 145 mmol/L 131 (L)   Potassium 3.5 - 5.5 mmol/L 5.1   Chloride 100 - 111 mmol/L 88 (L)   CO2 21 - 32 mmol/L 3 (LL)   BUN,BUNPL 7 - 18 mg/dL 47 (H)   Creatinine 0.60 - 1.30 mg/dL 3.13 (H)   Bun/Cre Ratio 12 - 20   15   Anion Gap 3.0 - 18.0 mmol/L 40 (H)   Est, Glom Filt Rate >60 ml/min/1.73m2 23 (L)   Magnesium 1.6 - 2.6 mg/dL 2.3   Glucose, Random 74 - 99 mg/dL 787 (HH)   CALCIUM, SERUM, 021169 8.5 - 10.1 mg/dL 8.2 (L)   Troponin, High Sensitivity 0 - 78 ng/L 21   WBC 4.6 - 13.2 K/uL 27.8 (H)   RBC 4.35 - 5.65 M/uL 3.43 (L)   Hemoglobin Quant 13.0 - 16.0 g/dL 10.2 (L)   Hematocrit 36.0 - 48.0 % 34.6 (L)   MCV 78.0 - 100.0 .9 (H)   MCH 24.0 - 34.0 PG 29.7   MCHC 31.0 - 37.0 g/dL 29.5 (L)   MPV 9.2 - 11.8 FL 12.5 (H)   RDW 11.6 - 14.5 % 15.0 (H)   Platelet Count 828 - 420 K/uL 287   Neutrophils % 40 - 73 % 92 (H)   Lymphocyte % 21 - 52 % 3 (L)   Monocytes % 3 - 10 % 2 (L)   Eosinophils % 0 - 5 % 0   Basophils % 0 - 2 % 0   Neutrophils Absolute 1.8 - 8.0 K/UL 26.4 (H)   Lymphocytes Absolute 0.9 - 3.6 K/UL 0.8 (L)   Monocytes Absolute 0.05 - 1.2 K/UL 0.6   Eosinophils Absolute 0.0 - 0.4 K/UL 0.0   Basophils Absolute 0.0 - 0.1 K/UL 0.0   Differential Type -   Manual   Band Neutrophils 0 - 5 % 3   Immature Granulocytes % 0   Nucleated Red Blood Cells 0.0  WBC  0.00 - 0.01 K/uL 0.0  0.00   Absolute Immature Granulocyte K/UL verification. [address] ML scanned, has not voided yet, - no pain or discomfort - will follow urinary retention protocol - option 2 - will reevaluate, brief clean, repositioned. Call bell in reach, bed alarm on, fall matts in place both sides, side rails up x 3.

## 2023-08-27 NOTE — PROGRESS NOTES
1320 - Insulin gtt started per protocol at 10.8 Units/hr. Bolus dose held per MD order. 1330 - Pt transported to ICU.

## 2023-08-27 NOTE — ED PROVIDER NOTES
[facility] 2 ICU  EMERGENCY DEPARTMENT ENCOUNTER      Pt Name: Jose Mckinley  MRN: [id number]  [address] [facility] 1965  Date of evaluation: 8/27/2023  Provider: Rivka Brown MD    CHIEF COMPLAINT       Chief Complaint   Patient presents with    Blood Sugar Problem       HPI:  Jose Mckinley is a 56 y.o. male who presents to the emergency department pt brought from snf for elev glucose, fatigue h/o freq dka, says feels sim. No fever. No cough. No pain. Nursing Notes were reviewed. REVIEW OF SYSTEMS    (2-9 systems for level 4, 10 or more for level 5)     Review of Systems    Except as noted above the remainder of the review of systems was reviewed and negative. PAST MEDICAL HISTORY     Past Medical History:   Diagnosis Date    Depression     Diabetes ([address] )     GERD (gastroesophageal reflux disease)     Hypercholesterolemia     Hypertension     Seizures ([address])     Stroke [facility])          SURGICAL HISTORY       Past Surgical History:   Procedure Laterality Date    ORTHOPEDIC SURGERY Left 08/30/2018    hip joint    ORTHOPEDIC SURGERY Right     broken knee         CURRENT MEDICATIONS       Current Discharge Medication List        CONTINUE these medications which have NOT CHANGED    Details   gabapentin (NEURONTIN) 100 MG capsule Take 2 capsules by mouth in the morning and at bedtime for 90 days.  Max Daily Amount: 400 mg  Qty: 60 capsule, Refills: 0    Associated Diagnoses: History of CVA (cerebrovascular accident)      insulin glargine (LANTUS) 100 UNIT/ML injection vial Inject 15 Units into the skin daily  Qty: 10 mL, Refills: 3      sodium bicarbonate 650 MG tablet Take 1 tablet by mouth 2 times daily  Qty: 60 tablet, Refills: 0      potassium chloride (KLOR-CON M) 20 MEQ extended release tablet Take 1 tablet by mouth 2 times daily  Qty: 60 tablet, Refills: 0      vitamin D (ERGOCALCIFEROL) 1.25 MG (01429 UT) CAPS capsule Take 1 capsule by mouth once a week mondays      insulin aspart (NOVOLOG) 100 UNIT/ML

## 2023-08-27 NOTE — ED NOTES
TRANSFER - OUT REPORT:    Verbal report given to Ashley Borges on Tere Speck  being transferred to ICU 1 for routine progression of patient care       Report consisted of patient's Situation, Background, Assessment and   Recommendations(SBAR). Information from the following report(s) ED Encounter Summary, ED SBAR, [facility], and Recent Results was reviewed with the receiving nurse. Ad Fall Assessment:    Presents to emergency department  because of falls (Syncope, seizure, or loss of consciousness): No  Age > 70: No  Altered Mental Status, Intoxication with alcohol or substance confusion (Disorientation, impaired judgment, poor safety awaremess, or inability to follow instructions): No  Impaired Mobility: Ambulates or transfers with assistive devices or assistance; Unable to ambulate or transer.: Yes             Lines:   Peripheral IV 08/27/23 Right Hand (Active)   Site Assessment Clean, dry & intact 08/27/23 1234   Line Status Blood return noted 08/27/23 1234   Phlebitis Assessment No symptoms 08/27/23 1234   Infiltration Assessment 0 08/27/23 1234        Opportunity for questions and clarification was provided.       Patient transported with:  Monitor and Registered Nurse          Gurdeep Akbar RN  08/27/23 3770

## 2023-08-28 LAB
ANION GAP SERPL CALC-SCNC: 10 MMOL/L (ref 3–18)
ANION GAP SERPL CALC-SCNC: 6 MMOL/L (ref 3–18)
ANION GAP SERPL CALC-SCNC: 8 MMOL/L (ref 3–18)
APPEARANCE UR: ABNORMAL
BACTERIA URNS QL MICRO: NEGATIVE /HPF
BILIRUB UR QL: NEGATIVE
BUN SERPL-MCNC: 33 MG/DL (ref 7–18)
BUN SERPL-MCNC: 36 MG/DL (ref 7–18)
BUN SERPL-MCNC: 40 MG/DL (ref 7–18)
BUN/CREAT SERPL: 15 (ref 12–20)
BUN/CREAT SERPL: 16 (ref 12–20)
BUN/CREAT SERPL: 16 (ref 12–20)
C DIFF GDH STL QL: NEGATIVE
C DIFF TOX A+B STL QL IA: NEGATIVE
C DIFF TOXIN INTERPRETATION: NORMAL
CA-I BLD-MCNC: 6.7 MG/DL (ref 8.5–10.1)
CA-I BLD-MCNC: 7.1 MG/DL (ref 8.5–10.1)
CA-I BLD-MCNC: 7.5 MG/DL (ref 8.5–10.1)
CHLORIDE SERPL-SCNC: 101 MMOL/L (ref 100–111)
CHLORIDE SERPL-SCNC: 102 MMOL/L (ref 100–111)
CHLORIDE SERPL-SCNC: 104 MMOL/L (ref 100–111)
CO2 SERPL-SCNC: 24 MMOL/L (ref 21–32)
CO2 SERPL-SCNC: 26 MMOL/L (ref 21–32)
CO2 SERPL-SCNC: 30 MMOL/L (ref 21–32)
COLOR UR: YELLOW
CREAT SERPL-MCNC: 2.17 MG/DL (ref 0.6–1.3)
CREAT SERPL-MCNC: 2.29 MG/DL (ref 0.6–1.3)
CREAT SERPL-MCNC: 2.48 MG/DL (ref 0.6–1.3)
EPITH CASTS URNS QL MICRO: ABNORMAL /LPF (ref 0–20)
GLUCOSE BLD STRIP.AUTO-MCNC: 107 MG/DL (ref 70–110)
GLUCOSE BLD STRIP.AUTO-MCNC: 111 MG/DL (ref 70–110)
GLUCOSE BLD STRIP.AUTO-MCNC: 136 MG/DL (ref 70–110)
GLUCOSE BLD STRIP.AUTO-MCNC: 156 MG/DL (ref 70–110)
GLUCOSE BLD STRIP.AUTO-MCNC: 200 MG/DL (ref 70–110)
GLUCOSE BLD STRIP.AUTO-MCNC: 220 MG/DL (ref 70–110)
GLUCOSE BLD STRIP.AUTO-MCNC: 221 MG/DL (ref 70–110)
GLUCOSE BLD STRIP.AUTO-MCNC: 238 MG/DL (ref 70–110)
GLUCOSE BLD STRIP.AUTO-MCNC: 244 MG/DL (ref 70–110)
GLUCOSE BLD STRIP.AUTO-MCNC: 78 MG/DL (ref 70–110)
GLUCOSE BLD STRIP.AUTO-MCNC: 84 MG/DL (ref 70–110)
GLUCOSE BLD STRIP.AUTO-MCNC: 87 MG/DL (ref 70–110)
GLUCOSE BLD STRIP.AUTO-MCNC: 89 MG/DL (ref 70–110)
GLUCOSE BLD STRIP.AUTO-MCNC: 92 MG/DL (ref 70–110)
GLUCOSE BLD STRIP.AUTO-MCNC: 94 MG/DL (ref 70–110)
GLUCOSE SERPL-MCNC: 197 MG/DL (ref 74–99)
GLUCOSE SERPL-MCNC: 634 MG/DL (ref 74–99)
GLUCOSE SERPL-MCNC: 91 MG/DL (ref 74–99)
GLUCOSE UR STRIP.AUTO-MCNC: >1000 MG/DL
HGB UR QL STRIP: ABNORMAL
KETONES UR QL STRIP.AUTO: 40 MG/DL
LEUKOCYTE ESTERASE UR QL STRIP.AUTO: ABNORMAL
NITRITE UR QL STRIP.AUTO: NEGATIVE
PERFORMED BY:: ABNORMAL
PERFORMED BY:: NORMAL
PH UR STRIP: 5.5 (ref 5–8)
POTASSIUM SERPL-SCNC: 3.3 MMOL/L (ref 3.5–5.5)
POTASSIUM SERPL-SCNC: 3.5 MMOL/L (ref 3.5–5.5)
POTASSIUM SERPL-SCNC: 5.2 MMOL/L (ref 3.5–5.5)
PROT UR STRIP-MCNC: 100 MG/DL
RBC #/AREA URNS HPF: ABNORMAL /HPF (ref 0–2)
SODIUM SERPL-SCNC: 134 MMOL/L (ref 136–145)
SODIUM SERPL-SCNC: 137 MMOL/L (ref 136–145)
SODIUM SERPL-SCNC: 140 MMOL/L (ref 136–145)
SP GR UR REFRACTOMETRY: 1.02 (ref 1–1.03)
URINE CULTURE IF INDICATED: ABNORMAL
UROBILINOGEN UR QL STRIP.AUTO: 0.2 EU/DL (ref 0.2–1)
WBC URNS QL MICRO: ABNORMAL /HPF (ref 0–4)
YEAST URNS QL MICRO: PRESENT

## 2023-08-28 PROCEDURE — 2000000000 HC ICU R&B

## 2023-08-28 PROCEDURE — 6360000002 HC RX W HCPCS: Performed by: INTERNAL MEDICINE

## 2023-08-28 PROCEDURE — 81001 URINALYSIS AUTO W/SCOPE: CPT

## 2023-08-28 PROCEDURE — 2500000003 HC RX 250 WO HCPCS: Performed by: INTERNAL MEDICINE

## 2023-08-28 PROCEDURE — 80048 BASIC METABOLIC PNL TOTAL CA: CPT

## 2023-08-28 PROCEDURE — 87449 NOS EACH ORGANISM AG IA: CPT

## 2023-08-28 PROCEDURE — 36415 COLL VENOUS BLD VENIPUNCTURE: CPT

## 2023-08-28 PROCEDURE — 2580000003 HC RX 258: Performed by: INTERNAL MEDICINE

## 2023-08-28 PROCEDURE — 82962 GLUCOSE BLOOD TEST: CPT

## 2023-08-28 PROCEDURE — 6370000000 HC RX 637 (ALT 250 FOR IP): Performed by: INTERNAL MEDICINE

## 2023-08-28 PROCEDURE — 87086 URINE CULTURE/COLONY COUNT: CPT

## 2023-08-28 PROCEDURE — 87324 CLOSTRIDIUM AG IA: CPT

## 2023-08-28 RX ORDER — INSULIN GLARGINE 100 [IU]/ML
20 INJECTION, SOLUTION SUBCUTANEOUS DAILY
Status: DISCONTINUED | OUTPATIENT
Start: 2023-08-28 | End: 2023-08-29 | Stop reason: HOSPADM

## 2023-08-28 RX ORDER — INSULIN LISPRO 100 [IU]/ML
6 INJECTION, SOLUTION INTRAVENOUS; SUBCUTANEOUS
Status: DISCONTINUED | OUTPATIENT
Start: 2023-08-28 | End: 2023-08-29 | Stop reason: HOSPADM

## 2023-08-28 RX ADMIN — ENOXAPARIN SODIUM 30 MG: 30 INJECTION SUBCUTANEOUS at 15:38

## 2023-08-28 RX ADMIN — SODIUM BICARBONATE 650 MG: 650 TABLET ORAL at 09:01

## 2023-08-28 RX ADMIN — CETIRIZINE HYDROCHLORIDE 10 MG: 10 TABLET, FILM COATED ORAL at 17:27

## 2023-08-28 RX ADMIN — POTASSIUM CHLORIDE 20 MEQ: 750 TABLET, EXTENDED RELEASE ORAL at 20:35

## 2023-08-28 RX ADMIN — GABAPENTIN 200 MG: 100 CAPSULE ORAL at 09:00

## 2023-08-28 RX ADMIN — POTASSIUM CHLORIDE, DEXTROSE MONOHYDRATE AND SODIUM CHLORIDE 100 ML/HR: 150; 5; 450 INJECTION, SOLUTION INTRAVENOUS at 05:51

## 2023-08-28 RX ADMIN — INSULIN LISPRO 6 UNITS: 100 INJECTION, SOLUTION INTRAVENOUS; SUBCUTANEOUS at 12:04

## 2023-08-28 RX ADMIN — SODIUM BICARBONATE 650 MG: 650 TABLET ORAL at 20:35

## 2023-08-28 RX ADMIN — INSULIN GLARGINE 20 UNITS: 100 INJECTION, SOLUTION SUBCUTANEOUS at 10:05

## 2023-08-28 RX ADMIN — ROSUVASTATIN 10 MG: 10 TABLET, FILM COATED ORAL at 17:27

## 2023-08-28 RX ADMIN — ASPIRIN 81 MG: 81 TABLET, COATED ORAL at 17:27

## 2023-08-28 RX ADMIN — FLUOXETINE 10 MG: 10 CAPSULE ORAL at 17:27

## 2023-08-28 RX ADMIN — SODIUM CHLORIDE, PRESERVATIVE FREE 10 ML: 5 INJECTION INTRAVENOUS at 09:07

## 2023-08-28 RX ADMIN — ERGOCALCIFEROL 50000 UNITS: 1.25 CAPSULE ORAL at 09:00

## 2023-08-28 RX ADMIN — POTASSIUM CHLORIDE 20 MEQ: 750 TABLET, EXTENDED RELEASE ORAL at 09:00

## 2023-08-28 RX ADMIN — SODIUM CHLORIDE, PRESERVATIVE FREE 10 ML: 5 INJECTION INTRAVENOUS at 20:51

## 2023-08-28 RX ADMIN — GABAPENTIN 200 MG: 100 CAPSULE ORAL at 20:34

## 2023-08-28 ASSESSMENT — PAIN SCALES - GENERAL
PAINLEVEL_OUTOF10: 0

## 2023-08-28 NOTE — PROGRESS NOTES
0700-Beside shift report received from ARLIN See RN. Assumed care of patient. Patient resting with eyes closed. Resp even and unlabored. No distress noted. Insulin gtt infusing. Bed in lowest position. Bed alarm on. CBWR.    0715-Patient assessment performed    0900-AM medications given- Education Provided. Pt tolerated well.    0908-Doctor Rounding at this time    1045-Brief changed of urine. Ekaterina care performed. Pt tolerated well. 1207-Set patient up for lunch. 1545-Brief changed of incontinent urine. Ekaterina care preformed. Bed in lowest position. Bed alarm on. CBWR.    1727-Evening Medications Provided. Pt tolerated well. CHG bath performed. Bed in lowest position. Bed alarm on. CBWR.    1900-Offgoing bedside shift report given to ARLIN Tabares RN.

## 2023-08-28 NOTE — CARE COORDINATION
Case Management Assessment  Initial Evaluation    Date/Time of Evaluation: 8/28/2023 1:26 PM  Assessment Completed by: Ulises Ni RN    If patient is discharged prior to next notation, then this note serves as note for discharge by case management. Patient Name: Lelia Turk                   YOB: 1965  Diagnosis: DKA, type 1, not at goal [facility]) [E10.10]  Diabetic ketoacidosis without coma associated with type 1 diabetes mellitus ([address]) [E10.10]                   Date / Time: 8/27/2023 12:11 PM    Patient Admission Status: Inpatient   Readmission Risk (Low < 19, Mod (19-27), High > 27): Readmission Risk Score: 28.7    Current PCP: PATRICK Gomez CNP  PCP verified by CM? Yes    Chart Reviewed: Yes      History Provided by: Patient  Patient Orientation: Alert and Oriented, Person, Place    Patient Cognition: Alert    Hospitalization in the last 30 days (Readmission):  Yes    If yes, Readmission Assessment in CM Navigator will be completed. Advance Directives:      Code Status: DNR   Patient's Primary Decision Maker is: Legal Next of Kin    [facility] [place] [facility] - [phone]    Discharge Planning:    Patient lives with: Other (Comment) (LTC- EP) Type of Home: Long-Term Care  Primary Care Giver:  Other (Comment) (EP)  Patient Support Systems include: Parent, Other (Comment) (EP)   Current Financial resources: Medicaid, Medicare  Current community resources: None  Current services prior to admission: (P) Durable Medical Equipment            Current DME: (P) Bedside Commode            Type of Home Care services:  (P) None    ADLS  Prior functional level: Assistance with the following:, Bathing, Dressing, Toileting, Mobility  Current functional level: Assistance with the following:, Bathing, Dressing, Toileting, Mobility    PT AM-PAC:   /24  OT AM-PAC:   /24    Family can provide assistance at DC: No  Would you like Case Management to discuss the discharge plan with any other family members/significant others, and if so, who? Yes (Dad)  Plans to Return to Present Housing: Yes  Other Identified Issues/Barriers to RETURNING to current housing: None  Potential Assistance needed at discharge: (P) N/A            Potential DME:    Patient expects to discharge to: (P) Long-term care  Plan for transportation at discharge: (P) Family    Financial    Payor: Yessi Ambrosio / Plan: Tye Rummer / Product Type: *No Product type* /     Does insurance require precert for SNF: Yes    Potential assistance Purchasing Medications: (P) No  Meds-to-Beds request:        [address] [facility], [address] [facility]  [address] [facility] [address]  Phone: [phone] Fax: [phone]      Notes:    Factors facilitating achievement of predicted outcomes: Family support and Caregiver support    Barriers to discharge: Medical complications    Additional Case Management Notes: Discharge Planning/Transition of Care- RUR- 29%. Patient is a LTC resident at [facility]. POC is to discharge back to [facility] when ready for discharge. The Plan for Transition of Care is related to the following treatment goals of DKA, type 1, not at goal [facility]) [E10.10]  Diabetic ketoacidosis without coma associated with type 1 diabetes mellitus ([address] ) [L42.28]    IF APPLICABLE: The Patient and/or patient representative Ken Campos and his family were provided with a choice of provider and agrees with the discharge plan. Freedom of choice list with basic dialogue that supports the patient's individualized plan of care/goals and shares the quality data associated with the providers was provided to: (P) Patient   Patient Representative Name:       The Patient and/or Patient Representative Agree with the Discharge Plan?  (P) Yes    Lennice Canavan, RN  Case Management Department  Ph: [phone]  Fax: [phone]

## 2023-08-28 NOTE — PLAN OF CARE
Problem: Nutrition Deficit:  Goal: Optimize nutritional status  8/28/2023 1455 by Diogenes Quintana  Outcome: Not Progressing  Flowsheets (Taken 8/28/2023 1434)  Nutrient intake appropriate for improving, restoring, or maintaining nutritional needs:   Assess nutritional status and recommend course of action   Monitor oral intake, labs, and treatment plans   Recommend appropriate diets, oral nutritional supplements, and vitamin/mineral supplements   Recommend, monitor, and adjust tube feedings and TPN/PPN based on assessed needs  Comprehensive Nutrition Assessment    Type and Reason for Visit:  Initial  RD screen/readmission    Nutrition Recommendations/Plan:   Family meeting with patient to determine nutritional care (EN versus adequate PO versus  Food and Fluid as patient desires \"comfort measures\")     As per discussion with provider Lantus daily even if PO refused to avoid DKA. Consider Psychiatric consult for anorexia. Provide regular diet with fruit for most desserts and no sugar packet     Malnutrition Assessment:  Malnutrition Status:  Severe malnutrition (08/28/23 0957)    Context:  Chronic Illness     Findings of the 6 clinical characteristics of malnutrition:  Energy Intake:  75% or less estimated energy requirements for 1 month or longer  Weight Loss:  Greater than 5% over 1 month (Pt's usual weight 108 pounds in July, weight taken after pt refused to eat for 2 weeks weight 8/8/23 99 pounds.  Pt readmitted 8/17/23 with weight 108 pounds due to IVF, current weight 112 pounds with no input except IVF.)     Body Fat Loss:  Severe body fat loss Orbital, Triceps, Fat Overlying Ribs, Buccal region   Muscle Mass Loss:  Severe muscle mass loss Temples (temporalis), Clavicles (pectoralis & deltoids), Thigh (quadraceps), Calf (gastrocnemius), Hand (interosseous), Scapula (trapezius)  Fluid Accumulation:  No significant fluid accumulation     Strength:  Not Performed    Nutrition Assessment:    Pt readmitted Nutrition Supplement, Continue Current Diet  Nutrition Education/Counseling: Education declined  Coordination of Nutrition Care: Continue to monitor while inpatient  Plan of Care discussed with: Provider    Goals:  Previous Goal Met: Progress towards Goal(s) Declining  Goals: PO intake 50% or greater, within 7 days     Nutrition Monitoring and Evaluation:   Behavioral-Environmental Outcomes: Readiness for Change  Food/Nutrient Intake Outcomes: Enteral Nutrition Intake/Tolerance, Food and Nutrient Intake  Physical Signs/Symptoms Outcomes: Biochemical Data, Nutrition Focused Physical Findings    Discharge Planning:    Coordination of community care     [address] [place], [place] [address]   Contact: [phone]

## 2023-08-28 NOTE — PLAN OF CARE
Problem: Chronic Conditions and Co-morbidities  Goal: Patient's chronic conditions and co-morbidity symptoms are monitored and maintained or improved  Outcome: Not Progressing  Flowsheets (Taken 8/27/2023 1909)  Care Plan - Patient's Chronic Conditions and Co-Morbidity Symptoms are Monitored and Maintained or Improved: Monitor and assess patient's chronic conditions and comorbid symptoms for stability, deterioration, or improvement     Problem: Neurosensory - Adult  Goal: Achieves stable or improved neurological status  Outcome: Not Progressing  Goal: Achieves maximal functionality and self care  Outcome: Not Progressing     Problem: Metabolic/Fluid and Electrolytes - Adult  Goal: Electrolytes maintained within normal limits  Outcome: Not Progressing  Goal: Hemodynamic stability and optimal renal function maintained  Outcome: Not Progressing     Problem: Hematologic - Adult  Goal: Maintains hematologic stability  Outcome: Not Progressing     Problem: Nutrition Deficit:  Goal: Optimize nutritional status  Outcome: Not Progressing

## 2023-08-28 NOTE — PROGRESS NOTES
Collection Time: 08/27/23  5:10 PM   Result Value Ref Range    Sodium 137 136 - 145 mmol/L    Potassium 3.6 3.5 - 5.5 mmol/L    Chloride 97 (L) 100 - 111 mmol/L    CO2 15 (L) 21 - 32 mmol/L    Anion Gap 25 (H) 3.0 - 18.0 mmol/L    Glucose 370 (H) 74 - 99 mg/dL    BUN 49 (H) 7 - 18 mg/dL    Creatinine 3.02 (H) 0.60 - 1.30 mg/dL    Bun/Cre Ratio 16 12 - 20      Est, Glom Filt Rate 24 (L) >60 ml/min/1.73m2    Calcium 7.8 (L) 8.5 - 10.1 mg/dL   POCT Glucose    Collection Time: 08/27/23  6:04 PM   Result Value Ref Range    POC Glucose 340 (H) 70 - 110 mg/dL    Performed by: [address] [place] [address]    POCT Glucose    Collection Time: 08/27/23  7:08 PM   Result Value Ref Range    POC Glucose 266 (H) 70 - 110 mg/dL    Performed by:  [address] [place] [address]    POCT Glucose    Collection Time: 08/27/23  7:58 PM   Result Value Ref Range    POC Glucose 222 (H) 70 - 110 mg/dL    [facility] by: [facility]    POCT Glucose    Collection Time: 08/27/23  9:27 PM   Result Value Ref Range    POC Glucose 160 (H) 70 - 110 mg/dL    Performed by: Arron [facility]    Basic Metabolic Panel    Collection Time: 08/27/23 10:21 PM   Result Value Ref Range    Sodium 139 136 - 145 mmol/L    Potassium 3.1 (L) 3.5 - 5.5 mmol/L    Chloride 102 100 - 111 mmol/L    CO2 26 21 - 32 mmol/L    Anion Gap 11 3.0 - 18.0 mmol/L    Glucose 137 (H) 74 - 99 mg/dL    BUN 45 (H) 7 - 18 mg/dL    Creatinine 2.87 (H) 0.60 - 1.30 mg/dL    Bun/Cre Ratio 16 12 - 20      Est, Glom Filt Rate 25 (L) >60 ml/min/1.73m2    Calcium 7.5 (L) 8.5 - 10.1 mg/dL   POCT Glucose    Collection Time: 08/27/23 10:25 PM   Result Value Ref Range    POC Glucose 127 (H) 70 - 110 mg/dL    Performed by: Arron Lorenzo    POCT Glucose    Collection Time: 08/27/23 11:33 PM   Result Value Ref Range    POC Glucose 125 (H) 70 - 110 mg/dL    Performed by: Arron Lorenzo    POCT Glucose    Collection Time: 08/28/23 12:22 AM   Result Value Ref Range    POC Glucose 111 (H) 70 - 110 mg/dL    Performed by: Escherichia Coli Not detected     Klebsiella aerogenes by PCR Not detected     Klebsiella oxytoca by PCR Not detected     Klebsiella pneumoniae group by PCR Not detected     Proteus by PCR Detected        Salmonella species by PCR Not detected     Serratia marcescens by PCR Not detected     Haemophilus Influenzae by PCR Not detected     Neisseria meningitidis by PCR Not detected     Pseudomonas aeruginosa Not detected     Stenotrophomonas maltophilia by PCR Not detected     Candida albicans by PCR Not detected     Candida auris by PCR Not detected     Candida glabrata Not detected     Candida krusei by PCR Not detected     Candida parapsilosis by PCR Not detected     Candida tropicalis by PCR Not detected     Cryptococcus neoformans/gattii by PCR Not detected     Resistant gene targets          Resistant gene ctx-m by PCR Not detected     Resistant gene imp by PCR Not detected     KPC (Carbapenem resistance gene) Not detected     Resistant gene ndm by PCR Not detected     Resistant gene oxa-48-like by pcr Not detected     Resistant gene vim by PCR Not detected     [employer] test comment False positive results may rarely occur. Correlate with     Comment: clinical,epidemiologic,  and other laboratory findings  Please see BCID Interpretation Guide in EPIC Links                  Imaging results impression onlyXR CHEST PORTABLE    Result Date: 8/27/2023  Left-sided PICC line tip is near the right atrium-superior vena cava junction. XR CHEST PORTABLE    Result Date: 8/27/2023  Mild hyperinflation. No infiltrate. XR CHEST PORTABLE   Final Result   Left-sided PICC line tip is near the right atrium-superior vena cava junction. XR CHEST PORTABLE   Final Result      Mild hyperinflation. No infiltrate.                          Connie Seip M.D.

## 2023-08-28 NOTE — PROGRESS NOTES
-1850 Received care of pt from off going nurse. Pt lying in bed. Insulin drip @ 16.48 units/hr (See MAR for titration). -1909 PM Assessment completed. Pt opens eyes when name is called. Resp even and non-labored. Lungs clear, SATS 96% on RA. Skin warm and dry. Skin pallor pale. INT to right thumb intact at present.      -2009 Dr. Rivera into see pt. MD made aware that pt is not alert enough to take po pm meds. Per MD okay to use PICC and to leave pt NPO. Malika Ramirez Per MD continue to let Sodium bicarb drip infuse until completed. IVF and Insulin drip connected to PICC line. PICC line measures  22 cm in circumference,  5 cm from hub insertion site.    -2014 . IVF changed to D5 1/2 NS with 20 meq KCL @ 100 ml/hr.    -2115 Pt family member calling to check on pt.     -2147 Pt unable to swallow. Pt is NPO at present.    -2220 Urine noted in suction cannister.    -0005 Hourly rounding done at this time. Pt resting quietly in bed. VSS.      -0250 Sodium bicarb drip done.    -8936 Urine specimen sent to lab.    -0420  in.  AM labs drawn from PICC. Ports changed anf flushed per protocol.    -0530 Pt incontinent of huge, dark, mucous stool. Pt cleaned. New primo fit applied and connected to suction. Stool specimen sent to lab.    -0760 Bedside shift change report given to IRENE Copeland/ ELIZABETH Goodwin RN (oncoming nurse) by ARLIN Merrill RN (offgoing nurse). Report included the following information Adult Overview, Surgery Report, Intake/Output, MAR, and Recent Results.

## 2023-08-29 VITALS
HEART RATE: 101 BPM | WEIGHT: 112.3 LBS | SYSTOLIC BLOOD PRESSURE: 102 MMHG | OXYGEN SATURATION: 99 % | DIASTOLIC BLOOD PRESSURE: 64 MMHG | BODY MASS INDEX: 15.72 KG/M2 | HEIGHT: 71 IN | TEMPERATURE: 98.1 F | RESPIRATION RATE: 15 BRPM

## 2023-08-29 LAB
ANION GAP SERPL CALC-SCNC: 6 MMOL/L (ref 3–18)
BACTERIA SPEC CULT: NORMAL
BUN SERPL-MCNC: 30 MG/DL (ref 7–18)
BUN/CREAT SERPL: 19 (ref 12–20)
CA-I BLD-MCNC: 7.5 MG/DL (ref 8.5–10.1)
CHLORIDE SERPL-SCNC: 105 MMOL/L (ref 100–111)
CO2 SERPL-SCNC: 30 MMOL/L (ref 21–32)
CREAT SERPL-MCNC: 1.61 MG/DL (ref 0.6–1.3)
FLUAV RNA SPEC QL NAA+PROBE: NOT DETECTED
FLUBV RNA SPEC QL NAA+PROBE: NOT DETECTED
GLUCOSE BLD STRIP.AUTO-MCNC: 100 MG/DL (ref 70–110)
GLUCOSE BLD STRIP.AUTO-MCNC: 88 MG/DL (ref 70–110)
GLUCOSE BLD STRIP.AUTO-MCNC: 97 MG/DL (ref 70–110)
GLUCOSE SERPL-MCNC: 73 MG/DL (ref 74–99)
Lab: NORMAL
MAGNESIUM SERPL-MCNC: 1.8 MG/DL (ref 1.6–2.6)
PERFORMED BY:: NORMAL
POTASSIUM SERPL-SCNC: 3.2 MMOL/L (ref 3.5–5.5)
SARS-COV-2 RNA RESP QL NAA+PROBE: NOT DETECTED
SODIUM SERPL-SCNC: 141 MMOL/L (ref 136–145)

## 2023-08-29 PROCEDURE — 82962 GLUCOSE BLOOD TEST: CPT

## 2023-08-29 PROCEDURE — 80048 BASIC METABOLIC PNL TOTAL CA: CPT

## 2023-08-29 PROCEDURE — 6370000000 HC RX 637 (ALT 250 FOR IP): Performed by: INTERNAL MEDICINE

## 2023-08-29 PROCEDURE — 36415 COLL VENOUS BLD VENIPUNCTURE: CPT

## 2023-08-29 PROCEDURE — 2580000003 HC RX 258: Performed by: INTERNAL MEDICINE

## 2023-08-29 PROCEDURE — 83735 ASSAY OF MAGNESIUM: CPT

## 2023-08-29 PROCEDURE — 87636 SARSCOV2 & INF A&B AMP PRB: CPT

## 2023-08-29 RX ORDER — ENOXAPARIN SODIUM 100 MG/ML
40 INJECTION SUBCUTANEOUS EVERY 24 HOURS
Status: DISCONTINUED | OUTPATIENT
Start: 2023-08-29 | End: 2023-08-29 | Stop reason: HOSPADM

## 2023-08-29 RX ORDER — INSULIN GLARGINE 100 [IU]/ML
18 INJECTION, SOLUTION SUBCUTANEOUS DAILY
Qty: 10 ML | Refills: 3 | Status: SHIPPED | OUTPATIENT
Start: 2023-08-29

## 2023-08-29 RX ADMIN — SODIUM CHLORIDE, PRESERVATIVE FREE 10 ML: 5 INJECTION INTRAVENOUS at 09:47

## 2023-08-29 RX ADMIN — GABAPENTIN 200 MG: 100 CAPSULE ORAL at 09:45

## 2023-08-29 RX ADMIN — INSULIN GLARGINE 20 UNITS: 100 INJECTION, SOLUTION SUBCUTANEOUS at 09:45

## 2023-08-29 RX ADMIN — POTASSIUM CHLORIDE 20 MEQ: 750 TABLET, EXTENDED RELEASE ORAL at 09:46

## 2023-08-29 RX ADMIN — SODIUM BICARBONATE 650 MG: 650 TABLET ORAL at 09:47

## 2023-08-29 RX ADMIN — INSULIN LISPRO 6 UNITS: 100 INJECTION, SOLUTION INTRAVENOUS; SUBCUTANEOUS at 11:23

## 2023-08-29 ASSESSMENT — PAIN SCALES - GENERAL
PAINLEVEL_OUTOF10: 0

## 2023-08-29 NOTE — PROGRESS NOTES
-3813- Bedside report  received from by Chang Westfall RN (offgoing nurse). Report included the following information Intake/Output, MAR, and Recent Results. 0754-patient assessment performed    Am GLUCOSE 100.     Patient refusing AM Breakfast tray    0945-AM meds given, Patient consumed couple of spoonful of breakfast;    1045-report called to EP    1125-patient assisted with meal tray set up; patient currently eating cheeseburger    1150-PICC and iv removed no complications,patient transferred to EP

## 2023-08-29 NOTE — CARE COORDINATION
Patient will be going back to [facility] today. Nurse can call report to [phone]. Nurse made aware.

## 2023-08-29 NOTE — DISCHARGE SUMMARY
Discharge Summary       Patient ID:  Jesse Mary,   56 y.o., male  1965    PCP:  PATRICK Augustin CNP    Admit Date: 8/27/2023 12:11 PM  Discharge Date:  No discharge date for patient encounter. Length of stay: 2 day(s)  Code Status: DNR  Discharging physician: Jany Jamison MD    Admission Diagnoses:   DKA, type 1, not at goal [facility]) [E10.10]  Diabetic ketoacidosis without coma associated with type 1 diabetes mellitus ([address]) [E10.10]      Recurrent admission for DKA due to non-compliance with insulin  The patient must have half dose of his Lantus sq  even not eating at EP      Discharge Medications     Medication List        CHANGE how you take these medications      insulin glargine 100 UNIT/ML injection vial  Commonly known as: LANTUS  Inject 18 Units into the skin daily  What changed: how much to take            CONTINUE taking these medications      acetaminophen 325 MG tablet  Commonly known as: TYLENOL     aspirin 81 MG EC tablet     cetirizine 10 MG tablet  Commonly known as: ZYRTEC     FLUoxetine 10 MG capsule  Commonly known as: PROZAC     gabapentin 100 MG capsule  Commonly known as: NEURONTIN  Take 2 capsules by mouth in the morning and at bedtime for 90 days.  Max Daily Amount: 400 mg     glucose 4 g chewable tablet     insulin aspart 100 UNIT/ML injection vial  Commonly known as: NOVOLOG     loperamide 2 MG capsule  Commonly known as: IMODIUM     potassium chloride 20 MEQ extended release tablet  Commonly known as: KLOR-CON M  Take 1 tablet by mouth 2 times daily     rosuvastatin 10 MG tablet  Commonly known as: CRESTOR     sodium bicarbonate 650 MG tablet  Take 1 tablet by mouth 2 times daily     vitamin D 1.25 MG (28871 UT) Caps capsule  Commonly known as: ERGOCALCIFEROL               Where to Get Your Medications        These medications were sent to [address] [place], [place] [address] , [address] [place] [address]

## 2023-08-30 ENCOUNTER — CARE COORDINATION (OUTPATIENT)
Facility: CLINIC | Age: 58
End: 2023-08-30

## 2023-08-30 NOTE — CARE COORDINATION
No transition of care outreach indicated due to patient discharge to [facility] [address] [place] [address].

## 2023-09-04 ENCOUNTER — HOSPITAL ENCOUNTER (OUTPATIENT)
Age: 58
Discharge: HOME | End: 2023-09-07
Payer: MEDICARE

## 2023-09-05 LAB
ANION GAP SERPL CALC-SCNC: 7 MMOL/L (ref 3–18)
BUN SERPL-MCNC: 15 MG/DL (ref 7–18)
BUN/CREAT SERPL: 13 (ref 12–20)
CA-I BLD-MCNC: 8.4 MG/DL (ref 8.5–10.1)
CHLORIDE SERPL-SCNC: 95 MMOL/L (ref 100–111)
CO2 SERPL-SCNC: 31 MMOL/L (ref 21–32)
CREAT SERPL-MCNC: 1.16 MG/DL (ref 0.6–1.3)
GLUCOSE SERPL-MCNC: 95 MG/DL (ref 74–99)
POTASSIUM SERPL-SCNC: 4 MMOL/L (ref 3.5–5.5)
SODIUM SERPL-SCNC: 133 MMOL/L (ref 136–145)

## 2023-09-05 PROCEDURE — 80048 BASIC METABOLIC PNL TOTAL CA: CPT

## 2023-09-05 PROCEDURE — 36415 COLL VENOUS BLD VENIPUNCTURE: CPT

## 2023-09-11 ENCOUNTER — HOSPITAL ENCOUNTER (OUTPATIENT)
Age: 58
Discharge: HOME | End: 2023-09-14
Payer: MEDICARE

## 2023-09-11 LAB
ANION GAP SERPL CALC-SCNC: 11 MMOL/L (ref 3–18)
BUN SERPL-MCNC: 26 MG/DL (ref 7–18)
BUN/CREAT SERPL: 15 (ref 12–20)
CA-I BLD-MCNC: 8.2 MG/DL (ref 8.5–10.1)
CHLORIDE SERPL-SCNC: 97 MMOL/L (ref 100–111)
CO2 SERPL-SCNC: 27 MMOL/L (ref 21–32)
CREAT SERPL-MCNC: 1.74 MG/DL (ref 0.6–1.3)
GLUCOSE SERPL-MCNC: 206 MG/DL (ref 74–99)
POTASSIUM SERPL-SCNC: 3.6 MMOL/L (ref 3.5–5.5)
SODIUM SERPL-SCNC: 135 MMOL/L (ref 136–145)

## 2023-09-11 PROCEDURE — 36415 COLL VENOUS BLD VENIPUNCTURE: CPT

## 2023-09-11 PROCEDURE — 80048 BASIC METABOLIC PNL TOTAL CA: CPT

## 2023-09-13 ENCOUNTER — HOSPITAL ENCOUNTER (OUTPATIENT)
Age: 58
Discharge: HOME | End: 2023-09-16
Payer: MEDICARE

## 2023-09-13 LAB
ANION GAP SERPL CALC-SCNC: 11 MMOL/L (ref 3–18)
BUN SERPL-MCNC: 28 MG/DL (ref 7–18)
BUN/CREAT SERPL: 16 (ref 12–20)
CA-I BLD-MCNC: 8.3 MG/DL (ref 8.5–10.1)
CHLORIDE SERPL-SCNC: 96 MMOL/L (ref 100–111)
CO2 SERPL-SCNC: 27 MMOL/L (ref 21–32)
CREAT SERPL-MCNC: 1.8 MG/DL (ref 0.6–1.3)
GLUCOSE SERPL-MCNC: 188 MG/DL (ref 74–99)
POTASSIUM SERPL-SCNC: 3.4 MMOL/L (ref 3.5–5.5)
SODIUM SERPL-SCNC: 134 MMOL/L (ref 136–145)

## 2023-09-13 PROCEDURE — 80048 BASIC METABOLIC PNL TOTAL CA: CPT

## 2023-09-13 PROCEDURE — 36415 COLL VENOUS BLD VENIPUNCTURE: CPT

## 2023-09-15 ENCOUNTER — HOSPITAL ENCOUNTER (OUTPATIENT)
Age: 58
End: 2023-09-15
Payer: MEDICARE

## 2023-09-15 LAB
ANION GAP SERPL CALC-SCNC: 37 MMOL/L (ref 3–18)
BUN SERPL-MCNC: 49 MG/DL (ref 7–18)
BUN/CREAT SERPL: 16 (ref 12–20)
CA-I BLD-MCNC: 8.1 MG/DL (ref 8.5–10.1)
CHLORIDE SERPL-SCNC: 89 MMOL/L (ref 100–111)
CO2 SERPL-SCNC: 6 MMOL/L (ref 21–32)
CREAT SERPL-MCNC: 3 MG/DL (ref 0.6–1.3)
GLUCOSE SERPL-MCNC: 689 MG/DL (ref 74–99)
GLUCOSE SERPL-MCNC: 689 MG/DL (ref 74–99)
POTASSIUM SERPL-SCNC: 3.9 MMOL/L (ref 3.5–5.5)
SODIUM SERPL-SCNC: 132 MMOL/L (ref 136–145)

## 2023-09-15 PROCEDURE — 82947 ASSAY GLUCOSE BLOOD QUANT: CPT

## 2023-09-15 PROCEDURE — 36415 COLL VENOUS BLD VENIPUNCTURE: CPT

## 2023-09-15 PROCEDURE — 80048 BASIC METABOLIC PNL TOTAL CA: CPT
